# Patient Record
Sex: FEMALE | Race: BLACK OR AFRICAN AMERICAN | NOT HISPANIC OR LATINO | Employment: OTHER | URBAN - METROPOLITAN AREA
[De-identification: names, ages, dates, MRNs, and addresses within clinical notes are randomized per-mention and may not be internally consistent; named-entity substitution may affect disease eponyms.]

---

## 2017-11-19 ENCOUNTER — HOSPITAL ENCOUNTER (OUTPATIENT)
Facility: HOSPITAL | Age: 77
Discharge: HOME HEALTH | End: 2017-11-20
Attending: EMERGENCY MEDICINE | Admitting: INTERNAL MEDICINE
Payer: MEDICARE

## 2017-11-19 DIAGNOSIS — I16.0 HYPERTENSIVE URGENCY: ICD-10-CM

## 2017-11-19 DIAGNOSIS — R42 DIZZINESS: ICD-10-CM

## 2017-11-19 DIAGNOSIS — I20.89 ANGINAL EQUIVALENT: Primary | ICD-10-CM

## 2017-11-19 DIAGNOSIS — R06.02 SOB (SHORTNESS OF BREATH): ICD-10-CM

## 2017-11-19 DIAGNOSIS — R07.9 CHEST PAIN: ICD-10-CM

## 2017-11-19 PROBLEM — D64.9 NORMOCYTIC ANEMIA: Status: ACTIVE | Noted: 2017-11-19

## 2017-11-19 PROBLEM — N18.30 CKD (CHRONIC KIDNEY DISEASE) STAGE 3, GFR 30-59 ML/MIN: Status: ACTIVE | Noted: 2017-11-19

## 2017-11-19 LAB
ALBUMIN SERPL BCP-MCNC: 3.4 G/DL
ALP SERPL-CCNC: 71 U/L
ALT SERPL W/O P-5'-P-CCNC: 9 U/L
AMPHET+METHAMPHET UR QL: NEGATIVE
ANION GAP SERPL CALC-SCNC: 10 MMOL/L
APTT BLDCRRT: 24 SEC
AST SERPL-CCNC: 15 U/L
BARBITURATES UR QL SCN>200 NG/ML: NEGATIVE
BASOPHILS # BLD AUTO: 0.02 K/UL
BASOPHILS NFR BLD: 0.4 %
BENZODIAZ UR QL SCN>200 NG/ML: NEGATIVE
BILIRUB SERPL-MCNC: 0.4 MG/DL
BILIRUB UR QL STRIP: NEGATIVE
BNP SERPL-MCNC: 75 PG/ML
BUN SERPL-MCNC: 20 MG/DL
BZE UR QL SCN: NEGATIVE
CALCIUM SERPL-MCNC: 9.3 MG/DL
CANNABINOIDS UR QL SCN: NEGATIVE
CHLORIDE SERPL-SCNC: 107 MMOL/L
CLARITY UR: CLEAR
CO2 SERPL-SCNC: 21 MMOL/L
COLOR UR: YELLOW
CREAT SERPL-MCNC: 1.3 MG/DL
CREAT UR-MCNC: 268.1 MG/DL
DIFFERENTIAL METHOD: ABNORMAL
EOSINOPHIL # BLD AUTO: 0 K/UL
EOSINOPHIL NFR BLD: 0.4 %
ERYTHROCYTE [DISTWIDTH] IN BLOOD BY AUTOMATED COUNT: 14.5 %
EST. GFR  (AFRICAN AMERICAN): 45 ML/MIN/1.73 M^2
EST. GFR  (NON AFRICAN AMERICAN): 39 ML/MIN/1.73 M^2
FERRITIN SERPL-MCNC: 23 NG/ML
FOLATE SERPL-MCNC: 8.2 NG/ML
GLUCOSE SERPL-MCNC: 97 MG/DL
GLUCOSE UR QL STRIP: NEGATIVE
HCT VFR BLD AUTO: 34.9 %
HGB BLD-MCNC: 10.9 G/DL
HGB UR QL STRIP: NEGATIVE
INR PPP: 1
IRON SERPL-MCNC: 71 UG/DL
KETONES UR QL STRIP: ABNORMAL
LEUKOCYTE ESTERASE UR QL STRIP: NEGATIVE
LYMPHOCYTES # BLD AUTO: 1 K/UL
LYMPHOCYTES NFR BLD: 23 %
MCH RBC QN AUTO: 28.2 PG
MCHC RBC AUTO-ENTMCNC: 31.2 G/DL
MCV RBC AUTO: 90 FL
METHADONE UR QL SCN>300 NG/ML: NEGATIVE
MONOCYTES # BLD AUTO: 0.3 K/UL
MONOCYTES NFR BLD: 6.4 %
NEUTROPHILS # BLD AUTO: 3.1 K/UL
NEUTROPHILS NFR BLD: 69.6 %
NITRITE UR QL STRIP: NEGATIVE
OPIATES UR QL SCN: NEGATIVE
PCP UR QL SCN>25 NG/ML: NEGATIVE
PH UR STRIP: 6 [PH] (ref 5–8)
PLATELET # BLD AUTO: 217 K/UL
PMV BLD AUTO: 11.2 FL
POTASSIUM SERPL-SCNC: 4.2 MMOL/L
PROT SERPL-MCNC: 7.2 G/DL
PROT UR QL STRIP: NEGATIVE
PROTHROMBIN TIME: 10.6 SEC
RBC # BLD AUTO: 3.86 M/UL
SATURATED IRON: 23 %
SODIUM SERPL-SCNC: 138 MMOL/L
SP GR UR STRIP: 1.02 (ref 1–1.03)
TOTAL IRON BINDING CAPACITY: 306 UG/DL
TOXICOLOGY INFORMATION: NORMAL
TRANSFERRIN SERPL-MCNC: 207 MG/DL
TROPONIN I SERPL DL<=0.01 NG/ML-MCNC: 0.01 NG/ML
TROPONIN I SERPL DL<=0.01 NG/ML-MCNC: 0.01 NG/ML
TSH SERPL DL<=0.005 MIU/L-ACNC: 0.87 UIU/ML
URN SPEC COLLECT METH UR: ABNORMAL
UROBILINOGEN UR STRIP-ACNC: 1 EU/DL
VIT B12 SERPL-MCNC: 296 PG/ML
WBC # BLD AUTO: 4.52 K/UL

## 2017-11-19 PROCEDURE — 82746 ASSAY OF FOLIC ACID SERUM: CPT

## 2017-11-19 PROCEDURE — 36415 COLL VENOUS BLD VENIPUNCTURE: CPT

## 2017-11-19 PROCEDURE — 84484 ASSAY OF TROPONIN QUANT: CPT

## 2017-11-19 PROCEDURE — 83880 ASSAY OF NATRIURETIC PEPTIDE: CPT

## 2017-11-19 PROCEDURE — 84484 ASSAY OF TROPONIN QUANT: CPT | Mod: 91

## 2017-11-19 PROCEDURE — 81003 URINALYSIS AUTO W/O SCOPE: CPT

## 2017-11-19 PROCEDURE — G0378 HOSPITAL OBSERVATION PER HR: HCPCS

## 2017-11-19 PROCEDURE — 85610 PROTHROMBIN TIME: CPT

## 2017-11-19 PROCEDURE — 63600175 PHARM REV CODE 636 W HCPCS: Performed by: INTERNAL MEDICINE

## 2017-11-19 PROCEDURE — 93005 ELECTROCARDIOGRAM TRACING: CPT

## 2017-11-19 PROCEDURE — 93010 ELECTROCARDIOGRAM REPORT: CPT | Mod: ,,, | Performed by: INTERNAL MEDICINE

## 2017-11-19 PROCEDURE — 85730 THROMBOPLASTIN TIME PARTIAL: CPT

## 2017-11-19 PROCEDURE — 84443 ASSAY THYROID STIM HORMONE: CPT

## 2017-11-19 PROCEDURE — 85025 COMPLETE CBC W/AUTO DIFF WBC: CPT

## 2017-11-19 PROCEDURE — 25000003 PHARM REV CODE 250: Performed by: INTERNAL MEDICINE

## 2017-11-19 PROCEDURE — 82607 VITAMIN B-12: CPT

## 2017-11-19 PROCEDURE — 82728 ASSAY OF FERRITIN: CPT

## 2017-11-19 PROCEDURE — 83540 ASSAY OF IRON: CPT

## 2017-11-19 PROCEDURE — 80307 DRUG TEST PRSMV CHEM ANLYZR: CPT

## 2017-11-19 PROCEDURE — 80053 COMPREHEN METABOLIC PANEL: CPT

## 2017-11-19 PROCEDURE — 99285 EMERGENCY DEPT VISIT HI MDM: CPT | Mod: 25

## 2017-11-19 RX ORDER — DOCUSATE SODIUM 100 MG/1
100 CAPSULE, LIQUID FILLED ORAL 2 TIMES DAILY
Status: DISCONTINUED | OUTPATIENT
Start: 2017-11-19 | End: 2017-11-20 | Stop reason: HOSPADM

## 2017-11-19 RX ORDER — HEPARIN SODIUM 5000 [USP'U]/ML
5000 INJECTION, SOLUTION INTRAVENOUS; SUBCUTANEOUS EVERY 8 HOURS
Status: DISCONTINUED | OUTPATIENT
Start: 2017-11-19 | End: 2017-11-20 | Stop reason: HOSPADM

## 2017-11-19 RX ORDER — ONDANSETRON 2 MG/ML
4 INJECTION INTRAMUSCULAR; INTRAVENOUS EVERY 8 HOURS PRN
Status: DISCONTINUED | OUTPATIENT
Start: 2017-11-19 | End: 2017-11-20 | Stop reason: HOSPADM

## 2017-11-19 RX ORDER — NAPROXEN SODIUM 220 MG/1
81 TABLET, FILM COATED ORAL DAILY
Status: DISCONTINUED | OUTPATIENT
Start: 2017-11-19 | End: 2017-11-20 | Stop reason: HOSPADM

## 2017-11-19 RX ORDER — FERROUS SULFATE 325(65) MG
325 TABLET, DELAYED RELEASE (ENTERIC COATED) ORAL 2 TIMES DAILY
Status: DISCONTINUED | OUTPATIENT
Start: 2017-11-19 | End: 2017-11-20 | Stop reason: HOSPADM

## 2017-11-19 RX ORDER — ATORVASTATIN CALCIUM 20 MG/1
20 TABLET, FILM COATED ORAL DAILY
Status: DISCONTINUED | OUTPATIENT
Start: 2017-11-20 | End: 2017-11-20 | Stop reason: HOSPADM

## 2017-11-19 RX ORDER — LOSARTAN POTASSIUM 50 MG/1
100 TABLET ORAL DAILY
Status: DISCONTINUED | OUTPATIENT
Start: 2017-11-19 | End: 2017-11-20 | Stop reason: HOSPADM

## 2017-11-19 RX ORDER — AMLODIPINE BESYLATE 5 MG/1
10 TABLET ORAL DAILY
Status: DISCONTINUED | OUTPATIENT
Start: 2017-11-19 | End: 2017-11-20 | Stop reason: HOSPADM

## 2017-11-19 RX ORDER — SODIUM CHLORIDE 9 MG/ML
INJECTION, SOLUTION INTRAVENOUS CONTINUOUS
Status: DISCONTINUED | OUTPATIENT
Start: 2017-11-19 | End: 2017-11-20

## 2017-11-19 RX ADMIN — HEPARIN SODIUM 5000 UNITS: 5000 INJECTION, SOLUTION INTRAVENOUS; SUBCUTANEOUS at 10:11

## 2017-11-19 RX ADMIN — ASPIRIN 81 MG 81 MG: 81 TABLET ORAL at 04:11

## 2017-11-19 RX ADMIN — SODIUM CHLORIDE: 0.9 INJECTION, SOLUTION INTRAVENOUS at 06:11

## 2017-11-19 RX ADMIN — LOSARTAN POTASSIUM 100 MG: 50 TABLET, FILM COATED ORAL at 04:11

## 2017-11-19 RX ADMIN — AMLODIPINE BESYLATE 10 MG: 5 TABLET ORAL at 04:11

## 2017-11-19 RX ADMIN — DOCUSATE SODIUM 100 MG: 100 CAPSULE, LIQUID FILLED ORAL at 08:11

## 2017-11-19 RX ADMIN — FERROUS SULFATE TAB EC 325 MG (65 MG FE EQUIVALENT) 325 MG: 325 (65 FE) TABLET DELAYED RESPONSE at 08:11

## 2017-11-19 NOTE — ED TRIAGE NOTES
Pt reports chest discomfort, with dizziness x 4 days, states thought it would get better but did not. Pt denies dizziness at this time, rates chest discomfort 7/10. Pt denies shortness of breath, n/v/d. Pt states symptoms worsens with ambulation and exertion.

## 2017-11-19 NOTE — MEDICAL/APP STUDENT
Chief Complaint: Worsening dizziness x2 days    HPI: Ms. Crystal Alvarado is a 78 yo AAF with a history of HTN, SSS, HLD, CAD, CKDIII, bradycardia, Tobacco use, and arthritis, who presented to [facility] ED on 11/19 with worsening dizziness for the last 2 days. She states that she last felt like her normal self prior to the summer, but has had dizziness with standing and movement, typically 1-2 episodes per day. She states that for the last 2 days she has had more frequent episodes, with dizziness any time she is not sitting or laying down. She describes the dizziness as a feeling of about to pass out, but denies vertigo, and loss of consciousness. She states that during these periodic episodes she usually sits or lays down, which improves her symptoms. She also endorses some tunnel vision, palpitations, SOB, and blurred vision associated with these dizzy spells, especially in the last week.   She indicates that she has had a decreased appetite for the last 2 months, that she does not attribute to anything specific. She denies any nausea, vomiting, diarrhea, and abdominal pain. She reports some right arm pain, exacerbated with movement. She denies any recent illness, sick contacts, or recent travel. She does indorse some infrequent hot flashes, which she says she has experienced for years. She reports her only medical problem is arthritis, and when questioned further states that she does have a heart problem, but does not know what problem specifically.  She reports a history of smoking 4-5 cigarettes a day for 5 years, but states that she quit 2 years ago. She denies alcohol and illicit/recreational drug use. She reports that she lives with her daughter, but that she is able to accomplish all the activities of daily living with minimal to no assistance and does not use any durable medical equipment. She reports that she is compliant with her 4 daily medications, though admits that doesn't remember the name of the  medications that she takes. She states that she did not take them today due to feeling poorly.    Past Medical History:   HTN  Sick Sinus Syndrome  HLD  CAD  CKD Stage III  Bradycardia  Tobacco use  Arthritis    Past Surgical History:  Hysterectomy    Immunizations/Screening:  Mammogram, Colonoscopy, PAP: no longer indicated by age  DEXA: up to date  Tdap up to date, date unknown  Flu not up to date, had flu vaccine last year  Pneumovac up to date    Allergies:   NKDA    Home medications:  Amlodipine 10mg PO QD  Atorvastatin 20mg PO QD  Aspirin 81mg PO QD  Losartan 100mg PO QD    Family History:  No pertinent family history.    Social History:   Denies current tobacco use. 4-5 cigarettes/day q5qunks, quit 2 years ago  Denies alcohol use  Denies illicit/recreational drug use    Review of Systems:  General: denies fever, chills, recent illness, denies cold intolerance, endorses infrequent hot flashes  Neuro: denies numbness, tingling, decreased sensation, denies weakness and loss of consciousness  Head: denies recent trauma, denies headaches  Eyes: endorses some tunnel vision and blurred vision during dizzy spells  Ears: denies ear pain, denies changes in hearing  Nose: denies nasal drainage or congestion  Throat: denies throat pain, cough, and dysphagia  Cardio: endorses periodic palpitations associated with dizzy spells, denies chest pain  Respiratory: endorses SOB periodically, denies recent URI  Abd: denies N/V, denies diarrhea, denies abdominal pain or discomfort, denies bowel habit changes, denies blood in stool  Gu: denies recent bladder changes, denies hematuria, dysuria  Ext: R arm pain-increased with movement, denies numbness, tingling, denies changes in sensation    Objective:  Vitals:  Temp: 98.9  Pulse: 56-74  RR: 16-20  Bp: 170-200/66-82  SpO2: 100% on RA    Physical Exam:  General: alert and awake, NAD, sitting comfortably in bed  Neuro: A&O x3, no focal deficits, strength 5/5 in UE & LE  bilaterally  Head: normocephalic, atraumatic  Eyes: EOMI, pupils equal and reactive to light, anicteric sclera  Ears: no erythema, no drainage noted  Throat: no oropharyngeal erythema or exudates, oral mucosa moist and pink  Neck: supple, trachea midline, no LAD, JVP 5-6cm  Cardiac: RRR, Grade II systolic murmur at L upper sternal border  Lung: CTAB, no crackles, wheezes, or rhonchi, no accessory muscle use, unlabored breathing  Abd: soft, nondistended, nontender to palpation, normoactive bowel sounds in all 4 quadrants  Skin: warm, dry, intact, no lesions or rashes noted  Ext: warm, well-perfused, trace edema of lower extremities bilaterally, no cyanosis, no clubbing  Pulses: 2+ dorsalis pedis and radial pulses bilaterally  Psych: appropriate demeanor and affect on exam    Labs:  WBC: 4.52  RBC: 3.86  Hgb: 10.9  Hct: 34.9  MCV: 90  MCH: 28.2  MCHC: 31.2  RDW: 14.5  Plt: 217  MPV: 11.2  Gran%: 69.6  Gran#: 3.1  Lymph%: 23.0  Lymph#: 1.0  Mono%: 6.4  Mono#: 0.3  Eos%: 0.4  Eos#: 0.0  Baso%: 0.4  Baso#: 0.02    Protime: 10.6  INR: 1.0  aPTT: 24.0    Na: 138  K: 4.2  Cl: 107  Co2: 21  Anion Gap: 10  BUN: 20  Crt: 1.3  GFR: 45  Glucose: 97  Ca: 9.3  Alk Phos: 71  Total Protein: 7.2  Albumin: 3.4  Total Bili: 0.4  AST: 15  ALT: 9    Troponin I: 0.010  BNP: 75    Urine Crt: 268.1    Urine Tox: negative  U/A: urine, clean catch   Color: yellow   pH: 6.0   Specific gravity: 1.025   Appearance: clear   Protein: negative   Glucose: negative   Ketones: trace   Occult blood: negative   Nitrite: negative   Urobilinogen: 1.0   Bilirubin: negative   Leukocytes: negative      Imagin/19 CXR:   The cardiomediastinal silhouette is prominent, similar to the previous exam noting calcification of the aortic arch. There is no pleural effusion.  The trachea is midline. The lungs are symmetrically expanded bilaterally with mildly coarse interstitial attenuation and minimal basilar subsegmental atelectasis. No large focal consolidation  seen.  There is no pneumothorax.  The osseous structures are remarkable for degenerative changes of the spine and shoulders. Stable chronic findings, no acute cardiopulmonary process.    11/19 EKG:    Normal sinus rhythm    Assessment:  Ms. Crystal Alvarado is a 76yo AA female with a history of HTN, HLD, and CAD, who presented with worsening dizziness for the last 2 days.    Plan:  1. Dizziness   -2/2 cardiac vs venous insufficiency vs dehydration vs thyroid vs medication induced etiology   -Likely some combination of the above   -orthostatics negative on admit, will repeat in the AM   - CBC & CMP Qd, Hgb A1c, B12, Folate, iron studies ordered, will follow up   - PT/OT consulted   -Cardiac    -EKG shows normal sinus rhythm    -CXR shows chronic findings, no acute cardiopulmonary processes    - Troponin x1 negative, will repeat x2    -BNP 75    -Will follow up TTE tomorrow    -admittied with cardiac telemetry for monitoring    -cardiology consulted, will follow up recomendations   -Venous insufficiency    -recommended use of compression socks during admit to improve stasis    -consider discharge on fludrocortisone   -dehydration    -will start IVF for rehydration   -thyroid    -will follow up thyroid function tests   -medication induced    -home medications restarted as necessary for BP control, will discontinue any unnecessary medications to optimize regimen  2. Protein Gap   -protein gap 3.8 on admission   -will follow up Hep C antibody, HIV 1 & 2 antibodies   -will continue to monitor  3. CAD    -CAD reported in pt chart   -Cath in 2015 indicated no concerning disease   -Home dose Aspirin 81mg continued on admit  4. HTN   -Pt reports that she did not take her meds today   -Hypertensive in the ED with SBP up to 200   -Continued home dose of Amlodpine 10mg PO Qd and Losartan 100mg PO QD  5. HLD   -Continued home dose atorvastatin 20mg PO QD  6. Arthritis   -Reports right arm pain, increased with activity   -Will  continue to monitor

## 2017-11-19 NOTE — ED PROVIDER NOTES
"Encounter Date: 11/19/2017       History     Chief Complaint   Patient presents with    Dizziness     dizziness and a "funny feeling in my chest" x 3 days, denies n/v, intermittent     76yo female with pmhx of HTN and HLD is here for SOB and CP on exertion for the past three to four days.  Pt states that her anterior chest "feels funny" when she gets up and walks.  No fever/chills hemoptysis, abd pain, n/v/d, dysuria, or leg swelling.  She has an occasional nonproductive cough.  She reports occasional palpitations when she is walking.  Pt reports that she has also felt "like I'm going to pass out" when she is walking.  No falls or recent injury.  Pt denies headache, numbness/tingling.  She reports that she has been taking her prescription medications as prescribed.  She reports that for the past several weeks, she has had decreased appetite, but has been drinking fluids without difficulty.        The history is provided by the patient and medical records.   Chest Pain   The current episode started several days ago. Chest pain occurs intermittently. The chest pain is unchanged. The pain is associated with exertion. Quality: "feels funny" "tight" The pain does not radiate. Chest pain is worsened by exertion. Primary symptoms include fatigue, shortness of breath, cough and palpitations. Pertinent negatives for primary symptoms include no fever, no abdominal pain, no nausea, no vomiting, no dizziness and no altered mental status.   The palpitations also occurred with shortness of breath. The palpitations did not occur with dizziness.     Associated symptoms include near-syncope.   Pertinent negatives for associated symptoms include no diaphoresis, no lower extremity edema, no numbness, no orthopnea, no paroxysmal nocturnal dyspnea and no weakness. She tried nothing for the symptoms. Risk factors include being elderly and obesity.   Her past medical history is significant for hyperlipidemia and hypertension.   Pertinent " negatives for past medical history include no CAD.   Pertinent negatives for family medical history include: no CAD.     Review of patient's allergies indicates:  No Known Allergies  Past Medical History:   Diagnosis Date    Arthritis     Hyperlipidemia     Hypertension      Past Surgical History:   Procedure Laterality Date    HYSTERECTOMY N/A      History reviewed. No pertinent family history.  Social History   Substance Use Topics    Smoking status: Former Smoker    Smokeless tobacco: Never Used    Alcohol use Yes     Review of Systems   Constitutional: Positive for activity change, appetite change and fatigue. Negative for diaphoresis and fever.   HENT: Negative for congestion.    Eyes: Negative for visual disturbance.   Respiratory: Positive for cough and shortness of breath.    Cardiovascular: Positive for chest pain, palpitations and near-syncope. Negative for orthopnea.   Gastrointestinal: Negative for abdominal pain, nausea and vomiting.   Genitourinary: Negative for dysuria.   Musculoskeletal: Negative for back pain.   Skin: Negative for rash.   Allergic/Immunologic: Negative for immunocompromised state.   Neurological: Positive for light-headedness. Negative for dizziness, speech difficulty, weakness and numbness.   Psychiatric/Behavioral: Negative for confusion.       Physical Exam     Initial Vitals [11/19/17 1243]   BP Pulse Resp Temp SpO2   (!) 180/66 68 16 98.9 °F (37.2 °C) 100 %      MAP       104         Physical Exam    Nursing note and vitals reviewed.  Constitutional: She appears well-developed and well-nourished. She is active and cooperative. She is easily aroused.  Non-toxic appearance. She does not have a sickly appearance. She does not appear ill. No distress.   HENT:   Head: Normocephalic and atraumatic.   Eyes: Conjunctivae and EOM are normal.   Neck: Normal range of motion. Neck supple.   Cardiovascular: Normal rate and regular rhythm.   Murmur heard.   Systolic murmur is present    Pulses:       Radial pulses are 2+ on the right side, and 2+ on the left side.   Pulmonary/Chest: Effort normal. No accessory muscle usage. She has decreased breath sounds (diffuse bilaterally).   Abdominal: Soft. Normal appearance and bowel sounds are normal. There is no tenderness.   Neurological: She is alert, oriented to person, place, and time and easily aroused. She has normal strength. GCS eye subscore is 4. GCS verbal subscore is 5. GCS motor subscore is 6.   Skin: Skin is warm, dry and intact. No bruising and no rash noted.   Psychiatric: She has a normal mood and affect. Her speech is normal and behavior is normal. Judgment and thought content normal. Cognition and memory are normal.         ED Course   Procedures  Labs Reviewed   CBC W/ AUTO DIFFERENTIAL - Abnormal; Notable for the following:        Result Value    RBC 3.86 (*)     Hemoglobin 10.9 (*)     Hematocrit 34.9 (*)     MCHC 31.2 (*)     All other components within normal limits   COMPREHENSIVE METABOLIC PANEL - Abnormal; Notable for the following:     CO2 21 (*)     Albumin 3.4 (*)     ALT 9 (*)     eGFR if  45 (*)     eGFR if non  39 (*)     All other components within normal limits   URINALYSIS - Abnormal; Notable for the following:     Ketones, UA Trace (*)     All other components within normal limits   B-TYPE NATRIURETIC PEPTIDE   TROPONIN I   PROTIME-INR   APTT   TOXICOLOGY SCREEN, URINE, RANDOM (COMPLIANCE)   DRUG SCREEN PANEL, URINE EMERGENCY         Imaging Results          X-Ray Chest 1 View (Final result)  Result time 11/19/17 13:26:51    Final result by So Kendall MD (11/19/17 13:26:51)                 Impression:      Stable chronic findings, no acute cardiopulmonary process.      Electronically signed by: SO KENDALL MD  Date:     11/19/17  Time:    13:26              Narrative:    Chest one view    Indication:Shortness of breath    Comparison:10/30/2015    Findings:  The  cardiomediastinal silhouette is prominent, similar to the previous exam noting calcification of the aortic arch.  There is no pleural effusion.  The trachea is midline.  The lungs are symmetrically expanded bilaterally with mildly coarse interstitial attenuation and minimal basilar subsegmental atelectasis. No large focal consolidation seen.  There is no pneumothorax.  The osseous structures are remarkable for degenerative changes of the spine and shoulders.                                   Medical Decision Making:   History:   Old Medical Records: I decided to obtain old medical records.  Old Records Summarized: records from clinic visits.       <> Summary of Records: 9/29/17- Progress note:  Sick Sinus Syndrome  Initial Assessment:   78yo female here for exertional SOB and CP.    Differential Diagnosis:   STEMI, Non-stemi, dysrhythmia, CHF, electrolyte derangement, dehydration, infection  Clinical Tests:   Lab Tests: Ordered and Reviewed  Radiological Study: Ordered and Reviewed  Medical Tests: Ordered and Reviewed  ED Management:  Labs, EKG, CXR  Other:   I have discussed this case with another health care provider.       <> Summary of the Discussion: [facility]- will accept patient.   I feel the pt has anginal equivalent.  Troponin, EKG, and CXR negative for acute changes.  Pt will be placed in observation with  for cardiac rule out.                    ED Course      Clinical Impression:   The primary encounter diagnosis was Anginal equivalent. Diagnoses of SOB (shortness of breath), Dizziness, and Chest pain were also pertinent to this visit.                           Maddy Harrison, KIMBERLEE  11/19/17 [id number]

## 2017-11-19 NOTE — H&P
"[facility] Internal Medicine History and Physical - Resident Note    Admitting Team: [facility] Internal Medicine Team B  Attending Physician: Dr. Berto Arndt  Resident: Peg Lund  Interns: Silver and Asde    Date of Admit: 11/19/2017    Chief Complaint     Dizziness (dizziness and a "funny feeling in my chest" x 3 days, denies n/v, intermittent)   for two days.    Subjective:      History of Present Illness:  Crystal Alvarado is a 77 y.o. female who  has a history of Arthritis; Hyperlipidemia; and Hypertension.. The patient presented to [facility] on 11/19/2017 with a primary complaint of Dizziness (dizziness and a "funny feeling in my chest" x 3 days, denies n/v, intermittent)    "Dizziness" when up moving around is feeling like she is going to faint when she is up walking around. Has had episodes like this before but is feeling worse in the last day. Since the summer has had episodes of dizziness 1-2x/day. Yesterday morning she had multiple episodes of dizziness. Reports that she should've come to ED yesterday but had no one to bring her. Orthostatics negative in ED- had no symptoms. When she has this feeling she usually goes and lies down at home until it resolves. Shortness of breath, a "bump" feeling in her chest with dizziness. Has some blacking out of vision. She spends most of her time sitting, leaves the house only once a week.  Also endorses blurry vision x 1 week. Has also had decreased appetite for past 2 months. No inciting event that lead to decreased appetite. Has sharp pain in R arm/shoulder intermittently, worse when uses arm.   No abdominal pain, chest pain, no nausea/vomiting, no diarrhea/constipation. Reports decreased intake of water as well. Denies blood in stool, denies dysuria. No cold Sx.  Says her only medical history is arthritis. Was previously admitted for similar symptoms but forgets what they told her about her heart. Per chart checking, had chest pain and cardiac cath " that was clear in 2015. Endorses hot flashes occasionally. Has not had any recent medication changes.       Quit smoking 2 years ago, smoked 4-5 cigarettes a day for about 5 years or more when was a smoker. No alcohol, no other drugs. Lives at home with her daughter. Does not use any DME. Able to independently perform ADLs. She doesn't cook much anymore but still gets and plates her own food.     Takes 3 medicines a day plus a baby aspirin, and did not take any of them yet today. Nto aware of any allergies to meds.  [phone] daughter is Karsten. Code status discussed with patient and family on admission, code is DNR.       Past Medical History:  Past Medical History:   Diagnosis Date    Arthritis     Hyperlipidemia     Hypertension        Past Surgical History:  Past Surgical History:   Procedure Laterality Date    HYSTERECTOMY N/A        Allergies:  Review of patient's allergies indicates:  No Known Allergies    Home Medications:  Prior to Admission medications    Medication Sig Start Date End Date Taking? Authorizing Provider   amlodipine (NORVASC) 10 MG tablet Take 10 mg by mouth once daily.    Historical Provider, MD   aspirin 81 MG Chew Take 1 tablet (81 mg total) by mouth once daily. 8/12/15 9/29/17  Melina Overton MD   atorvastatin (LIPITOR) 20 MG tablet Take 1 tablet (20 mg total) by mouth once daily. 8/12/15 9/29/17  Melina Overton MD   azelastine (ASTELIN) 137 mcg (0.1 %) nasal spray INHALE 1 SPRAY IN EACH NOSTRIL TWICE A DAY 5/17/16   Historical Provider, MD   fluticasone (FLONASE) 50 mcg/actuation nasal spray 2 sprays by Each Nare route once daily. 8/16/16   Cecilio Kline MD   losartan (COZAAR) 100 MG tablet TAKE 1 TABLET (100 MG TOTAL) BY MOUTH ONCE DAILY. 2/23/17   Cecilio Kline MD   nitroGLYCERIN (NITROSTAT) 0.4 MG SL tablet Place 1 tablet (0.4 mg total) under the tongue every 5 (five) minutes as needed for Chest pain (and notify MD). 5/2/17 5/2/18  Cecilio MCDUFFIE  "MD Eliud     Family History:  History reviewed. No pertinent family history.    Social History:  Social History   Substance Use Topics    Smoking status: Former Smoker    Smokeless tobacco: Never Used    Alcohol use Yes       Review of Systems:  Pertinent positives and negatives listed in HPI. All other systems are reviewed and are negative.    Health Maintaince :   Primary Care Physician: Dr. Shantell Goff  Immunizations:   TDap unknown up to date.  Influenza is not up to date, had one last year.  Pneumovax is up to date, provided by PCP.  Cancer Screening:  PAP; mammogram; colonoscopy no longer indicated by age.       Objective:   Last 24 Hour Vital Signs:  BP  Min: 170/82  Max: 200/66  Temp  Av.9 °F (37.2 °C)  Min: 98.9 °F (37.2 °C)  Max: 98.9 °F (37.2 °C)  Pulse  Av  Min: 57  Max: 74  Resp  Av.8  Min: 16  Max: 20  SpO2  Av %  Min: 100 %  Max: 100 %  Height  Av' 5" (165.1 cm)  Min: 5' 5" (165.1 cm)  Max: 5' 5" (165.1 cm)  Weight  Av.5 kg (184 lb)  Min: 83.5 kg (184 lb)  Max: 83.5 kg (184 lb)  Body mass index is 30.62 kg/m².  No intake/output data recorded.    Physical Examination:  General: Alert and awake in no apparent distress  Head:  Normocephalic and atraumatic  Eyes:  PERRL; EOMi with anicteric sclera and clear conjunctivae  Mouth:  Oropharynx clear and without exudate; moist mucous membranes  Cardio:  Regular rate and rhythm with normal S1 and S2; 1/6 systolic murmur at upper L sternal border. JVP 5-6cm  Resp:  CTAB; respirations unlabored; no wheezes, crackles or rhonchi  Abdom: Soft, NTND with normoactive bowel sounds  Extrem: Warm and well-perfused with no clubbing, cyanosis. Trace edema of bilateral lower extremities  Skin:  No rashes, lesions, or color changes  Pulses: 2+ and symmetric distally  Neuro:  AAOx3; cooperative and pleasant with no focal deficits    Laboratory:  Most Recent Data:  CBC: Lab Results   Component Value Date    WBC 4.52 2017    HGB 10.9 " (L) 11/19/2017    HCT 34.9 (L) 11/19/2017     11/19/2017    MCV 90 11/19/2017    RDW 14.5 11/19/2017     BMP: Lab Results   Component Value Date     11/19/2017    K 4.2 11/19/2017     11/19/2017    CO2 21 (L) 11/19/2017    BUN 20 11/19/2017    CREATININE 1.3 11/19/2017    GLU 97 11/19/2017    CALCIUM 9.3 11/19/2017                 LFTs: Lab Results   Component Value Date    PROT 7.2 11/19/2017    ALBUMIN 3.4 (L) 11/19/2017    BILITOT 0.4 11/19/2017    AST 15 11/19/2017    ALKPHOS 71 11/19/2017    ALT 9 (L) 11/19/2017     Coags:   Lab Results   Component Value Date    INR 1.0 11/19/2017       DM: Lab Results   Component Value Date          CREATININE 1.3 11/19/2017       Anemia: No results found for: IRON, TIBC, FERRITIN, [id number], FOLATE  Cardiac: Lab Results   Component Value Date    TROPONINI 0.010 11/19/2017    BNP 75 11/19/2017       Trended Cardiac Data:    Recent Labs  Lab 11/19/17  1325   TROPONINI 0.010   BNP 75       Microbiology Data:  No data    Other Laboratory Data:  N/A    Other Results:  EKG (my interpretation): NSR    Radiology:  Imaging Results          X-Ray Chest 1 View (Final result)  Result time 11/19/17 13:26:51    Final result by So Kendall MD (11/19/17 13:26:51)                 Impression:      Stable chronic findings, no acute cardiopulmonary process.      Electronically signed by: SO KENDALL MD  Date:     11/19/17  Time:    13:26              Narrative:    Chest one view    Indication:Shortness of breath    Comparison:10/30/2015    Findings:  The cardiomediastinal silhouette is prominent, similar to the previous exam noting calcification of the aortic arch.  There is no pleural effusion.  The trachea is midline.  The lungs are symmetrically expanded bilaterally with mildly coarse interstitial attenuation and minimal basilar subsegmental atelectasis. No large focal consolidation seen.  There is no pneumothorax.  The osseous structures are remarkable for  degenerative changes of the spine and shoulders.                                 Assessment:     Crystal Alvarado is a 77 y.o. female with:  Patient Active Problem List    Diagnosis Date Noted    Anginal equivalent 11/19/2017    Coronary artery disease involving native coronary artery of native heart without angina pectoris 02/16/2016    Hyperlipidemia     Cardiac ischemia 08/12/2015    Essential hypertension 08/11/2015    SSS (sick sinus syndrome) 08/10/2015    Dizziness and giddiness 08/10/2015    Bradycardia 08/10/2015    Light headedness 08/10/2015    Sick sinus syndrome 08/10/2015    Chest pain 08/09/2015    HTN (hypertension) 08/09/2015    Tobacco abuse 08/09/2015    Pain in the chest 08/09/2015        Plan:     Dizziness  - consider cardiac, venous insufficiency, dehydration, thyroid as etiology  - cardiac workup as below:  - EKG with NSR  - troponin negative x1, will continue to trend  - BNP 75  - CXR with stable chronic findings, no acute cardiopulmonary process  - Echo for tomorrow  - Admit to tele  - Thyroid function tests  - Iron studies  - B 12, folate  - HgbA1c  - daily CBC, CMP  - Rehydration with IVF  - Orthostatics negative on admission; repeat orthostatics in AM  - If does not improve, consider discharging on fludrocortizone.   - Will consult cardiology, f/u recs  - PT/OT eval    History of CAD per chart  - clean cardiac cath in 2015  - continue home ASA 81mg    Protein Gap of 3.8 on Admission  - Hep C antibody  - HIV 1 and 2 antibodies    Hypertension  - Had not taken BP meds on day of admission  - Continue home amlodipine 10mg, losartan 100mg daily    Hyperlipidemia  - Continue home atorvastatin 20mg daily     Arthritis  - Some R arm pain with movement  - Continue to monitor    Diet: Adult regular cardiac  Ppx: subQ heparin  Dispo: pending dizziness workup and clinical improvement    Code Status:     DNR, discussed with patient and family on admission.    Abril Ray  [facility]  Internal Medicine HO-I  [facility] Internal Medicine Service    [facility] Medicine Hospitalist Pager numbers:   [facility] Hospitalist Medicine Team A (Tom/Boubacar): [phone]  [place] Hospitalist Medicine Team B (Yris/Kelvin):  [phone]

## 2017-11-20 VITALS
SYSTOLIC BLOOD PRESSURE: 176 MMHG | HEART RATE: 65 BPM | RESPIRATION RATE: 20 BRPM | HEIGHT: 65 IN | TEMPERATURE: 99 F | DIASTOLIC BLOOD PRESSURE: 74 MMHG | OXYGEN SATURATION: 99 % | BODY MASS INDEX: 30.66 KG/M2 | WEIGHT: 184 LBS

## 2017-11-20 PROBLEM — I16.1 HYPERTENSIVE EMERGENCY WITHOUT CONGESTIVE HEART FAILURE: Status: ACTIVE | Noted: 2017-11-20

## 2017-11-20 LAB
ALBUMIN SERPL BCP-MCNC: 3.3 G/DL
ALP SERPL-CCNC: 73 U/L
ALT SERPL W/O P-5'-P-CCNC: 7 U/L
ANION GAP SERPL CALC-SCNC: 6 MMOL/L
AST SERPL-CCNC: 10 U/L
BASOPHILS # BLD AUTO: 0.02 K/UL
BASOPHILS NFR BLD: 0.5 %
BILIRUB SERPL-MCNC: 0.6 MG/DL
BUN SERPL-MCNC: 16 MG/DL
CALCIUM SERPL-MCNC: 8.9 MG/DL
CHLORIDE SERPL-SCNC: 108 MMOL/L
CHOLEST SERPL-MCNC: 136 MG/DL
CHOLEST/HDLC SERPL: 3 {RATIO}
CO2 SERPL-SCNC: 26 MMOL/L
CREAT SERPL-MCNC: 1 MG/DL
DIFFERENTIAL METHOD: ABNORMAL
EOSINOPHIL # BLD AUTO: 0.1 K/UL
EOSINOPHIL NFR BLD: 2.2 %
ERYTHROCYTE [DISTWIDTH] IN BLOOD BY AUTOMATED COUNT: 14.5 %
EST. GFR  (AFRICAN AMERICAN): >60 ML/MIN/1.73 M^2
EST. GFR  (NON AFRICAN AMERICAN): 54 ML/MIN/1.73 M^2
ESTIMATED AVG GLUCOSE: 103 MG/DL
ESTIMATED PA SYSTOLIC PRESSURE: 20.98
GLOBAL PERICARDIAL EFFUSION: ABNORMAL
GLUCOSE SERPL-MCNC: 89 MG/DL
HBA1C MFR BLD HPLC: 5.2 %
HCT VFR BLD AUTO: 32.6 %
HCV AB SERPL QL IA: NEGATIVE
HDLC SERPL-MCNC: 45 MG/DL
HDLC SERPL: 33.1 %
HGB BLD-MCNC: 10.2 G/DL
HIV 1+2 AB+HIV1 P24 AG SERPL QL IA: NEGATIVE
LDLC SERPL CALC-MCNC: 76.4 MG/DL
LYMPHOCYTES # BLD AUTO: 1.3 K/UL
LYMPHOCYTES NFR BLD: 30.4 %
MCH RBC QN AUTO: 28.1 PG
MCHC RBC AUTO-ENTMCNC: 31.3 G/DL
MCV RBC AUTO: 90 FL
MONOCYTES # BLD AUTO: 0.3 K/UL
MONOCYTES NFR BLD: 8.2 %
NEUTROPHILS # BLD AUTO: 2.4 K/UL
NEUTROPHILS NFR BLD: 58.7 %
NONHDLC SERPL-MCNC: 91 MG/DL
PLATELET # BLD AUTO: 207 K/UL
PMV BLD AUTO: 11.1 FL
POTASSIUM SERPL-SCNC: 3.9 MMOL/L
PROT SERPL-MCNC: 6.3 G/DL
RBC # BLD AUTO: 3.63 M/UL
RETIRED EF AND QEF - SEE NOTES: 55 (ref 55–65)
SODIUM SERPL-SCNC: 140 MMOL/L
TRICUSPID VALVE REGURGITATION: ABNORMAL
TRIGL SERPL-MCNC: 73 MG/DL
TROPONIN I SERPL DL<=0.01 NG/ML-MCNC: 0.01 NG/ML
TROPONIN I SERPL DL<=0.01 NG/ML-MCNC: 0.01 NG/ML
TROPONIN I SERPL DL<=0.01 NG/ML-MCNC: <0.006 NG/ML
WBC # BLD AUTO: 4.15 K/UL

## 2017-11-20 PROCEDURE — 84484 ASSAY OF TROPONIN QUANT: CPT | Mod: 91

## 2017-11-20 PROCEDURE — G8989 SELF CARE D/C STATUS: HCPCS | Mod: CI

## 2017-11-20 PROCEDURE — 84484 ASSAY OF TROPONIN QUANT: CPT

## 2017-11-20 PROCEDURE — 25000003 PHARM REV CODE 250: Performed by: INTERNAL MEDICINE

## 2017-11-20 PROCEDURE — 80053 COMPREHEN METABOLIC PANEL: CPT

## 2017-11-20 PROCEDURE — 85025 COMPLETE CBC W/AUTO DIFF WBC: CPT

## 2017-11-20 PROCEDURE — 93010 ELECTROCARDIOGRAM REPORT: CPT | Mod: ,,, | Performed by: INTERNAL MEDICINE

## 2017-11-20 PROCEDURE — 93307 TTE W/O DOPPLER COMPLETE: CPT

## 2017-11-20 PROCEDURE — 97161 PT EVAL LOW COMPLEX 20 MIN: CPT

## 2017-11-20 PROCEDURE — G0378 HOSPITAL OBSERVATION PER HR: HCPCS

## 2017-11-20 PROCEDURE — 83036 HEMOGLOBIN GLYCOSYLATED A1C: CPT

## 2017-11-20 PROCEDURE — G8980 MOBILITY D/C STATUS: HCPCS | Mod: CK

## 2017-11-20 PROCEDURE — G8987 SELF CARE CURRENT STATUS: HCPCS | Mod: CI

## 2017-11-20 PROCEDURE — 93005 ELECTROCARDIOGRAM TRACING: CPT

## 2017-11-20 PROCEDURE — 86703 HIV-1/HIV-2 1 RESULT ANTBDY: CPT

## 2017-11-20 PROCEDURE — 36415 COLL VENOUS BLD VENIPUNCTURE: CPT

## 2017-11-20 PROCEDURE — G8978 MOBILITY CURRENT STATUS: HCPCS | Mod: CK

## 2017-11-20 PROCEDURE — 97116 GAIT TRAINING THERAPY: CPT

## 2017-11-20 PROCEDURE — 94761 N-INVAS EAR/PLS OXIMETRY MLT: CPT

## 2017-11-20 PROCEDURE — 97166 OT EVAL MOD COMPLEX 45 MIN: CPT

## 2017-11-20 PROCEDURE — 80061 LIPID PANEL: CPT

## 2017-11-20 PROCEDURE — 63600175 PHARM REV CODE 636 W HCPCS: Performed by: INTERNAL MEDICINE

## 2017-11-20 PROCEDURE — G8988 SELF CARE GOAL STATUS: HCPCS | Mod: CH

## 2017-11-20 PROCEDURE — 86803 HEPATITIS C AB TEST: CPT

## 2017-11-20 PROCEDURE — G8979 MOBILITY GOAL STATUS: HCPCS | Mod: CJ

## 2017-11-20 RX ORDER — FERROUS SULFATE 325(65) MG
325 TABLET, DELAYED RELEASE (ENTERIC COATED) ORAL 2 TIMES DAILY
Qty: 30 TABLET | Refills: 3 | Status: SHIPPED | OUTPATIENT
Start: 2017-11-20 | End: 2019-02-26

## 2017-11-20 RX ADMIN — LOSARTAN POTASSIUM 100 MG: 50 TABLET, FILM COATED ORAL at 09:11

## 2017-11-20 RX ADMIN — HEPARIN SODIUM 5000 UNITS: 5000 INJECTION, SOLUTION INTRAVENOUS; SUBCUTANEOUS at 05:11

## 2017-11-20 RX ADMIN — SODIUM CHLORIDE: 0.9 INJECTION, SOLUTION INTRAVENOUS at 05:11

## 2017-11-20 RX ADMIN — ASPIRIN 81 MG 81 MG: 81 TABLET ORAL at 09:11

## 2017-11-20 RX ADMIN — AMLODIPINE BESYLATE 10 MG: 5 TABLET ORAL at 09:11

## 2017-11-20 RX ADMIN — FERROUS SULFATE TAB EC 325 MG (65 MG FE EQUIVALENT) 325 MG: 325 (65 FE) TABLET DELAYED RESPONSE at 09:11

## 2017-11-20 RX ADMIN — HEPARIN SODIUM 5000 UNITS: 5000 INJECTION, SOLUTION INTRAVENOUS; SUBCUTANEOUS at 01:11

## 2017-11-20 RX ADMIN — ATORVASTATIN CALCIUM 20 MG: 20 TABLET, FILM COATED ORAL at 09:11

## 2017-11-20 RX ADMIN — DOCUSATE SODIUM 100 MG: 100 CAPSULE, LIQUID FILLED ORAL at 09:11

## 2017-11-20 NOTE — ASSESSMENT & PLAN NOTE
Heart rate stable here.   I would recommend an event monitor   Patient may need pacemaker  Obtain TSH.   Continue telemetry and obtain echocardiogram.

## 2017-11-20 NOTE — NURSING
Patient sitting up on side of bed. /84 hr 71. After standing bp 145/70. Patient assisted into chair. After up in chair for a few minutes patient states she feels dizziness,SOB, and cp. /86 hr 69. SPO2 100% on RA. Assisted back into bed. /89 after lying in bed. Patient states she still feels pain in middle of chest and still feel SOB but improving. Dr. Stuart notified. Will see patient.

## 2017-11-20 NOTE — PLAN OF CARE
Problem: Patient Care Overview  Goal: Plan of Care Review  Outcome: Ongoing (interventions implemented as appropriate)  Pt has slept well between care during the night.  She is on telemetry running sinus sue with occasional  PVCs.  Pt has had no complaints during the shift.

## 2017-11-20 NOTE — NURSING
Discharge instructions and education provided. Patient voices understanding. IV site and telemetry discontinued without adverse reaction. Patient waiting for wheelchair.

## 2017-11-20 NOTE — ASSESSMENT & PLAN NOTE
BP was 188/66 on presentation  Patient had associated Dizziness  CT head was not performed  We recommend to obtain CT head without contrast to r/o CVA  Adjust BP meds to keep BP<140/90  Allow permissive hypertensive for the first 24 hours  Place on low salt Diet

## 2017-11-20 NOTE — ASSESSMENT & PLAN NOTE
Most likely due to hypertensive heart disease in the setting of hypertensive emergency  Last coronary angiogram negative for CAD  Continue aspirin, statin.

## 2017-11-20 NOTE — PLAN OF CARE
Problem: Patient Care Overview  Goal: Plan of Care Review  Pt on room air with SpO2 98 %. No respiratory distress noted. Will continue to monitor.

## 2017-11-20 NOTE — PT/OT/SLP EVAL
Occupational Therapy  Evaluation    Crystal Alvarado   MRN: [id number]   Admitting Diagnosis: Dizziness    OT Date of Treatment: 11/20/17   OT Start Time: 1201  OT Stop Time: 1236  OT Total Time (min): 35 min    Billable Minutes:  Evaluation 35  Total Time 35    Diagnosis: Dizziness   The primary encounter diagnosis was Anginal equivalent. Diagnoses of SOB (shortness of breath), Dizziness, and Chest pain were also pertinent to this visit.      Past Medical History:   Diagnosis Date    Arthritis     Hyperlipidemia     Hypertension       Past Surgical History:   Procedure Laterality Date    HYSTERECTOMY N/A        Referring physician: Yris  Date referred to OT: 11/19/2017    General Precautions: Standard, fall (monitor BP fluctuates high to low)  Orthopedic Precautions: N/A  Braces: N/A    Do you have any cultural, spiritual, Hoahaoism conflicts, given your current situation?: NA     Patient History:  Living Environment  Lives With: child(brendon), adult  Living Arrangements: house  Home Accessibility: stairs to enter home  Number of Stairs to Enter Home: 3  Stair Railings at Home: none  Transportation Available: family or friend will provide  Living Environment Comment: pt. lives with daughter in [facility] with 3 steps to enter with no HR.  Has tub./shower combo with shower chair.  PLOF:  indep ADLS and ambulates without a device independently.  Assisted with steps via daughter.  Daughter provides transportation.  Equipment Currently Used at Home: shower chair    Prior level of function:   Bed Mobility/Transfers: independent  Grooming: independent  Bathing: independent  Upper Body Dressing: independent  Lower Body Dressing: independent  Toileting: independent  Home Management Skills: independent  Homemaking Responsibilities: Yes  Meal Prep Responsibility: Primary  Laundry Responsibility: Primary  Cleaning Responsibility: Primary  Bill Paying/Finance Responsibility: Primary  Shopping Responsibility: Primary  Driving License:  No  Mode of Transportation: Family     Dominant hand: right    Subjective:  Communicated with nurse prior to session.    Chief Complaint: a little dizziness  Patient/Family stated goals: none    Pain/Comfort  Pain Rating 1: 4/10  Location - Side 1: Bilateral  Location - Orientation 1: upper  Location 1: thigh  Pain Addressed 1: Reposition  Pain Rating Post-Intervention 1:  (reported jill apin after sat briefly up in BS chair and returned to bed; nurse in room at time.  pain not rated)    Objective:  Patient found with: telemetry    Cognitive Exam:  Oriented to: Person, Place, Time and Situation  Follows Commands/attention: Follows one-step commands  Communication: clear/fluent  Memory:  No Deficits noted  Safety awareness/insight to disability: impaired  Coping skills/emotional control: flat affect    Visual/perceptual:  Intact    Physical Exam:  Postural examination/scapula alignment: Rounded shoulder  Skin integrity: Visible skin intact  Edema: None noted BUE    Sensation:   Intact    Upper Extremity Range of Motion:  Right Upper Extremity: WFL  Left Upper Extremity: WFL    Upper Extremity Strength:  Right Upper Extremity: WFL except shld 4-/5  Left Upper Extremity: WFL except shld 3+/5   Strength: wfl    Fine motor coordination:   Intact    Gross motor coordination: WFL    Functional Mobility:  Bed Mobility:  Sit to Supine: Stand by Assistance    Transfers:  Sit <> Stand Assistance: Stand By Assistance  Sit <> Stand Assistive Device: No Assistive Device  Bed <> Chair Technique: Stand Pivot  Bed <> Chair Transfer Assistance: Contact Guard Assistance (initially SBA from EOB but after seated up in BS chair for short period of time to eat lunch, pt reported SOB, chest pain and assisted back to bed with CGA stand step t/f back to EOB )  Bed <> Chair Assistive Device: No Assistive Device  Toilet Transfer Technique: Stand Pivot (before SOB and chest pain episode while sitting upin BS chair for short  "period)  Toilet Transfer Assistance: Supervision  Toilet Transfer Assistive Device: No Assistive Device    Functional Ambulation: SBA /Supervison without device to bathroom, sink and BS chair; no LOB but slowed speed.    Activities of Daily Living:  Feeding Level of Assistance: Modified independent (poor appetite)  LE Dressing Level of Assistance: Modified independent (socks seated EOB)  Grooming Position: Standing at sink  Grooming Level of Assistance: Supervision  Toileting Where Assessed: Toilet (prior to chest pain and SOB episode while sitting up in BS chair eating lunch for short period of time)  Toileting Level of Assistance: Modified independent    Balance:   Static Sit: NORMAL: No deviations seen in posture held statically  Dynamic Sit: GOOD+: Maintains balance through MAXIMAL excursions of active trunk motion  Static Stand: GOOD-: Takes MODERATE challenges from all directions inconsistently  Dynamic stand: FAIR+: Needs CLOSE SUPERVISION during gait and is able to right self with minor LOB    Therapeutic Activities and Exercises:  Role of OT and Poc    AM-PAC 6 CLICK ADL  How much help from another person does this patient currently need?  1 = Unable, Total/Dependent Assistance  2 = A lot, Maximum/Moderate Assistance  3 = A little, Minimum/Contact Guard/Supervision  4 = None, Modified [place]/Independent    Putting on and taking off regular lower body clothing? : 4  Bathing (including washing, rinsing, drying)?: 3  Toileting, which includes using toilet, bedpan, or urinal? : 4  Putting on and taking off regular upper body clothing?: 4  Taking care of personal grooming such as brushing teeth?: 4  Eating meals?: 4  Total Score: 23    AM-PAC Raw Score CMS "G-Code Modifier Level of Impairment Assistance   6 % Total / Unable   7 - 9 CM 80 - 100% Maximal Assist   10-14 CL 60 - 80% Moderate Assist   15 - 19 CK 40 - 60% Moderate Assist   20 - 22 CJ 20 - 40% Minimal Assist   23 CI 1-20% SBA / CGA   24 CH " 0% Independent/ Mod I       Patient left HOB elevated with all lines intact, call button in reach, bed alarm on, nurse notified and nurse present    Assessment:  Crystal Alvarado is a 77 y.o. female with a medical diagnosis of Dizziness and The primary encounter diagnosis was Anginal equivalent. Diagnoses of SOB (shortness of breath), Dizziness, and Chest pain were also pertinent to this visit. Pt. presents with slight dizziness and elevated /84 hr 71, standing /70. Pt. Reported no real appetite. After up in chair for a few minutes patient stated she felt SOB and nurse notified and pt. assisted to EOB with nurse in room at time. /86 hr 69. SPO2 100% on RA. Pt. Assisted to supine with SBA;  /89 after lying in bed. Patient stated she still felt pain in middle of chest and still felt SOB. Nurse remained at bedside with pt.  Continue OT as appropriate . DME needs for home: To be determined. HH recommended at discharge.        Rehab identified problem list/impairments: Rehab identified problem list/impairments: weakness, impaired self care skills, impaired functional mobilty, impaired endurance, decreased lower extremity function, impaired cardiopulmonary response to activity    Rehab potential is good.    Activity tolerance: Fair    Discharge recommendations: Discharge Facility/Level Of Care Needs: home with home health, home health OT, home health PT     Barriers to discharge: Barriers to Discharge: None    Equipment recommendations:  (ongoing )     GOALS:    Occupational Therapy Goals        Problem: Occupational Therapy Goal    Goal Priority Disciplines Outcome Interventions   Occupational Therapy Goal     OT, PT/OT Ongoing (interventions implemented as appropriate)    Description:  Goals to be met by: 11/28/2017    Patient will increase functional independence with ADLs by performing:    Grooming while standing at sink with Modified [place].  Stand pivot transfers with Modified  [place].  Toilet transfer to toilet with [place].  Increased functional strength to 4/5 for BUE.  Upper extremity exercise program 10 reps per handout, with supervision.                      PLAN:  Patient to be seen 5 x/week to address the above listed problems via self-care/home management, therapeutic activities, therapeutic exercises  Plan of Care expires: 12/20/17  Plan of Care reviewed with: patient    OT G-codes  Functional Assessment Tool Used: AM-PAC  Score: 23  Functional Limitation: Self care  Self Care Current Status (): CI  Self Care Goal Status (): MAYELA Carranza OT  11/20/2017

## 2017-11-20 NOTE — PROGRESS NOTES
"U Internal Medicine Resident HO-1 Progress Note    Subjective:      Crystal Alvarado is a 77 y.o. female who is being followed by the [facility] service at [facility] for dizziness.     This AM patient had reported feeling improved. Early this afternoon when working with PT/OT, SBP was 190 when lying down, dropped to 140 when seated. Patient was helped to bring to chair, then started having shortness of breath and dizziness with complaints of some substernal chest pain. Pain did not radiate. Team went to see her, she had been helped back to bed and was lying down. BP was 202/86, HR 69. She reported feeling short of breath.   Breath sounds were bilateral and equal. Heart was regular rate and rhythm with no new murmurs. STAT EKG showed NSR. Troponin was drawn. Symptoms subsided as she was lying down. Will follow up again with patient this afternoon to ensure resolution of symptoms and to follow troponin.     Has not yet had echo today.       Objective:   Last 24 Hour Vital Signs:  BP  Min: 148/65  Max: 200/66  Temp  Av.7 °F (37.1 °C)  Min: 98.3 °F (36.8 °C)  Max: 99.2 °F (37.3 °C)  Pulse  Av.4  Min: 49  Max: 80  Resp  Av  Min: 11  Max: 22  SpO2  Av.6 %  Min: 94 %  Max: 100 %  Height  Av' 5" (165.1 cm)  Min: 5' 5" (165.1 cm)  Max: 5' 5" (165.1 cm)  Weight  Av.5 kg (184 lb)  Min: 83.5 kg (184 lb)  Max: 83.5 kg (184 lb)  I/O last 3 completed shifts:  In: 1253.3 [P.O.:60; I.V.:1193.3]  Out: 275 [Urine:275]    Physical Examination:  General:          Alert and awake in no apparent distress  Head:               Normocephalic and atraumatic  Eyes:               PERRL; EOMi with anicteric sclera and clear conjunctivae  Mouth:             Oropharynx clear and without exudate; moist mucous membranes  Cardio:             Regular rate and rhythm with normal S1 and S2; 1/6 systolic murmur at upper L sternal border.  Resp:               CTAB; respirations unlabored; no " wheezes, crackles or rhonchi  Abdom:            Soft, NTND with normoactive bowel sounds  Extrem:            Warm and well-perfused with no clubbing, cyanosis. Compression stockings on bilateral lower extremities  Skin:                No rashes, lesions, or color changes  Pulses:            2+ and symmetric distally  Neuro:             AAOx3; cooperative and pleasant with no focal deficits      Laboratory:  Laboratory Data Reviewed: yes  Pertinent Findings:  Trended Lab Data:    Recent Labs  Lab 11/19/17  1325 11/20/17  0325 11/20/17  0700   WBC 4.52 4.15  --    HGB 10.9* 10.2*  --    HCT 34.9* 32.6*  --     207  --    MCV 90 90  --    RDW 14.5 14.5  --      --  140   K 4.2  --  3.9     --  108   CO2 21*  --  26   BUN 20  --  16   GLU 97  --  89   CALCIUM 9.3  --  8.9   PROT 7.2  --  6.3   ALBUMIN 3.4*  --  3.3*   BILITOT 0.4  --  0.6   AST 15  --  10   ALKPHOS 71  --  73   ALT 9*  --  7*     Troponin negative x3  TSH 0.866  Cholesterol panel negative: 136, HDL 45, LDL 76.4, triglycerides 73    Microbiology Data Reviewed: yes  Pertinent Findings:  No data    Other Results:  EKG (my interpretation): no new EKG.    Radiology Data Reviewed: yes  Pertinent Findings:  No new studies    Current Medications:     Infusions:        Scheduled:   amLODIPine  10 mg Oral Daily    aspirin  81 mg Oral Daily    atorvastatin  20 mg Oral Daily    docusate sodium  100 mg Oral BID    ferrous sulfate  325 mg Oral BID    heparin (porcine)  5,000 Units Subcutaneous Q8H    losartan  100 mg Oral Daily        PRN:  [email or web address]    Antibiotics and Day Number of Therapy:  None    Lines and Day Number of Therapy:  PIV    Assessment:     Crystal Alvarado is a 77 y.o.female with  Patient Active Problem List    Diagnosis Date Noted    Hypertensive emergency without congestive heart failure 11/20/2017    Anginal equivalent 11/19/2017    CKD (chronic kidney disease) stage 3, GFR 30-59 ml/min 11/19/2017    Normocytic  anemia 11/19/2017    Coronary artery disease involving native coronary artery of native heart without angina pectoris 02/16/2016    Hyperlipidemia     Cardiac ischemia 08/12/2015    Essential hypertension 08/11/2015    SSS (sick sinus syndrome) 08/10/2015    Dizziness 08/10/2015    Bradycardia 08/10/2015    Light headedness 08/10/2015    Sick sinus syndrome 08/10/2015    Chest pain 08/09/2015    HTN (hypertension) 08/09/2015    Tobacco abuse 08/09/2015    Pain in the chest 08/09/2015        Plan:     Dizziness  - consider cardiac, venous insufficiency, dehydration, anxiety, stroke.   - cardiac workup as below:  - EKG with NSR  - troponin negative x3, repeat troponin with recent episode pendnig  - Monitored on tele  - BNP 75  - CXR with stable chronic findings, no acute cardiopulmonary process  - Echo ordered for today, will follow up  - TSH 0.866  - Iron studies: Iron 71, TIBC 306, 23% saturation, transferrin 207, ferritin 23  - B 12 296, folate 8.2  - HgbA1c pending, will follow up  - daily CBC, CMP  - Rehydration with IVF  - Orthostatics negative on admission  - Very labile blood pressure  - Cardiology consult, appreciate recs: recommend head Ct, outpatient event monitor and follow up with Dr. Zayas, BP med adjustment to keep BP < 140/90 after 24 hours of permissive HTN, low salt diet.   - Ordered head CT and will follow up  - PT/OT eval recommending home health, will discuss with SW      Hypertension  - Had not taken BP meds on day of admission  - Possibly due to patient non-compliance with anxiety  - Monitor and consider adding hydralazine to BP med regimen  - Continue home amlodipine 10mg, losartan 100mg daily  - Encourage close follow up with PCP to continue medicine adjustment    History of Sick Sinus Syndrome  - per chart checking past diagnosis  - patient unsure of any previous cardiac diagnosis    History of CAD per chart  - clean cardiac cath in 2015  - continue home ASA 81mg     Protein Gap of  3.8 on Admission  - Hep C antibody, HIV 1 and 2 antibodies negative     Hyperlipidemia  - Continue home atorvastatin 20mg daily      Arthritis  - Some R arm pain with movement  - Continue to monitor    Deconditioning  - Patient reports that she is not moving around as much as she used to, moves at home from bed to chair  - No longer cooks for herself  - Can be contributing to feelings of weakness/dizziness  - Home health PT/OT     Diet: Adult regular cardiac  Ppx: subQ heparin  Dispo: pending dizziness workup and clinical improvement, to home with home health     Abril Ray  [facility] Internal Medicine HO-1  [facility] Internal Medicine Service Team B    [facility] Medicine Hospitalist Pager numbers:   [facility] Hospitalist Medicine Team A (Tom/Boubacar): [phone]  [facility] Hospitalist Medicine Team B (Yris/Kelvin):  [phone]

## 2017-11-20 NOTE — PT/OT/SLP EVAL
Physical Therapy  Evaluation/Treatment    Crystal Alvarado   MRN: [id number]   Admitting Diagnosis: Dizziness    PT Received On: 11/20/17  PT Start Time: 0923     PT Stop Time: 0946    PT Total Time (min): 23 min       Billable Minutes:  Evaluation 15 and Gait Training 8    Diagnosis: Dizziness  The primary encounter diagnosis was Anginal equivalent. Diagnoses of SOB (shortness of breath), Dizziness, and Chest pain were also pertinent to this visit.    Past Medical History:   Diagnosis Date    Arthritis     Hyperlipidemia     Hypertension       Past Surgical History:   Procedure Laterality Date    HYSTERECTOMY N/A        Referring physician: Dr. Lund  Date referred to PT: 11/19/17    General Precautions: Standard, fall  Orthopedic Precautions: N/A   Braces: N/A       Do you have any cultural, spiritual, Jainism conflicts, given your current situation?: none reported to PT    Patient History:  Living Environment Comment: Pt reports living with her daughter in a [facility] with 3 VANDANA with no rails with tub/shower combo with shower chair. Pt reports independence with all functional mobility, gait and ADLs without AD, only requiring assist from her daughter to negotiate steps in/out of her house.  Equipment Currently Used at Home: shower chair  DME owned (not currently used): none    Previous Level of Function:  Ambulation Skills: independent  Transfer Skills: independent  ADL Skills: independent    Subjective:  Communicated with TRACY Burger prior to session.  Pt reports no dizziness today, denying dizziness or any other symptoms throughout session.  Chief Complaint: none reported at this time  Patient goals: to return home    Pain/Comfort  Pain Rating 1:  (pt reports no pain at rest and only pain in R knee during WB activities (6/10 during gait))  Pain Rating Post-Intervention 1: 0/10      Objective:   Patient found with: telemetry, bed alarm     Cognitive Exam:  Oriented to: Person, Place, Time and Situation    Follows  Commands/attention: Follows multistep  commands  Communication: clear/fluent  Safety awareness/insight to disability: intact    Physical Exam:  Postural examination/scapula alignment: Rounded shoulder and Head forward    Skin integrity: Visible skin intact  Edema: Mild BUEs    Sensation:   Intact    Lower Extremity Range of Motion:  Right Lower Extremity: WFL  Left Lower Extremity: WFL    Lower Extremity Strength:  Right Lower Extremity: WFL except hip flexion 3+/5, hip add 4-/5  Left Lower Extremity: WFL except hip flexion 3+/5, hip add 4-/5     Fine motor coordination:  Intact    Gross motor coordination: WFL    Functional Mobility:  Bed Mobility:  Rolling/Turning Right: Supervision  Scooting/Bridging: Supervision  Supine to Sit: Supervision, With side rail  Sit to Supine: Supervision    Transfers:  Sit <> Stand Assistance: Contact Guard Assistance  Sit <> Stand Assistive Device: No Assistive Device    Gait:   Gait Distance: 110 feet x 2 with no AD and CGA with antalgic gait pattern over RLE leading to instability and increased lateral trunk sway with no overt LOB.  Assistance 1: Contact Guard Assistance  Gait Assistive Device: No device  Gait Pattern: reciprocal  Gait Deviation(s): decreased bertha, decreased step length    Balance:   Static Sit: GOOD: Takes MODERATE challenges from all directions  Dynamic Sit: GOOD: Maintains balance through MODERATE excursions of active trunk movement  Static Stand: FAIR+: Takes MINIMAL challenges from all directions  Dynamic stand: FAIR: Needs CONTACT GUARD during gait    Therapeutic Activities and Exercises:  Pt completed 2 bouts of gait x 110 feet with no AD and CGA for safety. Pt with slight instaiblity noted during gait but no overt LOB.  Pt instructed in static standing balance activities x 30 sec in each condition: narrow FAROOQ eyes open/closed, modified tandem with RLE leading eyes open/closed, and modified tandem stance with LLE leading with eyes open/closed.  Pt stands  with wide FAROOQ during tandem standing bouts and required CGA throughout for safety.    AM-PAC 6 CLICK MOBILITY  How much help from another person does this patient currently need?   1 = Unable, Total/Dependent Assistance  2 = A lot, Maximum/Moderate Assistance  3 = A little, Minimum/Contact Guard/Supervision  4 = None, Modified [place]/Independent    Turning over in bed (including adjusting bedclothes, sheets and blankets)?: 3  Sitting down on and standing up from a chair with arms (e.g., wheelchair, bedside commode, etc.): 3  Moving from lying on back to sitting on the side of the bed?: 3  Moving to and from a bed to a chair (including a wheelchair)?: 3  Need to walk in hospital room?: 3  Climbing 3-5 steps with a railing?: 3  Total Score: 18     AM-PAC Raw Score CMS G-Code Modifier Level of Impairment Assistance   6 % Total / Unable   7 - 9 CM 80 - 100% Maximal Assist   10 - 14 CL 60 - 80% Moderate Assist   15 - 19 CK 40 - 60% Moderate Assist   20 - 22 CJ 20 - 40% Minimal Assist   23 CI 1-20% SBA / CGA   24 CH 0% Independent/ Mod I     Patient left HOB elevated with call button in reach, bed alarm on and RN notified.    Assessment:   Crystal Alvarado is a 77 y.o. female with a medical diagnosis of Dizziness and presents with LE weakness, impaired standing balance, impaired gait mechanics, and instability during gait. Pt lives with her daughter. DME needs are ongoing pending pt's stability and progress with gait. Recommending HH PT/OT.    Rehab identified problem list/impairments: Rehab identified problem list/impairments: weakness, impaired endurance, impaired self care skills, impaired functional mobilty, gait instability, impaired balance, decreased lower extremity function    Rehab potential is good.    Activity tolerance: Good    Discharge recommendations: Discharge Facility/Level Of Care Needs: home health PT, home health OT     Barriers to discharge: Barriers to Discharge: None    Equipment  recommendations: Equipment Needed After Discharge:  (ongoing assessment)     GOALS:    Physical Therapy Goals        Problem: Physical Therapy Goal    Goal Priority Disciplines Outcome Goal Variances Interventions   Physical Therapy Goal     PT/OT, PT Ongoing (interventions implemented as appropriate)     Description:  Goals to be met by: 17     Patient will increase functional independence with mobility by performin. Supine <> sit with Modified [place]  2. Sit to stand transfer with Supervision  3. Bed to chair transfer with Supervision   4. Gait  x 150 feet with Stand-by Assistance  5. Stand for 5 minutes with Supervision   6. Lower extremity exercise program x 10 reps per handout, with supervision                      PLAN:    Patient to be seen 6 x/week to address the above listed problems via gait training, therapeutic activities, therapeutic exercises  Plan of Care expires: 17  Plan of Care reviewed with: patient    Functional Assessment Tool Used: AMPAC  Score: 18  Functional Limitation: Mobility: Walking and moving around  Mobility: Walking and Moving Around Current Status (): CK  Mobility: Walking and Moving Around Goal Status (): ERIN Tony, PT  2017

## 2017-11-20 NOTE — PLAN OF CARE
Patient discharged to home. TN faxed via [facility] care home health orders to [facility]. No DME needs per notes. Patient's PCP office closed, will call tomorrow to ensure they schedule patient for follow-up.    --TN met with patient. She is aware TN will call her tomorrow with follow-up appointments. She is also aware [employer] [employer] will assign her a home health company. Patient stated her daughter is coming to pick her up. Nurse Jennyfer MOLINA notified.    --TN left message with Dr. Kline and Dr. Goff's office to schedule patient for a hospital follow-up. TN called and notified patient's daughter Shimon.    Future Appointments  Date Time Provider Department Center   4/10/2018 9:00 AM Cecilio Kline MD [facility] CARDIO [facility]     Follow-up With  Details  Why  Contact Info   Shantell Gfof MD  Schedule an appointment as soon as possible for a visit in 1 week   left message.   [address]  [address]  [place] [address]  [phone]   Cecilio Kline MD  Schedule an appointment as soon as possible for a visit    [address]  [address]  [place] [address]  [phone]   [facility]    Home Health Company-- send home health orders to them.  [address]  [address]  [place] [address]  [phone]           11/20/17 1637   Final Note   Assessment Type Final Discharge Note   Discharge Disposition Home-Health   What phone number can be called within the next 1-3 days to see how you are doing after discharge? [phone]   Hospital Follow Up  Appt(s) scheduled? Yes   Discharge plans and expectations educations in teach back method with documentation complete? Yes   Right Care Referral Info   Post Acute Recommendation Home-care   Referral Type Home Health   Facility Name Sent to [facility]     Natalia Brito RN  Transition Navigator  [phone]

## 2017-11-20 NOTE — MEDICAL/APP STUDENT
Subjective:  Ms. Alvarado reports improvement in her symptoms overnight, and states that she has not been dizzy or lightheaded this morning. She is able to tolerate a regular cardiac diet with no N/V. She denies any bladder or bowel habit changes, and denies diarrhea. She is able to ambulate about the room with no difficulty. She denies dizziness and lightheadedness with sitting and standing this morning. She denies chest pain, SOB, headaches, fever, chills, and palpitations this morning.    Objective:  Vitals:  Temp: 98.3 Max: 99  Pulse: 49-80  RR: 11-20  Bp: 148-200/63-82  SpO2: % on RA    Orthostatics:   Layin/84 Pulse: 61   Sittin/77 Pulse: 72   Standin/81 Pulse: 80   No symptoms on exam.      Physical Exam:  General: alert and awake, NAD, sitting comfortably in bed  Neuro: A&O x3, no focal deficits, strength 5/5 in UE & LE bilaterally  Head: normocephalic, atraumatic  Eyes: EOMI, pupils equal and reactive to light  Throat: no oropharyngeal erythema or exudates, oral mucosa moist and pink  Neck: supple, trachea midline, no LAD  Cardiac: RRR, Grade II systolic murmur at L upper sternal border  Lung: CTAB, no crackles, wheezes, or rhonchi, no accessory muscle use, unlabored breathing  Abd: soft, nondistended, nontender to palpation, normoactive bowel sounds in all 4 quadrants  Skin: warm, dry, intact, no lesions or rashes noted  Ext: warm, well-perfused, no cyanosis, no clubbing, trace edema in LE bilaterally  Pulses: 2+ dorsalis pedis and radial pulses bilaterally     Medications:  Amlodipine 10mg PO QD  Aspirin 81mg PO QD  Atorvastatin 20mg PO QD  Docusate 100mg PO BID  Ferrous sulfate 325mg PO BID  Heparin 5000 Units SubQ Q8H  Losartan 100mg PO QD  0.9% NaCl infusion IV continuous 100mL/hr    Labs:  WBC: 4.15  RBC: 3.63  Hgb: 10.2  Hct: 32.6  MCV: 90  MCH: 28.1  MCHC: 31.3  RDW: 14.5  Plt: 207  MPV: 11.1  Gran%: 58.7  Gran#: 2.4  Lymph%: 30.4  Lymph#: 1.3  Mono%: 8.2  Mono#: 0.3  Eos%:  2.2  Eos#: 0.1  Baso%: 0.5  Baso#: 0.02    Iron: 71  TIBC: 306  Saturated Iron: 23  Transferrin: 207  Ferritin: 23  Folate: 8.2  Vitamin B12: 296     Na: 140  K: 3.9  Cl: 108  Co2: 26  Anion Gap: 6  BUN: 16  Crt: 1.0  GFR: >60  Glucose: 89  Ca: 8.9  Alk Phos: 73  Total Protein: 6.3  Albumin: 3.3  Total Bili: 0.6  AST: 10  ALT: 7    Cholesterol: 136  HDL: 45  LDL: 76.4  Total Chol/HDL Ratio: 3.0  Triglycerides: 73    TSH: 0.866      Assessment:  Ms. Crystal Alvarado is a 78yo AA female with a history of arthritis, HTN, HLD, and CAD, who presented with worsening dizziness for 2 days.     Plan:  1. Dizziness              -likely 2/2 dehydration              -orthostatics negative on admit, negative when repeated this morning   -dehydration improved with IVF              -thyroid- TSH 0.866   -telemetry shows sinus bradycardia and occasional PVCs, will continue to monitor              -follow up Hgb A1C, Folate              -PT/OT consulted              -cardiology consulted, will follow up recomendations  2. Protein Gap              -protein gap 3.8 on admission, 3.0 this AM              -will follow up Hep C antibody, HIV 1 & 2 antibodies              -will continue to monitor  3. CAD    - no acute issues              -CAD reported in pt chart              -Cath in 2015 indicated no concerning disease              -Home dose Aspirin 81mg continued on admit  4. HTN               -Hypertensive this morning with SBP of 179, has not received morning dose of antihypertensive medications              -Continued home dose of Amlodpine 10mg PO QD and Losartan 100mg PO QD  5. HLD   -lipid panel indicates no hyperlipidemia              -Continued home dose atorvastatin 20mg PO QD  6. Arthritis              -Reports right arm pain, increased with activity              -Will continue to monitor

## 2017-11-20 NOTE — HPI
"The patient is a a 76 yo female with past medical history of hypertension, arthritis, hyperlipidemia, who came to the ED with complaint of dizziness and "funny feeling in her chest". The patient reported that she developed dizziness about 1 week ago. She described it like she sees black and about to passed out. She sits down and her symptoms resolved in less then 5 min. She gets dizzy when she moved around,  Yesterday, her symptoms got worse. She got multiple episodes throughout the day associated with blurry vision. She also has chest pain that is sharp in nature, retrosternal, nonradiating. She denies abdominal pain, N/V/D. She reported poor PO Intake for the past couple months.  Off  Note she had a coronary angiogram in 2015 that showed nonobstructive CAD. Echo in 2015 showed normal LVEF and diastolic dysfunction.   In the ED BP was 180/66, hr 68. She was orthostatic negative. CT head was not done.  As of this morning, dizziness has resolved. She stated that she was able to to the restroom without difficulty.  "

## 2017-11-20 NOTE — CONSULTS
"  [facility]  Cardiology  Consult Note    Patient Name: Crystal Alvarado  MRN: [id number]  Admission Date: 11/19/2017  Hospital Length of Stay: 0 days  Code Status: DNR   Attending Provider: Berto Arndt MD   Consulting Provider: Lilly Reed MD  Primary Care Physician: Shantell Goff MD  Principal Problem:Dizziness    Patient information was obtained from ER records.     Consults  Subjective:     Chief Complaint:  dizziness     HPI:   The patient is a a 76 yo female with past medical history of hypertension, arthritis, hyperlipidemia, who came to the ED with complaint of dizziness and "funny feeling in her chest". The patient reported that she developed dizziness about 1 week ago. She described it like she sees black and about to passed out. She sits down and her symptoms resolved in less then 5 min. She gets dizzy when she moved around,  Yesterday, her symptoms got worse. She got multiple episodes throughout the day associated with blurry vision. She also has chest pain that is sharp in nature, retrosternal, nonradiating. She denies abdominal pain, N/V/D. She reported poor PO Intake for the past couple months.  Off  Note she had a coronary angiogram in 2015 that showed nonobstructive CAD. Echo in 2015 showed normal LVEF and diastolic dysfunction.   In the ED BP was 180/66, hr 68. She was orthostatic negative. CT head was not done.  As of this morning, dizziness has resolved. She stated that she was able to to the restroom without difficulty.    No new subjective & objective note has been filed under this hospital service since the last note was generated.     Past Medical History:       Past Medical History:   Diagnosis Date    Arthritis      Hyperlipidemia      Hypertension           Past Surgical History:        Past Surgical History:   Procedure Laterality Date    HYSTERECTOMY N/A           Allergies:  Review of patient's allergies indicates:  No Known Allergies     Home " Medications:  Prior to Admission medications    Medication Sig Start Date End Date Taking? Authorizing Provider   amlodipine (NORVASC) 10 MG tablet Take 10 mg by mouth once daily.       Historical Provider, MD   aspirin 81 MG Chew Take 1 tablet (81 mg total) by mouth once daily. 8/12/15 9/29/17   Melina Overton MD   atorvastatin (LIPITOR) 20 MG tablet Take 1 tablet (20 mg total) by mouth once daily. 8/12/15 9/29/17   Melina Overton MD   azelastine (ASTELIN) 137 mcg (0.1 %) nasal spray INHALE 1 SPRAY IN EACH NOSTRIL TWICE A DAY 5/17/16     Historical Provider, MD   fluticasone (FLONASE) 50 mcg/actuation nasal spray 2 sprays by Each Nare route once daily. 8/16/16     Cecilio Kline MD   losartan (COZAAR) 100 MG tablet TAKE 1 TABLET (100 MG TOTAL) BY MOUTH ONCE DAILY. 2/23/17     Cecilio Kline MD   nitroGLYCERIN (NITROSTAT) 0.4 MG SL tablet Place 1 tablet (0.4 mg total) under the tongue every 5 (five) minutes as needed for Chest pain (and notify MD). 5/2/17 5/2/18   Cecilio Kline MD      Family History:  History reviewed. No pertinent family history.     Social History:       Social History   Substance Use Topics    Smoking status: Former Smoker    Smokeless tobacco: Never Used    Alcohol use Yes         Review of Systems:  Vitals:    11/20/17 0406 11/20/17 0503 11/20/17 0715 11/20/17 0802   BP: (!) 156/63   (!) 179/81   BP Location: Left arm   Right arm   Patient Position: Lying   Standing   Pulse: (!) 49   80   Resp: 12   20   Temp: 98.5 °F (36.9 °C)   98.5 °F (36.9 °C)   TempSrc: Oral   Oral   SpO2:  100% 98%    Weight:       Height:         Pertinent positives and negatives listed in HPI. All other systems are reviewed and are negative.   Physical Exam   Constitutional: She is oriented to person, place, and time. She appears well-developed and well-nourished.   Eyes: No scleral icterus.   Neck: No JVD present. Carotid bruit is not present.   Cardiovascular: Normal rate and  regular rhythm.   Occasional extrasystoles are present. Exam reveals no gallop.    Murmur (II/VI systolic) heard.  Pulmonary/Chest: Breath sounds normal.   Musculoskeletal: She exhibits no edema.   Neurological: She is alert and oriented to person, place, and time.   Skin: Skin is warm and dry.   Psychiatric: She has a normal mood and affect. Her behavior is normal. Judgment and thought content normal.       Recent Labs  Lab 11/20/17  0700      K 3.9      CO2 26   BUN 16   CREATININE 1.0       Recent Labs  Lab 11/20/17  0325   WBC 4.15   RBC 3.63*   HGB 10.2*   HCT 32.6*      MCV 90   MCH 28.1   MCHC 31.3*       Assessment and Plan:     * Dizziness    May be due to hypertensive encephalopathy in the setting of hypertensive emergency. Other etiology may be intermittent bradycardia.  Dizziness resolved once BP was well controlled  Continue fall precaution  Follow up echocardiography  Recommend head CT  Patient will need outpatient event monitor   She will follow up with Dr. Zayas at discharge.          Hypertensive emergency without congestive heart failure    BP was 188/66 on presentation  Patient had associated Dizziness  CT head was not performed  We recommend to obtain CT head without contrast to r/o CVA  Adjust BP meds to keep BP<140/90  Allow permissive hypertensive for the first 24 hours  Place on low salt Diet        CKD (chronic kidney disease) stage 3, GFR 30-59 ml/min    Creatinine 1 and clearance >60  Continue to monitor        Hyperlipidemia    Resume statin   Repeat lipid panel        SSS (sick sinus syndrome)    Heart rate stable here.   I would recommend an event monitor   Patient may need pacemaker  Obtain TSH.   Continue telemetry and obtain echocardiogram.        Chest pain    Most likely due to hypertensive heart disease in the setting of hypertensive emergency  Last coronary angiogram negative for CAD  Continue aspirin, statin.            VTE Risk Mitigation         Ordered      heparin (porcine) injection 5,000 Units  Every 8 hours     Route:  Subcutaneous        11/19/17 1636     Medium Risk of VTE  Once      11/19/17 1636     Place sequential compression device  Until discontinued      11/19/17 1636     Place MARIANN hose  Until discontinued      11/19/17 1636          Thank you for your consult. I will follow-up with patient. Please contact us if you have any additional questions.    Lilly Reed MD  Cardiology   [facility]

## 2017-11-20 NOTE — PLAN OF CARE
"Problem: Occupational Therapy Goal  Goal: Occupational Therapy Goal  Goals to be met by: 11/28/2017    Patient will increase functional independence with ADLs by performing:    Grooming while standing at sink with Modified [place].  Stand pivot transfers with Modified [place].  Toilet transfer to toilet with [place].  Increased functional strength to 4/5 for BUE.  Upper extremity exercise program 10 reps per handout, with supervision.    Outcome: Ongoing (interventions implemented as appropriate)   Patient found sitting up on side of bed upon entrance to room. Pt. Reported feeling "a little" dizziness. Donned socks Roxanne seated EOB. Checked seated /84 hr 71, standing /70. Pt. Ambulated to bathroom with SBA no device, toileted with Roxanne; ambulated to sink with supervision and washed hands with supervision. No complaints. Patient ambulated to BS chair with Supervision to sit up and eat lunch as lunch tray arrived. Pt. Reported no real appetite. After up in chair for a few minutes patient stated she felt SOB and nurse notified and pt. assisted to EOB with nurse in room at time. /86 hr 69. SPO2 100% on RA. Pt. Assisted to supine with SBA;  /89 after lying in bed. Patient stated she still felt pain in middle of chest and still felt SOB. Nurse remained at bedside with pt.  Continue OT as appropriate . DME needs for home: To be determined. HH recommended at discharge.        "

## 2017-11-20 NOTE — PLAN OF CARE
TN went to meet with patient, no family present at bedside.  Patient is independent, and her daughter lives with her.   She does not have home health or medical equipment at home.   Patient does not drive, but daughter will provide transport home.   TN will continue to follow and assess discharge needs.    --Update: Therapy recommending home health PT/OT on discharge.    Future Appointments  Date Time Provider Department Center   4/10/2018 9:00 AM Cecilio Kline MD [facility] CARDIO [facility]        11/20/17 1021   Discharge Assessment   Assessment Type Discharge Planning Assessment   Confirmed/corrected address and phone number on facesheet? Yes   Assessment information obtained from? Patient   Prior to hospitilization cognitive status: Alert/Oriented   Prior to hospitalization functional status: Independent   Current cognitive status: Alert/Oriented   Current Functional Status: Independent   Facility Arrived From: Home   Lives With child(brendon), adult   Able to Return to Prior Arrangements yes   Is patient able to care for self after discharge? Yes   Who are your caregiver(s) and their phone number(s)? Shimon HutchinsPiqiz-[phone]   Patient's perception of discharge disposition home or selfcare   Readmission Within The Last 30 Days no previous admission in last 30 days   Equipment Currently Used at Home none   Do you have any problems affording any of your prescribed medications? No   Is the patient taking medications as prescribed? yes   Does the patient have transportation home? Yes   Transportation Available family or friend will provide   Dialysis Name and Scheduled days N/A   Does the patient receive services at the [facility]? No   Discharge Plan A Home with family   Discharge Plan B Home with family;Home Health   Patient/Family In Agreement With Plan yes     Natalia Brito RN  Transition Navigator  [phone]

## 2017-11-20 NOTE — DISCHARGE INSTRUCTIONS
Dizziness, Uncertain Cause (English) View Edit Remove   Chest Pain, Noncardiac (English) View Edit Remove   Heart Failure, Discharge Instructions for (English) View Edit Remove   Heart Failure: Warning Signs of a Flare-Up (English) View Edit Remove   BLOOD PRESSURE, DISCHARGE INSTRUCTIONS: TAKING YOUR (ENGLISH) View Edit Remove

## 2017-11-20 NOTE — ASSESSMENT & PLAN NOTE
May be due to hypertensive encephalopathy in the setting of hypertensive emergency. Other etiology may be intermittent bradycardia.  Dizziness resolved once BP was well controlled  Continue fall precaution

## 2017-11-21 ENCOUNTER — TELEPHONE (OUTPATIENT)
Dept: CARDIOLOGY | Facility: CLINIC | Age: 77
End: 2017-11-21

## 2017-11-21 NOTE — TELEPHONE ENCOUNTER
----- Message from Jackson Jurado sent at 11/21/2017  8:45 AM CST -----  Contact: [phone]/SKGV  Pt need to be seen within 1-2 weeks for a hospital follow-up  Please call and advise

## 2017-11-21 NOTE — PT/OT/SLP DISCHARGE
Physical Therapy Discharge Summary    Crystal Alvarado  MRN: [id number]   Dizziness   Patient Discharged from acute Physical Therapy on 17.  Please refer to prior PT noted date on 17 for functional status.     Assessment:   Patient appropriate for care in another setting.  GOALS:    Physical Therapy Goals        Problem: Physical Therapy Goal    Goal Priority Disciplines Outcome Goal Variances Interventions   Physical Therapy Goal     PT/OT, PT Ongoing (interventions implemented as appropriate)     Description:  Goals to be met by: 17     Patient will increase functional independence with mobility by performin. Supine <> sit with Modified [place]  2. Sit to stand transfer with Supervision  3. Bed to chair transfer with Supervision   4. Gait  x 150 feet with Stand-by Assistance  5. Stand for 5 minutes with Supervision   6. Lower extremity exercise program x 10 reps per handout, with supervision                    Reasons for Discontinuation of Therapy Services  Transfer to alternate level of care.      Plan:  Patient Discharged to: Home with Home Health Service.    Alem Tony, PT  2017

## 2017-11-22 PROBLEM — I16.1 HYPERTENSIVE EMERGENCY WITHOUT CONGESTIVE HEART FAILURE: Status: ACTIVE | Noted: 2017-11-22

## 2017-11-23 NOTE — DISCHARGE SUMMARY
"[facility] Internal Medicine Discharge Summary    Primary Team: [facility] Internal Medicine  Attending Physician: Dr. Berto Arndt  Resident: Kevon  Intern: Cory    Date of Admit: 11/19/2017  Date of Discharge: 11/20/2017    Discharge to: Home with Home Health  Condition: Good    Discharge Diagnoses     Patient Active Problem List   Diagnosis    Chest pain    HTN (hypertension)    Tobacco abuse    Pain in the chest    SSS (sick sinus syndrome)    Dizziness    Bradycardia    Light headedness    Sick sinus syndrome    Essential hypertension    Cardiac ischemia    Hyperlipidemia    Coronary artery disease involving native coronary artery of native heart without angina pectoris    Anginal equivalent    CKD (chronic kidney disease) stage 3, GFR 30-59 ml/min    Normocytic anemia    Hypertensive emergency without congestive heart failure       Consultants and Procedures     Consultants:  Cardiology    Procedures:   None    Brief History of Present Illness      Crystal Alvarado is a 77 y.o. female who  has a past medical history of Arthritis; Hyperlipidemia; and Hypertension.  The patient presented to [facility] on 11/19/2017 with a primary complaint of Dizziness (dizziness and a "funny feeling in my chest" x 3 days, denies n/v, intermittent)    Patient with episodes of dizziness and shortness of breath when up and walking around for past several months. Day prior to admission she had multiple episodes, resolved with several minutes of lying down. She has also had decreased appetite and decreased ambulation over the past several months. Endorses some lightheadedness/ vision darkening on getting up as well. Per her chart, she had a chest pain workup in 2015 with a clean cardiac cath.     During her stay, etiology for dizziness was worked up including cardiac, stroke, thyroid, orthostatics. Cardiac workup was negative except for positive orthostatics. PT/OT recommended patient be discharged home with " home health therapy. Cardiology saw the patient and recommend outpatient event monitor and follow-up.     For the full HPI please refer to the History & Physical from this admission.    Hospital Course By Problem with Pertinent Findings     Dizziness  - considered cardiac, venous insufficiency, dehydration, anxiety, stroke.   - cardiac workup: EKG with NSR, troponin negative x4, BNP 75, echocardiogram normal   - CXR with stable chronic findings, no acute cardiopulmonary process  - TFTs wnl  - Iron studies, B12, folate all wnl  - HgbA1c 5.2  - caogulation studies wnl  - Orthostatics positive when working with PT on day of discharge  - Very labile blood pressure  - Cardiology consulted, appreciate recs: recommend head Ct, outpatient event monitor and follow up with cardiologist, BP med adjustment to keep BP < 140/90, low salt diet. At this time, continue on current BP regimen with emphasis on compliance, see below. W  - Head CT with no acute processes  - PT/OT rigobertoal recommended home health, set up by [employer] to have home health PT/OT 3x/week     Hypertension  - Had not taken BP meds on day of admission  - Possibly due to patient non-compliance, with anxiety  - Continued home amlodipine 10mg, losartan 100mg daily  - Considered adding hydralazine to home BP med but because of lability, did not add at this time   - Encourage close follow up with PCP to continue medicine adjustment     History of Sick Sinus Syndrome  - per chart checking past diagnosis  - patient unsure of any previous cardiac diagnosis     History of CAD per chart  - clean cardiac cath in 2015  - continued home ASA 81mg     Protein Gap of 3.8 on Admission  - Hep C antibody, HIV 1 and 2 antibodies negative     Hyperlipidemia  - Continued home atorvastatin 20mg daily   - FLP wnl     Arthritis  - Some R arm pain with movement  - Continue to monitor. Encourage follow-up with PCP as outpatient     Deconditioning  - Patient reports that she is not moving  around as much as she used to, moves at home from bed to chair  - No longer cooks for herself  - Can be contributing to feelings of weakness/dizziness  - Home health PT/OT       Discharge Medications        Medication List      START taking these medications    ferrous sulfate 325 (65 FE) MG EC tablet  Take 1 tablet (325 mg total) by mouth 2 (two) times daily.        CONTINUE taking these medications    amLODIPine 10 MG tablet  Commonly known as:  NORVASC     aspirin 81 MG Chew  Take 1 tablet (81 mg total) by mouth once daily.     atorvastatin 20 MG tablet  Commonly known as:  LIPITOR  Take 1 tablet (20 mg total) by mouth once daily.     azelastine 137 mcg (0.1 %) nasal spray  Commonly known as:  ASTELIN     fluticasone 50 mcg/actuation nasal spray  Commonly known as:  FLONASE  2 sprays by Each Nare route once daily.     losartan 100 MG tablet  Commonly known as:  COZAAR  TAKE 1 TABLET (100 MG TOTAL) BY MOUTH ONCE DAILY.     nitroGLYCERIN 0.4 MG SL tablet  Commonly known as:  NITROSTAT  Place 1 tablet (0.4 mg total) under the tongue every 5 (five) minutes as needed for Chest pain (and notify MD).           Where to Get Your Medications      You can get these medications from any pharmacy    Bring a paper prescription for each of these medications  · ferrous sulfate 325 (65 FE) MG EC tablet         Discharge Information:   Diet:  Cardiac    Physical Activity:  As tolerated    Instructions:  1. Take all medications as prescribed  2. Keep all follow-up appointments  3. Return to the hospital or call your primary care physicians if any worsening symptoms such as temperature > 100.4, chest pain, shortness of breath, increased confusion or weakness, or any other concerning symptoms occur.      Follow-Up Appointments:  Follow-up Information     Shantell Goff MD. Schedule an appointment as soon as possible for a visit in 1 week.    Specialty:  Family Medicine  Why:   left message.   Contact  information:  [address]  [address]  [place] [id number] [phone]             Schedule an appointment as soon as possible for a visit with Cecilio Kline MD.    Specialty:  Cardiology  Why:   left message, will call you to schedule.  Contact information:  [address]  [address]  [place] [address] [phone]             [facility].    Specialty:  DME Provider  Why:  [facility]-- send home health orders to them.  Contact information:  [address]  [address]  [facility] [address]  [phone]                     [facility] Internal Medicine, HO[facility]

## 2017-12-08 NOTE — PT/OT/SLP DISCHARGE
Occupational Therapy Discharge Summary    Crystal Alvarado  MRN: [id number]   Principal Problem: Dizziness      Patient Discharged from acute Occupational Therapy on 11/20.  Please refer to prior OT note dated 11/20 for functional status.    Assessment:      Patient appropriate for care in another setting.    Objective:     GOALS:    Occupational Therapy Goals        Problem: Occupational Therapy Goal    Goal Priority Disciplines Outcome Interventions   Occupational Therapy Goal     OT, PT/OT Ongoing (interventions implemented as appropriate)    Description:  Goals to be met by: 11/28/2017    Patient will increase functional independence with ADLs by performing:    Grooming while standing at sink with Modified [place].  Stand pivot transfers with Modified [place].  Toilet transfer to toilet with [place].  Increased functional strength to 4/5 for BUE.  Upper extremity exercise program 10 reps per handout, with supervision.                      Reasons for Discontinuation of Therapy Services  Transfer to alternate level of care.      Plan:     Patient Discharged to: Home with Home Health Service    Tanvir Farmer OT  12/8/2017

## 2017-12-26 ENCOUNTER — OFFICE VISIT (OUTPATIENT)
Dept: CARDIOLOGY | Facility: CLINIC | Age: 77
End: 2017-12-26
Payer: MEDICARE

## 2017-12-26 VITALS
BODY MASS INDEX: 30.49 KG/M2 | SYSTOLIC BLOOD PRESSURE: 136 MMHG | WEIGHT: 183 LBS | HEART RATE: 60 BPM | DIASTOLIC BLOOD PRESSURE: 70 MMHG | HEIGHT: 65 IN

## 2017-12-26 DIAGNOSIS — I25.10 CORONARY ARTERY DISEASE INVOLVING NATIVE CORONARY ARTERY OF NATIVE HEART WITHOUT ANGINA PECTORIS: ICD-10-CM

## 2017-12-26 DIAGNOSIS — I10 ESSENTIAL HYPERTENSION: Primary | Chronic | ICD-10-CM

## 2017-12-26 DIAGNOSIS — N18.30 CKD (CHRONIC KIDNEY DISEASE) STAGE 3, GFR 30-59 ML/MIN: ICD-10-CM

## 2017-12-26 DIAGNOSIS — R07.9 CHEST PAIN, UNSPECIFIED TYPE: ICD-10-CM

## 2017-12-26 DIAGNOSIS — R42 LIGHT HEADEDNESS: ICD-10-CM

## 2017-12-26 DIAGNOSIS — D64.9 NORMOCYTIC ANEMIA: ICD-10-CM

## 2017-12-26 DIAGNOSIS — I49.5 SICK SINUS SYNDROME: ICD-10-CM

## 2017-12-26 DIAGNOSIS — I49.5 SSS (SICK SINUS SYNDROME): ICD-10-CM

## 2017-12-26 PROCEDURE — 99999 PR PBB SHADOW E&M-EST. PATIENT-LVL III: CPT | Mod: PBBFAC,,, | Performed by: INTERNAL MEDICINE

## 2017-12-26 PROCEDURE — 93000 ELECTROCARDIOGRAM COMPLETE: CPT | Mod: S$GLB,,, | Performed by: INTERNAL MEDICINE

## 2017-12-26 PROCEDURE — 99214 OFFICE O/P EST MOD 30 MIN: CPT | Mod: S$GLB,,, | Performed by: INTERNAL MEDICINE

## 2017-12-26 RX ORDER — TRAMADOL HYDROCHLORIDE 50 MG/1
50 TABLET ORAL 4 TIMES DAILY PRN
Refills: 5 | COMMUNITY
Start: 2017-10-09 | End: 2018-02-22

## 2017-12-26 RX ORDER — MIRTAZAPINE 15 MG/1
15 TABLET, FILM COATED ORAL NIGHTLY
COMMUNITY
Start: 2017-12-23 | End: 2018-05-08

## 2017-12-26 NOTE — PROGRESS NOTES
Subjective:   Patient ID:  Crystal Alvarado is a 77 y.o. female     Chief Complaint   Patient presents with    Hospital Follow Up       HPI: Pt was recently hospitalized at [facility] for chst pain and feeling weak. While walking in the house in November pt felt faint and had to lie down in the bed. There is no hx of loss of consciousness. AM of admission pt states she wasn't feeling well at all.  Pt also had retrosternal pain.   Enzymes and echo looked fine during hospitalization.  Pt had a mild anemia.   Review of Systems   Cardiovascular: Positive for chest pain (No recurrent episodes of chest pain since discharge from hospital) and near-syncope. Negative for claudication, dyspnea on exertion, irregular heartbeat, leg swelling, orthopnea, palpitations and syncope.     Pt only felt faint once since hospital discharge in November.  Objective:   Physical Exam   Constitutional: She is oriented to person, place, and time. She appears well-developed and well-nourished. No distress.   HENT:   Head: Normocephalic.   Eyes: No scleral icterus.   Neck: No JVD present. Carotid bruit is not present.   Cardiovascular: Normal rate, regular rhythm and normal heart sounds.  Exam reveals no gallop and no friction rub.    No murmur heard.  Pulmonary/Chest: Effort normal and breath sounds normal. No stridor.   Musculoskeletal: She exhibits no edema.   Neurological: She is alert and oriented to person, place, and time.   Skin: Skin is warm and dry. She is not diaphoretic.   Psychiatric: She has a normal mood and affect. Her behavior is normal. Judgment and thought content normal.   Vitals reviewed.    ECG: NSR, WNL    Note cor angio in 2015 showed no obstructive CAD    November 2017 hospital records reviewed  Assessment:     1. Essential hypertension    2. SSS (sick sinus syndrome)    3. Sick sinus syndrome    4. Coronary artery disease involving native coronary artery of native heart without angina pectoris    5. CKD (chronic  kidney disease) stage 3, GFR 30-59 ml/min    6. Normocytic anemia    7. Chest pain, unspecified type    8. Light headedness        Plan:     Possible orthostatic hypotension.  If episodes  Of weakness recur may need to cut the amlodipine in half  Same meds for now  24 hr holter  RTC 3 months

## 2017-12-29 ENCOUNTER — TELEPHONE (OUTPATIENT)
Dept: CARDIOLOGY | Facility: CLINIC | Age: 77
End: 2017-12-29

## 2018-02-21 ENCOUNTER — HOSPITAL ENCOUNTER (EMERGENCY)
Facility: HOSPITAL | Age: 78
Discharge: HOME | End: 2018-02-21
Attending: EMERGENCY MEDICINE
Payer: MEDICARE

## 2018-02-21 VITALS
HEIGHT: 65 IN | SYSTOLIC BLOOD PRESSURE: 174 MMHG | RESPIRATION RATE: 16 BRPM | DIASTOLIC BLOOD PRESSURE: 79 MMHG | BODY MASS INDEX: 30.49 KG/M2 | WEIGHT: 183 LBS | TEMPERATURE: 98 F | OXYGEN SATURATION: 100 % | HEART RATE: 77 BPM

## 2018-02-21 DIAGNOSIS — M54.50 ACUTE RIGHT-SIDED LOW BACK PAIN WITHOUT SCIATICA: Primary | ICD-10-CM

## 2018-02-21 PROCEDURE — 99283 EMERGENCY DEPT VISIT LOW MDM: CPT | Mod: 25

## 2018-02-21 PROCEDURE — 63600175 PHARM REV CODE 636 W HCPCS: Performed by: EMERGENCY MEDICINE

## 2018-02-21 PROCEDURE — 96372 THER/PROPH/DIAG INJ SC/IM: CPT

## 2018-02-21 RX ORDER — KETOROLAC TROMETHAMINE 30 MG/ML
15 INJECTION, SOLUTION INTRAMUSCULAR; INTRAVENOUS
Status: COMPLETED | OUTPATIENT
Start: 2018-02-21 | End: 2018-02-21

## 2018-02-21 RX ORDER — IBUPROFEN 600 MG/1
600 TABLET ORAL EVERY 6 HOURS PRN
Qty: 30 TABLET | Refills: 0 | Status: SHIPPED | OUTPATIENT
Start: 2018-02-21 | End: 2018-04-06 | Stop reason: SINTOL

## 2018-02-21 RX ORDER — ORPHENADRINE CITRATE 30 MG/ML
60 INJECTION INTRAMUSCULAR; INTRAVENOUS
Status: COMPLETED | OUTPATIENT
Start: 2018-02-21 | End: 2018-02-21

## 2018-02-21 RX ORDER — METHOCARBAMOL 750 MG/1
1500 TABLET, FILM COATED ORAL 3 TIMES DAILY
Qty: 30 TABLET | Refills: 0 | Status: SHIPPED | OUTPATIENT
Start: 2018-02-21 | End: 2018-02-26

## 2018-02-21 RX ADMIN — ORPHENADRINE CITRATE 60 MG: 30 INJECTION INTRAMUSCULAR; INTRAVENOUS at 11:02

## 2018-02-21 RX ADMIN — KETOROLAC TROMETHAMINE 15 MG: 30 INJECTION, SOLUTION INTRAMUSCULAR at 11:02

## 2018-02-21 NOTE — ED NOTES
Pt states she had a trip and fall on steps yesterday.  Pt states she landed on R back and c/o pain to R lower back/R upper buttocks.  Pt denies hitting her head or LOC.

## 2018-02-21 NOTE — ED PROVIDER NOTES
Encounter Date: 2/21/2018    SCRIBE #1 NOTE: I, Marcos Ferrari, am scribing for, and in the presence of,  Dr. Brice. I have scribed the entire note.       History     Chief Complaint   Patient presents with    Fall     trip and fall 1 day ago resulting in pain to left lower back and left knee     Time seen by provider: 11:08 AM    This patent a 77 year old female presenting to ED with lower back pain after falling down two steps yesterday.  Patient reports that she fell on her behind.  The pain began gradually after the incident.  The patient reports that she was ambulatory after the fall, but required assistance to get up initially.  The patient took one ibuprofen with no alleviation of the pain.  Patient reports no associated numbness or weakness in the lower extremities.  Patient denies bowel/bladder incontinence. The patient has no allergies. Patient also has a history of arthritis in her knees.            The history is provided by the patient.     Review of patient's allergies indicates:  No Known Allergies  Past Medical History:   Diagnosis Date    Arthritis     Hyperlipidemia     Hypertension      Past Surgical History:   Procedure Laterality Date    HYSTERECTOMY N/A      History reviewed. No pertinent family history.  Social History   Substance Use Topics    Smoking status: Former Smoker    Smokeless tobacco: Never Used    Alcohol use No     Review of Systems   Genitourinary: Negative for difficulty urinating.   Musculoskeletal: Positive for back pain (lower right).   Neurological: Negative for weakness and numbness.   All other systems reviewed and are negative.      Physical Exam     Initial Vitals [02/21/18 1022]   BP Pulse Resp Temp SpO2   (!) 187/75 80 18 98.4 °F (36.9 °C) 97 %      MAP       112.33         Physical Exam    Nursing note and vitals reviewed.  Constitutional: She appears well-developed and well-nourished. She is not diaphoretic. No distress.   HENT:   Head: Normocephalic and  atraumatic.   Eyes: Conjunctivae are normal.   Neck: Normal range of motion. Neck supple.   Cardiovascular: Normal rate, regular rhythm and normal heart sounds. Exam reveals no gallop and no friction rub.    No murmur heard.  Pulmonary/Chest: Breath sounds normal. She has no wheezes. She has no rhonchi. She has no rales.   Musculoskeletal: Normal range of motion. She exhibits tenderness. She exhibits no edema.        Back:    Lower right back tenderness   Neurological: She is alert and oriented to person, place, and time. She has normal strength and normal reflexes.   Skin: Skin is warm and dry. No rash noted. No erythema.   Psychiatric: She has a normal mood and affect. Her behavior is normal.         ED Course   Procedures  Labs Reviewed - No data to display          Medical Decision Making:   ED Management:  Right sided lower back pain after fall 2 days ago.  No bony tenderness.  Feel this is strain.  Treat with pain meds.                      Clinical Impression:     1. Acute right-sided low back pain without sciatica        Disposition:   Disposition: Discharged  Condition: Stable    I, Dr. Sharon Brice, personally performed the services described in this documentation.   All medical record entries made by the scribe were at my direction and in my presence.   I have reviewed the chart and agree that the record is accurate and complete.   Sharon Brice MD.  6:04 PM 03/09/2018                  Sharon Brice MD  03/09/18 [id number]

## 2018-02-21 NOTE — DISCHARGE INSTRUCTIONS
Apply ice to area of pain for 20 minute intervals several times a day.  Take medications as directed.  See your doctor if you are note better in 4-5 days.

## 2018-02-22 ENCOUNTER — OFFICE VISIT (OUTPATIENT)
Dept: URGENT CARE | Facility: CLINIC | Age: 78
End: 2018-02-22
Payer: MEDICARE

## 2018-02-22 VITALS
TEMPERATURE: 98 F | WEIGHT: 183 LBS | HEIGHT: 65 IN | BODY MASS INDEX: 30.49 KG/M2 | OXYGEN SATURATION: 98 % | RESPIRATION RATE: 18 BRPM

## 2018-02-22 DIAGNOSIS — M54.50 ACUTE RIGHT-SIDED LOW BACK PAIN WITHOUT SCIATICA: Primary | ICD-10-CM

## 2018-02-22 PROCEDURE — 3008F BODY MASS INDEX DOCD: CPT | Mod: S$GLB,,, | Performed by: PHYSICIAN ASSISTANT

## 2018-02-22 PROCEDURE — 99203 OFFICE O/P NEW LOW 30 MIN: CPT | Mod: S$GLB,,, | Performed by: PHYSICIAN ASSISTANT

## 2018-02-22 PROCEDURE — 1159F MED LIST DOCD IN RCRD: CPT | Mod: S$GLB,,, | Performed by: PHYSICIAN ASSISTANT

## 2018-02-22 RX ORDER — HYDROCODONE BITARTRATE AND ACETAMINOPHEN 7.5; 325 MG/1; MG/1
1 TABLET ORAL EVERY 8 HOURS PRN
Qty: 9 TABLET | Refills: 0 | Status: SHIPPED | OUTPATIENT
Start: 2018-02-22 | End: 2018-02-24 | Stop reason: ALTCHOICE

## 2018-02-22 RX ORDER — NAPROXEN SODIUM 220 MG/1
81 TABLET, FILM COATED ORAL DAILY
Refills: 4 | COMMUNITY
Start: 2018-01-13 | End: 2020-08-10

## 2018-02-22 RX ORDER — ATORVASTATIN CALCIUM 20 MG/1
20 TABLET, FILM COATED ORAL NIGHTLY
Refills: 4 | COMMUNITY
Start: 2018-01-13

## 2018-02-22 RX ORDER — FERROUS SULFATE 325(65) MG
1 TABLET ORAL 2 TIMES DAILY
Refills: 3 | Status: ON HOLD | COMMUNITY
Start: 2018-01-16 | End: 2018-04-27 | Stop reason: HOSPADM

## 2018-02-23 NOTE — PROGRESS NOTES
"Subjective:       Patient ID: Crystal Alvarado is a 77 y.o. female.    Vitals:  height is 5' 5" (1.651 m) and weight is 83 kg (183 lb). Her temperature is 97.8 °F (36.6 °C). Her respiration is 18 and oxygen saturation is 98%.     Chief Complaint: Back Pain    Pt fell last Tuesday & hurt her R lower back. She went to the ER where they gave her a muscle relaxer. Pt states that the medication isn't working & her back is still in great pain. She denies fevers/chills, bowel or bladder incontinence, numbness, tingling, weakness, or radiculoapthy of B Le.       Back Pain   This is a new problem. The current episode started 1 to 4 weeks ago. The problem has been gradually worsening since onset. The quality of the pain is described as aching. The pain does not radiate. The pain is at a severity of 8/10. The pain is the same all the time. The symptoms are aggravated by standing and sitting. Pertinent negatives include no abdominal pain, bladder incontinence, bowel incontinence, chest pain, dysuria, fever, headaches, leg pain, numbness, paresis, paresthesias, pelvic pain, perianal numbness, tingling, weakness or weight loss. She has tried muscle relaxant for the symptoms. The treatment provided no relief.     Review of Systems   Constitution: Negative for fever, weakness, malaise/fatigue and weight loss.   Cardiovascular: Negative for chest pain.   Skin: Negative for color change and rash.   Musculoskeletal: Positive for back pain. Negative for muscle cramps, muscle weakness and stiffness.   Gastrointestinal: Negative for abdominal pain and bowel incontinence.   Genitourinary: Negative for bladder incontinence, dysuria, hematuria, pelvic pain and urgency.   Neurological: Negative for disturbances in coordination, headaches, numbness, paresthesias and tingling.       Objective:      Physical Exam   Constitutional: She is oriented to person, place, and time. Vital signs are normal. She appears well-developed and well-nourished. " She is active and cooperative. No distress.   HENT:   Head: Normocephalic and atraumatic.   Nose: Nose normal.   Mouth/Throat: Oropharynx is clear and moist and mucous membranes are normal.   Eyes: Conjunctivae and lids are normal.   Neck: Trachea normal, normal range of motion, full passive range of motion without pain and phonation normal. Neck supple.   Cardiovascular: Normal rate, regular rhythm, normal heart sounds, intact distal pulses and normal pulses.    Pulmonary/Chest: Effort normal and breath sounds normal.   Abdominal: Soft. Normal appearance and bowel sounds are normal. She exhibits no abdominal bruit, no pulsatile midline mass and no mass.   Musculoskeletal: She exhibits no edema or deformity.        Cervical back: She exhibits normal range of motion, no tenderness, no bony tenderness, no swelling, no edema, no deformity, no laceration, no pain, no spasm and normal pulse.        Thoracic back: She exhibits normal range of motion, no tenderness, no bony tenderness, no swelling, no edema, no deformity, no laceration, no pain, no spasm and normal pulse.        Lumbar back: She exhibits tenderness, pain and spasm. She exhibits normal range of motion, no bony tenderness, no swelling, no edema, no deformity, no laceration and normal pulse.        Back:    TTP RIGHT LUMBAR REGION WITH MILD M SPASM NOTED  NO E/E/E    FULL ROM B LE WITH 5/5 STRENGTH  ABLE TO AMBULATE WITH SMOOTH RHYTHMIC GAIT  NEG ST LEG RAISE B  2+DTR B PATELLA AND ACHILLES  NVIT DISTALLY WITH SLIT AND 2+BCR   Neurological: She is alert and oriented to person, place, and time. She has normal strength and normal reflexes. No sensory deficit.   Skin: Skin is warm, dry and intact. She is not diaphoretic.   Psychiatric: She has a normal mood and affect. Her speech is normal and behavior is normal. Judgment and thought content normal. Cognition and memory are normal.   Nursing note and vitals reviewed.      Assessment:       1. Acute right-sided  low back pain without sciatica        Plan:         Acute right-sided low back pain without sciatica    Other orders  -     hydrocodone-acetaminophen 7.5-325mg (NORCO) 7.5-325 mg per tablet; Take 1 tablet by mouth every 8 (eight) hours as needed for Pain.  Dispense: 9 tablet; Refill: 0        Back Contusion     You have a contusion to your back. A contusion is also called a bruise. There is swelling and some bleeding under the skin. The skin may be purplish. You may have muscle aching and stiffness in the area of the bruise. There are no broken bones.  Contusions heal on their own, without further treatment. However, pain and skin discoloration may take weeks to months to go away.   Home care  · Rest. Avoid heavy lifting, strenuous exertion, or any activity that causes pain.  · Ice the area to reduce pain and swelling. Put ice cubes in a plastic bag or use a cold pack. (Wrap the cold source in a thin towel. Do not place it directly on your skin.) Ice the injured area for 20 minutes every 1-2 hours the first day. Continue with ice packs 3-4 times a day for the next two days, then as needed for the relief of pain and swelling.  · Take any prescribed pain medication. If none was prescribed, take acetaminophen, ibuprofen, or naproxen to control pain.  Follow-up care  Follow up with your healthcare provider, or as directed. Call if you are not better in 1-2 weeks.  When to seek medical advice  Call your healthcare provider for any of the following:  · New or worsening pain  · Increased swelling around the bruise  · Pain spreads to one or both legs  · Weakness or numbness in one or both legs   · Loss of bowel or bladder control  · Numbness in the groin or genital area  · Fever of 100.4°F (38ºC) or higher, or as directed by your healthcare provider  Date Last Reviewed: 6/26/2015 © 2000-2017 [employer]. [address] [place] [address], [place] [address]. All rights reserved. This information is not intended as a  substitute for professional medical care. Always follow your healthcare professional's instructions.      Please follow up with your Primary care provider within 2-5 days if your signs and symptoms have not resolved or worsen.     If your condition worsens or fails to improve we recommend that you receive another evaluation at the emergency room immediately or contact your primary medical clinic to discuss your concerns.   You must understand that you have received an Urgent Care treatment only and that you may be released before all of your medical problems are known or treated. You, the patient, will arrange for follow up care as instructed.

## 2018-02-23 NOTE — PATIENT INSTRUCTIONS
Back Contusion     You have a contusion to your back. A contusion is also called a bruise. There is swelling and some bleeding under the skin. The skin may be purplish. You may have muscle aching and stiffness in the area of the bruise. There are no broken bones.  Contusions heal on their own, without further treatment. However, pain and skin discoloration may take weeks to months to go away.   Home care  · Rest. Avoid heavy lifting, strenuous exertion, or any activity that causes pain.  · Ice the area to reduce pain and swelling. Put ice cubes in a plastic bag or use a cold pack. (Wrap the cold source in a thin towel. Do not place it directly on your skin.) Ice the injured area for 20 minutes every 1-2 hours the first day. Continue with ice packs 3-4 times a day for the next two days, then as needed for the relief of pain and swelling.  · Take any prescribed pain medication. If none was prescribed, take acetaminophen, ibuprofen, or naproxen to control pain.  Follow-up care  Follow up with your healthcare provider, or as directed. Call if you are not better in 1-2 weeks.  When to seek medical advice  Call your healthcare provider for any of the following:  · New or worsening pain  · Increased swelling around the bruise  · Pain spreads to one or both legs  · Weakness or numbness in one or both legs   · Loss of bowel or bladder control  · Numbness in the groin or genital area  · Fever of 100.4°F (38ºC) or higher, or as directed by your healthcare provider  Date Last Reviewed: 6/26/2015 © 2000-2017 [employer]. [address] [place] [address], [place] [address]. All rights reserved. This information is not intended as a substitute for professional medical care. Always follow your healthcare professional's instructions.      Please follow up with your Primary care provider within 2-5 days if your signs and symptoms have not resolved or worsen.     If your condition worsens or fails to improve we recommend that  you receive another evaluation at the emergency room immediately or contact your primary medical clinic to discuss your concerns.   You must understand that you have received an Urgent Care treatment only and that you may be released before all of your medical problems are known or treated. You, the patient, will arrange for follow up care as instructed.

## 2018-02-24 ENCOUNTER — HOSPITAL ENCOUNTER (EMERGENCY)
Facility: HOSPITAL | Age: 78
Discharge: HOME | End: 2018-02-24
Payer: MEDICARE

## 2018-02-24 VITALS
TEMPERATURE: 99 F | WEIGHT: 183 LBS | BODY MASS INDEX: 31.24 KG/M2 | RESPIRATION RATE: 16 BRPM | DIASTOLIC BLOOD PRESSURE: 81 MMHG | SYSTOLIC BLOOD PRESSURE: 210 MMHG | OXYGEN SATURATION: 99 % | HEART RATE: 80 BPM | HEIGHT: 64 IN
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
## 2018-02-24 DIAGNOSIS — M54.50 ACUTE BILATERAL LOW BACK PAIN WITHOUT SCIATICA: ICD-10-CM

## 2018-02-24 DIAGNOSIS — S32.040A CLOSED COMPRESSION FRACTURE OF FOURTH LUMBAR VERTEBRA, INITIAL ENCOUNTER: Primary | ICD-10-CM

## 2018-02-24 PROCEDURE — 63600175 PHARM REV CODE 636 W HCPCS

## 2018-02-24 PROCEDURE — 25000003 PHARM REV CODE 250

## 2018-02-24 PROCEDURE — 96372 THER/PROPH/DIAG INJ SC/IM: CPT

## 2018-02-24 PROCEDURE — 99284 EMERGENCY DEPT VISIT MOD MDM: CPT | Mod: 25

## 2018-02-24 RX ORDER — ONDANSETRON 4 MG/1
4 TABLET, ORALLY DISINTEGRATING ORAL
Status: COMPLETED | OUTPATIENT
Start: 2018-02-24 | End: 2018-02-24

## 2018-02-24 RX ORDER — OXYCODONE AND ACETAMINOPHEN 5; 325 MG/1; MG/1
1 TABLET ORAL
Status: COMPLETED | OUTPATIENT
Start: 2018-02-24 | End: 2018-02-24

## 2018-02-24 RX ORDER — OXYCODONE AND ACETAMINOPHEN 5; 325 MG/1; MG/1
1-2 TABLET ORAL EVERY 4 HOURS PRN
Qty: 20 TABLET | Refills: 0 | Status: SHIPPED | OUTPATIENT
Start: 2018-02-24 | End: 2018-03-19 | Stop reason: SDUPTHER

## 2018-02-24 RX ORDER — DOCUSATE SODIUM 100 MG/1
100 CAPSULE, LIQUID FILLED ORAL 2 TIMES DAILY
Qty: 60 CAPSULE | Refills: 0 | Status: SHIPPED | OUTPATIENT
Start: 2018-02-24 | End: 2023-01-12

## 2018-02-24 RX ORDER — DOCUSATE SODIUM 100 MG/1
100 CAPSULE, LIQUID FILLED ORAL 2 TIMES DAILY
Qty: 60 CAPSULE | Refills: 0 | COMMUNITY
Start: 2018-02-24 | End: 2018-02-24 | Stop reason: SDUPTHER

## 2018-02-24 RX ORDER — KETOROLAC TROMETHAMINE 30 MG/ML
30 INJECTION, SOLUTION INTRAMUSCULAR; INTRAVENOUS
Status: COMPLETED | OUTPATIENT
Start: 2018-02-24 | End: 2018-02-24

## 2018-02-24 RX ADMIN — OXYCODONE HYDROCHLORIDE AND ACETAMINOPHEN 1 TABLET: 5; 325 TABLET ORAL at 06:02

## 2018-02-24 RX ADMIN — ONDANSETRON 4 MG: 4 TABLET, ORALLY DISINTEGRATING ORAL at 06:02

## 2018-02-24 RX ADMIN — KETOROLAC TROMETHAMINE 30 MG: 30 INJECTION, SOLUTION INTRAMUSCULAR at 06:02

## 2018-02-24 NOTE — ED TRIAGE NOTES
Pt fell 2 days ago and went to urgent care, prescribed percocet but no imaging was done. C/o lower back pain 8/10.      LOC: The patient is awake, alert, aware of environment with an appropriate affect. Oriented x4, speaking appropriately  APPEARANCE: Pt resting comfortably, in no acute distress, pt is clean and well groomed, clothing properly fastened  SKIN:The skin is warm and dry, color consistent with ethnicity, patient has normal skin turgor and moist mucus membranes  RESPIRATORY:Airway is open and patent, respirations are spontaneous, patient has a normal effort and rate, no accessory muscle use noted.  CARDIAC: Normal rate and rhythm, no peripheral edema noted, capillary refill < 3 seconds, bilateral radial pulses 2+.  NEUROLOGIC: PERRLA, facial expression is symmetrical, patient moving all extremities spontaneously, normal sensation in all extremities when touched with a finger.  Follows all commands appropriately  MUSCULOSKELETAL: Patient moving all extremities spontaneously, no obvious swelling or deformities noted.

## 2018-02-25 ENCOUNTER — TELEPHONE (OUTPATIENT)
Dept: URGENT CARE | Facility: CLINIC | Age: 78
End: 2018-02-25

## 2018-02-25 NOTE — TELEPHONE ENCOUNTER
I spoke with the patient's sitter and she said the she said that the patient went back to be seen somewhere else on yesterday and was told that she had a fracture.

## 2018-03-16 NOTE — PROGRESS NOTES
Subjective:      Patient ID: Crystal Alvarado is a 77 y.o. female.    Chief Complaint: Low-back Pain      HPI     History of CKD 3, CAD, HTN, hyperlipidemia, sick sinus syndrome.     Seen in ED on 2/24/18 after a fall (she missed a step and fell backwards) a few days prior. CT scan showed age indeterminate fracture of L4.     She complains of constant LBP with intermittent radiation to her groin. No other leg pain. Pain is worse with getting up from sitting or turning in bed. Pain is better with laying flat and sitting. Groin pain is only when she gets up from a seated position. Pain is sharp in nature. She rates her pain as a 7 on a scale of 1-10. No numbness, tingling,or weakness in her legs. She has overall weakness noted. No previous back issues.     Some improvement with percocet and robaxin. No PT, injections, or surgery on her back.     Patient denies fevers, chills, night sweats, nausea, vomiting, and weight loss. Patient also denies bowel/bladder dysfunction, sexual dysfunction, and any saddle anesthesia.       Review of Systems   Constitution: Positive for weakness and malaise/fatigue. Negative for fever, night sweats, weight gain and weight loss.   HENT: Negative for hearing loss, nosebleeds and odynophagia.    Eyes: Negative for blurred vision and double vision.   Cardiovascular: Negative for chest pain, irregular heartbeat and palpitations.   Respiratory: Negative for cough, hemoptysis, shortness of breath and wheezing.    Endocrine: Negative for cold intolerance and polydipsia.        Positive for poor appetite.    Hematologic/Lymphatic: Does not bruise/bleed easily.   Skin: Negative for dry skin, poor wound healing, rash and suspicious lesions.   Musculoskeletal: Positive for back pain.        See HPI for pertinent positives.   Gastrointestinal: Negative for bloating, abdominal pain, constipation, diarrhea, hematochezia, melena, nausea and vomiting.   Genitourinary: Negative for bladder incontinence,  dysuria, hematuria, hesitancy and incomplete emptying.   Neurological: Negative for disturbances in coordination, dizziness, focal weakness, headaches, loss of balance, numbness, paresthesias and seizures.   Psychiatric/Behavioral: Negative for depression and hallucinations. The patient is not nervous/anxious.            Objective:        General: Crystal is well-developed, well-nourished, appears stated age, in no acute distress, alert and oriented to time, place and person.     General    Vitals reviewed.  Constitutional: She is oriented to person, place, and time. She appears well-developed and well-nourished.   Pulmonary/Chest: Effort normal.   Abdominal: She exhibits no distension.   Neurological: She is alert and oriented to person, place, and time.   Psychiatric: She has a normal mood and affect. Her behavior is normal. Judgment and thought content normal.           Gait: she ambulates slowly in room. Came up in WC. Heel/toe/tandem walking not tested. She has pain with going from sitting to standing.     On exam of the lumbar spine, Inspection of back is normal, diffuse lower lumbar tenderness noted. No pain with fist percussion.     Skin in lumbar region is warm to the touch without visible rashes.     muscle tone normal without spasm, limited range of motion with pain  Pain in flexion. and Pain in extension.    Strength testing of the bilateral LEs shows  Right hip abduction:  +5/5  Left hip abduction:  +5/5  Right hip flexion:  +5/5  Left hip flexion:  +5/5  Right hip extensors:  +5/5  Left hip extensors:  +5/5  Right quadriceps:  +5/5  Left quadriceps:  +5/5  Right hamstring:  +5/5  Left hamstring:  +5/5  Right dorsiflexion:  +5/5  Left dorsiflexion:  +5/5  Right plantar flexion:  +5/5  Left plantar flexion:  +5/5   Right EHL:  +5/5   Left EHL:  +5/5    negative clonus of bilateral LEs.     negative straight leg raise on bilateral LEs.     Sensation is grossly intact in L2, L3, L4, L5, and S1  distribution.    Right hip has no pain with IR/ER.  Left hip has no pain with IR/ER.     On exam of bilateral UEs, patient has full painfree ROM with no signs of clubbing, laxity, cyanosis, edema, instability, weakness, or tenderness.       XRAY INTERPRETATION:  CT scan of lumbar spine dated 2/24/18 is personally reviewed and shows age indeterminate L4 compression fracture, slip L4-L5, multilevel lumbar spondylosis with foraminal and central stenosis.         Assessment:       1. Closed compression fracture of fourth lumbar vertebra, initial encounter    2. Spondylosis without myelopathy    3. Thoracic and lumbosacral neuritis    4. Acquired spondylolisthesis    5. Bilateral low back pain with bilateral sciatica, unspecified chronicity           Plan:       Orders Placed This Encounter    MRI LUMBAR SPINE WITHOUT CONTRAST    Ambulatory referral to Pain Clinic    calcitonin, salmon, (FORTICAL) 200 unit/actuation nasal spray    oxyCODONE-acetaminophen (PERCOCET) 5-325 mg per tablet       Seen in ED on 2/24/18 after a fall (she missed a step and fell backwards) with constant LBP with intermittent radiation to her groin. No other leg pain. Groin pain is when going from sitting to standing. Known age indeterminate L4 compression fracture, slip L4-L5, multilevel lumbar spondylosis with foraminal and central stenosis. Exam and symptoms likely due to acute compression fracture, but may also be due to underlying degeneration. Treatment options reviewed with patient and her daughter along with above lumbar CT scan. Following plan made:     - MRI of lumbar spine to evaluate acuity of L4 compression fracture. Will do in [place].   - Miacalcin nasal spray to do x 1 month only.   - [place] state  reviewed she was given percocet from ED on 2/24/18 and again by PCP on 2/27/18 (this was a 15 day supply). She still has some of this prescription left.   - Discussed risks of narcotics at length. Continue to wean from percocet as  needed. She was given one time refill of percocet. She will not fill if she does not require it.   - Will put MRI results and further plan on [facility]. If fracture is acute, may consider kyphoplasty. If not, may consider ESIs with pain management in [place].     ADDENDUM 3/25/18:  MRI of lumbar spine dated 3/23/18 is personally reviewed and shows acute to subacute L4 compression fracture with no retropulsion. Mild central stenosis L3-L4 and mild/moderate central stenosis L4-L5. Recommend patient see Dr. Rojo in [place] to discuss possible kyphoplasty. Will call patient (she is not active on [facility]).     Follow-up: Follow-up if symptoms worsen or fail to improve. If there are any questions prior to this, the patient was instructed to contact the office.

## 2018-03-19 ENCOUNTER — OFFICE VISIT (OUTPATIENT)
Dept: SPINE | Facility: CLINIC | Age: 78
End: 2018-03-19
Payer: MEDICARE

## 2018-03-19 VITALS
HEART RATE: 56 BPM | DIASTOLIC BLOOD PRESSURE: 84 MMHG | BODY MASS INDEX: 31.24 KG/M2 | WEIGHT: 183 LBS | HEIGHT: 64 IN | SYSTOLIC BLOOD PRESSURE: 198 MMHG

## 2018-03-19 DIAGNOSIS — M54.14 THORACIC AND LUMBOSACRAL NEURITIS: ICD-10-CM

## 2018-03-19 DIAGNOSIS — M54.42 BILATERAL LOW BACK PAIN WITH BILATERAL SCIATICA, UNSPECIFIED CHRONICITY: ICD-10-CM

## 2018-03-19 DIAGNOSIS — M47.819 SPONDYLOSIS WITHOUT MYELOPATHY: ICD-10-CM

## 2018-03-19 DIAGNOSIS — S32.040A CLOSED COMPRESSION FRACTURE OF FOURTH LUMBAR VERTEBRA, INITIAL ENCOUNTER: Primary | ICD-10-CM

## 2018-03-19 DIAGNOSIS — M54.41 BILATERAL LOW BACK PAIN WITH BILATERAL SCIATICA, UNSPECIFIED CHRONICITY: ICD-10-CM

## 2018-03-19 DIAGNOSIS — M54.17 THORACIC AND LUMBOSACRAL NEURITIS: ICD-10-CM

## 2018-03-19 DIAGNOSIS — M43.10 ACQUIRED SPONDYLOLISTHESIS: ICD-10-CM

## 2018-03-19 PROCEDURE — 3077F SYST BP >= 140 MM HG: CPT | Mod: CPTII,S$GLB,, | Performed by: PHYSICIAN ASSISTANT

## 2018-03-19 PROCEDURE — 3079F DIAST BP 80-89 MM HG: CPT | Mod: CPTII,S$GLB,, | Performed by: PHYSICIAN ASSISTANT

## 2018-03-19 PROCEDURE — 99999 PR PBB SHADOW E&M-EST. PATIENT-LVL IV: CPT | Mod: PBBFAC,,, | Performed by: PHYSICIAN ASSISTANT

## 2018-03-19 PROCEDURE — 99204 OFFICE O/P NEW MOD 45 MIN: CPT | Mod: S$GLB,,, | Performed by: PHYSICIAN ASSISTANT

## 2018-03-19 RX ORDER — OXYCODONE AND ACETAMINOPHEN 5; 325 MG/1; MG/1
1-2 TABLET ORAL EVERY 8 HOURS PRN
Qty: 20 TABLET | Refills: 0 | Status: SHIPPED | OUTPATIENT
Start: 2018-03-19 | End: 2018-04-26

## 2018-03-19 RX ORDER — CALCITONIN SALMON 200 [IU]/.09ML
1 SPRAY, METERED NASAL DAILY
Qty: 1 BOTTLE | Refills: 0 | Status: SHIPPED | OUTPATIENT
Start: 2018-03-19 | End: 2019-02-26

## 2018-03-23 ENCOUNTER — HOSPITAL ENCOUNTER (OUTPATIENT)
Dept: RADIOLOGY | Facility: HOSPITAL | Age: 78
Discharge: HOME | End: 2018-03-23
Attending: PHYSICIAN ASSISTANT
Payer: MEDICARE

## 2018-03-23 DIAGNOSIS — S32.040A CLOSED COMPRESSION FRACTURE OF FOURTH LUMBAR VERTEBRA, INITIAL ENCOUNTER: ICD-10-CM

## 2018-03-23 PROCEDURE — 72148 MRI LUMBAR SPINE W/O DYE: CPT | Mod: 26,,, | Performed by: RADIOLOGY

## 2018-03-23 PROCEDURE — 72148 MRI LUMBAR SPINE W/O DYE: CPT | Mod: TC

## 2018-03-26 ENCOUNTER — TELEPHONE (OUTPATIENT)
Dept: SPINE | Facility: CLINIC | Age: 78
End: 2018-03-26

## 2018-03-27 ENCOUNTER — TELEPHONE (OUTPATIENT)
Dept: SPINE | Facility: CLINIC | Age: 78
End: 2018-03-27

## 2018-04-06 ENCOUNTER — HOSPITAL ENCOUNTER (EMERGENCY)
Facility: HOSPITAL | Age: 78
Discharge: HOME | End: 2018-04-06
Attending: EMERGENCY MEDICINE
Payer: MEDICARE

## 2018-04-06 VITALS
BODY MASS INDEX: 30.22 KG/M2 | RESPIRATION RATE: 18 BRPM | OXYGEN SATURATION: 98 % | HEIGHT: 64 IN | TEMPERATURE: 99 F | DIASTOLIC BLOOD PRESSURE: 77 MMHG | HEART RATE: 67 BPM | SYSTOLIC BLOOD PRESSURE: 180 MMHG | WEIGHT: 177 LBS
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
## 2018-04-06 DIAGNOSIS — M71.22 BAKER'S CYST, LEFT: Primary | ICD-10-CM
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
## 2018-04-06 DIAGNOSIS — N28.9 ACUTE KIDNEY INSUFFICIENCY: ICD-10-CM

## 2018-04-06 DIAGNOSIS — M79.672 LEFT FOOT PAIN: ICD-10-CM

## 2018-04-06 DIAGNOSIS — I10 ESSENTIAL HYPERTENSION: ICD-10-CM
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
## 2018-04-06 LAB
ALBUMIN SERPL BCP-MCNC: 3.4 G/DL
ALP SERPL-CCNC: 96 U/L
ALT SERPL W/O P-5'-P-CCNC: 5 U/L
ANION GAP SERPL CALC-SCNC: 6 MMOL/L
AST SERPL-CCNC: 12 U/L
BASOPHILS # BLD AUTO: 0.03 K/UL
BASOPHILS NFR BLD: 0.6 %
BILIRUB SERPL-MCNC: 0.3 MG/DL
BILIRUB UR QL STRIP: NEGATIVE
BUN SERPL-MCNC: 31 MG/DL
CALCIUM SERPL-MCNC: 9 MG/DL
CHLORIDE SERPL-SCNC: 110 MMOL/L
CK SERPL-CCNC: 95 U/L
CLARITY UR REFRACT.AUTO: CLEAR
CO2 SERPL-SCNC: 23 MMOL/L
COLOR UR AUTO: NORMAL
CREAT SERPL-MCNC: 1.6 MG/DL
DIFFERENTIAL METHOD: ABNORMAL
EOSINOPHIL # BLD AUTO: 0.1 K/UL
EOSINOPHIL NFR BLD: 1.7 %
ERYTHROCYTE [DISTWIDTH] IN BLOOD BY AUTOMATED COUNT: 14.1 %
EST. GFR  (AFRICAN AMERICAN): 35.1 ML/MIN/1.73 M^2
EST. GFR  (NON AFRICAN AMERICAN): 30.4 ML/MIN/1.73 M^2
GLUCOSE SERPL-MCNC: 85 MG/DL
GLUCOSE UR QL STRIP: NEGATIVE
HCT VFR BLD AUTO: 34.3 %
HGB BLD-MCNC: 10.5 G/DL
HGB UR QL STRIP: NEGATIVE
IMM GRANULOCYTES # BLD AUTO: 0.01 K/UL
IMM GRANULOCYTES NFR BLD AUTO: 0.2 %
KETONES UR QL STRIP: NEGATIVE
LEUKOCYTE ESTERASE UR QL STRIP: NEGATIVE
LYMPHOCYTES # BLD AUTO: 1.2 K/UL
LYMPHOCYTES NFR BLD: 22.9 %
MAGNESIUM SERPL-MCNC: 2.3 MG/DL
MCH RBC QN AUTO: 29.3 PG
MCHC RBC AUTO-ENTMCNC: 30.6 G/DL
MCV RBC AUTO: 96 FL
MONOCYTES # BLD AUTO: 0.3 K/UL
MONOCYTES NFR BLD: 6.4 %
NEUTROPHILS # BLD AUTO: 3.6 K/UL
NEUTROPHILS NFR BLD: 68.2 %
NITRITE UR QL STRIP: NEGATIVE
NRBC BLD-RTO: 0 /100 WBC
PH UR STRIP: 5 [PH] (ref 5–8)
PLATELET # BLD AUTO: 200 K/UL
PMV BLD AUTO: 10.4 FL
POTASSIUM SERPL-SCNC: 4 MMOL/L
PROT SERPL-MCNC: 6.9 G/DL
PROT UR QL STRIP: NEGATIVE
RBC # BLD AUTO: 3.58 M/UL
SODIUM SERPL-SCNC: 139 MMOL/L
SP GR UR STRIP: 1 (ref 1–1.03)
TSH SERPL DL<=0.005 MIU/L-ACNC: 2.05 UIU/ML
URN SPEC COLLECT METH UR: NORMAL
UROBILINOGEN UR STRIP-ACNC: NEGATIVE EU/DL
WBC # BLD AUTO: 5.32 K/UL

## 2018-04-06 PROCEDURE — 96360 HYDRATION IV INFUSION INIT: CPT

## 2018-04-06 PROCEDURE — 83735 ASSAY OF MAGNESIUM: CPT

## 2018-04-06 PROCEDURE — 85025 COMPLETE CBC W/AUTO DIFF WBC: CPT

## 2018-04-06 PROCEDURE — 99284 EMERGENCY DEPT VISIT MOD MDM: CPT | Mod: ,,, | Performed by: EMERGENCY MEDICINE

## 2018-04-06 PROCEDURE — 99284 EMERGENCY DEPT VISIT MOD MDM: CPT | Mod: 25

## 2018-04-06 PROCEDURE — 84443 ASSAY THYROID STIM HORMONE: CPT

## 2018-04-06 PROCEDURE — 81003 URINALYSIS AUTO W/O SCOPE: CPT

## 2018-04-06 PROCEDURE — 80053 COMPREHEN METABOLIC PANEL: CPT

## 2018-04-06 PROCEDURE — 96361 HYDRATE IV INFUSION ADD-ON: CPT

## 2018-04-06 PROCEDURE — 82550 ASSAY OF CK (CPK): CPT

## 2018-04-06 PROCEDURE — 25000003 PHARM REV CODE 250: Performed by: EMERGENCY MEDICINE

## 2018-04-06 RX ORDER — ACETAMINOPHEN 325 MG/1
650 TABLET ORAL
Status: COMPLETED | OUTPATIENT
Start: 2018-04-06 | End: 2018-04-06

## 2018-04-06 RX ADMIN — SODIUM CHLORIDE, POTASSIUM CHLORIDE, SODIUM LACTATE AND CALCIUM CHLORIDE 1000 ML: 600; 310; 30; 20 INJECTION, SOLUTION INTRAVENOUS at 06:04

## 2018-04-06 RX ADMIN — ACETAMINOPHEN 650 MG: 325 TABLET ORAL at 06:04

## 2018-04-06 NOTE — ED TRIAGE NOTES
Pt arrives for evaluation of left foot and right head pain after she fell at home today - also has pain and small scratch to right lip from fall - denies dizziness

## 2018-04-06 NOTE — ED PROVIDER NOTES
Encounter Date: 4/6/2018    SCRIBE #1 NOTE: I, Odalys Carl, am scribing for, and in the presence of,  Dr. Hua. I have scribed the entire note.       History     Chief Complaint   Patient presents with    Foot Pain     Pt arrives for evaluation of left foot and right head pain after she fell at home today - also has pain and small scratch to right lip from fall - denies dizziness     Time patient was seen by the provider: 5:23 PM      The patient is a 77 y.o. female with co-morbidities including: HLD, HTN, arthritis, who presents to the ED with a complaint of left foot pain s/p mechanical fall from around 2:00 PM today. The patient states she tripped and fell also hitting her mouth, there is minimal bleeding present. Patient reports she was unable to get up and was lying on the floor for 2 hours until her daughter found her. She denies LOC. Patient also states her vision has been blurry for a week. She has not had an eye exam recently.          The history is provided by the patient, medical records and a relative.     Review of patient's allergies indicates:  No Known Allergies  Past Medical History:   Diagnosis Date    Arthritis     Hyperlipidemia     Hypertension      Past Surgical History:   Procedure Laterality Date    HYSTERECTOMY N/A      History reviewed. No pertinent family history.  Social History   Substance Use Topics    Smoking status: Former Smoker    Smokeless tobacco: Never Used    Alcohol use No     Review of Systems   Constitutional: Negative for fever.   HENT: Negative for sore throat.         (+) Upper lip bleeding   Eyes:        (+) Blurry vision   Respiratory: Negative for shortness of breath.    Cardiovascular: Negative for chest pain.   Gastrointestinal: Negative for nausea.   Genitourinary: Negative for dysuria.   Musculoskeletal: Negative for back pain.        (+) Left foot pain   Skin: Negative for rash.   Neurological: Negative for weakness.   Hematological: Does not  bruise/bleed easily.       Physical Exam     Initial Vitals [04/06/18 1638]   BP Pulse Resp Temp SpO2   (!) 163/68 60 20 99.2 °F (37.3 °C) 99 %      MAP       99.67         Physical Exam    Vitals reviewed.  Constitutional: No distress.   77 y.o. -American female   HENT:   Head: Normocephalic and atraumatic.   No intraoral injury or lesion is identified   Eyes: EOM are normal. Pupils are equal, round, and reactive to light.   Neck: No tracheal deviation present. No JVD present.   Cardiovascular: Normal rate, regular rhythm and normal heart sounds.   Diminished lower extremities bilaterally   Pulmonary/Chest: Breath sounds normal. No stridor. No respiratory distress.   Abdominal: Soft. She exhibits no distension. There is no tenderness.   Musculoskeletal: She exhibits no edema.   There is mild soft tissue swelling and tenderness of the dorsum of the left foot without bony deformity.  There is no malleolar tenderness to palpation.  Otherwise normal range of motion exhibited.  Patient endorses right sided low back pain with movement stemming from a fall greater than a month ago.   Neurological: She is alert and oriented to person, place, and time. No sensory deficit.   Skin:   A 1 to 2 mm superficial laceration is noted to the right upper lip with minimal bleeding   Psychiatric: Her behavior is normal. Thought content normal.         ED Course   Procedures  Labs Reviewed   CBC W/ AUTO DIFFERENTIAL - Abnormal; Notable for the following:        Result Value    RBC 3.58 (*)     Hemoglobin 10.5 (*)     Hematocrit 34.3 (*)     MCHC 30.6 (*)     All other components within normal limits   COMPREHENSIVE METABOLIC PANEL - Abnormal; Notable for the following:     BUN, Bld 31 (*)     Creatinine 1.6 (*)     Albumin 3.4 (*)     ALT 5 (*)     Anion Gap 6 (*)     eGFR if  35.1 (*)     eGFR if non  30.4 (*)     All other components within normal limits   MAGNESIUM   TSH   CK   URINALYSIS, REFLEX  TO URINE CULTURE          X-Rays:   Independently Interpreted Readings:   Other Readings:  X-ray foot: No evidence of fracture or dislocation noted   X-ray ankle: no fracture or dislocation noted.     Imaging Results          US Lower Extremity Veins Left (Final result)  Result time 04/06/18 20:24:18    Final result by Jamel Zambrano MD (04/06/18 20:24:18)                 Impression:      No evidence of deep venous thrombosis in either lower extremity.    Left Baker's cyst.    Electronically signed by resident: Bijal Fernández  Date:    04/06/2018  Time:    20:12    Electronically signed by: Jamel Zambrano MD  Date:    04/06/2018  Time:    20:24             Narrative:    EXAMINATION:  US LOWER EXTREMITY VEINS LEFT    CLINICAL HISTORY:  Pain in left foot    TECHNIQUE:  Duplex and color flow Doppler evaluation and graded compression of the left lower extremity veins was performed.    COMPARISON:  None    FINDINGS:  Left thigh veins: The common femoral, femoral, popliteal, upper greater saphenous, and deep femoral veins are patent and free of thrombus. The veins are normally compressible and have normal phasic flow and augmentation response.    Left calf veins: The visualized calf veins are patent.    Contralateral CFV: No evidence of deep venous thrombosis in either lower extremity.    Miscellaneous: There is a fluid collection in left popliteal fossa measuring 4.5 by 3.1 x 0.9 cm, nonspecific, likely a Baker's cyst.                               X-Ray Ankle Complete Left (Final result)  Result time 04/06/18 18:08:01   Procedure changed from X-Ray Ankle 2 View Left     Final result by Lul Kendall MD (04/06/18 18:08:01)                 Impression:      1. No acute displaced fracture or dislocation of the ankle.      Electronically signed by: Lul Kendall MD  Date:    04/06/2018  Time:    18:08             Narrative:    EXAMINATION:  XR ANKLE COMPLETE 3 VIEW LEFT    CLINICAL HISTORY:  fall at  home;    TECHNIQUE:  AP, lateral and oblique views of the left ankle were performed.    COMPARISON:  None    FINDINGS:  Three views.    There is osteopenia.  There is diffuse edema about the ankle.  The ankle mortise is intact.  No acute displaced fracture or dislocation of the ankle.  Degenerative changes are noted of the calcaneus.  There is vascular calcification.  There is a focus of subcutaneous calcification posterior to the distal tibia/fibula.                               X-Ray Foot Complete Left (Final result)  Result time 04/06/18 18:08:58   Procedure changed from X-Ray Foot 2 View Left     Final result by Lul Kendall MD (04/06/18 18:08:58)                 Impression:      1. Nonspecific edema about the foot, no acute displaced fracture or dislocation.      Electronically signed by: Lul Kendall MD  Date:    04/06/2018  Time:    18:08             Narrative:    EXAMINATION:  XR FOOT COMPLETE 3 VIEW LEFT    CLINICAL HISTORY:  fall at home;.    TECHNIQUE:  AP, lateral and oblique views of the left foot were performed.    COMPARISON:  07/03/2013    FINDINGS:  Three views.    There is osteopenia.  Degenerative changes are noted of the foot.  Allowing for positioning, no convincing acute displaced fracture or dislocation of the foot.  There is edema about the foot.                                Medical Decision Making:   History:   Old Medical Records: I decided to obtain old medical records.  Differential Diagnosis:   Foot fracture, foot dislocation, foot contusion, AK I, electrolyte derangement, rhabdomyolysis  Independently Interpreted Test(s):   I have ordered and independently interpreted X-rays - see prior notes.  Clinical Tests:   Lab Tests: Ordered and Reviewed  Radiological Study: Ordered and Reviewed            Scribe Attestation:   Scribe #1: I performed the above scribed service and the documentation accurately describes the services I performed. I attest to the accuracy of the  note.    Attending Attestation:             Attending ED Notes:   Emergency department findings today do not reveal evidence of significant hematologic disorder, however, metabolic profile does reveal an acute kidney injury with a creatinine up from 1.02 1.6 with associated azotemia in this patient presenting with left foot pain and a fall at home earlier today.  Plain films of the lower extremity as well as ultrasounds do not reveal evidence of acute bony injury or embolic disease.  A Baker's cyst of the left popliteal fossa has been identified.  Fluid resuscitation has been provided.  The patient describes improvement of her presenting symptoms with ED therapy.  She has been fitting for a walking boot and has been fitted and dispensed a walker for diminished weight-bearing in improved ambulation.  I have discussed ED findings and outpatient plan with the patient and accompanying daughter and have answered her questions the satisfaction.  I have recommended close primary care follow-up for further monitoring of her renal function and return to the ED as needed for urgent concerns.             Clinical Impression:   The primary encounter diagnosis was Baker's cyst, left. Diagnoses of Left foot pain, Fall at home, initial encounter, and Acute kidney insufficiency were also pertinent to this visit.    Disposition:   Disposition: Discharged  Condition: Stable                        Wilfredo Hua MD  04/06/18 [id number]

## 2018-04-06 NOTE — ED TRIAGE NOTES
Presents to ER after a slip and fall earlier today.  Complaining of pain to her left ankle.  Swelling noted to the medial and lateral aspect of her left ankle.  Pt identifiers checked and correct  LOC: The patient is awake, alert, aware of environment with an appropriate affect. Oriented x3, speaking appropriately  APPEARANCE: Pt resting comfortably, in no acute distress, pt is clean and well groomed, clothing properly fastened  SKIN: Skin warm, dry and intact, normal skin turgor, moist mucus membranes  RESPIRATORY: Airway is open and patent, respirations are spontaneous, even and unlabored, normal effort and rate  MUSCULOSKELETAL: No obvious deformities.  HISTORY:                             Chest pain     HTN (hypertension)    Tobacco abuse     Pain in the chest     SSS (sick sinus syndrome)     Dizziness     Bradycardia     Light headedness     Sick sinus syndrome     Essential hypertension     Cardiac ischemia     Hyperlipidemia     Coronary artery disease involving native coronary artery of native heart without angina pectoris     Anginal equivalent     CKD (chronic kidney disease) stage 3, GFR 30-59 ml/min     Normocytic anemia     Hypertensive emergency without congestive heart failure     SOB (shortness of breath)     Hypertensive urgency

## 2018-04-06 NOTE — ED NOTES
Patient to RWR, assist to BR in attempt to obtain urine, up with max assist, patient voided in toilet, not in hat. To x ray with tech.

## 2018-04-25 ENCOUNTER — HOSPITAL ENCOUNTER (OUTPATIENT)
Facility: HOSPITAL | Age: 78
Discharge: HOME | End: 2018-04-28
Attending: EMERGENCY MEDICINE | Admitting: INTERNAL MEDICINE
Payer: MEDICARE

## 2018-04-25 DIAGNOSIS — E87.6 HYPOKALEMIA: ICD-10-CM

## 2018-04-25 DIAGNOSIS — I47.10 SUPRAVENTRICULAR TACHYCARDIA: Primary | ICD-10-CM

## 2018-04-25 DIAGNOSIS — I50.30 HEART FAILURE WITH PRESERVED LEFT VENTRICULAR FUNCTION (HFPEF): ICD-10-CM

## 2018-04-25 DIAGNOSIS — R07.2 PRECORDIAL PAIN: ICD-10-CM

## 2018-04-25 DIAGNOSIS — R00.0 TACHYCARDIA: ICD-10-CM

## 2018-04-25 DIAGNOSIS — R07.9 CHEST PAIN: ICD-10-CM

## 2018-04-25 DIAGNOSIS — I95.1 ORTHOSTATIC HYPOTENSION: ICD-10-CM

## 2018-04-25 DIAGNOSIS — R53.81 PHYSICAL DECONDITIONING: ICD-10-CM

## 2018-04-25 DIAGNOSIS — R42 POSTURAL DIZZINESS WITH PRESYNCOPE: ICD-10-CM

## 2018-04-25 DIAGNOSIS — I10 ESSENTIAL HYPERTENSION: ICD-10-CM

## 2018-04-25 DIAGNOSIS — R55 POSTURAL DIZZINESS WITH PRESYNCOPE: ICD-10-CM

## 2018-04-25 DIAGNOSIS — R00.1 SINUS BRADYCARDIA: ICD-10-CM

## 2018-04-25 DIAGNOSIS — I49.5 SSS (SICK SINUS SYNDROME): ICD-10-CM

## 2018-04-25 LAB
ALBUMIN SERPL BCP-MCNC: 3.7 G/DL
ALP SERPL-CCNC: 111 U/L
ALT SERPL W/O P-5'-P-CCNC: <5 U/L
ANION GAP SERPL CALC-SCNC: 12 MMOL/L
ANISOCYTOSIS BLD QL SMEAR: SLIGHT
AST SERPL-CCNC: 11 U/L
BASOPHILS # BLD AUTO: 0.02 K/UL
BASOPHILS NFR BLD: 0.3 %
BILIRUB SERPL-MCNC: 0.6 MG/DL
BILIRUB UR QL STRIP: NEGATIVE
BNP SERPL-MCNC: 48 PG/ML
BUN SERPL-MCNC: 18 MG/DL
CALCIUM SERPL-MCNC: 9.8 MG/DL
CHLORIDE SERPL-SCNC: 103 MMOL/L
CLARITY UR: CLEAR
CO2 SERPL-SCNC: 26 MMOL/L
COLOR UR: YELLOW
CREAT SERPL-MCNC: 1.3 MG/DL
DIFFERENTIAL METHOD: NORMAL
EOSINOPHIL # BLD AUTO: 0.1 K/UL
EOSINOPHIL NFR BLD: 1 %
ERYTHROCYTE [DISTWIDTH] IN BLOOD BY AUTOMATED COUNT: 14 %
EST. GFR  (AFRICAN AMERICAN): 45 ML/MIN/1.73 M^2
EST. GFR  (NON AFRICAN AMERICAN): 39 ML/MIN/1.73 M^2
GLUCOSE SERPL-MCNC: 118 MG/DL
GLUCOSE UR QL STRIP: NEGATIVE
HCT VFR BLD AUTO: 39.1 %
HGB BLD-MCNC: 12.6 G/DL
HGB UR QL STRIP: NEGATIVE
KETONES UR QL STRIP: NEGATIVE
LEUKOCYTE ESTERASE UR QL STRIP: NEGATIVE
LYMPHOCYTES # BLD AUTO: 2.2 K/UL
LYMPHOCYTES NFR BLD: 32.6 %
MAGNESIUM SERPL-MCNC: 1.7 MG/DL
MCH RBC QN AUTO: 28.9 PG
MCHC RBC AUTO-ENTMCNC: 32.2 G/DL
MCV RBC AUTO: 90 FL
MONOCYTES # BLD AUTO: 0.4 K/UL
MONOCYTES NFR BLD: 5.8 %
NEUTROPHILS # BLD AUTO: 4 K/UL
NEUTROPHILS NFR BLD: 60.3 %
NITRITE UR QL STRIP: NEGATIVE
OVALOCYTES BLD QL SMEAR: NORMAL
PH UR STRIP: 6 [PH] (ref 5–8)
PLATELET # BLD AUTO: 275 K/UL
PLATELET BLD QL SMEAR: NORMAL
PMV BLD AUTO: 10.4 FL
POIKILOCYTOSIS BLD QL SMEAR: SLIGHT
POTASSIUM SERPL-SCNC: 3.3 MMOL/L
PROT SERPL-MCNC: 7.2 G/DL
PROT UR QL STRIP: NEGATIVE
RBC # BLD AUTO: 4.36 M/UL
SODIUM SERPL-SCNC: 141 MMOL/L
SP GR UR STRIP: <=1.005 (ref 1–1.03)
TROPONIN I SERPL DL<=0.01 NG/ML-MCNC: 0.01 NG/ML
TSH SERPL DL<=0.005 MIU/L-ACNC: 1.19 UIU/ML
URN SPEC COLLECT METH UR: ABNORMAL
UROBILINOGEN UR STRIP-ACNC: NEGATIVE EU/DL
WBC # BLD AUTO: 6.68 K/UL

## 2018-04-25 PROCEDURE — 83735 ASSAY OF MAGNESIUM: CPT

## 2018-04-25 PROCEDURE — 99285 EMERGENCY DEPT VISIT HI MDM: CPT | Mod: 25

## 2018-04-25 PROCEDURE — 25000003 PHARM REV CODE 250: Performed by: EMERGENCY MEDICINE

## 2018-04-25 PROCEDURE — 96374 THER/PROPH/DIAG INJ IV PUSH: CPT

## 2018-04-25 PROCEDURE — 85025 COMPLETE CBC W/AUTO DIFF WBC: CPT

## 2018-04-25 PROCEDURE — G0378 HOSPITAL OBSERVATION PER HR: HCPCS

## 2018-04-25 PROCEDURE — 63600175 PHARM REV CODE 636 W HCPCS: Performed by: EMERGENCY MEDICINE

## 2018-04-25 PROCEDURE — 81003 URINALYSIS AUTO W/O SCOPE: CPT

## 2018-04-25 PROCEDURE — 93005 ELECTROCARDIOGRAM TRACING: CPT

## 2018-04-25 PROCEDURE — 84484 ASSAY OF TROPONIN QUANT: CPT

## 2018-04-25 PROCEDURE — 84443 ASSAY THYROID STIM HORMONE: CPT

## 2018-04-25 PROCEDURE — 83880 ASSAY OF NATRIURETIC PEPTIDE: CPT

## 2018-04-25 PROCEDURE — 80053 COMPREHEN METABOLIC PANEL: CPT

## 2018-04-25 RX ORDER — ASPIRIN 325 MG
325 TABLET ORAL
Status: DISCONTINUED | OUTPATIENT
Start: 2018-04-25 | End: 2018-04-25

## 2018-04-25 RX ORDER — ATORVASTATIN CALCIUM 20 MG/1
20 TABLET, FILM COATED ORAL NIGHTLY
Status: DISCONTINUED | OUTPATIENT
Start: 2018-04-26 | End: 2018-04-28 | Stop reason: HOSPADM

## 2018-04-25 RX ORDER — ADENOSINE 3 MG/ML
INJECTION, SOLUTION INTRAVENOUS
Status: DISCONTINUED
Start: 2018-04-25 | End: 2018-04-25 | Stop reason: WASHOUT

## 2018-04-25 RX ORDER — IBUPROFEN 200 MG
24 TABLET ORAL
Status: DISCONTINUED | OUTPATIENT
Start: 2018-04-25 | End: 2018-04-28 | Stop reason: HOSPADM

## 2018-04-25 RX ORDER — IBUPROFEN 200 MG
16 TABLET ORAL
Status: DISCONTINUED | OUTPATIENT
Start: 2018-04-25 | End: 2018-04-28 | Stop reason: HOSPADM

## 2018-04-25 RX ORDER — NITROGLYCERIN 0.4 MG/1
0.4 TABLET SUBLINGUAL EVERY 5 MIN PRN
Status: DISCONTINUED | OUTPATIENT
Start: 2018-04-25 | End: 2018-04-28 | Stop reason: HOSPADM

## 2018-04-25 RX ORDER — NAPROXEN SODIUM 220 MG/1
81 TABLET, FILM COATED ORAL DAILY
Status: DISCONTINUED | OUTPATIENT
Start: 2018-04-26 | End: 2018-04-28 | Stop reason: HOSPADM

## 2018-04-25 RX ORDER — SODIUM CHLORIDE 0.9 % (FLUSH) 0.9 %
5 SYRINGE (ML) INJECTION
Status: DISCONTINUED | OUTPATIENT
Start: 2018-04-25 | End: 2018-04-28 | Stop reason: HOSPADM

## 2018-04-25 RX ORDER — POTASSIUM CHLORIDE 20 MEQ/1
20 TABLET, EXTENDED RELEASE ORAL
Status: COMPLETED | OUTPATIENT
Start: 2018-04-25 | End: 2018-04-25

## 2018-04-25 RX ORDER — GLUCAGON 1 MG
1 KIT INJECTION
Status: DISCONTINUED | OUTPATIENT
Start: 2018-04-25 | End: 2018-04-28 | Stop reason: HOSPADM

## 2018-04-25 RX ORDER — MORPHINE SULFATE 4 MG/ML
2 INJECTION, SOLUTION INTRAMUSCULAR; INTRAVENOUS
Status: COMPLETED | OUTPATIENT
Start: 2018-04-25 | End: 2018-04-25

## 2018-04-25 RX ADMIN — NITROGLYCERIN 0.4 MG: 0.4 TABLET SUBLINGUAL at 08:04

## 2018-04-25 RX ADMIN — MORPHINE SULFATE 2 MG: 4 INJECTION INTRAVENOUS at 08:04

## 2018-04-25 RX ADMIN — POTASSIUM CHLORIDE 20 MEQ: 20 TABLET, EXTENDED RELEASE ORAL at 09:04

## 2018-04-26 ENCOUNTER — TELEPHONE (OUTPATIENT)
Dept: CARDIOLOGY | Facility: CLINIC | Age: 78
End: 2018-04-26

## 2018-04-26 PROBLEM — R00.1 SINUS BRADYCARDIA: Status: ACTIVE | Noted: 2018-04-26

## 2018-04-26 PROBLEM — I16.1 HYPERTENSIVE EMERGENCY WITHOUT CONGESTIVE HEART FAILURE: Status: RESOLVED | Noted: 2017-11-22 | Resolved: 2018-04-26

## 2018-04-26 LAB
ALBUMIN SERPL BCP-MCNC: 3.2 G/DL
ALP SERPL-CCNC: 93 U/L
ALT SERPL W/O P-5'-P-CCNC: <5 U/L
ANION GAP SERPL CALC-SCNC: 7 MMOL/L
AST SERPL-CCNC: 10 U/L
BASOPHILS # BLD AUTO: 0.01 K/UL
BASOPHILS NFR BLD: 0.2 %
BILIRUB SERPL-MCNC: 0.5 MG/DL
BUN SERPL-MCNC: 17 MG/DL
CALCIUM SERPL-MCNC: 9.2 MG/DL
CHLORIDE SERPL-SCNC: 106 MMOL/L
CHOLEST SERPL-MCNC: 147 MG/DL
CHOLEST/HDLC SERPL: 3 {RATIO}
CO2 SERPL-SCNC: 30 MMOL/L
CREAT SERPL-MCNC: 1.2 MG/DL
DIASTOLIC DYSFUNCTION: YES
DIFFERENTIAL METHOD: ABNORMAL
EOSINOPHIL # BLD AUTO: 0.1 K/UL
EOSINOPHIL NFR BLD: 1 %
ERYTHROCYTE [DISTWIDTH] IN BLOOD BY AUTOMATED COUNT: 14.1 %
EST. GFR  (AFRICAN AMERICAN): 50 ML/MIN/1.73 M^2
EST. GFR  (NON AFRICAN AMERICAN): 43 ML/MIN/1.73 M^2
ESTIMATED PA SYSTOLIC PRESSURE: 31.3
GLUCOSE SERPL-MCNC: 100 MG/DL
HCT VFR BLD AUTO: 37.1 %
HDLC SERPL-MCNC: 49 MG/DL
HDLC SERPL: 33.3 %
HGB BLD-MCNC: 11.6 G/DL
LDLC SERPL CALC-MCNC: 85.2 MG/DL
LYMPHOCYTES # BLD AUTO: 1.3 K/UL
LYMPHOCYTES NFR BLD: 25.2 %
MAGNESIUM SERPL-MCNC: 1.9 MG/DL
MCH RBC QN AUTO: 28.5 PG
MCHC RBC AUTO-ENTMCNC: 31.3 G/DL
MCV RBC AUTO: 91 FL
MITRAL VALVE MOBILITY: NORMAL
MONOCYTES # BLD AUTO: 0.3 K/UL
MONOCYTES NFR BLD: 5.5 %
NEUTROPHILS # BLD AUTO: 3.4 K/UL
NEUTROPHILS NFR BLD: 67.9 %
NONHDLC SERPL-MCNC: 98 MG/DL
PLATELET # BLD AUTO: 226 K/UL
PMV BLD AUTO: 10.4 FL
POTASSIUM SERPL-SCNC: 3.7 MMOL/L
PROT SERPL-MCNC: 6.5 G/DL
RBC # BLD AUTO: 4.07 M/UL
RETIRED EF AND QEF - SEE NOTES: 60 (ref 55–65)
SODIUM SERPL-SCNC: 143 MMOL/L
TRICUSPID VALVE REGURGITATION: ABNORMAL
TRIGL SERPL-MCNC: 64 MG/DL
TROPONIN I SERPL DL<=0.01 NG/ML-MCNC: 0.01 NG/ML
TROPONIN I SERPL DL<=0.01 NG/ML-MCNC: 0.01 NG/ML
WBC # BLD AUTO: 5.07 K/UL

## 2018-04-26 PROCEDURE — G0378 HOSPITAL OBSERVATION PER HR: HCPCS

## 2018-04-26 PROCEDURE — 36415 COLL VENOUS BLD VENIPUNCTURE: CPT

## 2018-04-26 PROCEDURE — 25000003 PHARM REV CODE 250: Performed by: STUDENT IN AN ORGANIZED HEALTH CARE EDUCATION/TRAINING PROGRAM

## 2018-04-26 PROCEDURE — 84484 ASSAY OF TROPONIN QUANT: CPT

## 2018-04-26 PROCEDURE — 93306 TTE W/DOPPLER COMPLETE: CPT | Mod: 26,,, | Performed by: INTERNAL MEDICINE

## 2018-04-26 PROCEDURE — 97161 PT EVAL LOW COMPLEX 20 MIN: CPT

## 2018-04-26 PROCEDURE — 63600175 PHARM REV CODE 636 W HCPCS: Performed by: STUDENT IN AN ORGANIZED HEALTH CARE EDUCATION/TRAINING PROGRAM

## 2018-04-26 PROCEDURE — 93306 TTE W/DOPPLER COMPLETE: CPT

## 2018-04-26 PROCEDURE — 80061 LIPID PANEL: CPT

## 2018-04-26 PROCEDURE — 97165 OT EVAL LOW COMPLEX 30 MIN: CPT

## 2018-04-26 PROCEDURE — 85025 COMPLETE CBC W/AUTO DIFF WBC: CPT

## 2018-04-26 PROCEDURE — 93005 ELECTROCARDIOGRAM TRACING: CPT

## 2018-04-26 PROCEDURE — G8978 MOBILITY CURRENT STATUS: HCPCS | Mod: CL

## 2018-04-26 PROCEDURE — 80053 COMPREHEN METABOLIC PANEL: CPT

## 2018-04-26 PROCEDURE — G8979 MOBILITY GOAL STATUS: HCPCS | Mod: CI

## 2018-04-26 PROCEDURE — 99291 CRITICAL CARE FIRST HOUR: CPT | Mod: ,,, | Performed by: INTERNAL MEDICINE

## 2018-04-26 PROCEDURE — 83735 ASSAY OF MAGNESIUM: CPT

## 2018-04-26 RX ORDER — HYDRALAZINE HYDROCHLORIDE 20 MG/ML
10 INJECTION INTRAMUSCULAR; INTRAVENOUS ONCE AS NEEDED
Status: COMPLETED | OUTPATIENT
Start: 2018-04-26 | End: 2018-04-26

## 2018-04-26 RX ORDER — ENOXAPARIN SODIUM 100 MG/ML
40 INJECTION SUBCUTANEOUS DAILY
Status: DISCONTINUED | OUTPATIENT
Start: 2018-04-26 | End: 2018-04-28 | Stop reason: HOSPADM

## 2018-04-26 RX ORDER — AMLODIPINE BESYLATE 5 MG/1
10 TABLET ORAL DAILY
Status: DISCONTINUED | OUTPATIENT
Start: 2018-04-26 | End: 2018-04-28 | Stop reason: HOSPADM

## 2018-04-26 RX ORDER — LOSARTAN POTASSIUM 50 MG/1
100 TABLET ORAL DAILY
Status: DISCONTINUED | OUTPATIENT
Start: 2018-04-26 | End: 2018-04-28 | Stop reason: HOSPADM

## 2018-04-26 RX ADMIN — ASPIRIN 81 MG 81 MG: 81 TABLET ORAL at 09:04

## 2018-04-26 RX ADMIN — SODIUM CHLORIDE 500 ML: 9 INJECTION, SOLUTION INTRAVENOUS at 03:04

## 2018-04-26 RX ADMIN — LOSARTAN POTASSIUM 100 MG: 50 TABLET, FILM COATED ORAL at 09:04

## 2018-04-26 RX ADMIN — ATORVASTATIN CALCIUM 20 MG: 20 TABLET, FILM COATED ORAL at 09:04

## 2018-04-26 RX ADMIN — AMLODIPINE BESYLATE 10 MG: 5 TABLET ORAL at 09:04

## 2018-04-26 RX ADMIN — ENOXAPARIN SODIUM 40 MG: 100 INJECTION SUBCUTANEOUS at 09:04

## 2018-04-26 RX ADMIN — HYDRALAZINE HYDROCHLORIDE 10 MG: 20 INJECTION INTRAMUSCULAR; INTRAVENOUS at 10:04

## 2018-04-26 NOTE — PROGRESS NOTES
"[facility] Internal Medicine Resident HO-I Progress Note    Subjective:      Reports feeling better this AM. Denies episodes of dizziness, chest pain overnight. Further clarifies that dizziness usually occurs while standing     Objective:   Last 24 Hour Vital Signs:  BP  Min: 119/72  Max: 179/56  Temp  Av.3 °F (36.8 °C)  Min: 97.9 °F (36.6 °C)  Max: 98.5 °F (36.9 °C)  Pulse  Av.4  Min: 40  Max: 170  Resp  Av.1  Min: 15  Max: 43  SpO2  Av.9 %  Min: 99 %  Max: 100 %  Height  Av' 5" (165.1 cm)  Min: 5' 5" (165.1 cm)  Max: 5' 5" (165.1 cm)  Weight  Av.2 kg (165 lb 12.6 oz)  Min: 75.2 kg (165 lb 12.6 oz)  Max: 75.2 kg (165 lb 12.6 oz)  No intake/output data recorded.    Physical Examination:  General: sitting in bed, NAD, pacer pads in place, on 2L NC  HEENT: NCAT. PERRL, EOMI, anicteric sclera. OP clear with MMM.  CV: bradycardic, regular rhythm, no murmurs  Resp: on 2L NC, no accessory muscle use, CTAB, no crackles/wheezes  Abd: soft, NT, ND, normoactive BS  Skin: no rashes or bruising  Ext: no edema, cyanosis, or clubbing  Pulses: 2+ radial, PT  Neuro: AxO to person, place, year, situation, president. Moves extremities spontaneously.  Psych: cooperative, pleasant    Laboratory:  Laboratory Data Reviewed: yes  Pertinent Findings:  Lab Results   Component Value Date    WBC 5.07 2018    HGB 11.6 (L) 2018    HCT 37.1 2018    MCV 91 2018     2018     Lab Results   Component Value Date    CREATININE 1.2 2018    BUN 17 2018     2018    K 3.7 2018     2018    CO2 30 (H) 2018     Lab Results   Component Value Date    ALT <5 (L) 2018    AST 10 2018    ALKPHOS 93 2018    BILITOT 0.5 2018     Microbiology Data Reviewed: yes  Pertinent Findings:  None    Other Results:  EKG (my interpretation):  - sinus bradycardia, no ST segment changes, TWI in aVR/V1    Radiology Data Reviewed: yes  Pertinent " Findings:  None    Current Medications:     Infusions:       Scheduled:   amLODIPine  10 mg Oral Daily    aspirin  81 mg Oral Daily    atorvastatin  20 mg Oral QHS    enoxaparin  40 mg Subcutaneous Daily    losartan  100 mg Oral Daily        PRN:  dextrose 50%, dextrose 50%, glucagon (human recombinant), glucose, glucose, nitroGLYCERIN, sodium chloride 0.9%    Antibiotics and Day Number of Therapy:  None    Lines and Day Number of Therapy:  PIV    Assessment:     Crystal Alvarado is a 77 y.o.female with    Plan:     Chest pain with SVT s/p conversion with vagal maneuver   - P/w chest pain x 1 hour a/w SOB, diaphoresis.    - Initial trop 0.006, EKG with SVT-- converted to NSR s/p vagal maneuvers.   - 8/2015 nuclear stress test pos for inducible ischemia  subsequent cath with non-obstructive CAD.  12/2017 24hr Holter neg for arrhythmias.    - Trending trop/EKG (neg x2 so far).  Cardiology consulted (follows w/ Dr. Kline)-- f/u recs.  - Likely step down to floor today  continue monitoring on tele, pacer pads in place.     Presyncope with hx sick sinus syndrome  - Presented s/p near syncopal event.  Hx of similar episodes in the past.  - Follows with Dr. Kline (cards)  12/2017 24hr Holter neg for arrhythmias to explain episodes.  - Monitor on tele, pacer pads in place.  Cardiology consulted-- f/u recs.     Hypokalemia, resolved  - Admit K 3.3  replete PRN     Benign essential HTN  - BPs stable  continue home norvasc, losartan     Hyperlipidemia  - Fasting lipid panel at goal  continue home statin     Chronic diastolic heart failure  - Appears euvolemic.  On admit -- BNP 48, CXR without pulm vascular congestion.  - 11/2017 TTE with LVEF 55-60%, indeterminate diastolic fnx  f/u repeat TTE     CKD3  - Admit BUN/Cr 18/1.3, eGFR 45 (at baseline)  avoid nephrotoxins     Iron deficiency anemia  - Diagnosed during 11/2017 hospitalization.  H/H stable on admit.    - Continue home iron, colace on discharge     L4  compression fracture  - Seen on CT and MRI L-spine s/p mechanical fall  - PT/OT consulted  pain control PRN     Deconditioning  - PT/OT consulted at noted above     DVTPPx: lovenox  Diet: NPO  Code: FULL     Dispo: step down to floor today, pending cardiology recs, symptomatic improvement    Saskia George  [facility] Internal Medicine HO-I  [facility] IM Service Team A    [facility] Medicine Hospitalist Pager numbers:   [facility] Hospitalist Medicine Team A (Tom/Boubacar): [phone]  [facility] Hospitalist Medicine Team B (Yris/Kelvin):  [phone]

## 2018-04-26 NOTE — HOSPITAL COURSE
4/25/2018 Presented with complaints of dizziness but denies LOC. Denies any chest pain. EKG with no acute changes. Initial troponin .006. TSH 1.194 Reports of HR up to 170s in ER with HR down to 40s with vagal maneuvers-no strips available on chart. Admitted to [facility] for further evaluation   4/26/2018 HR SOB overnight with HR upper 40s to 50s true SB with no AVB or pauses. BP stable. Repeat troponin .013. Echocardiogram today with normal LVEF and diastolic dysfunction   4/27/2018 Transferred out of ICU yesterday. HR 57-63 on telemetry overnight with no AVB or pauses. Orthostatics positive yesterday with 500cc NS bolus. Denies any complaints this AM

## 2018-04-26 NOTE — ASSESSMENT & PLAN NOTE
-nonobstructive per [facility] 2015  -troponin negative; no concern for ACS  -continue ASA, statin and ARB; no BB due to bradycardia

## 2018-04-26 NOTE — PLAN OF CARE
bp lying   170/76 pulse 42-68 while she was still lying, having occ pvc, and pac's    bp sitting   166/74   Pulse 68 with slight inc to 74, still having occ pvc and pac's , no co of chest pain sob    bp standing,  133/60  Pulse 65, initially felt weak , but did not last long, once she started moving back to bed  Pulse up 72, but once back in bed, down 63, occ pvc and pac's, no chest pain or sob

## 2018-04-26 NOTE — TELEPHONE ENCOUNTER
----- Message from Rowan Huizar sent at 4/26/2018  8:51 AM CDT -----  Contact: Maty [phone]  CONSULTS  Patient: DIANA COOPER [[id number]]  YOB: 1940  Clinic Number: [id number]  Room Number: 258  Referring Physician: Dr. Lillie Hamlin  Reason: Chest pain   Person calling & phone number: Maty [phone]

## 2018-04-26 NOTE — ED PROVIDER NOTES
Encounter Date: 4/25/2018    SCRIBE #1 NOTE: IAleksandar, am scribing for, and in the presence of, Dr. Phillip.       History     Chief Complaint   Patient presents with    Chest Pain     arrives via [facility] ems with reported onset of chest pain approx. 45 minutes pta. pt. took ntg. sl x1 with decrease in pain to 7 and took 324mg asa p.o.. on arrival during triage pt. becomes anxious/restless/sob and reports pain increasing. initial ekg svt-170-180. md at bedside.      Crystal Alvarado is a 76 y/o female who  has a past medical history of Arthritis; Hyperlipidemia; and Hypertension.    The patient presents to the ED due to central chest pain and weakness starting 45 minutes ago. Patient had just finished bathing when her pain began and she became diaphoretic. She is unable to describe the pain. It does not radiate to any other locations. Patient notes associated shortness of breath. She took four 81mg aspirin and 1 sublingual nitroglycerin, which minimally improved her pain. Her pain is a 7 currently, but she states it is worsening. Patient's cardiologist is Dr. Kline.      The history is provided by the EMS personnel and a relative.     Review of patient's allergies indicates:  No Known Allergies  Past Medical History:   Diagnosis Date    Arthritis     Hyperlipidemia     Hypertension      Past Surgical History:   Procedure Laterality Date    HYSTERECTOMY N/A      No family history on file.  Social History   Substance Use Topics    Smoking status: Former Smoker    Smokeless tobacco: Never Used    Alcohol use No     Review of Systems   Constitutional: Positive for diaphoresis. Negative for fatigue.   HENT: Negative for congestion, rhinorrhea and sore throat.    Eyes: Negative for pain and visual disturbance.   Respiratory: Positive for shortness of breath. Negative for cough.    Cardiovascular: Positive for chest pain. Negative for leg swelling.   Gastrointestinal: Negative for abdominal pain,  diarrhea, nausea and vomiting.   Genitourinary: Negative for dysuria and hematuria.   Musculoskeletal: Negative for back pain and neck pain.   Skin: Negative for rash.   Neurological: Positive for weakness. Negative for headaches.   Hematological: Does not bruise/bleed easily.   Psychiatric/Behavioral: Negative for confusion.       Physical Exam     Initial Vitals   BP Pulse Resp Temp SpO2   -- -- -- -- --      MAP       --         Physical Exam    Nursing note and vitals reviewed.  Constitutional: She appears well-developed and well-nourished. She is not diaphoretic.   HENT:   Head: Normocephalic and atraumatic.   Right Ear: External ear normal.   Left Ear: External ear normal.   Nose: Nose normal.   Mouth/Throat: Oropharynx is clear and moist.   Eyes: Conjunctivae and EOM are normal. Right eye exhibits no discharge. Left eye exhibits no discharge. No scleral icterus.   Neck: Neck supple. No JVD present.   Cardiovascular: Regular rhythm, normal heart sounds and intact distal pulses. Exam reveals no gallop and no friction rub.    No murmur heard.  Symmetric 2+ radial pulses, tachycardic   Pulmonary/Chest: Breath sounds normal. No respiratory distress. She has no wheezes. She has no rhonchi. She has no rales. She exhibits no tenderness.   Abdominal: Soft. Bowel sounds are normal. She exhibits no distension and no mass. There is no tenderness. There is no rebound and no guarding.   Musculoskeletal: Normal range of motion. She exhibits no edema.   No peripheral edema   Neurological: She is alert and oriented to person, place, and time. No cranial nerve deficit.   Skin: Skin is warm. Capillary refill takes less than 2 seconds. No rash noted. No erythema.   Psychiatric: She has a normal mood and affect. Thought content normal.         ED Course   Critical Care  Date/Time: 4/25/2018 10:06 PM  Performed by: YOVANA UMANA  Authorized by: YOVANA UMANA   Total critical care time (exclusive of procedural time) : 37  minutes  Critical care was necessary to treat or prevent imminent or life-threatening deterioration of the following conditions: cardiac failure.  Critical care was time spent personally by me on the following activities: blood draw for specimens, discussions with consultants, evaluation of patient's response to treatment, obtaining history from patient or surrogate, pulse oximetry, ordering and review of laboratory studies, review of old charts, development of treatment plan with patient or surrogate, examination of patient, ordering and performing treatments and interventions, ordering and review of radiographic studies and re-evaluation of patient's condition.        Labs Reviewed   COMPREHENSIVE METABOLIC PANEL - Abnormal; Notable for the following:        Result Value    Potassium 3.3 (*)     Glucose 118 (*)     ALT <5 (*)     eGFR if  45 (*)     eGFR if non  39 (*)     All other components within normal limits   URINALYSIS - Abnormal; Notable for the following:     Specific Gravity, UA <=1.005 (*)     All other components within normal limits   CBC W/ AUTO DIFFERENTIAL   TROPONIN I   B-TYPE NATRIURETIC PEPTIDE   TSH   MAGNESIUM   TROPONIN I     EKG Readings: (Independently Interpreted)   1st EKG: SVT, rate of 170    2nd EKG: SVT, rate of 156    3rd EKG: Sinus rhythm with frequent PVCs, no stemi, normal T waves     Imaging Results          X-Ray Chest AP Portable (Final result)  Result time 04/25/18 21:49:31    Final result by Aleksandar Turpin MD (04/25/18 21:49:31)                 Impression:      No radiographic acute intrathoracic process seen on this single view.      Electronically signed by: Aleksandar Turpin MD  Date:    04/25/2018  Time:    21:49             Narrative:    EXAMINATION:  XR CHEST AP PORTABLE    CLINICAL HISTORY:  Chest Pain;    TECHNIQUE:  Single portable semi-upright view of the chest was performed.    COMPARISON:  Chest radiograph  11/19/2017.    FINDINGS:  Monitoring leads and pacer pads overlie the chest.  Patient is slightly rotated.  Cardiomediastinal silhouette is midline and grossly unchanged.  Pulmonary vasculature and hilar regions are within normal limits.  The lungs are symmetrically well expanded and grossly clear.  No pleural effusion or pneumothorax.  Calcific atherosclerosis of the thoracic aorta.  No acute osseous process seen.  PA and lateral views can be obtained.                                   Medical Decision Making:   History:   Old Medical Records: I decided to obtain old medical records.  Initial Assessment:   The patient presents to the ED due to chest pain and weakness starting 45 minutes ago.  Differential Diagnosis:   SVT, a fib with RVR, sinus tachycardia, sepsis, infection, dehydration, electrolyte abnormality  Independently Interpreted Test(s):   I have ordered and independently interpreted EKG Reading(s) - see prior notes  Clinical Tests:   Lab Tests: Ordered and Reviewed  Radiological Study: Ordered and Reviewed  Medical Tests: Ordered and Reviewed  ED Management:  Patient in SVT upon arrival but self converted with vagal maneuvers.  CP resolved.  Chart check reveals that patient has h/o SSS and was seen by Dr Kline and given holter monitor which didn't show any arrhythmias over 24 hours.  ED workup reveals mild hypokalemia which was replaced in ED.   Will admit to [facility] IM for further management                       Clinical Impression:   The primary encounter diagnosis was Supraventricular tachycardia. Diagnoses of Chest pain, Tachycardia, and Hypokalemia were also pertinent to this visit.    Disposition:   Disposition: Admitted  Condition: Stable       I, Lily Phillip,  personally performed the services described in this documentation. All medical record entries made by the scribe were at my direction and in my presence.  I have reviewed the chart and agree that the record reflects my personal  performance and is accurate and complete. Lily Phillip M.D. 10:06 PM04/25/2018                   Lily Phillip MD  04/25/18 2207       Lily Phillip MD  04/25/18 6372

## 2018-04-26 NOTE — PLAN OF CARE
Ap 50, sinus , no co of chest pain, or sob, sat's 98% 2lnc, lungs clear, remains npo until evaluated by cardiology , neuro status negative, except rt eye does not open as much as left, at times , still looks closed, lorie, noproblem with vision, slightly weaker rt hand grasp, no numbness/tingling, states no problems holding on to things

## 2018-04-26 NOTE — ED NOTES
Patient in to ED 11. Tachycardic. C/O of chest pain of 7 on 1-10 scale.  Nitro ordered and administered along with IV morphine. Patient awake and oriented to place and situation. Physician at bedside along with nursing staff. Patient able to verbalize needs.

## 2018-04-26 NOTE — PLAN OF CARE
Up to commode, no dizziness, is weak on her feet, unsteady, reinstructed never to get up by herself, bed alarm on, bp  At 1300 179/81  At 1400 102/65  Both lying in bed, 1500 bp 102/65 also  Lying  In bed,  Up to commode, bp up 169/72 pulse 56, weak but no dizziness        1530 started on normal saline 250cc hr for total of 500cc, did not see order , that was written earlier

## 2018-04-26 NOTE — SUBJECTIVE & OBJECTIVE
Past Medical History:   Diagnosis Date    Arthritis     CHF (congestive heart failure)     Hyperlipidemia     Hypertension        Past Surgical History:   Procedure Laterality Date    HYSTERECTOMY N/A        Review of patient's allergies indicates:  No Known Allergies    No current facility-administered medications on file prior to encounter.      Current Outpatient Prescriptions on File Prior to Encounter   Medication Sig    amlodipine (NORVASC) 10 MG tablet Take 10 mg by mouth once daily.    aspirin 81 MG Chew Take 81 mg by mouth once daily.    atorvastatin (LIPITOR) 20 MG tablet Take 20 mg by mouth every evening.    azelastine (ASTELIN) 137 mcg (0.1 %) nasal spray INHALE 1 SPRAY IN EACH NOSTRIL TWICE A DAY    calcitonin, salmon, (FORTICAL) 200 unit/actuation nasal spray 1 spray by Nasal route once daily.    docusate sodium (COLACE) 100 MG capsule Take 1 capsule (100 mg total) by mouth 2 (two) times daily.    ferrous sulfate 325 (65 FE) MG EC tablet Take 1 tablet (325 mg total) by mouth 2 (two) times daily.    ferrous sulfate 325 mg (65 mg iron) Tab tablet Take 1 tablet by mouth 2 (two) times daily.    losartan (COZAAR) 100 MG tablet TAKE 1 TABLET (100 MG TOTAL) BY MOUTH ONCE DAILY.    mirtazapine (REMERON) 15 MG tablet Take 15 mg by mouth every evening.     nitroGLYCERIN (NITROSTAT) 0.4 MG SL tablet Place 1 tablet (0.4 mg total) under the tongue every 5 (five) minutes as needed for Chest pain (and notify MD).    [DISCONTINUED] oxyCODONE-acetaminophen (PERCOCET) 5-325 mg per tablet Take 1-2 tablets by mouth every 8 (eight) hours as needed for Pain.     Family History     None        Social History Main Topics    Smoking status: Former Smoker     Packs/day: 0.50     Quit date: 4/26/2013    Smokeless tobacco: Never Used    Alcohol use No    Drug use: No    Sexual activity: No     Review of Systems   Constitution: Negative for chills, decreased appetite, diaphoresis, fever and weakness.    Cardiovascular: Positive for dyspnea on exertion. Negative for chest pain, claudication, cyanosis, irregular heartbeat, leg swelling, near-syncope, orthopnea, palpitations, paroxysmal nocturnal dyspnea and syncope.   Respiratory: Negative for cough, hemoptysis, shortness of breath and wheezing.    Gastrointestinal: Negative for bloating, abdominal pain, constipation, diarrhea, melena, nausea and vomiting.   Neurological: Positive for dizziness.     Objective:     Vital Signs (Most Recent):  Temp: 97.7 °F (36.5 °C) (04/26/18 1107)  Pulse: (!) 59 (04/26/18 1103)  Resp: 18 (04/26/18 1103)  BP: (!) 148/75 (04/26/18 1103)  SpO2: 100 % (04/26/18 1103) Vital Signs (24h Range):  Temp:  [97.7 °F (36.5 °C)-98.5 °F (36.9 °C)] 97.7 °F (36.5 °C)  Pulse:  [] 59  Resp:  [15-44] 18  SpO2:  [92 %-100 %] 100 %  BP: (119-179)/(54-76) 148/75     Weight: 75.2 kg (165 lb 12.6 oz)  Body mass index is 27.59 kg/m².    SpO2: 100 %  O2 Device (Oxygen Therapy): nasal cannula      Intake/Output Summary (Last 24 hours) at 04/26/18 1433  Last data filed at 04/26/18 0910   Gross per 24 hour   Intake              180 ml   Output              200 ml   Net              -20 ml       Lines/Drains/Airways     Peripheral Intravenous Line                 Peripheral IV - Single Lumen 04/25/18 2027 Right Antecubital less than 1 day         Peripheral IV - Single Lumen 04/25/18 2341 Right Forearm less than 1 day                Physical Exam   Constitutional: She is oriented to person, place, and time. She appears well-developed and well-nourished. No distress.   Cardiovascular: Regular rhythm.  Bradycardia present.  Exam reveals no gallop.    No murmur heard.  Pulmonary/Chest: Effort normal and breath sounds normal. No respiratory distress. She has no wheezes.   Abdominal: Soft. Bowel sounds are normal. She exhibits no distension. There is no tenderness.   Neurological: She is alert and oriented to person, place, and time.   Skin: Skin is warm and  dry.       Significant Labs:       Recent Labs  Lab 04/26/18  0231   CALCIUM 9.2   PROT 6.5      K 3.7   CO2 30*      BUN 17   CREATININE 1.2   ALKPHOS 93   ALT <5*   AST 10   BILITOT 0.5       Recent Labs  Lab 04/26/18  0231   WBC 5.07   RBC 4.07   HGB 11.6*   HCT 37.1      MCV 91   MCH 28.5   MCHC 31.3*       Recent Labs  Lab 04/26/18  0802   TROPONINI 0.013       Significant Imaging: Echocardiogram:   2D echo with color flow doppler:   Results for orders placed or performed during the hospital encounter of 04/25/18   2D echo with color flow doppler   Result Value Ref Range    EF 60 55 - 65    Diastolic Dysfunction Yes (A)     Est. PA Systolic Pressure 31.3     Mitral Valve Mobility NORMAL     Tricuspid Valve Regurgitation TRIVIAL

## 2018-04-26 NOTE — PT/OT/SLP EVAL
"Physical Therapy Evaluation    Patient Name:  Crystal Alvarado   MRN:  [id number]    Recommendations:     Discharge Recommendations:   (TBD)   Discharge Equipment Recommendations:  (TBD)   Barriers to discharge: None    Assessment:     Crystal Alvarado is a 77 y.o. female admitted with a medical diagnosis of Supraventricular tachycardia.  She presents with the following impairments/functional limitations:  weakness, impaired endurance, impaired self care skills, impaired functional mobilty, gait instability, impaired balance, impaired cardiopulmonary response to activity, decreased upper extremity function, decreased lower extremity function Patient with fatigue and c/o "legs shaky" with standing/stepping; pt Ying PTA; will cont with POC; ongoing assessment for d/c recs..    Rehab Prognosis:  good; patient would benefit from acute skilled PT services to address these deficits and reach maximum level of function.      Recent Surgery: * No surgery found *      Plan:     During this hospitalization, patient to be seen 6 x/week to address the above listed problems via gait training, therapeutic activities, therapeutic exercises  · Plan of Care Expires:  05/26/18   Plan of Care Reviewed with: patient    Subjective     Communicated with nurse prior to session.  Patient found supine in bed with HOB elevated upon PT entry to room, agreeable to evaluation.      Chief Complaint: my legs are so weak  Patient comments/goals: get stronger  Pain/Comfort:  · Pain Rating 1: 0/10  · Pain Rating Post-Intervention 1: 0/10    Patients cultural, spiritual, Holiness conflicts given the current situation: no    Living Environment:  Pt lives with dtr in [facility] with 3 VANDANA and no handrails; tub/shower combo with chair  Prior to admission, patients level of function was Ying with mobility-used RW until past Friday 2/2 sprained ankle-no pain now.  Patient has the following equipment: walker, rolling.  Upon discharge, patient will have assistance " from family.    Objective:     Patient found with: peripheral IV, oxygen, SCD, bed alarm (ICU monitoring)     General Precautions: Standard, fall, respiratory   Orthopedic Precautions:N/A   Braces: N/A     Exams:  · A&Ox4  · Follows all one/two step commands  · BLE AROM WFL  · BLE strength~4/5 grossly  · Intact tactile sensation    Functional Mobility:  · Bed Mobility:     · Rolling Left:  stand by assistance  · Scooting: stand by assistance  · Supine to Sit: stand by assistance  · Sit to Supine: stand by assistance  · Transfers:     · Sit to Stand:  stand by assistance and contact guard assistance with HHA  · Gait: CGA/HHA with 4 side steps toward HOB; slow bertha, short steps  · Balance: Sit~good; Stand~fair+; dynamic stand/stepping~fair    AM-PAC 6 CLICK MOBILITY  Total Score:12       Therapeutic Activities and Exercises:   pt educated on role of PT/POC    Patient left HOB elevated with all lines intact, call button in reach, bed alarm on and nurse notified.    GOALS:    Physical Therapy Goals        Problem: Physical Therapy Goal    Goal Priority Disciplines Outcome Goal Variances Interventions   Physical Therapy Goal     PT/OT, PT Ongoing (interventions implemented as appropriate)     Description:  Goals to be met by: 2018     Patient will increase functional independence with mobility by performin. Supine to sit with Modified [place]  2. Sit to supine with Modified [place]  3. Sit to stand transfer with Modified [place]  4. Gait  x 75 feet with Modified [place] with or without AD  5. Ascend/descend 4 stair with 0 Handrails Minimal Assistance   6. Lower extremity exercise program x10 reps with independence                      History:     Past Medical History:   Diagnosis Date    Arthritis     CHF (congestive heart failure)     Hyperlipidemia     Hypertension        Past Surgical History:   Procedure Laterality Date    HYSTERECTOMY N/A        Clinical Decision Making:      History  Co-morbidities and personal factors that may impact the plan of care Examination  Body Structures and Functions, activity limitations and participation restrictions that may impact the plan of care Clinical Presentation   Decision Making/ Complexity Score   Co-morbidities:   [] Time since onset of injury / illness / exacerbation  [x] Status of current condition  []Patient's cognitive status and safety concerns    [] Multiple Medical Problems (see med hx)  Personal Factors:   [x] Patient's age  [] Prior Level of function   [] Patient's home situation (environment and family support)  [] Patient's level of motivation  [] Expected progression of patient      HISTORY:(criteria)    [] 31879 - no personal factors/history    [x] 05257 - has 1-2 personal factor/comorbidity     [] 14451 - has >3 personal factor/comorbidity     Body Regions:  [] Objective examination findings  [] Head     []  Neck  [] Trunk   [] Upper Extremity  [] Lower Extremity    Body Systems:  [x] For communication ability, affect, cognition, language, and learning style: the assessment of the ability to make needs known, consciousness, orientation (person, place, and time), expected emotional /behavioral responses, and learning preferences (eg, learning barriers, education  needs)  [x] For the neuromuscular system: a general assessment of gross coordinated movement (eg, balance, gait, locomotion, transfers, and transitions) and motor function  (motor control and motor learning)  [x] For the musculoskeletal system: the assessment of gross symmetry, gross range of motion, gross strength, height, and weight  [] For the integumentary system: the assessment of pliability(texture), presence of scar formation, skin color, and skin integrity  [x] For cardiovascular/pulmonary system: the assessment of heart rate, respiratory rate, blood pressure, and edema     Activity limitations:    [] Patient's cognitive status and saf ety concerns          [x]  Status of current condition      [] Weight bearing restriction  [] Cardiopulmunary Restriction    Participation Restrictions:   [] Goals and goal agreement with the patient     [] Rehab potential (prognosis) and probable outcome      Examination of Body System: (criteria)    [] 78457 - addressing 1-2 elements    [] 79561 - addressing a total of 3 or more elements     [x] 33591 -  Addressing a total of 4 or more elements         Clinical Presentation: (criteria)  Evolving - 46070     On examination of body system using standardized tests and measures patient presents with 4 or more elements from any of the following: body structures and functions, activity limitations, and/or participation restrictions.  Leading to a clinical presentation that is considered stable and/or uncomplicated                              Clinical Decision Making  (Eval Complexity):  Low- 44996     Time Tracking:     PT Received On: 04/26/18  PT Start Time: 1520     PT Stop Time: 1540  PT Total Time (min): 20 min     Billable Minutes: Evaluation 20 minutes      Kelsy Alan, PT  04/26/2018

## 2018-04-26 NOTE — PLAN OF CARE
Problem: Occupational Therapy Goal  Goal: Occupational Therapy Goal  Goals to be met by: 5/26     Patient will increase functional independence with ADLs by performing:    UE Dressing with Modified [place].  LE Dressing with Set-up Assistance.  Grooming while standing with Modified [place].  Toileting from toilet with Modified [place] for hygiene and clothing management.   Toilet transfer to toilet with Modified [place].  Upper extremity exercise program x10 reps per handout, with independence.    Outcome: Ongoing (interventions implemented as appropriate)  OT eval performed, report to follow    Ongoing assessment of post acute therapy needs pending progress

## 2018-04-26 NOTE — ASSESSMENT & PLAN NOTE
-HR 47-53 overnight on telemetry  -reports of SVT with HR 170s with HR down to 40s with vagal maneuvers-no strips available  -SB noted on bedside telemetry with no AVB or pauses noted  -appears asymptomatic from SB; symptoms more consistent with orthostasis  -continue to monitor on telemetry; avoid CCB or BB  -24hr Holter monitor in 12/2017 with no AVB or pauses; rare PVCs, PACs; 2 runs of nonsustained EAT  -no indication for pacemaker placement as of now; will arrange for outpatient cardiac event monitor

## 2018-04-26 NOTE — HPI
76yo female with history of nonobstructive CAD, HTN, CKD stage III, HLP and normocytic anemia who presented to the ER with complaints of dizziness. She is a poor historian making her HPI difficult. She complains of dizziness yesterday while standing that was described as a drained feeling. She reports of sensation of feeling as if she would pass out. She rested with continued symptoms therefore she presented to the ER. She denies complete LOC. She complains of SOB while walking into the ER but cannot elaborate but denies chest pain or palpitations. She reports decreased appetite lately but has been drinking a fair amount of fluids

## 2018-04-26 NOTE — TELEPHONE ENCOUNTER
----- Message from Rowan Huizar sent at 4/26/2018  8:51 AM CDT -----  Contact: Maty [phone]  CONSULTS  Patient: DIANA COOPER [[id number]]  YOB: 1940  Clinic Number: [id number]  Room Number: 258  Referring Physician: Dr. Lillie Hamlin  Reason: Chest pain   Person calling & phone number: Maty [phone]

## 2018-04-26 NOTE — H&P
"\[facility]\"" Internal Medicine History and Physical - Resident Note    Admitting Team: \[facility]\"" IM Team A  Attending Physician: Isamar Hamlin MD  Resident: Rupa Fontenot  Intern: Talat George    Date of Admit: 4/25/2018    Chief Complaint     "Chest pain" x 1 hour, "Dizziness" x 1 episode    Subjective:      History of Present Illness:  Crystal Alvarado is a 76 yo female with a PMHx HTN, HLD, sick sinus syndrome, and chronic diastolic heart failure who presents to the ED with chest pain x 1 hour and s/p 1 episode of near syncope. The patient is a relatively poor historian in that she changes answers to questions depending on when asked. She is accompanied by son at bedside. The patient states she was in her USOH (lives at home with daughter, walks with walker, does not drive, independent with ADLs) until earlier in the evening when she started to feel dizzy. She reports standing up for unknown amount of time in the kitchen then started to feel like she was going to pass out; reports seeing spots; denied room spinning sensation. Denies chest pain, palpitations at that time. Has had "dizzy" episodes several times over past year and reports never losing consciousness. Has been hospitalized for this but is unsure of what has been found. Then, prior to getting to ED, started to develop chest pain. Chest pain was worst when she got to the ED; described as right sided, throbbing, a/w SOB, diaphoresis; at one point stated it lasted minutes but also said it lasted 1 hour; resolved with SL nitro given in ED. On ROS, reports increasing falls over the last few months 2/2 generalized weakness and decreased appetite; denies recent illness, SOB, F/C, N/V, abdominal pain, constipation, diarrhea, changes in urinary/bowel habits, fatigue, weight change.    Of note-- on arrival in ED, patient appeared anxious and SOB. Initial EKG revealed SVT with HR 170s-180s. Patient spontaneously converted to NSR with frequent PVCs.    Past Medical " History:  Past Medical History:   Diagnosis Date    Arthritis     Hyperlipidemia     Hypertension    Chronic diastolic heart failure  Sick sinus syndrome    Past Surgical History:  Past Surgical History:   Procedure Laterality Date    HYSTERECTOMY N/A        Allergies:  Review of patient's allergies indicates:  No Known Allergies    Home Medications:  Prior to Admission medications    Medication Sig Start Date End Date Taking? Authorizing Provider   amlodipine (NORVASC) 10 MG tablet Take 10 mg by mouth once daily.    Historical Provider, MD   aspirin 81 MG Chew Take 81 mg by mouth once daily. 1/13/18   Historical Provider, MD   atorvastatin (LIPITOR) 20 MG tablet Take 20 mg by mouth every evening. 1/13/18   Historical Provider, MD   azelastine (ASTELIN) 137 mcg (0.1 %) nasal spray INHALE 1 SPRAY IN EACH NOSTRIL TWICE A DAY 5/17/16   Historical Provider, MD   calcitonin, salmon, (FORTICAL) 200 unit/actuation nasal spray 1 spray by Nasal route once daily. 3/19/18 3/19/19  Vivi Rose PA-C   docusate sodium (COLACE) 100 MG capsule Take 1 capsule (100 mg total) by mouth 2 (two) times daily. 2/24/18   Kellie Gordon MD   ferrous sulfate 325 (65 FE) MG EC tablet Take 1 tablet (325 mg total) by mouth 2 (two) times daily. 11/20/17   Abril Ray DO   ferrous sulfate 325 mg (65 mg iron) Tab tablet Take 1 tablet by mouth 2 (two) times daily. 1/16/18   Historical Provider, MD   losartan (COZAAR) 100 MG tablet TAKE 1 TABLET (100 MG TOTAL) BY MOUTH ONCE DAILY. 12/18/17   Cecilio Kline MD   mirtazapine (REMERON) 15 MG tablet Take 15 mg by mouth every evening.  12/23/17   Historical Provider, MD   nitroGLYCERIN (NITROSTAT) 0.4 MG SL tablet Place 1 tablet (0.4 mg total) under the tongue every 5 (five) minutes as needed for Chest pain (and notify MD). 5/2/17 5/2/18  Cecilio Kline MD   oxyCODONE-acetaminophen (PERCOCET) 5-325 mg per tablet Take 1-2 tablets by mouth every 8 (eight) hours as needed for Pain.  3/19/18   Vivi Rose PA-C       Family History:  No family history on file.    Social History:  Social History   Substance Use Topics    Smoking status: Former Smoker    Smokeless tobacco: Never Used    Alcohol use No       Review of Systems:  Pertinent items are noted in HPI. All other systems are reviewed and are negative.    Health Maintaince :   Primary Care Physician: Shantell Goff  Cardiologist: Eliud  Immunizations:   TDap is up to date.  Influenza is not up to date.  Pneumovax is up to date.  Cancer Screening:  PAP: no longer indicated.   Mammogram: is not up to date.  Colonoscopy: is not up to date.      Objective:   Last 24 Hour Vital Signs:  BP  Min: 119/72  Max: 174/69  Temp  Av.5 °F (36.9 °C)  Min: 98.5 °F (36.9 °C)  Max: 98.5 °F (36.9 °C)  Pulse  Av.2  Min: 40  Max: 170  Resp  Av.2  Min: 17  Max: 25  SpO2  Av.8 %  Min: 99 %  Max: 100 %  There is no height or weight on file to calculate BMI.  No intake/output data recorded.    Physical Examination:  General: sitting in bed, NAD, pacer pads in place, on 2L NC  HEENT: NCAT. PERRL, EOMI, anicteric sclera. OP clear with MMM.  Neck: JVP 8cm  CV: bradycardic, regular rhythm, no murmurs  Resp: on 2L NC, no accessory muscle use, CTAB, no crackles/wheezes  Abd: soft, NT, ND, normoactive BS  Skin: no rashes or bruising  Ext: no edema, cyanosis, or clubbing  Pulses: 2+ radial, PT  Neuro: AxO to person, place, year, situation, president. PERRL, EOMI, symmetric smile, hearing intact to conversation. 5/5 RUE, RLE strength. 4/5 LUE, LLE strength. Sensation intact grossly. Did not assess gait.  Psych: cooperative, pleaseant    Laboratory:  Most Recent Data:  CBC: Lab Results   Component Value Date    WBC 6.68 2018    HGB 12.6 2018    HCT 39.1 2018     2018    MCV 90 2018    RDW 14.0 2018     WBC Differential: 60.3 % N, 0 % Bands, 32.6 % L, 5.8 % M, 1.0 % Eo, 0.3 % Baso, 0 additional cells  seen  BMP: Lab Results   Component Value Date     04/25/2018    K 3.3 (L) 04/25/2018     04/25/2018    CO2 26 04/25/2018    BUN 18 04/25/2018    CREATININE 1.3 04/25/2018     (H) 04/25/2018    CALCIUM 9.8 04/25/2018    MG 1.7 04/25/2018    PHOS 2.8 06/14/2010     LFTs: Lab Results   Component Value Date    PROT 7.2 04/25/2018    ALBUMIN 3.7 04/25/2018    BILITOT 0.6 04/25/2018    AST 11 04/25/2018    ALKPHOS 111 04/25/2018    ALT <5 (L) 04/25/2018     Coags:   Lab Results   Component Value Date    INR 1.0 11/19/2017     FLP: Lab Results   Component Value Date    CHOL 136 11/20/2017    HDL 45 11/20/2017    LDLCALC 76.4 11/20/2017    TRIG 73 11/20/2017    CHOLHDL 33.1 11/20/2017     DM: Lab Results   Component Value Date    HGBA1C 5.2 11/20/2017    LDLCALC 76.4 11/20/2017    CREATININE 1.3 04/25/2018     Thyroid: Lab Results   Component Value Date    TSH 1.194 04/25/2018     Anemia: Lab Results   Component Value Date    IRON 71 11/19/2017    TIBC 306 11/19/2017    FERRITIN 23 11/19/2017    JGNVGDPJ11 296 11/19/2017    FOLATE 8.2 11/19/2017     Cardiac: Lab Results   Component Value Date    TROPONINI 0.006 04/25/2018    BNP 48 04/25/2018     Urinalysis: Lab Results   Component Value Date    LABURIN NO GROWTH AFTER 48 HOURS. 06/11/2010    COLORU Yellow 04/25/2018    SPECGRAV <=1.005 (A) 04/25/2018    NITRITE Negative 04/25/2018    KETONESU Negative 04/25/2018    UROBILINOGEN Negative 04/25/2018    WBCUA 3-5 06/11/2010       Trended Lab Data:    Recent Labs  Lab 04/25/18 2032   WBC 6.68   HGB 12.6   HCT 39.1      MCV 90   RDW 14.0      K 3.3*      CO2 26   BUN 18   CREATININE 1.3   *   PROT 7.2   ALBUMIN 3.7   BILITOT 0.6   AST 11   ALKPHOS 111   ALT <5*       Trended Cardiac Data:    Recent Labs  Lab 04/25/18 2032   TROPONINI 0.006   BNP 48       Microbiology Data:  None    Other Results:  EKG (my interpretation):   1st: narrow complex tachycardia/SVT, diffuse ST  depression, TWI in aVR/V1  2nd: narrow complex tachycardia/SVT, ST depression II/III/aVF/V2-V6, TWI in aVR/V1  3rd: sinus rhythm, frequent PVCs, RSR pattern in V1 suggesting right bundle delay    Radiology:  Imaging Results          X-Ray Chest AP Portable (Final result)  Result time 04/25/18 21:49:31    Final result by Aleksandar Turpin MD (04/25/18 21:49:31)                 Impression:      No radiographic acute intrathoracic process seen on this single view.      Electronically signed by: Aleksandar Turpin MD  Date:    04/25/2018  Time:    21:49             Narrative:    EXAMINATION:  XR CHEST AP PORTABLE    CLINICAL HISTORY:  Chest Pain;    TECHNIQUE:  Single portable semi-upright view of the chest was performed.    COMPARISON:  Chest radiograph 11/19/2017.    FINDINGS:  Monitoring leads and pacer pads overlie the chest.  Patient is slightly rotated.  Cardiomediastinal silhouette is midline and grossly unchanged.  Pulmonary vasculature and hilar regions are within normal limits.  The lungs are symmetrically well expanded and grossly clear.  No pleural effusion or pneumothorax.  Calcific atherosclerosis of the thoracic aorta.  No acute osseous process seen.  PA and lateral views can be obtained.                                 Assessment:     Crystal Alvarado is a 77 y.o. female with:     Plan:     Chest pain with SVT s/p conversion with vagal maneuver   - P/w chest pain x 1 hour a/w SOB, diaphoresis.    - Initial trop 0.006, EKG with SVT-- converted to NSR s/p vagal maneuvers.   - 8/2015 nuclear stress test pos for inducible ischemia  subsequent cath with non-obstructive CAD.  12/2017 24hr Holter neg for arrhythmias.    - Trend trop/EKG, monitor on telemetry in ICU, keep pacer pads in place.  - Cardiology consulted (follows w/ Dr. Kline)-- f/u recs.    Presyncope with hx sick sinus syndrome  - Presented s/p near syncopal event.  Hx of similar episodes in the past.  - Follows with Dr. Kline (cards)  12/2017 24hr  Holter neg for arrhythmias to explain episodes.  - Monitor on tele, pacer pads in place.  Cardiology consulted-- f/u recs.    Hypokalemia  - Admit K 3.3  replete PRN    Benign essential HTN  - BPs stable  continue home norvasc, losartan    Hyperlipidemia  - F/u lipid panel  continue home statin    Chronic diastolic heart failure  - Appears euvolemic.  On admit -- BNP 48, CXR without pulm vascular congestion.  - 11/2017 TTE with LVEF 55-60%, indeterminate diastolic fnx  f/u repeat TTE    CKD3  - Admit BUN/Cr 18/1.3, eGFR 45 (at baseline)  avoid nephrotoxins    Iron deficiency anemia  - Diagnosed during 11/2017 hospitalization.  H/H stable on admit.    - Continue home iron, colace on discharge    L4 compression fracture  - Seen on CT and MRI L-spine s/p mechanical fall  - PT/OT consulted  pain control PRN    Deconditioning  - PT/OT consulted at noted above    DVTPPx: lovenox  Diet: NPO  Code: FULL    Dispo: pending chest pain work-up, cardiology recs, symptomatic improvement    Saskia George  [facility] Internal Medicine HO-I  U IM Service    [facility] Medicine Hospitalist Pager numbers:   [facility] Hospitalist Medicine Team A (Tom/Boubacar): [phone]  [facility] Hospitalist Medicine Team B (Yris/Kelvin):  [phone]

## 2018-04-26 NOTE — ASSESSMENT & PLAN NOTE
-per reports in ER  -no strips available  -no SVT noted on monitor overnight  -continue to monitor on telemetry; arrange for 30 day event monitor as an outpatient

## 2018-04-26 NOTE — PT/OT/SLP EVAL
Occupational Therapy   Evaluation    Name: Crystal Alvarado  MRN: [id number]  Admitting Diagnosis:  Supraventricular tachycardia      Recommendations:     Discharge Recommendations: other (see comments) (TBD)  Discharge Equipment Recommendations:  other (see comments) (TBD)  Barriers to discharge:  None    History:     Occupational Profile:  Living Environment: Pt lives w/dtr in SSH, 3 VANDANA no HR; tub/shower combo w/chair  Previous level of function: mod I ADLs and mobility except occasional assist for tub tf and steps  Roles and Routines:   Equipment Owned:  walker, rolling  Assistance upon Discharge: family    Past Medical History:   Diagnosis Date    Arthritis     CHF (congestive heart failure)     Hyperlipidemia     Hypertension        Past Surgical History:   Procedure Laterality Date    HYSTERECTOMY N/A        Subjective     Chief Complaint: my legs are so weak  Patient/Family stated goals: get stronger  Communicated with: nurse prior to session.  Pain/Comfort:  · Pain Rating 1: 0/10  · Pain Rating Post-Intervention 1: 0/10    Patients cultural, spiritual, Nondenominational conflicts given the current situation:      Objective:     Patient found with: peripheral IV, oxygen, SCD, bed alarm (icu monitoring)    General Precautions: Standard, fall, respiratory   Orthopedic Precautions:    Braces:       Occupational Performance:    Bed Mobility:    · Patient completed Rolling/Turning to Left with  stand by assistance  · Patient completed Scooting/Bridging with stand by assistance  · Patient completed Supine to Sit with stand by assistance  · Patient completed Sit to Supine with stand by assistance    Functional Mobility/Transfers:  · Patient completed Sit <> Stand Transfer with stand by assistance and contact guard assistance  with  HHA   · Functional Mobility: CGA HHA 4 lateral steps to HOB    Activities of Daily Living:  · LB Dressing: contact guard assistance donned socks seated EOB    Cognitive/Visual  "Perceptual:  AO4    Physical Exam:  BUE AROM WFL except limited adalberto shlds ~90  BUE strength grossly 2+/5 shlds, 3+/5 elbow distally  Good sit balance, fair+ stand balance    Patient left HOB elevated with all lines intact, call button in reach, bed alarm on and nurse notified    [facility] 6 Click:  [facility] Total Score: 15    Treatment & Education:  Pt educated on role of OT/POC  Education:    Assessment:     Crystal Alvarado is a 77 y.o. female with a medical diagnosis of Supraventricular tachycardia.  She presents with performance deficits affecting function are weakness, impaired functional mobilty, impaired endurance, gait instability, decreased upper extremity function, impaired self care skills, impaired balance, decreased lower extremity function, impaired cardiopulmonary response to activity.      Rehab Prognosis:  good; patient would benefit from acute skilled OT services to address these deficits and reach maximum level of function.         Clinical Decision Makin.  OT Low:  "Pt evaluation falls under low complexity for evaluation coding due to performance deficits noted in 1-3 areas as stated above and 0 co-morbities affecting current functional status. Data obtained from problem focused assessments. No modifications or assistance was required for completion of evaluation. Only brief occupational profile and history review completed."     Plan:     Patient to be seen 5 x/week to address the above listed problems via self-care/home management, therapeutic activities, therapeutic exercises  · Plan of Care Expires: 18  · Plan of Care Reviewed with: patient    This Plan of care has been discussed with the patient who was involved in its development and understands and is in agreement with the identified goals and treatment plan    GOALS:    Occupational Therapy Goals        Problem: Occupational Therapy Goal    Goal Priority Disciplines Outcome Interventions   Occupational Therapy Goal     OT, PT/OT " Ongoing (interventions implemented as appropriate)    Description:  Goals to be met by: 5/26     Patient will increase functional independence with ADLs by performing:    UE Dressing with Modified [place].  LE Dressing with Set-up Assistance.  Grooming while standing with Modified [place].  Toileting from toilet with Modified [place] for hygiene and clothing management.   Toilet transfer to toilet with Modified [place].  Upper extremity exercise program x10 reps per handout, with independence.                      Time Tracking:     OT Date of Treatment: 04/26/18  OT Start Time: 1523  OT Stop Time: 1540  OT Total Time (min): 17 min w/PT    Billable Minutes:Evaluation 17    Tanvir Farmer OT  4/26/2018

## 2018-04-26 NOTE — PLAN OF CARE
Problem: Patient Care Overview  Goal: Plan of Care Review  Outcome: Ongoing (interventions implemented as appropriate)  Pt admitted to ICU for chest pain. Upon arrival pt denies SOB, ches pain or any discomfort; SB HR 40s on monitor, blood pressure stable, afebrile. Pt free of falls or injury. Serial troponin in progress

## 2018-04-26 NOTE — PLAN OF CARE
Problem: Patient Care Overview  Goal: Plan of Care Review  Outcome: Ongoing (interventions implemented as appropriate)  No co of chest pain or sob today, heart rate ranges from 44-72, bp also fluctuates from 120's to 170's, pt has positive orthostatic bp, and rec 500cc normal saline over 2 hrs, no sob past infusion, bp did go up 178/79

## 2018-04-26 NOTE — ASSESSMENT & PLAN NOTE
-presented with dizziness more consistent with orthostasis rather than symptomatic bradycardia  -orthostatics positive with lying HR 61 /76 sitting HR 71 /74 standing HR 74 /60  -recommend IVF for orthostasis

## 2018-04-26 NOTE — PLAN OF CARE
reviewed medical record. Patient being fed by bedside nurse, PHN nurse Manzanares at bedside.  placed call to patient's daughter Shimon Hutchins. Unable to reach. Left voicemail.     04/26/18 1227   Discharge Assessment   Assessment Type Discharge Planning Assessment   Confirmed/corrected address and phone number on facesheet? Yes   Assessment information obtained from? Medical Record   Prior to hospitilization cognitive status: Alert/Oriented   Prior to hospitalization functional status: Needs Assistance   Current cognitive status: Alert/Oriented   Current Functional Status: Needs Assistance   Lives With child(brendon), adult   Able to Return to Prior Arrangements unable to determine at this time (comments)   Is patient able to care for self after discharge? Yes   Who are your caregiver(s) and their phone number(s)? Karsten Hutchins [phone]   Patient's perception of discharge disposition home or selfcare   Readmission Within The Last 30 Days no previous admission in last 30 days   Patient currently being followed by outpatient case management? No   Patient currently receives any other outside agency services? No   Equipment Currently Used at Home walker, rolling   Do you have any problems affording any of your prescribed medications? No   Is the patient taking medications as prescribed? yes   Does the patient have transportation home? Yes   Does the patient receive services at [facility]? No   Discharge Plan A Home with family   Discharge Plan B Other   Patient/Family In Agreement With Plan unable to assess

## 2018-04-26 NOTE — CONSULTS
[facility]  Cardiology  Consult Note    Patient Name: Crystal Alvarado  MRN: [id number]  Admission Date: 4/25/2018  Hospital Length of Stay: 0 days  Code Status: Full Code   Attending Provider: Isamar Hamlin MD   Consulting Provider: SERGIO Abdi, ANP  Primary Care Physician: Shantell Goff MD  Principal Problem:Supraventricular tachycardia    Patient information was obtained from patient and past medical records.     Inpatient consult to [facility]  Consult performed by: SULEMAN CONLEY  Consult ordered by: HONEY YUAN        Subjective:     Chief Complaint:  Dizziness     HPI:   78yo female with history of nonobstructive CAD, HTN, CKD stage III, HLP and normocytic anemia who presented to the ER with complaints of dizziness. She is a poor historian making her HPI difficult. She complains of dizziness yesterday while standing that was described as a drained feeling. She reports of sensation of feeling as if she would pass out. She rested with continued symptoms therefore she presented to the ER. She denies complete LOC. She complains of SOB while walking into the ER but cannot elaborate but denies chest pain or palpitations. She reports decreased appetite lately but has been drinking a fair amount of fluids     Hospital Course:  4/25/2018 Presented with complaints of dizziness but denies LOC. Denies any chest pain. EKG with no acute changes. Initial troponin .006. TSH 1.194 Reports of HR up to 170s in ER with HR down to 40s with vagal maneuvers-no strips available on chart. Admitted to [facility] Medicine for further evaluation   4/26/2018 HR SOB overnight with HR upper 40s to 50s true SB with no AVB or pauses. BP stable. Repeat troponin .013. Echocardiogram today with normal LVEF and diastolic dysfunction     Past Medical History:   Diagnosis Date    Arthritis     CHF (congestive heart failure)     Hyperlipidemia     Hypertension        Past Surgical  History:   Procedure Laterality Date    HYSTERECTOMY N/A        Review of patient's allergies indicates:  No Known Allergies    No current facility-administered medications on file prior to encounter.      Current Outpatient Prescriptions on File Prior to Encounter   Medication Sig    amlodipine (NORVASC) 10 MG tablet Take 10 mg by mouth once daily.    aspirin 81 MG Chew Take 81 mg by mouth once daily.    atorvastatin (LIPITOR) 20 MG tablet Take 20 mg by mouth every evening.    azelastine (ASTELIN) 137 mcg (0.1 %) nasal spray INHALE 1 SPRAY IN EACH NOSTRIL TWICE A DAY    calcitonin, salmon, (FORTICAL) 200 unit/actuation nasal spray 1 spray by Nasal route once daily.    docusate sodium (COLACE) 100 MG capsule Take 1 capsule (100 mg total) by mouth 2 (two) times daily.    ferrous sulfate 325 (65 FE) MG EC tablet Take 1 tablet (325 mg total) by mouth 2 (two) times daily.    ferrous sulfate 325 mg (65 mg iron) Tab tablet Take 1 tablet by mouth 2 (two) times daily.    losartan (COZAAR) 100 MG tablet TAKE 1 TABLET (100 MG TOTAL) BY MOUTH ONCE DAILY.    mirtazapine (REMERON) 15 MG tablet Take 15 mg by mouth every evening.     nitroGLYCERIN (NITROSTAT) 0.4 MG SL tablet Place 1 tablet (0.4 mg total) under the tongue every 5 (five) minutes as needed for Chest pain (and notify MD).    [DISCONTINUED] oxyCODONE-acetaminophen (PERCOCET) 5-325 mg per tablet Take 1-2 tablets by mouth every 8 (eight) hours as needed for Pain.     Family History     None        Social History Main Topics    Smoking status: Former Smoker     Packs/day: 0.50     Quit date: 4/26/2013    Smokeless tobacco: Never Used    Alcohol use No    Drug use: No    Sexual activity: No     Review of Systems   Constitution: Negative for chills, decreased appetite, diaphoresis, fever and weakness.   Cardiovascular: Positive for dyspnea on exertion. Negative for chest pain, claudication, cyanosis, irregular heartbeat, leg swelling, near-syncope,  orthopnea, palpitations, paroxysmal nocturnal dyspnea and syncope.   Respiratory: Negative for cough, hemoptysis, shortness of breath and wheezing.    Gastrointestinal: Negative for bloating, abdominal pain, constipation, diarrhea, melena, nausea and vomiting.   Neurological: Positive for dizziness.     Objective:     Vital Signs (Most Recent):  Temp: 97.7 °F (36.5 °C) (04/26/18 1107)  Pulse: (!) 59 (04/26/18 1103)  Resp: 18 (04/26/18 1103)  BP: (!) 148/75 (04/26/18 1103)  SpO2: 100 % (04/26/18 1103) Vital Signs (24h Range):  Temp:  [97.7 °F (36.5 °C)-98.5 °F (36.9 °C)] 97.7 °F (36.5 °C)  Pulse:  [] 59  Resp:  [15-44] 18  SpO2:  [92 %-100 %] 100 %  BP: (119-179)/(54-76) 148/75     Weight: 75.2 kg (165 lb 12.6 oz)  Body mass index is 27.59 kg/m².    SpO2: 100 %  O2 Device (Oxygen Therapy): nasal cannula      Intake/Output Summary (Last 24 hours) at 04/26/18 1433  Last data filed at 04/26/18 0910   Gross per 24 hour   Intake              180 ml   Output              200 ml   Net              -20 ml       Lines/Drains/Airways     Peripheral Intravenous Line                 Peripheral IV - Single Lumen 04/25/18 2027 Right Antecubital less than 1 day         Peripheral IV - Single Lumen 04/25/18 2341 Right Forearm less than 1 day                Physical Exam   Constitutional: She is oriented to person, place, and time. She appears well-developed and well-nourished. No distress.   Cardiovascular: Regular rhythm.  Bradycardia present.  Exam reveals no gallop.    No murmur heard.  Pulmonary/Chest: Effort normal and breath sounds normal. No respiratory distress. She has no wheezes.   Abdominal: Soft. Bowel sounds are normal. She exhibits no distension. There is no tenderness.   Neurological: She is alert and oriented to person, place, and time.   Skin: Skin is warm and dry.       Significant Labs:       Recent Labs  Lab 04/26/18  0231   CALCIUM 9.2   PROT 6.5      K 3.7   CO2 30*      BUN 17   CREATININE  1.2   ALKPHOS 93   ALT <5*   AST 10   BILITOT 0.5       Recent Labs  Lab 04/26/18  0231   WBC 5.07   RBC 4.07   HGB 11.6*   HCT 37.1      MCV 91   MCH 28.5   MCHC 31.3*       Recent Labs  Lab 04/26/18  0802   TROPONINI 0.013       Significant Imaging: Echocardiogram:   2D echo with color flow doppler:   Results for orders placed or performed during the hospital encounter of 04/25/18   2D echo with color flow doppler   Result Value Ref Range    EF 60 55 - 65    Diastolic Dysfunction Yes (A)     Est. PA Systolic Pressure 31.3     Mitral Valve Mobility NORMAL     Tricuspid Valve Regurgitation TRIVIAL      Assessment and Plan:     * Supraventricular tachycardia    -per reports in ER  -no strips available  -no SVT noted on monitor overnight  -continue to monitor on telemetry; arrange for 30 day event monitor as an outpatient         Sinus bradycardia    -HR 47-53 overnight on telemetry  -reports of SVT with HR 170s with HR down to 40s with vagal maneuvers-no strips available  -SB noted on bedside telemetry with no AVB or pauses noted  -appears asymptomatic from SB; symptoms more consistent with orthostasis  -continue to monitor on telemetry; avoid CCB or BB  -24hr Holter monitor in 12/2017 with no AVB or pauses; rare PVCs, PACs; 2 runs of nonsustained EAT  -no indication for pacemaker placement as of now; will arrange for outpatient cardiac event monitor         Heart failure with preserved left ventricular function (HFpEF)    -echo with normal LVEF and diastolic dysfunction  -no ADHF present  -continue CCB and ARB         Postural dizziness with presyncope    -presented with dizziness more consistent with orthostasis rather than symptomatic bradycardia  -orthostatics positive with lying HR 61 /76 sitting HR 71 /74 standing HR 74 /60  -recommend IVF for orthostasis            Coronary artery disease involving native coronary artery of native heart without angina pectoris    -nonobstructive per  [facility] 2015  -troponin negative; no concern for ACS  -continue ASA, statin and ARB; no BB due to bradycardia             VTE Risk Mitigation         Ordered     enoxaparin injection 40 mg  Daily      04/26/18 0054     IP VTE HIGH RISK PATIENT  Once      04/25/18 2248     Place sequential compression device  Until discontinued      04/25/18 2248          Thank you for your consult. I will follow-up with patient. Please contact us if you have any additional questions.    SERGIO Abdi, ANP  Cardiology   [facility]

## 2018-04-27 PROBLEM — I95.1 ORTHOSTATIC HYPOTENSION: Status: ACTIVE | Noted: 2018-04-27

## 2018-04-27 LAB
ALBUMIN SERPL BCP-MCNC: 3.1 G/DL
ALP SERPL-CCNC: 84 U/L
ALT SERPL W/O P-5'-P-CCNC: <5 U/L
ANION GAP SERPL CALC-SCNC: 8 MMOL/L
AST SERPL-CCNC: 8 U/L
BASOPHILS # BLD AUTO: 0.02 K/UL
BASOPHILS NFR BLD: 0.5 %
BILIRUB SERPL-MCNC: 0.6 MG/DL
BUN SERPL-MCNC: 15 MG/DL
CALCIUM SERPL-MCNC: 9.3 MG/DL
CHLORIDE SERPL-SCNC: 105 MMOL/L
CO2 SERPL-SCNC: 26 MMOL/L
CREAT SERPL-MCNC: 1 MG/DL
DIFFERENTIAL METHOD: ABNORMAL
EOSINOPHIL # BLD AUTO: 0.1 K/UL
EOSINOPHIL NFR BLD: 2.1 %
ERYTHROCYTE [DISTWIDTH] IN BLOOD BY AUTOMATED COUNT: 14.2 %
EST. GFR  (AFRICAN AMERICAN): >60 ML/MIN/1.73 M^2
EST. GFR  (NON AFRICAN AMERICAN): 54 ML/MIN/1.73 M^2
GLUCOSE SERPL-MCNC: 92 MG/DL
HCT VFR BLD AUTO: 36.3 %
HGB BLD-MCNC: 11.5 G/DL
LYMPHOCYTES # BLD AUTO: 1.1 K/UL
LYMPHOCYTES NFR BLD: 25.8 %
MCH RBC QN AUTO: 28.8 PG
MCHC RBC AUTO-ENTMCNC: 31.7 G/DL
MCV RBC AUTO: 91 FL
MONOCYTES # BLD AUTO: 0.2 K/UL
MONOCYTES NFR BLD: 5.3 %
NEUTROPHILS # BLD AUTO: 2.9 K/UL
NEUTROPHILS NFR BLD: 66.3 %
PLATELET # BLD AUTO: 228 K/UL
PMV BLD AUTO: 10.5 FL
POCT GLUCOSE: 126 MG/DL (ref 70–110)
POTASSIUM SERPL-SCNC: 3.2 MMOL/L
PROT SERPL-MCNC: 6.2 G/DL
RBC # BLD AUTO: 3.99 M/UL
SODIUM SERPL-SCNC: 139 MMOL/L
TROPONIN I SERPL DL<=0.01 NG/ML-MCNC: 0.01 NG/ML
TROPONIN I SERPL DL<=0.01 NG/ML-MCNC: 0.01 NG/ML
WBC # BLD AUTO: 4.38 K/UL

## 2018-04-27 PROCEDURE — 80053 COMPREHEN METABOLIC PANEL: CPT

## 2018-04-27 PROCEDURE — 25000003 PHARM REV CODE 250: Performed by: STUDENT IN AN ORGANIZED HEALTH CARE EDUCATION/TRAINING PROGRAM

## 2018-04-27 PROCEDURE — G0378 HOSPITAL OBSERVATION PER HR: HCPCS

## 2018-04-27 PROCEDURE — 25000003 PHARM REV CODE 250: Performed by: RADIOLOGY

## 2018-04-27 PROCEDURE — 63600175 PHARM REV CODE 636 W HCPCS: Performed by: EMERGENCY MEDICINE

## 2018-04-27 PROCEDURE — 84484 ASSAY OF TROPONIN QUANT: CPT | Mod: 91

## 2018-04-27 PROCEDURE — 94761 N-INVAS EAR/PLS OXIMETRY MLT: CPT

## 2018-04-27 PROCEDURE — 85025 COMPLETE CBC W/AUTO DIFF WBC: CPT

## 2018-04-27 PROCEDURE — 93005 ELECTROCARDIOGRAM TRACING: CPT

## 2018-04-27 PROCEDURE — 99225 PR SUBSEQUENT OBSERVATION CARE,LEVEL II: CPT | Mod: ,,, | Performed by: INTERNAL MEDICINE

## 2018-04-27 PROCEDURE — 36415 COLL VENOUS BLD VENIPUNCTURE: CPT

## 2018-04-27 PROCEDURE — 63600175 PHARM REV CODE 636 W HCPCS: Performed by: STUDENT IN AN ORGANIZED HEALTH CARE EDUCATION/TRAINING PROGRAM

## 2018-04-27 PROCEDURE — 63600175 PHARM REV CODE 636 W HCPCS: Performed by: RADIOLOGY

## 2018-04-27 RX ORDER — HYDRALAZINE HYDROCHLORIDE 20 MG/ML
10 INJECTION INTRAMUSCULAR; INTRAVENOUS ONCE AS NEEDED
Status: COMPLETED | OUTPATIENT
Start: 2018-04-27 | End: 2018-04-27

## 2018-04-27 RX ORDER — TRAMADOL HYDROCHLORIDE 50 MG/1
50 TABLET ORAL
COMMUNITY
End: 2019-02-26

## 2018-04-27 RX ORDER — POTASSIUM CHLORIDE 20 MEQ/1
20 TABLET, EXTENDED RELEASE ORAL
Status: DISPENSED | OUTPATIENT
Start: 2018-04-27 | End: 2018-04-27

## 2018-04-27 RX ORDER — POTASSIUM CHLORIDE 20 MEQ/1
20 TABLET, EXTENDED RELEASE ORAL
Status: COMPLETED | OUTPATIENT
Start: 2018-04-27 | End: 2018-04-27

## 2018-04-27 RX ORDER — ERGOCALCIFEROL 1.25 MG/1
50000 CAPSULE ORAL
Status: ON HOLD | COMMUNITY
End: 2022-12-27 | Stop reason: HOSPADM

## 2018-04-27 RX ORDER — NITROGLYCERIN 0.4 MG/1
0.4 TABLET SUBLINGUAL EVERY 5 MIN PRN
Status: DISCONTINUED | OUTPATIENT
Start: 2018-04-27 | End: 2018-04-27 | Stop reason: SDUPTHER

## 2018-04-27 RX ADMIN — NITROGLYCERIN 0.4 MG: 0.4 TABLET SUBLINGUAL at 12:04

## 2018-04-27 RX ADMIN — ASPIRIN 81 MG 81 MG: 81 TABLET ORAL at 09:04

## 2018-04-27 RX ADMIN — POTASSIUM CHLORIDE 20 MEQ: 20 TABLET, EXTENDED RELEASE ORAL at 08:04

## 2018-04-27 RX ADMIN — HYDRALAZINE HYDROCHLORIDE 10 MG: 20 INJECTION INTRAMUSCULAR; INTRAVENOUS at 08:04

## 2018-04-27 RX ADMIN — ENOXAPARIN SODIUM 40 MG: 100 INJECTION SUBCUTANEOUS at 09:04

## 2018-04-27 RX ADMIN — AMLODIPINE BESYLATE 10 MG: 5 TABLET ORAL at 09:04

## 2018-04-27 RX ADMIN — POTASSIUM CHLORIDE 20 MEQ: 20 TABLET, EXTENDED RELEASE ORAL at 10:04

## 2018-04-27 RX ADMIN — SODIUM CHLORIDE 1000 ML: 0.9 INJECTION, SOLUTION INTRAVENOUS at 10:04

## 2018-04-27 RX ADMIN — LOSARTAN POTASSIUM 100 MG: 50 TABLET, FILM COATED ORAL at 09:04

## 2018-04-27 RX ADMIN — ATORVASTATIN CALCIUM 20 MG: 20 TABLET, FILM COATED ORAL at 09:04

## 2018-04-27 RX ADMIN — POTASSIUM CHLORIDE 20 MEQ: 20 TABLET, EXTENDED RELEASE ORAL at 09:04

## 2018-04-27 NOTE — DISCHARGE SUMMARY
"[facility] IM Discharge Summary    Primary Team: [facility] IM Team A  Attending Physician: Isamar Hamlin MD  Resident: Rupa Fontenot  Intern: Talat George    Date of Admit: 4/25/2018  Date of Discharge: 4/28/18    Discharge to: home [facility]  Condition: stable    Discharge Diagnoses     Patient Active Problem List   Diagnosis    Chest pain    HTN (hypertension)    Tobacco abuse    Pain in the chest    SSS (sick sinus syndrome)    Dizziness    Bradycardia    Light headedness    Sick sinus syndrome    Essential hypertension    Cardiac ischemia    Hyperlipidemia    Coronary artery disease involving native coronary artery of native heart without angina pectoris    Anginal equivalent    CKD (chronic kidney disease) stage 3, GFR 30-59 ml/min    Normocytic anemia    SOB (shortness of breath)    Supraventricular tachycardia    Postural dizziness with presyncope    Hypokalemia    Physical deconditioning    Heart failure with preserved left ventricular function (HFpEF)    Sinus bradycardia       Consultants and Procedures     Consultants:  none    Procedures:   none    Brief History of Present Illness      Crystal Alvarado is a 76 yo female with a PMHx HTN, HLD, sick sinus syndrome, and chronic diastolic heart failure who presents to the ED with chest pain x 1 hour and s/p 1 episode of near syncope. The patient is a relatively poor historian in that she changes answers to questions depending on when asked. She is accompanied by son at bedside. The patient states she was in her USOH (lives at home with daughter, walks with walker, does not drive, independent with ADLs) until earlier in the evening when she started to feel dizzy. She reports standing up for unknown amount of time in the kitchen then started to feel like she was going to pass out; reports seeing spots; denied room spinning sensation. Denies chest pain, palpitations at that time. Has had "dizzy" episodes several times over past year and reports never " losing consciousness. Has been hospitalized for this but is unsure of what has been found. Then, prior to getting to ED, started to develop chest pain. Chest pain was worst when she got to the ED; described as right sided, throbbing, a/w SOB, diaphoresis; at one point stated it lasted minutes but also said it lasted 1 hour; resolved with SL nitro given in ED. On ROS, reports increasing falls over the last few months 2/2 generalized weakness and decreased appetite; denies recent illness, SOB, F/C, N/V, abdominal pain, constipation, diarrhea, changes in urinary/bowel habits, fatigue, weight change.     Of note-- on arrival in ED, patient appeared anxious and SOB. Initial EKG revealed SVT with HR 170s-180s. Patient spontaneously converted to NSR with frequent PVCs.    For the full HPI please refer to the History & Physical from this admission.    Hospital Course By Problem with Pertinent Findings     Chest pain with SVT s/p conversion with vagal maneuver   - P/w chest pain x 1 hour a/w SOB, diaphoresis.    - Initial trop 0.006, EKG with SVT-- converted to NSR s/p vagal maneuvers.   - 8/2015 nuclear stress test pos for inducible ischemia  subsequent cath with non-obstructive CAD.  12/2017 24hr Holter neg for arrhythmias.    - Trending trop/EKG (neg x2 so far).  Cardiology consulted (follows w/ Dr. Kline)--rec TTE, hold kamar blocking agents, will set up event monitor and EP f/u as outpatient. No indication for pacer  - TTE with normal LV EF, + diastolic dysfunction  - Stepped down 4/26  no events on tele  Discharged with cards, EP, and event monitor f/u     Presyncope with hx sick sinus syndrome  - Presented s/p near syncopal event.  Hx of similar episodes in the past.  - Follows with Dr. Kline (cards)  12/2017 24hr Holter neg for arrhythmias to explain episodes.  - Monitor on tele, pacer pads in place while inpatient.  Cardiology consulted-- See above for recs  -Given fluids for orthostasis     Hypokalemia,  resolved  - Admit K 3.3  replete PRN     Benign essential HTN  - BPs stable  continue home norvasc, losartan     Hyperlipidemia  - Fasting lipid panel at goal  continue home statin     Chronic diastolic heart failure  - Appears euvolemic.  On admit -- BNP 48, CXR without pulm vascular congestion.  - 11/2017 TTE with LVEF 55-60%, indeterminate diastolic fnx  repeat TTE similar resultes     CKD3  - Admit BUN/Cr 18/1.3, eGFR 45 (at baseline)  avoid nephrotoxins     Iron deficiency anemia  - Diagnosed during 11/2017 hospitalization.  H/H stable on admit.    - Continue home iron, colace on discharge     L4 compression fracture  - Seen on CT and MRI L-spine s/p mechanical fall  - PT/OT consulted  pain control PRN     Deconditioning  - PT/OT consulted- home health    Discharge Medications        Medication List      CHANGE how you take these medications    ferrous sulfate 325 (65 FE) MG EC tablet  Take 1 tablet (325 mg total) by mouth 2 (two) times daily.  What changed:  Another medication with the same name was removed. Continue taking this medication, and follow the directions you see here.        CONTINUE taking these medications    amLODIPine 10 MG tablet  Commonly known as:  NORVASC     aspirin 81 MG Chew     atorvastatin 20 MG tablet  Commonly known as:  LIPITOR     azelastine 137 mcg (0.1 %) nasal spray  Commonly known as:  ASTELIN     calcitonin (salmon) 200 unit/actuation nasal spray  Commonly known as:  FORTICAL  1 spray by Nasal route once daily.     docusate sodium 100 MG capsule  Commonly known as:  COLACE  Take 1 capsule (100 mg total) by mouth 2 (two) times daily.     losartan 100 MG tablet  Commonly known as:  COZAAR  TAKE 1 TABLET (100 MG TOTAL) BY MOUTH ONCE DAILY.     mirtazapine 15 MG tablet  Commonly known as:  REMERON     nitroGLYCERIN 0.4 MG SL tablet  Commonly known as:  NITROSTAT  Place 1 tablet (0.4 mg total) under the tongue every 5 (five) minutes as needed for Chest pain (and notify MD).         STOP taking these medications    oxyCODONE-acetaminophen 5-325 mg per tablet  Commonly known as:  PERCOCET            Discharge Information:   Diet:  cardiac    Physical Activity:  As tolerated    Instructions:  1. Take all medications as prescribed  2. Keep all follow-up appointments    Follow-Up Appointments:  Belkis-Eliud DURHAM  EP--referral per belkis George  [facility] Medicine, -[place]

## 2018-04-27 NOTE — PLAN OF CARE
Patient's family reports that patient is presenting the same way she did at home.  Upon assessment, patient presents with shortness of breath, Complains of chest pains.  Vitals taken 150/92 HR 83 oxygen sat  on room air.  MD notified.  Dr. James immediately responds and orders Nitroglycerin and EKG.

## 2018-04-27 NOTE — NURSING TRANSFER
Nursing Transfer Note      4/27/2018     Transfer To: [id number]    Transfer via wheelchair    Transfer with cardiac monitoring    Transported by RN     Medicines sent: none     Chart send with patient: Yes    Notified: daughter Shimon    Patient reassessed at: 4/27 0000     Upon arrival to floor: call bell in reach and bed in lowest position

## 2018-04-27 NOTE — ASSESSMENT & PLAN NOTE
-nonobstructive per [facility] 2015  -troponin negative; no concern for ACS  -continue ASA, statin and ARB; no BB due to bradycardia

## 2018-04-27 NOTE — PLAN OF CARE
"Problem: Patient Care Overview  Goal: Plan of Care Review  Outcome: Outcome(s) achieved Date Met: 04/27/18  Patient complained of SOB and chest pains about 1250hrs. See note.      After interventions, discharge was cancelled and patient is remaining in hospital at this time.  Family was at bedside and agrees with plan.  Reviewed plan of care with patient and family who verbalizes complete understanding. Patient denies pain and describes feeling, "fine."    Fall precautions observed.  Call light within reach.  No further distress noted at this time.        "

## 2018-04-27 NOTE — ASSESSMENT & PLAN NOTE
-presented with dizziness more consistent with orthostasis rather than symptomatic bradycardia  -orthostatics positive yesterday with lying HR 61 /76 sitting HR 71 /74 standing HR 74 /60  -given NS bolus of 500cc overnight  -denies any complaints currently; plan to repeat orthostatics today  -if remain positive recommend either NS until negative or NS followed by continuous infusion

## 2018-04-27 NOTE — PROGRESS NOTES
[facility]  Cardiology  Progress Note    Patient Name: Crystal Alvarado  MRN: [id number]  Admission Date: 4/25/2018  Hospital Length of Stay: 0 days  Code Status: Full Code   Attending Physician: Isamar Hamlin MD   Primary Care Physician: Shantell Goff MD  Expected Discharge Date:   Principal Problem:Supraventricular tachycardia    Subjective:     Hospital Course:   4/25/2018 Presented with complaints of dizziness but denies LOC. Denies any chest pain. EKG with no acute changes. Initial troponin .006. TSH 1.194 Reports of HR up to 170s in ER with HR down to 40s with vagal maneuvers-no strips available on chart. Admitted to [facility] for further evaluation   4/26/2018 HR SOB overnight with HR upper 40s to 50s true SB with no AVB or pauses. BP stable. Repeat troponin .013. Echocardiogram today with normal LVEF and diastolic dysfunction   4/27/2018 Transferred out of ICU yesterday. HR 57-63 on telemetry overnight with no AVB or pauses. Orthostatics positive yesterday with 500cc NS bolus. Denies any complaints this AM        Review of Systems   Constitution: Negative for chills, decreased appetite, diaphoresis, fever and weakness.   Cardiovascular: Negative for chest pain, claudication, cyanosis, dyspnea on exertion, irregular heartbeat, leg swelling, near-syncope, orthopnea, palpitations, paroxysmal nocturnal dyspnea and syncope.   Respiratory: Negative for cough, hemoptysis, shortness of breath and wheezing.    Gastrointestinal: Negative for bloating, abdominal pain, constipation, diarrhea, melena, nausea and vomiting.   Neurological: Negative for dizziness.     Objective:     Vital Signs (Most Recent):  Temp: 96.1 °F (35.6 °C) (04/27/18 0732)  Pulse: 87 (04/27/18 1000)  Resp: 20 (04/27/18 0732)  BP: (!) 145/74 (04/27/18 1000)  SpO2: 98 % (04/27/18 0730) Vital Signs (24h Range):  Temp:  [96.1 °F (35.6 °C)-99 °F (37.2 °C)] 96.1 °F (35.6 °C)  Pulse:  [48-87] 87  Resp:  [7-34]  20  SpO2:  [92 %-100 %] 98 %  BP: (102-187)/(60-92) 145/74     Weight: 77.4 kg (170 lb 10.2 oz)  Body mass index is 28.4 kg/m².     SpO2: 98 %  O2 Device (Oxygen Therapy): room air      Intake/Output Summary (Last 24 hours) at 04/27/18 1002  Last data filed at 04/27/18 0929   Gross per 24 hour   Intake              720 ml   Output              850 ml   Net             -130 ml       Lines/Drains/Airways     Peripheral Intravenous Line                 Peripheral IV - Single Lumen 04/25/18 2027 Right Antecubital 1 day         Peripheral IV - Single Lumen 04/25/18 2341 Right Forearm 1 day                Physical Exam   Constitutional: She is oriented to person, place, and time. She appears well-developed and well-nourished. No distress.   Cardiovascular: Normal rate and regular rhythm.  Exam reveals no gallop.    No murmur heard.  Pulmonary/Chest: Effort normal and breath sounds normal. No respiratory distress. She has no wheezes.   Abdominal: Soft. Bowel sounds are normal. She exhibits no distension. There is no tenderness.   Neurological: She is alert and oriented to person, place, and time.   Skin: Skin is warm and dry.       Significant Labs:       Recent Labs  Lab 04/27/18  0459      K 3.2*      CO2 26   BUN 15   CREATININE 1.0       Recent Labs  Lab 04/27/18  0459   WBC 4.38   RBC 3.99*   HGB 11.5*   HCT 36.3*      MCV 91   MCH 28.8   MCHC 31.7*         Significant Imaging: Echocardiogram:   2D echo with color flow doppler:   Results for orders placed or performed during the hospital encounter of 04/25/18   2D echo with color flow doppler   Result Value Ref Range    EF 60 55 - 65    Diastolic Dysfunction Yes (A)     Est. PA Systolic Pressure 31.3     Mitral Valve Mobility NORMAL     Tricuspid Valve Regurgitation TRIVIAL      Assessment and Plan:     Brief HPI: Seen this morning on AM NP rounds while resting in bed. Denies any complaints currently. Reports walking to bathroom yesterday with no  complaints of dizziness. Discussed cardiac POC as detailed below-appeared to understand and agree with POC     * Supraventricular tachycardia    -per reports in ER  -no strips available  -no SVT noted on monitor overnight  -will arrange for 30 day event monitor as an outpatient         Sinus bradycardia    -HR up to 57-63 overnight on telemetry  -reports of SVT with HR 170s with HR down to 40s with vagal maneuvers-no strips available  -asymptomatic from SB; symptoms more consistent with orthostasis  -24hr Holter monitor in 12/2017 with no AVB or pauses; rare PVCs, PACs; 2 runs of nonsustained EAT  -no indication for pacemaker placement as of now; will arrange for outpatient cardiac event monitor         Postural dizziness with presyncope    -presented with dizziness more consistent with orthostasis rather than symptomatic bradycardia  -orthostatics positive yesterday with lying HR 61 /76 sitting HR 71 /74 standing HR 74 /60  -given NS bolus of 500cc overnight  -denies any complaints currently; plan to repeat orthostatics today  -if remain positive recommend either NS until negative or NS followed by continuous infusion         Coronary artery disease involving native coronary artery of native heart without angina pectoris    -nonobstructive per [facility] 2015  -troponin negative; no concern for ACS  -continue ASA, statin and ARB; no BB due to bradycardia             VTE Risk Mitigation         Ordered     enoxaparin injection 40 mg  Daily      04/26/18 0054     IP VTE HIGH RISK PATIENT  Once      04/25/18 2248     Place sequential compression device  Until discontinued      04/25/18 2248          SERGIO Abdi, ANP  Cardiology  [facility]

## 2018-04-27 NOTE — PLAN OF CARE
Problem: Cardiac: ACS (Acute Coronary Syndrome) (Adult)  Goal: Signs and Symptoms of Listed Potential Problems Will be Absent, Minimized or Managed (Cardiac: ACS)  Signs and symptoms of listed potential problems will be absent, minimized or managed by discharge/transition of care (reference Cardiac: ACS (Acute Coronary Syndrome) (Adult) CPG).    Outcome: Ongoing (interventions implemented as appropriate)  Pt c/o chest pains and shortness of breath.  See note.

## 2018-04-27 NOTE — PT/OT/SLP PROGRESS
"Physical Therapy      Patient Name:  Crystal Alvarado   MRN:  [id number]    Patient refused PT treatment, "I just don't feel like getting up". Patient reports she expects to return home Will follow up as able    Jurgen Lozada, PT    "

## 2018-04-27 NOTE — SUBJECTIVE & OBJECTIVE
Review of Systems   Constitution: Negative for chills, decreased appetite, diaphoresis, fever and weakness.   Cardiovascular: Negative for chest pain, claudication, cyanosis, dyspnea on exertion, irregular heartbeat, leg swelling, near-syncope, orthopnea, palpitations, paroxysmal nocturnal dyspnea and syncope.   Respiratory: Negative for cough, hemoptysis, shortness of breath and wheezing.    Gastrointestinal: Negative for bloating, abdominal pain, constipation, diarrhea, melena, nausea and vomiting.   Neurological: Negative for dizziness.     Objective:     Vital Signs (Most Recent):  Temp: 96.1 °F (35.6 °C) (04/27/18 0732)  Pulse: 87 (04/27/18 1000)  Resp: 20 (04/27/18 0732)  BP: (!) 145/74 (04/27/18 1000)  SpO2: 98 % (04/27/18 0730) Vital Signs (24h Range):  Temp:  [96.1 °F (35.6 °C)-99 °F (37.2 °C)] 96.1 °F (35.6 °C)  Pulse:  [48-87] 87  Resp:  [7-34] 20  SpO2:  [92 %-100 %] 98 %  BP: (102-187)/(60-92) 145/74     Weight: 77.4 kg (170 lb 10.2 oz)  Body mass index is 28.4 kg/m².     SpO2: 98 %  O2 Device (Oxygen Therapy): room air      Intake/Output Summary (Last 24 hours) at 04/27/18 1002  Last data filed at 04/27/18 0929   Gross per 24 hour   Intake              720 ml   Output              850 ml   Net             -130 ml       Lines/Drains/Airways     Peripheral Intravenous Line                 Peripheral IV - Single Lumen 04/25/18 2027 Right Antecubital 1 day         Peripheral IV - Single Lumen 04/25/18 2341 Right Forearm 1 day                Physical Exam   Constitutional: She is oriented to person, place, and time. She appears well-developed and well-nourished. No distress.   Cardiovascular: Normal rate and regular rhythm.  Exam reveals no gallop.    No murmur heard.  Pulmonary/Chest: Effort normal and breath sounds normal. No respiratory distress. She has no wheezes.   Abdominal: Soft. Bowel sounds are normal. She exhibits no distension. There is no tenderness.   Neurological: She is alert and  oriented to person, place, and time.   Skin: Skin is warm and dry.       Significant Labs:       Recent Labs  Lab 04/27/18  0459      K 3.2*      CO2 26   BUN 15   CREATININE 1.0       Recent Labs  Lab 04/27/18  0459   WBC 4.38   RBC 3.99*   HGB 11.5*   HCT 36.3*      MCV 91   MCH 28.8   MCHC 31.7*         Significant Imaging: Echocardiogram:   2D echo with color flow doppler:   Results for orders placed or performed during the hospital encounter of 04/25/18   2D echo with color flow doppler   Result Value Ref Range    EF 60 55 - 65    Diastolic Dysfunction Yes (A)     Est. PA Systolic Pressure 31.3     Mitral Valve Mobility NORMAL     Tricuspid Valve Regurgitation TRIVIAL

## 2018-04-27 NOTE — DISCHARGE INSTRUCTIONS
BLOOD PRESSURE, DISCHARGE INSTRUCTIONS: TAKING YOUR (ENGLISH) View Edit Remove  Low-Salt Choices (English) View Edit Remove  Supraventricular Tachycardia (SVT), Understanding (English) View Edit Remove  Diet, Discharge Instructions for Eating a Low-Salt (English) View Edit Remove  Blood Pressure, Taking Your (English) View Edit Remove  High Blood Pressure (Hypertension), Discharge Instructions (English) View Edit Remove

## 2018-04-27 NOTE — PROGRESS NOTES
U Internal Medicine Resident YULIANA Progress Note    Subjective:      Reports feeling better this AM. Denies episodes of dizziness, chest pain overnight.     Objective:   Last 24 Hour Vital Signs:  BP  Min: 102/65  Max: 187/80  Temp  Av.9 °F (36.6 °C)  Min: 96.1 °F (35.6 °C)  Max: 99 °F (37.2 °C)  Pulse  Av.2  Min: 48  Max: 74  Resp  Av.8  Min: 7  Max: 34  SpO2  Av.6 %  Min: 92 %  Max: 100 %  Weight  Av.4 kg (170 lb 10.2 oz)  Min: 77.4 kg (170 lb 10.2 oz)  Max: 77.4 kg (170 lb 10.2 oz)  I/O last 3 completed shifts:  In: 750 [P.O.:750]  Out: 700 [Urine:700]    Physical Examination:  General: sitting in bed, NAD, pacer pads in place, on 2L NC  HEENT: NCAT. PERRL, EOMI, anicteric sclera. OP clear with MMM.  CV: bradycardic, regular rhythm, no murmurs  Resp: on RA, no accessory muscle use, CTAB, no crackles/wheezes  Abd: soft, NT, ND, normoactive BS  Skin: no rashes or bruising  Ext: no edema, cyanosis, or clubbing  Pulses: 2+ radial, PT  Neuro: AxO to person, place, year, situation, president. Moves extremities spontaneously.  Psych: cooperative, pleasant    Laboratory:  Laboratory Data Reviewed: yes  Pertinent Findings:  Lab Results   Component Value Date    WBC 4.38 2018    HGB 11.5 (L) 2018    HCT 36.3 (L) 2018    MCV 91 2018     2018     Lab Results   Component Value Date    CREATININE 1.0 2018    BUN 15 2018     2018    K 3.2 (L) 2018     2018    CO2 26 2018     Lab Results   Component Value Date    ALT <5 (L) 2018    AST 8 (L) 2018    ALKPHOS 84 2018    BILITOT 0.6 2018     Microbiology Data Reviewed: yes  Pertinent Findings:  None    Other Results:  EKG (my interpretation):  - sinus bradycardia, no ST segment changes, TWI in aVR/V1    Radiology Data Reviewed: yes  Pertinent Findings:  None    Current Medications:     Infusions:       Scheduled:   amLODIPine  10 mg Oral Daily     aspirin  81 mg Oral Daily    atorvastatin  20 mg Oral QHS    enoxaparin  40 mg Subcutaneous Daily    losartan  100 mg Oral Daily    potassium chloride  20 mEq Oral Q2H        PRN:  dextrose 50%, dextrose 50%, glucagon (human recombinant), glucose, glucose, nitroGLYCERIN, sodium chloride 0.9%    Antibiotics and Day Number of Therapy:  None    Lines and Day Number of Therapy:  PIV    Assessment:     Crystal Alvarado is a 77 y.o.female with    Plan:     Chest pain with SVT s/p conversion with vagal maneuver   - P/w chest pain x 1 hour a/w SOB, diaphoresis.    - Initial trop 0.006, EKG with SVT-- converted to NSR s/p vagal maneuvers.   - 8/2015 nuclear stress test pos for inducible ischemia  subsequent cath with non-obstructive CAD.  12/2017 24hr Holter neg for arrhythmias.    - Trending trop/EKG (neg x2 so far).  Cardiology consulted (follows w/ Dr. Kline)--rec TTE, hold kamar blocking agents, will set up event monitor and EP f/u as outpatient. No indication for pacer  - TTE with normal LV EF, + diastolic dysfunction  - Stepped down 4/26  continue monitoring on tele     Presyncope with hx sick sinus syndrome  - Presented s/p near syncopal event.  Hx of similar episodes in the past.  - Follows with Dr. Kline (cards)  12/2017 24hr Holter neg for arrhythmias to explain episodes.  - Monitor on tele, pacer pads in place.  Cardiology consulted-- See above for recs     Hypokalemia, resolved  - Admit K 3.3  replete PRN     Benign essential HTN  - BPs stable  continue home norvasc, losartan     Hyperlipidemia  - Fasting lipid panel at goal  continue home statin     Chronic diastolic heart failure  - Appears euvolemic.  On admit -- BNP 48, CXR without pulm vascular congestion.  - 11/2017 TTE with LVEF 55-60%, indeterminate diastolic fnx  repeat TTE similar resultes     CKD3  - Admit BUN/Cr 18/1.3, eGFR 45 (at baseline)  avoid nephrotoxins     Iron deficiency anemia  - Diagnosed during 11/2017 hospitalization.   H/H stable on admit.    - Continue home iron, colace on discharge     L4 compression fracture  - Seen on CT and MRI L-spine s/p mechanical fall  - PT/OT consulted  pain control PRN     Deconditioning  - PT/OT consulted- dispo TBD    DVTPPx: lovenox  Diet: cardiac  Code: FULL     Dispo: likely d/c today    Talat George  [facility] Internal Medicine HO-I  [facility] IM Service Team A    [facility] Medicine Hospitalist Pager numbers:   [facility] Hospitalist Medicine Team A (Tom/Boubacar): [phone]  [facility] Hospitalist Medicine Team B (Yris/Kelvin):  [phone]

## 2018-04-27 NOTE — ASSESSMENT & PLAN NOTE
-per reports in ER  -no strips available  -no SVT noted on monitor overnight  -will arrange for 30 day event monitor as an outpatient

## 2018-04-27 NOTE — ASSESSMENT & PLAN NOTE
-HR up to 57-63 overnight on telemetry  -reports of SVT with HR 170s with HR down to 40s with vagal maneuvers-no strips available  -asymptomatic from SB; symptoms more consistent with orthostasis  -24hr Holter monitor in 12/2017 with no AVB or pauses; rare PVCs, PACs; 2 runs of nonsustained EAT  -no indication for pacemaker placement as of now; will arrange for outpatient cardiac event monitor

## 2018-04-27 NOTE — PLAN OF CARE
"TN faxed Home Health orders to [facility] via [employer] [facility].    TN spoke with Faith with PHN. Patient is set up with [facility].    --Update: Patient's discharge is cancelled. She will not be discharging today.    1349: TN went to meet with patient, family at bedside. They were informed [facility]. Son was inquiring if patient would qualify for oxygen with having a "panic attack." TN informed son that patient would need to qualify for oxygen and had a qualifying diagnosis. Insurance will not pay, if she doesn't have this. TN informed son they can pay out of pocket if they want oxygen, but son stated "If it does not benefit patient then I guess she doesn't need it." TN provided them with business card for any further questions.     Future Appointments  Date Time Provider Department Center   5/8/2018 11:20 AM Cecilio Kline MD [facility] CARDIO [facility] [facility]   5/8/2018 3:00 PM Darius Rojo MD [facility] PAINMGT [facility] [facility]     Follow-up With  Details  Why  Contact Info   Shantell Goff MD  In 1 week  Primary Care Physician-- left message to schedule follow-up.  [address]  [address]  [place] [address]  [phone]   Cecilio Kline MD  On 5/8/2018  at 11:20 am--Cardiology Follow-Up  [address]  [address]  [place] [address]  [phone]   [facility]    [facility]--To be set up.  [address]  [address]  [place] [address]  [phone]        04/27/18 1035   Final Note   Assessment Type Final Discharge Note   Discharge Disposition Home-Health   What phone number can be called within the next 1-3 days to see how you are doing after discharge? [phone]   Hospital Follow Up  Appt(s) scheduled? Yes   Discharge plans and expectations educations in teach back method with documentation complete? Yes   Right Care Referral Info   Post Acute Recommendation Home-care   Referral Type Home Health   Facility Name Set up by [facility] "     Natalia Brito RN  Transition Navigator  [phone]

## 2018-04-28 VITALS
BODY MASS INDEX: 28.43 KG/M2 | TEMPERATURE: 97 F | OXYGEN SATURATION: 99 % | RESPIRATION RATE: 16 BRPM | WEIGHT: 170.63 LBS | HEART RATE: 54 BPM | SYSTOLIC BLOOD PRESSURE: 184 MMHG | DIASTOLIC BLOOD PRESSURE: 77 MMHG | HEIGHT: 65 IN

## 2018-04-28 LAB
POCT GLUCOSE: 89 MG/DL (ref 70–110)
POCT GLUCOSE: 94 MG/DL (ref 70–110)
TROPONIN I SERPL DL<=0.01 NG/ML-MCNC: 0.01 NG/ML

## 2018-04-28 PROCEDURE — G0378 HOSPITAL OBSERVATION PER HR: HCPCS

## 2018-04-28 PROCEDURE — 93005 ELECTROCARDIOGRAM TRACING: CPT

## 2018-04-28 PROCEDURE — 36415 COLL VENOUS BLD VENIPUNCTURE: CPT

## 2018-04-28 PROCEDURE — 97110 THERAPEUTIC EXERCISES: CPT

## 2018-04-28 PROCEDURE — 25000003 PHARM REV CODE 250: Performed by: STUDENT IN AN ORGANIZED HEALTH CARE EDUCATION/TRAINING PROGRAM

## 2018-04-28 PROCEDURE — 63600175 PHARM REV CODE 636 W HCPCS: Performed by: STUDENT IN AN ORGANIZED HEALTH CARE EDUCATION/TRAINING PROGRAM

## 2018-04-28 PROCEDURE — 84484 ASSAY OF TROPONIN QUANT: CPT

## 2018-04-28 RX ADMIN — ENOXAPARIN SODIUM 40 MG: 100 INJECTION SUBCUTANEOUS at 08:04

## 2018-04-28 RX ADMIN — LOSARTAN POTASSIUM 100 MG: 50 TABLET, FILM COATED ORAL at 08:04

## 2018-04-28 RX ADMIN — ASPIRIN 81 MG 81 MG: 81 TABLET ORAL at 08:04

## 2018-04-28 RX ADMIN — AMLODIPINE BESYLATE 10 MG: 5 TABLET ORAL at 08:04

## 2018-04-28 NOTE — PROGRESS NOTES
U Internal Medicine Resident HO-III Progress Note    Subjective:      Discharge held yesterday due to patient complaining of acute chest pain. Work up was negative for acute ischemia. Vitals remained stable and unchanged during episode. Episode more consistent with anxiety than with ischemia.      Objective:   Last 24 Hour Vital Signs:  BP  Min: 131/76  Max: 184/77  Temp  Av.6 °F (36.4 °C)  Min: 96.5 °F (35.8 °C)  Max: 98.8 °F (37.1 °C)  Pulse  Av  Min: 54  Max: 88  Resp  Av.7  Min: 16  Max: 18  I/O last 3 completed shifts:  In: 480 [P.O.:480]  Out: 900 [Urine:900]    Physical Examination:  General: sitting in bed, NAD, pacer pads in place, on 2L NC  HEENT: NCAT. PERRL, EOMI, anicteric sclera. OP clear with MMM.  CV: bradycardic, regular rhythm, no murmurs  Resp: on RA, no accessory muscle use, CTAB, no crackles/wheezes  Abd: soft, NT, ND, normoactive BS  Skin: no rashes or bruising  Ext: no edema, cyanosis, or clubbing  Pulses: 2+ radial, PT  Neuro: AxO to person, place, year, situation, president. Moves extremities spontaneously.  Psych: cooperative, pleasant    Laboratory:  Laboratory Data Reviewed: yes  Pertinent Findings:  Lab Results   Component Value Date    WBC 4.38 2018    HGB 11.5 (L) 2018    HCT 36.3 (L) 2018    MCV 91 2018     2018     Lab Results   Component Value Date    CREATININE 1.0 2018    BUN 15 2018     2018    K 3.2 (L) 2018     2018    CO2 26 2018     Lab Results   Component Value Date    ALT <5 (L) 2018    AST 8 (L) 2018    ALKPHOS 84 2018    BILITOT 0.6 2018     Microbiology Data Reviewed: yes  Pertinent Findings:  None    Other Results:  EKG (my interpretation):  - sinus bradycardia, no ST segment changes, TWI in aVR/V1    Radiology Data Reviewed: yes  Pertinent Findings:  None    Current Medications:     Infusions:       Scheduled:   amLODIPine  10 mg Oral Daily     aspirin  81 mg Oral Daily    atorvastatin  20 mg Oral QHS    enoxaparin  40 mg Subcutaneous Daily    losartan  100 mg Oral Daily        PRN:  dextrose 50%, dextrose 50%, glucagon (human recombinant), glucose, glucose, nitroGLYCERIN, sodium chloride 0.9%    Antibiotics and Day Number of Therapy:  None    Lines and Day Number of Therapy:  PIV    Assessment:     Crystal Alvarado is a 77 y.o.female with    Plan:     Chest pain with SVT s/p conversion with vagal maneuver   - P/w chest pain x 1 hour a/w SOB, diaphoresis.    - Initial trop 0.006, EKG with SVT-- converted to NSR s/p vagal maneuvers.   - 8/2015 nuclear stress test pos for inducible ischemia  subsequent cath with non-obstructive CAD.  12/2017 24hr Holter neg for arrhythmias.    - Trending trop/EKG (neg x2 so far).  Cardiology consulted (follows w/ Dr. Kline)--rec TTE, hold kamar blocking agents, will set up event monitor and EP f/u as outpatient. No indication for pacer  - TTE with normal LV EF, + diastolic dysfunction  - Stepped down 4/26  continue monitoring on tele     Presyncope with hx sick sinus syndrome  - Presented s/p near syncopal event.  Hx of similar episodes in the past.  - Follows with Dr. Kline (cards)  12/2017 24hr Holter neg for arrhythmias to explain episodes.  - Monitor on tele, pacer pads in place.  Cardiology consulted-- See above for recs     Hypokalemia, resolved  - Admit K 3.3  replete PRN     Benign essential HTN  - BPs stable  continue home norvasc, losartan     Hyperlipidemia  - Fasting lipid panel at goal  continue home statin     Chronic diastolic heart failure  - Appears euvolemic.  On admit -- BNP 48, CXR without pulm vascular congestion.  - 11/2017 TTE with LVEF 55-60%, indeterminate diastolic fnx  repeat TTE similar resultes     CKD3  - Admit BUN/Cr 18/1.3, eGFR 45 (at baseline)  avoid nephrotoxins     Iron deficiency anemia  - Diagnosed during 11/2017 hospitalization.  H/H stable on admit.    - Continue  home iron, colace on discharge     L4 compression fracture  - Seen on CT and MRI L-spine s/p mechanical fall  - PT/OT consulted  pain control PRN     Deconditioning  - PT/OT consulted- dispo TBD    DVTPPx: lovenox  Diet: cardiac  Code: FULL     Dispo: d/c today    Landon Cho MD  [facility] Internal Medicine -III  [facility] IM Service Team A    [facility] Medicine Hospitalist Pager numbers:   [facility] Hospitalist Medicine Team A (Tom/Boubacar): [phone]  [facility] Hospitalist Medicine Team B (Yris/Kelvin):  [phone]

## 2018-04-28 NOTE — PLAN OF CARE
Problem: Patient Care Overview  Goal: Plan of Care Review  Outcome: Ongoing (interventions implemented as appropriate)  Plan of care reviewed with patient. Patient verbalized complete understanding. Fall precautions maintained. Bed in lowest position, locked, call light within reach, and bed alarm on. Side rails up x's 2 with slip resistant socks on. Nurse instructed patient to notify staff for any assistance and pt verbalized complete understanding. Patient on telemetry throughout shift with no ectopy noted. Will continue to monitor

## 2018-04-28 NOTE — PT/OT/SLP PROGRESS
"Physical Therapy Treatment    Patient Name:  Crystal Alvarado   MRN:  [id number]    Recommendations:     Discharge Recommendations:      Discharge Equipment Recommendations:     Barriers to discharge: Discharge w/ HHPT (discussed w/ [facility] PT)    Assessment:     Crystal Alvarado is a 77 y.o. female admitted with a medical diagnosis of Supraventricular tachycardia.  She presents with the following impairments/functional limitations:  weakness, impaired endurance, impaired functional mobilty, impaired balance, gait instability, decreased ROM. Pt tolerated standing / seated therex with no adverse reactions.    Rehab Prognosis:  GOOD; patient would benefit from acute skilled PT services to address these deficits and reach maximum level of function.      Recent Surgery: * No surgery found *      Plan:     During this hospitalization, patient to be seen 6 x/week to address the above listed problems via gait training, therapeutic activities, therapeutic exercises  · Plan of Care Expires:  05/26/18   Plan of Care Reviewed with: patient    Subjective     Communicated with NSG prior to session.  Patient found supine in bed upon PT entry to room, agreeable to treatment.      Chief Complaint: "uncomfortable night". Pt agreeable to PT session.   Patient comments/goals: to go home  Pain/Comfort:  · Pain Rating 1: 0/10    Patients cultural, spiritual, Presybeterian conflicts given the current situation: no    Objective:     Patient found with: telemetry, SCD     General Precautions: Standard, respiratory, fall   Orthopedic Precautions:N/A   Braces:       Functional Mobility:  · Bed Mobility:     · Rolling Left:  stand by assistance  · Transfers:     · Sit to Stand:  contact guard assistance with rolling walker  · Gait: 3 ft       AM-PAC 6 CLICK MOBILITY          Therapeutic Activities and Exercises:   Pt able to complete seated/ standing therex for B LE's AROM. Performed standing marching x 1 min in RW with SBA. Transferred w/ SBA " SUP<>Sit<>stannd.    Patient left supine with call button in reach and bed alarm on..    GOALS:    Physical Therapy Goals     Not on file          Multidisciplinary Problems (Resolved)        Problem: Physical Therapy Goal    Goal Priority Disciplines Outcome Goal Variances Interventions   Physical Therapy Goal   (Resolved)     PT/OT, PT Outcome(s) achieved     Description:  Goals to be met by: 2018     Patient will increase functional independence with mobility by performin. Supine to sit with Modified [place]  2. Sit to supine with Modified [place]  3. Sit to stand transfer with Modified [place]  4. Gait  x 75 feet with Modified [place] with or without AD  5. Ascend/descend 4 stair with 0 Handrails Minimal Assistance   6. Lower extremity exercise program x10 reps with independence                      Time Tracking:     PT Received On: 18  PT Start Time: 920     PT Stop Time: 940  PT Total Time (min): 20 min     Billable Minutes: Therapeutic Exercise 20    Treatment Type: Treatment  PT/PTA: PTA     PTA Visit Number: 1     Doron Reich, IVANA  2018

## 2018-04-28 NOTE — PROGRESS NOTES
TN contacted [phone]) weekend on call to verify that HH is arranged with [facility] and that they will be out to eval patient tomorrow, Sunday 4/28/18.     TN awaiting return calll

## 2018-04-28 NOTE — PLAN OF CARE
Pt sitting up in chair; verbalized understanding to call before getting up. Multiple attempts to call daughter as pt is being discharged. Will continue to attempt.

## 2018-04-28 NOTE — PLAN OF CARE
TN contacted PHN, [facility], [phone] ([id number]) and confirmed that [facility]  will be out tomorrow, Sunday, 4/29/18, to perform their initial evaluation.    No DME ordered       04/28/18 0819   Final Note   Assessment Type Final Discharge Note   Discharge Disposition Home-Health  ([facility]  arranged through [facility])   What phone number can be called within the next 1-3 days to see how you are doing after discharge? [phone]   Hospital Follow Up  Appt(s) scheduled? Yes   Right Care Referral Info   Post Acute Recommendation Home-care  ([facility]anged through [facility])   Referral Type [facility]   Facility Name [facility]     Glenna Figueroa, RN Transitional Navigator  [phone]

## 2018-04-30 NOTE — PROGRESS NOTES
"Admission Medication History - Pharmacy Consult Note    The home medication history was taken by Shon Mistry [facility].      You may go to "Admission" then "Reconcile Home Medications" tabs to review and/or act upon these items.    Please address this information as you see fit.  Feel free to contact us if you have any questions or require assistance.    Glenn Razo, PharmD    [phone]    "

## 2018-05-08 ENCOUNTER — OFFICE VISIT (OUTPATIENT)
Dept: CARDIOLOGY | Facility: CLINIC | Age: 78
End: 2018-05-08
Payer: MEDICARE

## 2018-05-08 VITALS
SYSTOLIC BLOOD PRESSURE: 133 MMHG | HEIGHT: 65 IN | DIASTOLIC BLOOD PRESSURE: 68 MMHG | WEIGHT: 172.31 LBS | BODY MASS INDEX: 28.71 KG/M2 | HEART RATE: 56 BPM

## 2018-05-08 DIAGNOSIS — I50.30 HEART FAILURE WITH PRESERVED LEFT VENTRICULAR FUNCTION (HFPEF): ICD-10-CM

## 2018-05-08 DIAGNOSIS — N18.30 CKD (CHRONIC KIDNEY DISEASE) STAGE 3, GFR 30-59 ML/MIN: ICD-10-CM

## 2018-05-08 DIAGNOSIS — E78.5 HYPERLIPIDEMIA, UNSPECIFIED HYPERLIPIDEMIA TYPE: ICD-10-CM

## 2018-05-08 DIAGNOSIS — I47.10 SUPRAVENTRICULAR TACHYCARDIA: ICD-10-CM

## 2018-05-08 DIAGNOSIS — I95.1 ORTHOSTATIC HYPOTENSION: ICD-10-CM

## 2018-05-08 DIAGNOSIS — E87.6 HYPOKALEMIA: ICD-10-CM

## 2018-05-08 DIAGNOSIS — I10 ESSENTIAL HYPERTENSION: Primary | Chronic | ICD-10-CM

## 2018-05-08 DIAGNOSIS — I49.5 SSS (SICK SINUS SYNDROME): ICD-10-CM

## 2018-05-08 DIAGNOSIS — I25.10 CORONARY ARTERY DISEASE INVOLVING NATIVE CORONARY ARTERY OF NATIVE HEART WITHOUT ANGINA PECTORIS: ICD-10-CM

## 2018-05-08 DIAGNOSIS — I10 HYPERTENSION: ICD-10-CM

## 2018-05-08 PROCEDURE — 99213 OFFICE O/P EST LOW 20 MIN: CPT | Mod: S$GLB,,, | Performed by: INTERNAL MEDICINE

## 2018-05-08 PROCEDURE — 93000 ELECTROCARDIOGRAM COMPLETE: CPT | Mod: S$GLB,,, | Performed by: INTERNAL MEDICINE

## 2018-05-08 PROCEDURE — 3075F SYST BP GE 130 - 139MM HG: CPT | Mod: CPTII,S$GLB,, | Performed by: INTERNAL MEDICINE

## 2018-05-08 PROCEDURE — 99999 PR PBB SHADOW E&M-EST. PATIENT-LVL III: CPT | Mod: PBBFAC,,, | Performed by: INTERNAL MEDICINE

## 2018-05-08 PROCEDURE — 3078F DIAST BP <80 MM HG: CPT | Mod: CPTII,S$GLB,, | Performed by: INTERNAL MEDICINE

## 2018-05-08 NOTE — PROGRESS NOTES
Subjective:      Patient ID: Crystal Alvarado is a 77 y.o. female.    Chief Complaint: Hospital Follow Up (When to ER with chest pain)    HPI:  Hospitalized in April with SVT and hypokalemia and shortness of breath  Pt fell twice after losing balance.  Last fall 2 weeks ago after losing balance.  Pt was not using her walker at the time of the last fall.      Review of Systems   Cardiovascular: Negative for chest pain, claudication, dyspnea on exertion, irregular heartbeat, leg swelling, near-syncope, orthopnea, palpitations and syncope.        Past Medical History:   Diagnosis Date    Arthritis     CHF (congestive heart failure)     Hyperlipidemia     Hypertension         Past Surgical History:   Procedure Laterality Date    HYSTERECTOMY N/A        No family history on file.    Social History     Social History    Marital status: Single     Spouse name: N/A    Number of children: N/A    Years of education: N/A     Social History Main Topics    Smoking status: Former Smoker     Packs/day: 0.50     Quit date: 4/26/2013    Smokeless tobacco: Never Used    Alcohol use No    Drug use: No    Sexual activity: No     Other Topics Concern    None     Social History Narrative    None       Current Outpatient Prescriptions on File Prior to Visit   Medication Sig Dispense Refill    amlodipine (NORVASC) 10 MG tablet Take 10 mg by mouth once daily.      aspirin 81 MG Chew Take 81 mg by mouth once daily.  4    atorvastatin (LIPITOR) 20 MG tablet Take 20 mg by mouth every evening.  4    azelastine (ASTELIN) 137 mcg (0.1 %) nasal spray INHALE 1 SPRAY IN EACH NOSTRIL TWICE A DAY  3    calcitonin, salmon, (FORTICAL) 200 unit/actuation nasal spray 1 spray by Nasal route once daily. 1 Bottle 0    docusate sodium (COLACE) 100 MG capsule Take 1 capsule (100 mg total) by mouth 2 (two) times daily. 60 capsule 0    ergocalciferol (ERGOCALCIFEROL) 50,000 unit Cap Take 50,000 Units by mouth every 7 days.      ferrous  "sulfate 325 (65 FE) MG EC tablet Take 1 tablet (325 mg total) by mouth 2 (two) times daily. 30 tablet 3    losartan (COZAAR) 100 MG tablet TAKE 1 TABLET (100 MG TOTAL) BY MOUTH ONCE DAILY. 30 tablet 9    nitroGLYCERIN (NITROSTAT) 0.4 MG SL tablet Place 1 tablet (0.4 mg total) under the tongue every 5 (five) minutes as needed for Chest pain (and notify MD). 25 tablet 5    traMADol (ULTRAM) 50 mg tablet Take 50 mg by mouth as needed for Pain.      [DISCONTINUED] mirtazapine (REMERON) 15 MG tablet Take 15 mg by mouth every evening.        No current facility-administered medications on file prior to visit.        Review of patient's allergies indicates:  No Known Allergies  Objective:     Vitals:    05/08/18 1123   BP: 133/68   BP Location: Left arm   Patient Position: Sitting   BP Method: Large (Automatic)   Pulse: (!) 56   Weight: 78.2 kg (172 lb 4.8 oz)   Height: 5' 5" (1.651 m)        Physical Exam   Constitutional: She is oriented to person, place, and time. She appears well-developed and well-nourished.   Eyes: No scleral icterus.   Neck: No JVD present. Carotid bruit is not present.   Cardiovascular: Normal rate and regular rhythm.  Exam reveals no gallop.    Murmur (II/VI systolic) heard.  Pulmonary/Chest: Breath sounds normal.   Musculoskeletal: She exhibits no edema.   Neurological: She is alert and oriented to person, place, and time.   Skin: Skin is warm and dry.   Psychiatric: She has a normal mood and affect. Her behavior is normal. Judgment and thought content normal.   Vitals reviewed.     ECG: sinus bradycardia at 56 bpm    Hospital records reviewed--initial ECG showed PSVT at 154 bpm (pt converted to NSR after vagal maneuvers)  Echo showed normal LVEF and diastolic dysfunction.  Pt was not discharged on any antiaarhythmic for fear of exacerbating her bradycardia.   Note pt had sinus bradycardia at 43 bpm on holter in December.  Assessment:     1. Essential hypertension    2. SSS (sick sinus " syndrome)    3. Hyperlipidemia, unspecified hyperlipidemia type    4. Coronary artery disease involving native coronary artery of native heart without angina pectoris    5. Supraventricular tachycardia    6. Heart failure with preserved left ventricular function (HFpEF)    7. Orthostatic hypotension    8. CKD (chronic kidney disease) stage 3, GFR 30-59 ml/min    9. Hypokalemia    10. Hypertension        Etiology of falls is uncertain.  Possible unsteady gait.  Possible orthostatic hypotension.  Possible symptomatic bradycardia.   Plan:   Crystal was seen today for hospital follow up.    Diagnoses and all orders for this visit:    Essential hypertension    SSS (sick sinus syndrome)  -     IN OFFICE EKG 12-LEAD (to Muse)    Hyperlipidemia, unspecified hyperlipidemia type    Coronary artery disease involving native coronary artery of native heart without angina pectoris    Supraventricular tachycardia    Heart failure with preserved left ventricular function (HFpEF)    Orthostatic hypotension    CKD (chronic kidney disease) stage 3, GFR 30-59 ml/min    Hypokalemia    Hypertension  -     IN OFFICE EKG 12-LEAD (to Muse)     Will consult Dr Archie Montez for evaluation of tachycardia - bradycardia syndrome.  Pt may benefit from PPM and metoprolol.    RTC 3 months    Same meds for now.    No Follow-up on file.

## 2018-05-30 ENCOUNTER — TELEPHONE (OUTPATIENT)
Dept: CARDIOLOGY | Facility: CLINIC | Age: 78
End: 2018-05-30

## 2018-05-30 NOTE — TELEPHONE ENCOUNTER
Discussed potential need for medications to control tachycardia and possible need for pacemaker.  I suggested to patient that she delay cataract surgery until after evaluation by Dr Montez, electrophysiologist.

## 2018-06-07 DIAGNOSIS — I49.5 SSS (SICK SINUS SYNDROME): Primary | ICD-10-CM

## 2018-06-14 ENCOUNTER — TELEPHONE (OUTPATIENT)
Dept: ELECTROPHYSIOLOGY | Facility: CLINIC | Age: 78
End: 2018-06-14

## 2018-06-14 ENCOUNTER — INITIAL CONSULT (OUTPATIENT)
Dept: CARDIOLOGY | Facility: CLINIC | Age: 78
End: 2018-06-14
Payer: MEDICARE

## 2018-06-14 VITALS
HEART RATE: 57 BPM | BODY MASS INDEX: 30.82 KG/M2 | WEIGHT: 185 LBS | HEIGHT: 65 IN | SYSTOLIC BLOOD PRESSURE: 128 MMHG | DIASTOLIC BLOOD PRESSURE: 76 MMHG

## 2018-06-14 DIAGNOSIS — I95.1 ORTHOSTATIC HYPOTENSION: ICD-10-CM

## 2018-06-14 DIAGNOSIS — I50.30 HEART FAILURE WITH PRESERVED LEFT VENTRICULAR FUNCTION (HFPEF): ICD-10-CM

## 2018-06-14 DIAGNOSIS — I10 ESSENTIAL HYPERTENSION: Chronic | ICD-10-CM

## 2018-06-14 DIAGNOSIS — I49.5 SSS (SICK SINUS SYNDROME): ICD-10-CM

## 2018-06-14 DIAGNOSIS — I47.10 SUPRAVENTRICULAR TACHYCARDIA: Primary | ICD-10-CM

## 2018-06-14 DIAGNOSIS — N18.30 CKD (CHRONIC KIDNEY DISEASE) STAGE 3, GFR 30-59 ML/MIN: ICD-10-CM

## 2018-06-14 PROCEDURE — 99205 OFFICE O/P NEW HI 60 MIN: CPT | Mod: S$GLB,,, | Performed by: INTERNAL MEDICINE

## 2018-06-14 PROCEDURE — 3078F DIAST BP <80 MM HG: CPT | Mod: CPTII,S$GLB,, | Performed by: INTERNAL MEDICINE

## 2018-06-14 PROCEDURE — 99999 PR PBB SHADOW E&M-EST. PATIENT-LVL IV: CPT | Mod: PBBFAC,,, | Performed by: INTERNAL MEDICINE

## 2018-06-14 PROCEDURE — 3074F SYST BP LT 130 MM HG: CPT | Mod: CPTII,S$GLB,, | Performed by: INTERNAL MEDICINE

## 2018-06-14 PROCEDURE — 93000 ELECTROCARDIOGRAM COMPLETE: CPT | Mod: S$GLB,,, | Performed by: INTERNAL MEDICINE

## 2018-06-14 NOTE — PROGRESS NOTES
Subjective:    Patient ID:  Crystal Alvarado is a 77 y.o. female who presents for evaluation of Tachy-sue    Referring Cardiologist: Chris Kline MD    HPI   I had the pleasure of seeing Ms. Alvarado today in our electrophysiology clinic for her SVT and bradycardia. As you are aware she is a pleasant 77 year-old woman with hypertension, non-obstructive CAD and CKD stage III and chronic bradycardia. She presented to the ER 4/25/2018 with complaint of dizziness. She was in a narrow complex short RP tachycardia at a rate of 156 bpm that terminated with a vagal maneuver. During her stay she had sinus rhythm with rates of 40s-60s off AV kamar agents. She felt better. She had a holter monitor 12/2017 that noted sinus rhythm with rate range of 43-97 with average of 66. There were also rare PVCs and PACs with 2 episodes of NSAT.     Since her hospitalization she has had 2 falls at home. She believes the falls were from losing her balance and not from light-headedness. She notes intermittent similar symptoms that she presented to the ER with. Episodes may last several minutes.    CONCLUSIONS     1 - Normal left ventricular systolic function (EF 60-65%).     2 - Impaired LV relaxation, increased LVEDP.     3 - Normal right ventricular systolic function .     4 - The estimated PA systolic pressure is 31 mmHg.    I reviewed all available electrocardiograms in EPIC which show sinus rhythm and intermittent sinus bradycardia with occasional PVCs. ECG dated 4/25/2018 showed narrow complex short RP tachycardia.    My interpretation of today's in clinic ECG is sinus rhythm at a rate of 57 bpm.    Review of Systems   Constitution: Negative for weakness and malaise/fatigue.   HENT: Negative for congestion and sore throat.    Eyes: Negative for blurred vision and visual disturbance.   Cardiovascular: Positive for palpitations. Negative for chest pain, leg swelling, near-syncope, paroxysmal nocturnal dyspnea and syncope.   Respiratory:  Negative for cough and shortness of breath.    Hematologic/Lymphatic: Negative for bleeding problem. Does not bruise/bleed easily.   Musculoskeletal: Positive for arthritis.   Gastrointestinal: Negative for bloating and abdominal pain.   Neurological: Positive for light-headedness (as per HPI) and loss of balance.        Objective:    Physical Exam   Constitutional: She is oriented to person, place, and time. She appears well-developed and well-nourished. No distress.   HENT:   Head: Normocephalic and atraumatic.   Eyes: Conjunctivae are normal. Right eye exhibits no discharge. Left eye exhibits no discharge.   Neck: Neck supple. No JVD present.   Cardiovascular: Normal rate and regular rhythm.  Exam reveals no gallop and no friction rub.    No murmur heard.  Pulmonary/Chest: Effort normal and breath sounds normal. No respiratory distress. She has no wheezes. She has no rales.   Abdominal: Soft. Bowel sounds are normal. She exhibits no distension. There is no tenderness. There is no rebound.   Musculoskeletal: She exhibits no edema.   Neurological: She is alert and oriented to person, place, and time.   Skin: Skin is warm and dry. She is not diaphoretic.   Psychiatric: She has a normal mood and affect. Her behavior is normal. Judgment and thought content normal.   Vitals reviewed.        Assessment:       1. Supraventricular tachycardia    2. SSS (sick sinus syndrome)    3. Orthostatic hypotension    4. Essential hypertension    5. Heart failure with preserved left ventricular function (HFpEF)    6. CKD (chronic kidney disease) stage 3, GFR 30-59 ml/min         Plan:       In summary, Ms. Alvarado is a pleasant 77 year-old woman with hypertension, non-obstructive CAD and CKD stage III and chronic bradycardia presenting for evaluation of SVT and bradycardia. It is unclear if her chronic bradycardia is symptomatic. I get the sense currently it is not. However she is having symptoms of likely recurrent SVT. We discussed at  length the etiology and pathophysiology of SVT including AVNRT, AVRT or AT. 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 I agree she is unlikely to tolerate AV kamar blocking agents. I discussed treatment options and their implications including no therapy with self vagal maneuvers, medical therapy with possible pacemaker implantation, or EPS guided ablation of the SVT mechanism. I was most in favor of EPS and ablation. I spent about a half hour discussing the nature of EP study and ablation, including possible transseptal puncture, with the patient and her daughter. We discussed risks and benefits at length. Our discussion included, but was not limited to the risk of death, infection, bleeding, stroke, MI, cardiac perforation, vascular injury, embolism, cardiac tamponade, skin burns, and other organic injury including the possibility for need for surgery or pacemaker implantation. I discussed that at some point her chronic bradycardia may truly become symptomatic at which point she would benefit from a pacemaker. After discussing the options she opted for definitive therapy with EPS and ablation. I see no contraindication to proceeding with cataract surgery from an arrhythmia point of view. I instructed her on how to perform vagal maneuvers at home for when she feels she is in SVT.     Plan  EPS/SVT ablation  IGLESIA for mapping  Anesthesia for sedation (MAC)    Thank you for allowing me to participate in the care of this patient. Please do not hesitate to call me with any questions or concerns.    Archie Montez MD, PhD  Cardiac Electrophysiology

## 2018-06-14 NOTE — LETTER
June 14, 2018      Cecilio Kline MD  [address]  [address]  [place] [address]           [facility]  [address], [address]  [place] [address]  Phone: [phone]          Patient: Crystal Alvarado   MR Number: [id number]   YOB: 1940   Date of Visit: 6/14/2018       Dear Dr. eCcilio Kline:    Thank you for referring Crystal Alvarado to me for evaluation. Attached you will find relevant portions of my assessment and plan of care.    If you have questions, please do not hesitate to call me. I look forward to following Crystal Alvarado along with you.    Sincerely,    Archie Montez MD    Enclosure  CC:  No Recipients    If you would like to receive this communication electronically, please contact [email or web address] or [phone] to request more information on [employer] access.    For providers and/or their staff who would like to refer a patient to [facility], please contact us through our one-stop-shop provider referral line, [facility], at [phone].    If you feel you have received this communication in error or would no longer like to receive these types of communications, please e-mail [email or web address]

## 2018-06-14 NOTE — PATIENT INSTRUCTIONS
Understanding Supraventricular Tachycardia  Supraventricular tachycardia (SVT) is a type of abnormal heart rhythm that results in fast heartbeats. The heart normally beats 60 to 100 beats per minute while you are at rest and awake. With SVT, the heart beats more than 100 times a minute. It may even beat over 200 times a minute. This is caused by a problem in the electrical system of the heart. It can lessen the amount of blood pumped through the heart.  How the heart beats  A heartbeat is the rhythm of the heart as it contracts to squeeze blood through the body. Its caused by electrical signals in the heart. A beat starts when a special group of cells give off an electrical signal. These cells are in the sinoatrial (SA) node. The SA node is in the upper right chamber of your heart (atrium). The signal from the SA node travels down to the 2 lower chambers of your heart (ventricles). On the way, the signal goes through the atrioventricular (AV) node. This is a special group of cells between the atria and ventricles. From there, the signal travels to your left and right ventricles. As it travels, the signal tells nearby parts of your heart to contract. This causes your heart to pump in a coordinated way.  What is SVT?  When you have SVT, the signal to start your heartbeat doesnt come from the SA node. Instead it comes from another part of the left or right atrium. Or it comes from the AV node. Some area outside the SA node begins to fire quickly, causing a rapid heartbeat of over 100 beats per minute or the electrical signals are caught up in an abnormal looping circuit. This shortens the time your ventricles have to fill. If your heartbeat is fast enough, your heart may be unable to pump enough blood forward to the rest of your body. The abnormal heartbeat may last for a few seconds to a few hours before your heart returns to its normal rhythm. Some SVT rhythms can last for days or weeks, or even become  permanent.  Types of SVT  There are several types of SVT. They include:  · Atrial fibrillation. This is most common type of SVT. The upper chambers of the heart quiver very fast instead of pumping due to disorganized electrical activity in the atria.  · Atrial flutter. This type of SVT is a milder form of fibrillation. The upper chambers of the heart flutter instead of pumping normally.  · Atrioventricular kamar reentrant tachycardia. It occurs when you have two channels through the AV node, instead of just one. The signal goes down one channel and up the other.  · Atrioventricular reciprocating tachycardia. With this condition, there is an extra connection of muscle between the atrium and the ventricle. This is known as an accessory pathway and can conduct electricity upwards and downwards. The signal goes down the AV node and back to the atrium through the accessory pathway. It then goes down the AV node again. In rare cases, this condition leads to an abnormal heart rhythm that causes sudden death. This is a congenital defect, which means you were born with it.  · Atrial tachycardia. This is another common type of SVT. A small group of cells in the atria begin to fire abnormally and trigger the fast heartbeat.  · Multifocal atrial tachycardia. Multiple groups of cells in your atria fire abnormally and trigger a fast heartbeat.  What causes SVT?  Some types of SVT run in families. Some people have heart problems from birth that cause SVT. High blood pressure, heart failure, mitral valve disease, sleep apnea, thyroid problems, and heart attacks can cause SVT. Smoking, excess caffeine or alcohol, and some medicines can increase your risk of having SVT.  Symptoms of SVT  When SVT happens, you may feel no symptoms. Or you may have:  · Fluttering feelings in your chest (palpitations)  · A tight feeling or pain in your chest  · A pulsing feeling in your neck  · Dizziness  · Shortness of  breath  · Tiredness  · Fainting  · Nausea  In very rare cases, SVT can cause sudden death.  Diagnosing SVT  Your primary healthcare provider may diagnose you. Or you may see a heart doctor (cardiologist). The doctor will ask about your health history. He or she will also give you a physical exam. You may also have tests. These help show what kind of SVT you have, and what may cause it. They also help check for other problems. The tests may include:  · Electrocardiogram (ECG), to analyze the abnormal rhythm  · Continuous heart monitors such as a holter monitor or an event recorder to watch your heart rhythm over a longer period in an attempt to catch the SVT rhythm  · Blood tests, to look for various causes such as thyroid problems or electrolyte abnormalities  · Chest X-ray, to check for lung problems and look at the size of your heart  · Exercise stress test, to see how well your heart works under stress  · Echocardiography, to check your heart structure and function  · Electrophysiologic study (EPS), an invasive procedure using wires in the heart to check the heart's electrical signals and diagnose the SVT  Date Last Reviewed: 5/1/2016  © 2002-2481 [employer]. [address] [place] [address]. All rights reserved. This information is not intended as a substitute for professional medical care. Always follow your healthcare professional's instructions.        Treatment for Supraventricular Tachycardia  Supraventricular tachycardia (SVT) is a type of abnormally fast heartbeat. The heart normally beats 60 to 100 beats per minute. With SVT, the heart beats more than 100 times a minute. It may beat as fast as 250 times a minute. This is caused by a problem in the electrical system of the heart. It can lessen the amount of blood pumped through the heart.  Types of treatment   You may not need treatment for SVT if you have only had one episode. If you do need treatment, there are several kinds.  They include:  · Valsalva maneuver. This is a way to increase pressure in the abdomen and chest. It can correct your heart rhythm right away. To do it, you bear down with your stomach muscles, as though you are trying to have a bowel movement.  · Carotid massage. Your healthcare provider may gently rub the carotid artery in your neck. This can also help correct the heart rhythm right away.  · Medicine. There are various kinds you can take. Calcium channel blockers or adenosine can help correct heart rhythm right away. If you have SVT only 1 or 2 times a year, you may take beta-blockers or calcium channel medicine as needed. If your SVT is more frequent, you may need to take medicine every day. Some people may need to take several medicines to prevent episodes of SVT.  · Electrocardioversion. This is a shock to the heart to restart a normal rhythm right away. This may be done if you have a severe episode of SVT.  · Catheter ablation. This can help permanently stop SVT. Your healthcare provider puts a thin, flexible tube (catheter) into a blood vessel in the groin. He or she then gently pushes it up into your heart. The area of your heart that causes your SVT is then burned. This prevents that area from starting a signal that causes SVT.   Lifestyle changes to help prevent SVT episodes  Your healthcare provider might suggest other ways to help prevent SVT, such as:  · Having less alcohol and caffeine  · Not smoking  · Lowering your stress  · Eating foods that are healthy for your heart  When to call your healthcare provider  Call your healthcare provider if you have any of the following:  · Sudden shortness of breath (call 911)  · Severe palpitations  · Severe dizziness  · Severe chest pain  · Symptoms that are happening more often   Date Last Reviewed: 8/10/2015  © 7085-5325 [employer]. [address] [place] [address], [place] [address]. All rights reserved. This information is not intended as a substitute for  professional medical care. Always follow your healthcare professional's instructions.        Having Catheter Ablation  A heart rhythm problem (arrhythmia) can make your heart beat too fast or in an irregular pattern. The problem is often caused by cells in your heart that arent working as they should. Or, it may be the result of an abnormal electric circuit. It may cause bothersome symptoms, such as an irregular heartbeat, dizziness, shortness of breath, chest pain, or fainting. Your doctor has recommended catheter ablation to treat your arrhythmia. This non-surgical procedure destroys the cells that are causing the problem.  Before the procedure    Before your catheter ablation, you will meet with a specially trained heart doctor (cardiac electrophysiologist) who will do the procedure. He or she will tell you how to get ready. You will likely be told to stop or change your heart rhythm medicines for a period of time before the procedure. Follow your doctors instructions. Also:  · Tell the doctor about all prescription and over-the-counter medicines you take. This includes herbs, supplements, and vitamins. It also includes daily medicines, such as insulin or blood thinners. If you are allergic to any medicines, tell the doctor.  · Have any routine tests, such as blood tests, as recommended.  · Dont eat or drink anything 12 hours before the procedure.  How catheter ablation is done  Catheter ablation uses thin, flexible wires (electrode catheters) to find and destroy (ablate) problem cells. Heres how the procedure is done:  · The hearts signals are mapped. To find the problem, an electrophysiology study (EPS) is done. During this study, the doctor tries to start (induce) your arrhythmia. An electrical map of the heart is then created. This shows the type of arrhythmia you have and where the problem is. Using the map as a guide, the doctor knows where to ablate.  · Problem areas are destroyed using heat or cold  therapy. Once the EPS shows where the problem is, the doctor threads an electrode catheter through a blood vessel to that area in the heart. Energy is sent through the catheter to destroy the problem cells.  · The hearts rhythm is tested again. After ablating the problem cells, the doctor tries to restart (reinduce) your arrhythmia. If a fast rhythm cant be induced, the ablation is a success. But if a fast rhythm does start again, you may need more ablation.  Your experience during catheter ablation  In most cases, catheter ablation is done in an electrophysiology (EP) lab. It often takes 2 to 4 hours, and sometimes longer. Youll receive medicine to prevent pain. Medicine will also help you relax or sleep during the procedure. If you feel uncomfortable during the procedure, tell the doctor or nurse:  · Getting started. The healthcare team washes the skin on your groin (or rarely, the neck). Any hair in that area may be removed. This is where the catheters will be inserted. An IV (intravenous) line is started in your arm. Medicines and fluids are given through this IV. To help keep the insertion site germ-free (sterile), your body is draped with sheets. Only the area where the catheters will be inserted is exposed.  · Inserting the catheters. The healthcare provider numbs the skin where the catheters will be inserted with pain medicine (local anesthetic). Then the provider uses a small needle to make punctures in your vein or artery. He or she puts catheters through these punctures and guides them to your heart. The provider uses X-ray monitors to help guide the catheters.  · The provider then puts wires in several places in the heart to map the electrical signals. The wires also stimulate the heart.  · Finishing up. When the procedure is finished, the provider takes the catheters out of your body. He or she puts pressure on the puncture sites to stop any bleeding. No stitches are needed. Youre then taken to a  recovery room to rest. You'll need to remain lying down for 2 to 6 hours. You'll also be asked not to move the leg where the catheters were inserted for a few hours. This is to make sure the insertion sites don't bleed.  Risks and complications  The risks of catheter ablation are fairly low compared to the benefits you receive. Discuss these risks with your doctor before the procedure. Possible risks and complications include:  · Bleeding or bruising at the catheter insertion site  · Blood clots  · A slow heart rhythm (requiring a permanent pacemaker)  · Perforation of the heart muscle, blood vessel, or lung (may require an emergency procedure)  · Damage to a heart valve (rare)  · Stroke or heart attack, also known as acute myocardial infarction, or AMI (rare)  · Infection, which is a risk after any invasive procedure. This may be indicated by a fever of 100.4°F (38.0°C) or higher, drainage, or redness and pain at the catheter insertion site.  · Death (extremely rare)   Date Last Reviewed: 5/1/2016 © 2000-2017 [employer]. [address] [place] [address], [place] [address]. All rights reserved. This information is not intended as a substitute for professional medical care. Always follow your healthcare professional's instructions.

## 2018-06-14 NOTE — TELEPHONE ENCOUNTER
----- Message from Clarence Meraz MA sent at 6/14/2018 10:44 AM CDT -----  When you call to schedule pt's RFA , Pt's daughter is requesting you call her to schedule. Her number is the mobile number in the chart.

## 2018-06-19 ENCOUNTER — TELEPHONE (OUTPATIENT)
Dept: ELECTROPHYSIOLOGY | Facility: CLINIC | Age: 78
End: 2018-06-19

## 2018-06-19 DIAGNOSIS — I49.9 CARDIAC ARRHYTHMIA, UNSPECIFIED CARDIAC ARRHYTHMIA TYPE: ICD-10-CM

## 2018-06-19 DIAGNOSIS — I47.10 SUPRAVENTRICULAR TACHYCARDIA: Primary | ICD-10-CM

## 2018-06-22 NOTE — PROGRESS NOTES
ABLATION EDUCATION CHECKLIST    PRE-PROCEDURE TESTIN2018 @ 9 AM  Pre-Procedure labs have been ordered for you at:  [facility]  · Be sure to arrive at your scheduled time. YOU DO NOT HAVE TO FAST FOR THIS LAB WORK!    DAY OF PROCEDURE:    2018 @ 9 AM  Report to Cardiology Waiting Room on 3rd floor of the Hospital    · Do not eat or drink anything after: 12 mn on the night before your procedure  · Please do not wear makeup (especially mascara) when arriving for your procedure    Medications:  · You may take your  usual morning medications with a sip of water      Directions to the Cardiology Waiting Room  If you park in the Parking Garage:  Take elevators to the 2nd floor  Walk up ramp and turn right by Gold Elevators  Take elevator to the 3rd floor  Upon exiting the elevator, turn away from the clinic areas  Walk long chinchilla around to front of hospital to area with windows overlooking [facility]  Check in at Reception Desk  OR  If family is dropping you off:  Have them drop you off at the front of the Hospital  (Near the ER, where all the flags are hung).  Take the E elevators to the 3rd floor.  Check in at the Reception Desk in the waiting room.        · You MAY be spending the night after your procedure.  · You will need someone to drive you home the day after your procedure.  · Your pain during your procedure will be managed by the anesthesia team.     Any need to reschedule or cancel procedures, or any questions regarding your procedures should be addressed directly with the Arrhythmia Department Nurses at the following phone number: [phone]

## 2018-07-04 ENCOUNTER — HOSPITAL ENCOUNTER (OUTPATIENT)
Facility: HOSPITAL | Age: 78
Discharge: HOME | End: 2018-07-06
Attending: EMERGENCY MEDICINE | Admitting: HOSPITALIST
Payer: MEDICARE

## 2018-07-04 DIAGNOSIS — I49.9 CARDIAC ARRHYTHMIA, UNSPECIFIED CARDIAC ARRHYTHMIA TYPE: ICD-10-CM

## 2018-07-04 DIAGNOSIS — S42.032A CLOSED DISPLACED FRACTURE OF ACROMIAL END OF LEFT CLAVICLE, INITIAL ENCOUNTER: ICD-10-CM

## 2018-07-04 DIAGNOSIS — R40.20 LOSS OF CONSCIOUSNESS: ICD-10-CM

## 2018-07-04 DIAGNOSIS — R55 NEAR SYNCOPE: ICD-10-CM

## 2018-07-04 DIAGNOSIS — R55 PRE-SYNCOPE: ICD-10-CM

## 2018-07-04 DIAGNOSIS — S01.81XA FACIAL LACERATION, INITIAL ENCOUNTER: ICD-10-CM

## 2018-07-04 DIAGNOSIS — T14.90XA TRAUMA: ICD-10-CM

## 2018-07-04 DIAGNOSIS — S06.0X0A CONCUSSION WITHOUT LOSS OF CONSCIOUSNESS, INITIAL ENCOUNTER: Primary | ICD-10-CM

## 2018-07-04 LAB
ALBUMIN SERPL BCP-MCNC: 3.6 G/DL
ALP SERPL-CCNC: 93 U/L
ALT SERPL W/O P-5'-P-CCNC: 8 U/L
ANION GAP SERPL CALC-SCNC: 10 MMOL/L
APTT BLDCRRT: <21 SEC
AST SERPL-CCNC: 15 U/L
BASOPHILS # BLD AUTO: 0.03 K/UL
BASOPHILS NFR BLD: 0.4 %
BILIRUB SERPL-MCNC: 0.3 MG/DL
BNP SERPL-MCNC: 46 PG/ML
BUN SERPL-MCNC: 17 MG/DL
CALCIUM SERPL-MCNC: 9.6 MG/DL
CHLORIDE SERPL-SCNC: 108 MMOL/L
CO2 SERPL-SCNC: 21 MMOL/L
CREAT SERPL-MCNC: 1.2 MG/DL
DIFFERENTIAL METHOD: ABNORMAL
EOSINOPHIL # BLD AUTO: 0.1 K/UL
EOSINOPHIL NFR BLD: 0.7 %
ERYTHROCYTE [DISTWIDTH] IN BLOOD BY AUTOMATED COUNT: 14.8 %
EST. GFR  (AFRICAN AMERICAN): 49.7 ML/MIN/1.73 M^2
EST. GFR  (NON AFRICAN AMERICAN): 43.1 ML/MIN/1.73 M^2
GLUCOSE SERPL-MCNC: 101 MG/DL
HCT VFR BLD AUTO: 39.2 %
HGB BLD-MCNC: 11.9 G/DL
IMM GRANULOCYTES # BLD AUTO: 0.03 K/UL
IMM GRANULOCYTES NFR BLD AUTO: 0.4 %
INR PPP: 0.9
LIPASE SERPL-CCNC: 55 U/L
LYMPHOCYTES # BLD AUTO: 1.1 K/UL
LYMPHOCYTES NFR BLD: 16.4 %
MAGNESIUM SERPL-MCNC: 2.5 MG/DL
MCH RBC QN AUTO: 29.5 PG
MCHC RBC AUTO-ENTMCNC: 30.4 G/DL
MCV RBC AUTO: 97 FL
MONOCYTES # BLD AUTO: 0.5 K/UL
MONOCYTES NFR BLD: 6.7 %
NEUTROPHILS # BLD AUTO: 5.1 K/UL
NEUTROPHILS NFR BLD: 75.4 %
NRBC BLD-RTO: 0 /100 WBC
PLATELET # BLD AUTO: 254 K/UL
PMV BLD AUTO: 10.6 FL
POTASSIUM SERPL-SCNC: 4.1 MMOL/L
PROT SERPL-MCNC: 7.6 G/DL
PROTHROMBIN TIME: 9.7 SEC
RBC # BLD AUTO: 4.03 M/UL
SODIUM SERPL-SCNC: 139 MMOL/L
TROPONIN I SERPL DL<=0.01 NG/ML-MCNC: 0.01 NG/ML
TROPONIN I SERPL DL<=0.01 NG/ML-MCNC: <0.006 NG/ML
WBC # BLD AUTO: 6.72 K/UL

## 2018-07-04 PROCEDURE — 90471 IMMUNIZATION ADMIN: CPT | Performed by: EMERGENCY MEDICINE

## 2018-07-04 PROCEDURE — 84484 ASSAY OF TROPONIN QUANT: CPT

## 2018-07-04 PROCEDURE — 12013 RPR F/E/E/N/L/M 2.6-5.0 CM: CPT | Mod: ,,, | Performed by: EMERGENCY MEDICINE

## 2018-07-04 PROCEDURE — 12013 RPR F/E/E/N/L/M 2.6-5.0 CM: CPT

## 2018-07-04 PROCEDURE — 63600175 PHARM REV CODE 636 W HCPCS: Performed by: EMERGENCY MEDICINE

## 2018-07-04 PROCEDURE — 93005 ELECTROCARDIOGRAM TRACING: CPT | Mod: 59

## 2018-07-04 PROCEDURE — 25000003 PHARM REV CODE 250: Performed by: EMERGENCY MEDICINE

## 2018-07-04 PROCEDURE — 93010 ELECTROCARDIOGRAM REPORT: CPT | Mod: 76,,, | Performed by: INTERNAL MEDICINE

## 2018-07-04 PROCEDURE — 83880 ASSAY OF NATRIURETIC PEPTIDE: CPT

## 2018-07-04 PROCEDURE — 96374 THER/PROPH/DIAG INJ IV PUSH: CPT | Mod: 59

## 2018-07-04 PROCEDURE — 96375 TX/PRO/DX INJ NEW DRUG ADDON: CPT | Mod: 59

## 2018-07-04 PROCEDURE — 85610 PROTHROMBIN TIME: CPT

## 2018-07-04 PROCEDURE — 80053 COMPREHEN METABOLIC PANEL: CPT

## 2018-07-04 PROCEDURE — 83690 ASSAY OF LIPASE: CPT

## 2018-07-04 PROCEDURE — 93010 ELECTROCARDIOGRAM REPORT: CPT | Mod: ,,, | Performed by: INTERNAL MEDICINE

## 2018-07-04 PROCEDURE — 99285 EMERGENCY DEPT VISIT HI MDM: CPT | Mod: 25,,, | Performed by: EMERGENCY MEDICINE

## 2018-07-04 PROCEDURE — 90714 TD VACC NO PRESV 7 YRS+ IM: CPT | Performed by: EMERGENCY MEDICINE

## 2018-07-04 PROCEDURE — 85025 COMPLETE CBC W/AUTO DIFF WBC: CPT

## 2018-07-04 PROCEDURE — 85730 THROMBOPLASTIN TIME PARTIAL: CPT

## 2018-07-04 PROCEDURE — 99285 EMERGENCY DEPT VISIT HI MDM: CPT | Mod: 25

## 2018-07-04 PROCEDURE — 83735 ASSAY OF MAGNESIUM: CPT

## 2018-07-04 RX ORDER — AMLODIPINE BESYLATE 5 MG/1
5 TABLET ORAL
Status: COMPLETED | OUTPATIENT
Start: 2018-07-04 | End: 2018-07-04

## 2018-07-04 RX ORDER — LIDOCAINE HYDROCHLORIDE 10 MG/ML
1 INJECTION, SOLUTION EPIDURAL; INFILTRATION; INTRACAUDAL; PERINEURAL
Status: COMPLETED | OUTPATIENT
Start: 2018-07-04 | End: 2018-07-04

## 2018-07-04 RX ORDER — HYDROCODONE BITARTRATE AND ACETAMINOPHEN 10; 325 MG/1; MG/1
1 TABLET ORAL
Status: COMPLETED | OUTPATIENT
Start: 2018-07-04 | End: 2018-07-04

## 2018-07-04 RX ORDER — HYDRALAZINE HYDROCHLORIDE 20 MG/ML
10 INJECTION INTRAMUSCULAR; INTRAVENOUS
Status: COMPLETED | OUTPATIENT
Start: 2018-07-04 | End: 2018-07-04

## 2018-07-04 RX ORDER — MORPHINE SULFATE 4 MG/ML
4 INJECTION, SOLUTION INTRAMUSCULAR; INTRAVENOUS
Status: COMPLETED | OUTPATIENT
Start: 2018-07-04 | End: 2018-07-04

## 2018-07-04 RX ADMIN — MORPHINE SULFATE 4 MG: 4 INJECTION INTRAVENOUS at 08:07

## 2018-07-04 RX ADMIN — HYDROCODONE BITARTRATE AND ACETAMINOPHEN 1 TABLET: 10; 325 TABLET ORAL at 11:07

## 2018-07-04 RX ADMIN — CLOSTRIDIUM TETANI TOXOID ANTIGEN (FORMALDEHYDE INACTIVATED) AND CORYNEBACTERIUM DIPHTHERIAE TOXOID ANTIGEN (FORMALDEHYDE INACTIVATED) 0.5 ML: 5; 2 INJECTION, SUSPENSION INTRAMUSCULAR at 10:07

## 2018-07-04 RX ADMIN — HYDRALAZINE HYDROCHLORIDE 10 MG: 20 INJECTION INTRAMUSCULAR; INTRAVENOUS at 11:07

## 2018-07-04 RX ADMIN — AMLODIPINE BESYLATE 5 MG: 5 TABLET ORAL at 11:07

## 2018-07-04 RX ADMIN — LIDOCAINE HYDROCHLORIDE 10 MG: 10 INJECTION, SOLUTION EPIDURAL; INFILTRATION; INTRACAUDAL; PERINEURAL at 08:07

## 2018-07-05 ENCOUNTER — CLINICAL SUPPORT (OUTPATIENT)
Dept: ELECTROPHYSIOLOGY | Facility: CLINIC | Age: 78
End: 2018-07-05
Attending: EMERGENCY MEDICINE
Payer: MEDICARE

## 2018-07-05 DIAGNOSIS — R55 PRE-SYNCOPE: ICD-10-CM

## 2018-07-05 PROBLEM — S06.0X0A CONCUSSION WITH NO LOSS OF CONSCIOUSNESS: Status: ACTIVE | Noted: 2018-07-05

## 2018-07-05 PROBLEM — I20.89 ANGINAL EQUIVALENT: Status: RESOLVED | Noted: 2017-11-19 | Resolved: 2018-07-05

## 2018-07-05 LAB — TROPONIN I SERPL DL<=0.01 NG/ML-MCNC: <0.006 NG/ML

## 2018-07-05 PROCEDURE — 25000003 PHARM REV CODE 250: Performed by: HOSPITALIST

## 2018-07-05 PROCEDURE — 93270 REMOTE 30 DAY ECG REV/REPORT: CPT | Mod: ,,, | Performed by: INTERNAL MEDICINE

## 2018-07-05 PROCEDURE — 84484 ASSAY OF TROPONIN QUANT: CPT

## 2018-07-05 PROCEDURE — 25000003 PHARM REV CODE 250: Performed by: EMERGENCY MEDICINE

## 2018-07-05 PROCEDURE — G0378 HOSPITAL OBSERVATION PER HR: HCPCS

## 2018-07-05 PROCEDURE — 99219 PR INITIAL OBSERVATION CARE,LEVL II: CPT | Mod: ,,, | Performed by: HOSPITALIST

## 2018-07-05 RX ORDER — LOSARTAN POTASSIUM 50 MG/1
100 TABLET ORAL DAILY
Status: DISCONTINUED | OUTPATIENT
Start: 2018-07-05 | End: 2018-07-06 | Stop reason: HOSPADM

## 2018-07-05 RX ORDER — SODIUM CHLORIDE 0.9 % (FLUSH) 0.9 %
5 SYRINGE (ML) INJECTION
Status: DISCONTINUED | OUTPATIENT
Start: 2018-07-05 | End: 2018-07-06 | Stop reason: HOSPADM

## 2018-07-05 RX ORDER — ACETAMINOPHEN 325 MG/1
650 TABLET ORAL EVERY 6 HOURS PRN
Status: DISCONTINUED | OUTPATIENT
Start: 2018-07-05 | End: 2018-07-06 | Stop reason: HOSPADM

## 2018-07-05 RX ORDER — SENNOSIDES 8.6 MG/1
2 TABLET ORAL EVERY 6 HOURS PRN
Status: DISCONTINUED | OUTPATIENT
Start: 2018-07-05 | End: 2018-07-06 | Stop reason: HOSPADM

## 2018-07-05 RX ORDER — FERROUS SULFATE 325(65) MG
325 TABLET, DELAYED RELEASE (ENTERIC COATED) ORAL 2 TIMES DAILY
Status: DISCONTINUED | OUTPATIENT
Start: 2018-07-05 | End: 2018-07-06 | Stop reason: HOSPADM

## 2018-07-05 RX ORDER — HYDROCODONE BITARTRATE AND ACETAMINOPHEN 10; 325 MG/1; MG/1
1 TABLET ORAL
Status: COMPLETED | OUTPATIENT
Start: 2018-07-05 | End: 2018-07-05

## 2018-07-05 RX ORDER — ATORVASTATIN CALCIUM 20 MG/1
20 TABLET, FILM COATED ORAL NIGHTLY
Status: DISCONTINUED | OUTPATIENT
Start: 2018-07-05 | End: 2018-07-06 | Stop reason: HOSPADM

## 2018-07-05 RX ORDER — DOCUSATE SODIUM 100 MG/1
100 CAPSULE, LIQUID FILLED ORAL 2 TIMES DAILY
Status: DISCONTINUED | OUTPATIENT
Start: 2018-07-05 | End: 2018-07-06 | Stop reason: HOSPADM

## 2018-07-05 RX ORDER — NAPROXEN SODIUM 220 MG/1
81 TABLET, FILM COATED ORAL DAILY
Status: DISCONTINUED | OUTPATIENT
Start: 2018-07-05 | End: 2018-07-06 | Stop reason: HOSPADM

## 2018-07-05 RX ORDER — AMLODIPINE BESYLATE 10 MG/1
10 TABLET ORAL DAILY
Status: DISCONTINUED | OUTPATIENT
Start: 2018-07-05 | End: 2018-07-06 | Stop reason: HOSPADM

## 2018-07-05 RX ORDER — HYDROCODONE BITARTRATE AND ACETAMINOPHEN 5; 325 MG/1; MG/1
2 TABLET ORAL EVERY 6 HOURS PRN
Status: DISCONTINUED | OUTPATIENT
Start: 2018-07-05 | End: 2018-07-05

## 2018-07-05 RX ORDER — OXYCODONE HYDROCHLORIDE 5 MG/1
10 TABLET ORAL EVERY 6 HOURS PRN
Status: DISCONTINUED | OUTPATIENT
Start: 2018-07-05 | End: 2018-07-06 | Stop reason: HOSPADM

## 2018-07-05 RX ORDER — ONDANSETRON 8 MG/1
8 TABLET, ORALLY DISINTEGRATING ORAL EVERY 8 HOURS PRN
Status: DISCONTINUED | OUTPATIENT
Start: 2018-07-05 | End: 2018-07-06 | Stop reason: HOSPADM

## 2018-07-05 RX ADMIN — FERROUS SULFATE TAB EC 325 MG (65 MG FE EQUIVALENT) 325 MG: 325 (65 FE) TABLET DELAYED RESPONSE at 09:07

## 2018-07-05 RX ADMIN — FERROUS SULFATE TAB EC 325 MG (65 MG FE EQUIVALENT) 325 MG: 325 (65 FE) TABLET DELAYED RESPONSE at 07:07

## 2018-07-05 RX ADMIN — ATORVASTATIN CALCIUM 20 MG: 20 TABLET, FILM COATED ORAL at 07:07

## 2018-07-05 RX ADMIN — LOSARTAN POTASSIUM 100 MG: 50 TABLET, FILM COATED ORAL at 09:07

## 2018-07-05 RX ADMIN — ASPIRIN 81 MG CHEWABLE TABLET 81 MG: 81 TABLET CHEWABLE at 09:07

## 2018-07-05 RX ADMIN — HYDROCODONE BITARTRATE AND ACETAMINOPHEN 2 TABLET: 5; 325 TABLET ORAL at 05:07

## 2018-07-05 RX ADMIN — HYDROCODONE BITARTRATE AND ACETAMINOPHEN 1 TABLET: 10; 325 TABLET ORAL at 03:07

## 2018-07-05 RX ADMIN — OXYCODONE HYDROCHLORIDE 10 MG: 5 TABLET ORAL at 07:07

## 2018-07-05 RX ADMIN — AMLODIPINE BESYLATE 10 MG: 10 TABLET ORAL at 09:07

## 2018-07-05 RX ADMIN — OXYCODONE HYDROCHLORIDE 10 MG: 5 TABLET ORAL at 10:07

## 2018-07-05 NOTE — HPI
77F presents after passing out in her kitchen.  She was standing at the stove stirring a pot when she started to feel like she was about to black out.  She tried to get to her bed to avoid falling but didn't make it and hit the floor resulting in injury.    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

## 2018-07-05 NOTE — SUBJECTIVE & OBJECTIVE
Past Medical History:   Diagnosis Date    Arthritis     CHF (congestive heart failure)     Hyperlipidemia     Hypertension        Past Surgical History:   Procedure Laterality Date    HYSTERECTOMY N/A        Review of patient's allergies indicates:  No Known Allergies    Current Facility-Administered Medications   Medication    HYDROcodone-acetaminophen  mg per tablet 1 tablet     Current Outpatient Prescriptions   Medication Sig    amlodipine (NORVASC) 10 MG tablet Take 10 mg by mouth once daily.    aspirin 81 MG Chew Take 81 mg by mouth once daily.    atorvastatin (LIPITOR) 20 MG tablet Take 20 mg by mouth every evening.    azelastine (ASTELIN) 137 mcg (0.1 %) nasal spray INHALE 1 SPRAY IN EACH NOSTRIL TWICE A DAY    calcitonin, salmon, (FORTICAL) 200 unit/actuation nasal spray 1 spray by Nasal route once daily.    docusate sodium (COLACE) 100 MG capsule Take 1 capsule (100 mg total) by mouth 2 (two) times daily.    ergocalciferol (ERGOCALCIFEROL) 50,000 unit Cap Take 50,000 Units by mouth every 7 days.    ferrous sulfate 325 (65 FE) MG EC tablet Take 1 tablet (325 mg total) by mouth 2 (two) times daily.    losartan (COZAAR) 100 MG tablet TAKE 1 TABLET (100 MG TOTAL) BY MOUTH ONCE DAILY.    traMADol (ULTRAM) 50 mg tablet Take 50 mg by mouth as needed for Pain.    nitroGLYCERIN (NITROSTAT) 0.4 MG SL tablet Place 1 tablet (0.4 mg total) under the tongue every 5 (five) minutes as needed for Chest pain (and notify MD).     Family History     None        Social History Main Topics    Smoking status: Former Smoker     Packs/day: 0.50     Quit date: 4/26/2013    Smokeless tobacco: Never Used    Alcohol use No    Drug use: No    Sexual activity: No     Review of Systems   Constitutional: Negative.    HENT: Negative.    Eyes: Negative.    Respiratory: Positive for shortness of breath.    Cardiovascular: Negative.    Gastrointestinal: Negative.    Endocrine: Negative.    Genitourinary: Negative.     Musculoskeletal: Negative.    Skin: Negative.    Allergic/Immunologic: Negative.    Neurological:        Feeling like she is going to black out   Hematological: Negative.    Psychiatric/Behavioral: Negative.      Objective:     Vital Signs (Most Recent):  Temp: 99.7 °F (37.6 °C) (07/04/18 1943)  Pulse: (!) 59 (07/05/18 0003)  Resp: 16 (07/05/18 0003)  BP: 134/63 (07/05/18 0134)  SpO2: 100 % (07/05/18 0003) Vital Signs (24h Range):  Temp:  [99.7 °F (37.6 °C)] 99.7 °F (37.6 °C)  Pulse:  [54-69] 59  Resp:  [10-19] 16  SpO2:  [99 %-100 %] 100 %  BP: (134-233)/(63-93) 134/63     Patient Vitals for the past 72 hrs (Last 3 readings):   Weight   07/04/18 1943 83.9 kg (184 lb 15.5 oz)     Body mass index is 30.78 kg/m².    No intake or output data in the 24 hours ending 07/05/18 0308    Physical Exam   Constitutional: She is oriented to person, place, and time. She appears well-developed and well-nourished.   HENT:   Head: Normocephalic.   Trauma over left eyebrow with dried blood   Eyes: EOM are normal. Pupils are equal, round, and reactive to light.   Neck: Normal range of motion. No JVD present.   Cardiovascular: Normal rate, regular rhythm, normal heart sounds and intact distal pulses.  Exam reveals no gallop and no friction rub.    No murmur heard.  Pulmonary/Chest: Effort normal and breath sounds normal. No respiratory distress. She has no wheezes. She has no rales. She exhibits no tenderness.   Abdominal: Soft. Bowel sounds are normal. She exhibits no distension. There is no tenderness.   Musculoskeletal: She exhibits no edema.   L arm in sling   Neurological: She is alert and oriented to person, place, and time.   Skin: Skin is warm and dry. Capillary refill takes less than 2 seconds.       Significant Labs:  CBC:    Recent Labs  Lab 07/04/18 2031   WBC 6.72   RBC 4.03   HGB 11.9*   HCT 39.2      MCV 97   MCH 29.5   MCHC 30.4*     BNP:    Recent Labs  Lab 07/04/18 2031   BNP 46     CMP:    Recent Labs  Lab  07/04/18 2031      CALCIUM 9.6   ALBUMIN 3.6   PROT 7.6      K 4.1   CO2 21*      BUN 17   CREATININE 1.2   ALKPHOS 93   ALT 8*   AST 15   BILITOT 0.3      Coagulation:     Recent Labs  Lab 07/04/18 2031   INR 0.9   APTT <21.0     LDH:  No results for input(s): LDH in the last 72 hours.  Microbiology:  Microbiology Results (last 7 days)     ** No results found for the last 168 hours. **          I have reviewed all pertinent labs within the past 24 hours.    Diagnostic Results:  Echo: I have reviewed all pertinent results/findings within the past 24 hours and my personal findings are:  as per hpi  EKG: I have reviewed all pertinent results/findings within the past 24 hours and my personal findings are: as per hpi

## 2018-07-05 NOTE — SUBJECTIVE & OBJECTIVE
Past Medical History:   Diagnosis Date    Arthritis     CHF (congestive heart failure)     Hyperlipidemia     Hypertension        Past Surgical History:   Procedure Laterality Date    HYSTERECTOMY N/A        Review of patient's allergies indicates:  No Known Allergies    No current facility-administered medications on file prior to encounter.      Current Outpatient Prescriptions on File Prior to Encounter   Medication Sig    amlodipine (NORVASC) 10 MG tablet Take 10 mg by mouth once daily.    aspirin 81 MG Chew Take 81 mg by mouth once daily.    atorvastatin (LIPITOR) 20 MG tablet Take 20 mg by mouth every evening.    azelastine (ASTELIN) 137 mcg (0.1 %) nasal spray INHALE 1 SPRAY IN EACH NOSTRIL TWICE A DAY    calcitonin, salmon, (FORTICAL) 200 unit/actuation nasal spray 1 spray by Nasal route once daily.    docusate sodium (COLACE) 100 MG capsule Take 1 capsule (100 mg total) by mouth 2 (two) times daily.    ergocalciferol (ERGOCALCIFEROL) 50,000 unit Cap Take 50,000 Units by mouth every 7 days.    ferrous sulfate 325 (65 FE) MG EC tablet Take 1 tablet (325 mg total) by mouth 2 (two) times daily.    losartan (COZAAR) 100 MG tablet TAKE 1 TABLET (100 MG TOTAL) BY MOUTH ONCE DAILY.    traMADol (ULTRAM) 50 mg tablet Take 50 mg by mouth as needed for Pain.    nitroGLYCERIN (NITROSTAT) 0.4 MG SL tablet Place 1 tablet (0.4 mg total) under the tongue every 5 (five) minutes as needed for Chest pain (and notify MD).     Family History     None        Social History Main Topics    Smoking status: Former Smoker     Packs/day: 0.50     Quit date: 4/26/2013    Smokeless tobacco: Never Used    Alcohol use No    Drug use: No    Sexual activity: No     Review of Systems   Constitutional: Negative for activity change, appetite change and diaphoresis.   HENT: Negative for congestion and facial swelling.    Eyes: Negative for photophobia and visual disturbance.   Respiratory: Negative for cough and shortness of  breath.    Cardiovascular: Positive for palpitations. Negative for chest pain and leg swelling.   Gastrointestinal: Negative for abdominal pain, nausea and vomiting.   Endocrine: Negative for polydipsia and polyuria.   Genitourinary: Negative for dysuria and hematuria.   Musculoskeletal: Negative for joint swelling and myalgias.   Skin: Negative for color change and rash.   Neurological: Positive for syncope and light-headedness.     Objective:     Vital Signs (Most Recent):  Temp: 98.4 °F (36.9 °C) (07/05/18 0514)  Pulse: (!) 56 (07/05/18 0535)  Resp: 18 (07/05/18 0514)  BP: (!) 149/69 (07/05/18 0514)  SpO2: 98 % (07/05/18 0514) Vital Signs (24h Range):  Temp:  [98.4 °F (36.9 °C)-99.7 °F (37.6 °C)] 98.4 °F (36.9 °C)  Pulse:  [54-69] 56  Resp:  [10-19] 18  SpO2:  [98 %-100 %] 98 %  BP: (134-233)/(63-93) 149/69     Weight: 75.9 kg (167 lb 5.3 oz)  Body mass index is 27.85 kg/m².    Physical Exam   Constitutional: She is oriented to person, place, and time. She appears well-developed and well-nourished. No distress.   HENT:   Head: Normocephalic.   Mouth/Throat: Oropharynx is clear and moist.   Eyes: Conjunctivae and EOM are normal. Pupils are equal, round, and reactive to light. No scleral icterus.   Neck: Normal range of motion. Neck supple. No JVD present.   Cardiovascular: Regular rhythm, S1 normal, S2 normal and intact distal pulses.  Bradycardia present.    Pulmonary/Chest: Effort normal and breath sounds normal.   Abdominal: Soft. Bowel sounds are normal. She exhibits no distension. There is no tenderness. There is no guarding.   Musculoskeletal: She exhibits no edema.   LUE in sling   Lymphadenopathy:     She has no cervical adenopathy.   Neurological: She is alert and oriented to person, place, and time. No cranial nerve deficit.   Skin: Skin is warm and dry. No erythema.   Nursing note and vitals reviewed.        CRANIAL NERVES     CN III, IV, VI   Pupils are equal, round, and reactive to light.  Extraocular  motions are normal.        Significant Labs:   CBC:   Recent Labs  Lab 07/04/18 2031   WBC 6.72   HGB 11.9*   HCT 39.2        CMP:   Recent Labs  Lab 07/04/18 2031      K 4.1      CO2 21*      BUN 17   CREATININE 1.2   CALCIUM 9.6   PROT 7.6   ALBUMIN 3.6   BILITOT 0.3   ALKPHOS 93   AST 15   ALT 8*   ANIONGAP 10   EGFRNONAA 43.1*       Significant Imaging: I have reviewed all pertinent imaging results/findings within the past 24 hours.

## 2018-07-05 NOTE — CONSULTS
[facility]  Cardiology  Consult Note    Patient Name: Crystal Alvarado  MRN: [id number]  Admission Date: 7/4/2018  Hospital Length of Stay: 0 days  Attending Physician: Dedrick Archuleta Jr., *  Primary Care Provider: Shantell Goff MD   Principal Problem:Pre-syncope    Inpatient consult to Cardiology  Consult performed by: ISRAEL SALVADOR  Consult ordered by: MONROE DICKSON        Subjective:     History of Present Illness:  77 year-old woman with hypertension, non-obstructive CAD and CKD stage III and chronic bradycardia. She presents to the ED s/p pre-syncope with fall, head trauma, and left clavicular fracture.  She was in her kitchen stirring a pot when she felt short of breath and as though she was going to black out.  She then tried to make it back to her bed before she fell but could not make it, fell, and hit the ground.  At that time she sustained the aforementioned trauma.       She recently presented to the ER 4/25/2018 with complaint of dizziness. She was in a narrow complex short RP tachycardia at a rate of 156 bpm that terminated with a vagal maneuver. During her stay she had sinus rhythm with rates of 40s-60s off AV kamar agents. She felt better. She had a holter monitor 12/2017 that noted sinus rhythm with rate range of 43-97 with average of 66. There were also rare PVCs and PACs with 2 episodes of NSAT.      Since her April hospitalization she has had 2 falls at home in addition to this most recent one. She believes the falls were from losing her balance and not from light-headedness. She notes intermittent similar symptoms that she presented to the ER with. Episodes may last several minutes.  She was seen in Dr Archie Montez's EP clinic and was scheduled for EPS/SVT ablation on 7/9/2018.      She has 3 ekgs in ER today all showing NSR with normal to mildly bradycardic rates.  Tele check in ED reveals no events.     RECENT ECHO CONCLUSIONS     1 - Normal left ventricular systolic  function (EF 60-65%).     2 - Impaired LV relaxation, increased LVEDP.     3 - Normal right ventricular systolic function .     4 - The estimated PA systolic pressure is 31 mmHg.    Past Medical History:   Diagnosis Date    Arthritis     CHF (congestive heart failure)     Hyperlipidemia     Hypertension        Past Surgical History:   Procedure Laterality Date    HYSTERECTOMY N/A        Review of patient's allergies indicates:  No Known Allergies    Current Facility-Administered Medications   Medication    HYDROcodone-acetaminophen  mg per tablet 1 tablet     Current Outpatient Prescriptions   Medication Sig    amlodipine (NORVASC) 10 MG tablet Take 10 mg by mouth once daily.    aspirin 81 MG Chew Take 81 mg by mouth once daily.    atorvastatin (LIPITOR) 20 MG tablet Take 20 mg by mouth every evening.    azelastine (ASTELIN) 137 mcg (0.1 %) nasal spray INHALE 1 SPRAY IN EACH NOSTRIL TWICE A DAY    calcitonin, salmon, (FORTICAL) 200 unit/actuation nasal spray 1 spray by Nasal route once daily.    docusate sodium (COLACE) 100 MG capsule Take 1 capsule (100 mg total) by mouth 2 (two) times daily.    ergocalciferol (ERGOCALCIFEROL) 50,000 unit Cap Take 50,000 Units by mouth every 7 days.    ferrous sulfate 325 (65 FE) MG EC tablet Take 1 tablet (325 mg total) by mouth 2 (two) times daily.    losartan (COZAAR) 100 MG tablet TAKE 1 TABLET (100 MG TOTAL) BY MOUTH ONCE DAILY.    traMADol (ULTRAM) 50 mg tablet Take 50 mg by mouth as needed for Pain.    nitroGLYCERIN (NITROSTAT) 0.4 MG SL tablet Place 1 tablet (0.4 mg total) under the tongue every 5 (five) minutes as needed for Chest pain (and notify MD).     Family History     None        Social History Main Topics    Smoking status: Former Smoker     Packs/day: 0.50     Quit date: 4/26/2013    Smokeless tobacco: Never Used    Alcohol use No    Drug use: No    Sexual activity: No     Review of Systems   Constitutional: Negative.    HENT: Negative.     Eyes: Negative.    Respiratory: Positive for shortness of breath.    Cardiovascular: Negative.    Gastrointestinal: Negative.    Endocrine: Negative.    Genitourinary: Negative.    Musculoskeletal: Negative.    Skin: Negative.    Allergic/Immunologic: Negative.    Neurological:        Feeling like she is going to black out   Hematological: Negative.    Psychiatric/Behavioral: Negative.      Objective:     Vital Signs (Most Recent):  Temp: 99.7 °F (37.6 °C) (07/04/18 1943)  Pulse: (!) 59 (07/05/18 0003)  Resp: 16 (07/05/18 0003)  BP: 134/63 (07/05/18 0134)  SpO2: 100 % (07/05/18 0003) Vital Signs (24h Range):  Temp:  [99.7 °F (37.6 °C)] 99.7 °F (37.6 °C)  Pulse:  [54-69] 59  Resp:  [10-19] 16  SpO2:  [99 %-100 %] 100 %  BP: (134-233)/(63-93) 134/63     Patient Vitals for the past 72 hrs (Last 3 readings):   Weight   07/04/18 1943 83.9 kg (184 lb 15.5 oz)     Body mass index is 30.78 kg/m².    No intake or output data in the 24 hours ending 07/05/18 0308    Physical Exam   Constitutional: She is oriented to person, place, and time. She appears well-developed and well-nourished.   HENT:   Head: Normocephalic.   Trauma over left eyebrow with dried blood   Eyes: EOM are normal. Pupils are equal, round, and reactive to light.   Neck: Normal range of motion. No JVD present.   Cardiovascular: Normal rate, regular rhythm, normal heart sounds and intact distal pulses.  Exam reveals no gallop and no friction rub.    No murmur heard.  Pulmonary/Chest: Effort normal and breath sounds normal. No respiratory distress. She has no wheezes. She has no rales. She exhibits no tenderness.   Abdominal: Soft. Bowel sounds are normal. She exhibits no distension. There is no tenderness.   Musculoskeletal: She exhibits no edema.   L arm in sling   Neurological: She is alert and oriented to person, place, and time.   Skin: Skin is warm and dry. Capillary refill takes less than 2 seconds.       Significant Labs:  CBC:    Recent Labs  Lab  07/04/18 2031   WBC 6.72   RBC 4.03   HGB 11.9*   HCT 39.2      MCV 97   MCH 29.5   MCHC 30.4*     BNP:    Recent Labs  Lab 07/04/18 2031   BNP 46     CMP:    Recent Labs  Lab 07/04/18 2031      CALCIUM 9.6   ALBUMIN 3.6   PROT 7.6      K 4.1   CO2 21*      BUN 17   CREATININE 1.2   ALKPHOS 93   ALT 8*   AST 15   BILITOT 0.3      Coagulation:     Recent Labs  Lab 07/04/18 2031   INR 0.9   APTT <21.0     LDH:  No results for input(s): LDH in the last 72 hours.  Microbiology:  Microbiology Results (last 7 days)     ** No results found for the last 168 hours. **          I have reviewed all pertinent labs within the past 24 hours.    Diagnostic Results:  Echo: I have reviewed all pertinent results/findings within the past 24 hours and my personal findings are:  as per hpi  EKG: I have reviewed all pertinent results/findings within the past 24 hours and my personal findings are: as per hpi    Assessment/Plan:     * Pre-syncope    Observation on medicine on tele  Check for any further bouts of NSAT or SVT as noted prior  If this does happen while inpatient it would be important to ask her what her symptoms are at the time of tachyarrhythmia, get strips, and possibly an ekg at the time of the event  Use adenosine if needed  No other AVN blockers as she is sinus sue at her baseline  Have EP evaluate her prior to her discharge as Dr Montez was planning an EPS/SVT ablation on Monday         HTN (hypertension)    She is currently only on losartan 100mg po daily  Would add norvasc 5mg po daily with a goal bp of 140-150/90 for this elderly female              Thank you for your consult. I will follow-up with patient. Please contact us if you have any additional questions.    Danis Bang MD  Heart Transplant  [facility]

## 2018-07-05 NOTE — PROGRESS NOTES
Patient admitted to C[facility] from ED.  Patient arrives to unit via stretcher. Ambulated to bed with assistance.  AAOX4, VSS , oriented to surroundings. Bed in lowest position. Call system within reach.  Side rails up x 2.  Telemetry notified patient is in room.

## 2018-07-05 NOTE — PLAN OF CARE
07/05/18 1049   Discharge Assessment   Assessment Type Discharge Planning Assessment   Confirmed/corrected address and phone number on facesheet? Yes   Assessment information obtained from? Patient;Medical Record   Expected Length of Stay (days) 3   Communicated expected length of stay with patient/caregiver yes   Prior to hospitilization cognitive status: Alert/Oriented   Prior to hospitalization functional status: Independent   Current cognitive status: Alert/Oriented   Current Functional Status: Independent   Lives With child(brendon), adult   Able to Return to Prior Arrangements yes   Is patient able to care for self after discharge? Yes   Patient's perception of discharge disposition home or selfcare   Readmission Within The Last 30 Days no previous admission in last 30 days   Patient currently being followed by outpatient case management? No   Patient currently receives any other outside agency services? No   Equipment Currently Used at Home walker, rolling;rollator   Do you have any problems affording any of your prescribed medications? TBD   Is the patient taking medications as prescribed? yes   Does the patient have transportation home? Yes   Transportation Available family or friend will provide   Does the patient receive services at the [facility]madin Clinic? No   Discharge Plan A Home with family   Patient/Family In Agreement With Plan yes   Placed in Obs for Pre Syncope. Lives with Daughter and is independent in her ADLs. Plan is to DC home. No DC needs identified.  Home Address:  [address]

## 2018-07-05 NOTE — ASSESSMENT & PLAN NOTE
On amlodipine 10 and losartan 100.  Acutely elevated BP likely stress response to traumatic injury.

## 2018-07-05 NOTE — ED PROVIDER NOTES
Encounter Date: 7/4/2018    SCRIBE #1 NOTE: I, Darren Sanabria, am scribing for, and in the presence of,  Dr. Archuleta. I have scribed the entire note.       History     Chief Complaint   Patient presents with    Fall     Patient was in kitchen, states that she blacked out and hit head on floor. Patient is alert and oriented at this time.      Time patient was seen by the provider: 8:17 PM      The patient is a 77 y.o. female with co-morbidities including: Arthritis, HTN, HLD, CHF, and CAD who presents to the ED s/p fall while in the kitchen earlier today. Pt admits to feeling lightheaded and near-syncopal prior to her fall. Pt also admits to head trauma and has facial lacerations from fall. Pt reports that she remembers falling. Associated sx include: back pain, left shoulder pain, and headache. Pt denies any CP, SOB, nausea, diaphoresis, diarrhea, abd pain, neck pain, LOC, smoking, any recent drug or EtOH use, and any hx of DM or CA. She does not recall her last tetanus. Per relative, she believes that the pt overheated in the kitchen and fainted. Pt adds that she takes aspirin. She has NKDA. Pt has had similar episodes in the past.         The history is provided by the patient, medical records and a relative.     Review of patient's allergies indicates:  No Known Allergies  Past Medical History:   Diagnosis Date    Arthritis     CHF (congestive heart failure)     Hyperlipidemia     Hypertension      Past Surgical History:   Procedure Laterality Date    HYSTERECTOMY N/A      No family history on file.  Social History   Substance Use Topics    Smoking status: Former Smoker     Packs/day: 0.50     Quit date: 4/26/2013    Smokeless tobacco: Never Used    Alcohol use No     Review of Systems   Constitutional: Negative for diaphoresis and fever.        + fall   HENT: Negative for sore throat.    Respiratory: Negative for shortness of breath.    Cardiovascular: Negative for chest pain.   Gastrointestinal:  Negative for abdominal pain, diarrhea and nausea.   Genitourinary: Negative for dysuria.   Musculoskeletal: Positive for back pain. Negative for neck pain.        + left shoulder pain   Skin: Negative for rash.   Neurological: Positive for light-headedness and headaches. Negative for weakness.        + near-syncopal episode  - LOC   Hematological: Does not bruise/bleed easily.       Physical Exam     Initial Vitals [07/04/18 1943]   BP Pulse Resp Temp SpO2   (!) 156/72 69 18 99.7 °F (37.6 °C) 99 %      MAP       --         Physical Exam    Nursing note and vitals reviewed.  Constitutional: She appears well-developed and well-nourished.   HENT:   Head: Normocephalic and atraumatic.   Mouth/Throat: Oropharynx is clear and moist.   Eyes: Pupils are equal, round, and reactive to light.   Neck: Normal range of motion. Neck supple. Spinous process tenderness present.   Cardiovascular: Normal rate, regular rhythm and normal heart sounds.   Pulmonary/Chest: Breath sounds normal. No respiratory distress. She has no wheezes. She has no rhonchi. She has no rales. She exhibits no tenderness.   Abdominal: Soft. She exhibits no distension and no mass. There is no tenderness. There is no rebound and no guarding.   Musculoskeletal: She exhibits tenderness. She exhibits no edema.   Midline tenderness T3/T4.  No upper T-spine or C-spine tenderness.   Left shoulder tenderness with reduced ROM and reduced distal pulse.   Neurological: She is alert and oriented to person, place, and time.   Skin: Skin is warm and dry. Capillary refill takes less than 2 seconds. Laceration noted.   Midline forehead with 2 cm laceration.   1 cm laceration on left eyebrow.    Psychiatric: She has a normal mood and affect. Her behavior is normal.         ED Course   Lac Repair  Date/Time: 7/10/2018 8:42 AM  Performed by: REDD VU JR  Authorized by: CLIFFORD WHITING   Body area: head/neck  Location details: left eyebrow  Laceration length: 1  cm  Foreign bodies: no foreign bodies  Tendon involvement: superficial  Nerve involvement: none  Vascular damage: no  Anesthesia: local infiltration    Anesthesia:  Local Anesthetic: lidocaine 1% without epinephrine  Anesthetic total: 3 mL  Patient sedated: no  Preparation: Patient was prepped and draped in the usual sterile fashion.  Irrigation solution: saline  Irrigation method: tap  Amount of cleaning: standard  Debridement: none  Degree of undermining: none  Skin closure: 6-0 Prolene  Technique: simple  Approximation: close  Approximation difficulty: simple  Dressing: open (no dressing)  Patient tolerance: Patient tolerated the procedure well with no immediate complications  Comments: I preformed anesthesia and wound prep suture per medical student under my supervision.  I was present for all critical components of repair    Lac Repair  Date/Time: 7/10/2018 8:45 AM  Performed by: REDD VU JR  Authorized by: CLIFFORD WHITING   Consent Done: Yes  Consent: Verbal consent obtained.  Consent given by: patient  Foreign bodies: no foreign bodies  Nerve involvement: none  Vascular damage: no  Anesthesia: local infiltration    Anesthesia:  Local Anesthetic: lidocaine 1% without epinephrine  Anesthetic total: 5 mL  Patient sedated: no  Preparation: Patient was prepped and draped in the usual sterile fashion.  Irrigation solution: saline  Amount of cleaning: standard  Debridement: none  Degree of undermining: none  Skin closure: 6-0 Prolene  Number of sutures: 5  Technique: simple  Approximation: close  Approximation difficulty: simple  Patient tolerance: Patient tolerated the procedure well with no immediate complications  Comments: I preformed anesthesia and wound prep, suture was by medical student under my direct supervision        Labs Reviewed   CBC W/ AUTO DIFFERENTIAL - Abnormal; Notable for the following:        Result Value    Hemoglobin 11.9 (*)     MCHC 30.4 (*)     RDW 14.8 (*)     Gran% 75.4 (*)      Lymph% 16.4 (*)     All other components within normal limits   COMPREHENSIVE METABOLIC PANEL - Abnormal; Notable for the following:     CO2 21 (*)     ALT 8 (*)     eGFR if  49.7 (*)     eGFR if non  43.1 (*)     All other components within normal limits   APTT   B-TYPE NATRIURETIC PEPTIDE   LIPASE   MAGNESIUM   PROTIME-INR   TROPONIN I   TROPONIN I   URINALYSIS, REFLEX TO URINE CULTURE     EKG Readings: (Independently Interpreted)   Sinus rhythm at rate of 65 bpm. No acute ST elevation or depression. Normal EKG. No change from previous.        Imaging Results          CT Thoracic Spine Without Contrast (Final result)  Result time 07/04/18 22:25:19    Final result by Jamel Zambrano MD (07/04/18 22:25:19)                 Impression:      No acute fractures or traumatic subluxations.    Additional findings as described above.    Electronically signed by resident: Rosalie Slaughter  Date:    07/04/2018  Time:    22:03    Electronically signed by: Jamel Zambrano MD  Date:    07/04/2018  Time:    22:25             Narrative:    EXAMINATION:  CT THORACIC SPINE WITHOUT CONTRAST    CLINICAL HISTORY:  T/L-spine trauma, significant injury suspected, +/- localizing signs;    TECHNIQUE:  Axial CT images of the thoracic spine obtained without the administration of intravenous contrast.  Coronal and sagittal reformats are provided.    COMPARISON:  MRI thoracic spine June 2010.    FINDINGS:  The thoracic vertebral body heights and disc spaces are maintained.  There is anterior disc osteophyte complex formation throughout the thoracic spine.  No acute fractures or traumatic subluxations.  Visualized portions of the ribs demonstrate no evidence of fractures or osseous destructive process.  No significant spinal canal stenosis or neural foraminal narrowing noted.  Multiple small intraosseous hemangiomas demonstrated in the thoracic spine, similar to prior MRI examination.  Clavicle fracture  described on recent shoulder radiograph is not included in the field of view.    Visualized soft tissue structures at the base of the neck show no significant abnormalities.  Evaluation of the mediastinum is somewhat limited due to motion artifact.  There is coronary artery atherosclerosis.  Small dependent pericardial effusion is noted.  Pulmonary artery caliber is mildly enlarged.  Mild atelectatic changes noted at the lung bases.  Visualized portions of the liver, spleen, and adrenal glands are within normal limits.  Gallbladder show no significant abnormalities.  The left kidney shows parenchymal loss and cortical thinning in comparison to the contralateral side suggestive of chronic medical renal disease.  Colonic diverticula noted within the visualized colon.  Pancreas is unremarkable.                               CT Head Without Contrast (Final result)  Result time 07/04/18 22:02:10    Final result by Jamel Zambrano MD (07/04/18 22:02:10)                 Impression:      No evidence of acute intracranial pathology.    Senescent changes.    Electronically signed by resident: Rosalie Slaughter  Date:    07/04/2018  Time:    21:51    Electronically signed by: Jamel Zambrano MD  Date:    07/04/2018  Time:    22:02             Narrative:    EXAMINATION:  CT HEAD WITHOUT CONTRAST    CLINICAL HISTORY:  Headache, post trauma;    TECHNIQUE:  Low dose axial images were obtained through the head.  Coronal and sagittal reformations were also performed. Contrast was not administered.    COMPARISON:  CT head 11/20/2017.    FINDINGS:  The ventricles are stable in size without evidence for hydrocephalus.  There is mild cerebral volume loss which is appropriate for age.    The brain otherwise appears within normal limits.  No evidence of major vascular distribution infarct, hemorrhage, edema, or parenchymal mass.  There are mild chronic microvascular ischemic changes.  No extra-axial blood or fluid collections.    The  cranium is intact.  The visualized paranasal sinuses and mastoid air cells are clear.                               CT Cervical Spine Without Contrast (Final result)  Result time 07/04/18 22:05:54    Final result by Jamel Zambrano MD (07/04/18 22:05:54)                 Impression:      No evidence of acute fractures or traumatic subluxations.    Multilevel cervical spondylosis noting moderate to severe left neural foraminal narrowing at C2-C5.    Electronically signed by resident: Rosalie Slaughter  Date:    07/04/2018  Time:    21:53    Electronically signed by: Jamel Zambrano MD  Date:    07/04/2018  Time:    22:05             Narrative:    EXAMINATION:  CT CERVICAL SPINE WITHOUT CONTRAST    CLINICAL HISTORY:  C-spine trauma, NEXUS/CCR negative, low risk;    TECHNIQUE:  Low dose axial images, sagittal and coronal reformations were performed though the cervical spine.  Contrast was not administered.    COMPARISON:  None.    FINDINGS:  There is reversal of the normal cervical lordosis.  The cervical vertebral body heights and disc spaces appear maintained.  There is anterior disc osteophyte complex formation at multiple levels.  The dense is intact.  There is no evidence of fracture or dislocation.  Multilevel moderate to severe neural foraminal narrowing is noted on the left at C2-C3, C3-C4, and C4-C5 related to facet and uncovertebral hypertrophy.  Small posterior disc osteophyte complex at C4-C5 may result in mild central canal narrowing.  There are mild atherosclerotic calcifications at the carotid bifurcations.  The soft tissue structures visualized in the neck are normal.  The airway is patent and the lung apices are normal.  The visualized portions of the brain demonstrate no significant abnormality.                               X-Ray Shoulder Trauma Left (Final result)  Result time 07/04/18 21:47:38    Final result by Jamel Zambrano MD (07/04/18 21:47:38)                 Impression:      Mildly  displaced fracture of the distal left clavicle.      Electronically signed by: Jamel Zambrano MD  Date:    07/04/2018  Time:    21:47             Narrative:    EXAMINATION:  XR SHOULDER TRAUMA 3 VIEW LEFT    CLINICAL HISTORY:  Injury, unspecified, initial encounter    TECHNIQUE:  Three views of the left shoulder.    COMPARISON:  Recent prior chest radiographs including 04/27/2018.    FINDINGS:  There is a mildly displaced fracture involving the distal left clavicle, not definitively seen on prior chest radiographs.  Acromioclavicular and glenohumeral alignment are maintained.  Left proximal humerus appears intact.  No additional fractures are identified.  No radiopaque foreign body.                               X-Ray Chest 1 View (Final result)  Result time 07/04/18 21:50:41    Final result by Jamel Zambrano MD (07/04/18 21:50:41)                 Impression:      Mildly displaced fracture of the distal left clavicle.    Otherwise no evidence of acute intrathoracic process.    Electronically signed by resident: Rosalie Slaughter  Date:    07/04/2018  Time:    21:41    Electronically signed by: Jamel Zambrano MD  Date:    07/04/2018  Time:    21:50             Narrative:    EXAMINATION:  XR CHEST 1 VIEW    CLINICAL HISTORY:  Syncope and collapse.    TECHNIQUE:  Single frontal view of the chest was performed.    COMPARISON:  04/27/2018.    FINDINGS:  The lungs are symmetrically expanded.  Mediastinal structures are midline.  There is aortic atherosclerosis.  Cardiac silhouette and pulmonary vascular distribution are within normal limits.  Mildly displaced fracture of the distal left clavicle noted.  Osseous structures otherwise show degenerative changes of the shoulders and AC joints.  No pleural effusion or pneumothorax.                                 Medical Decision Making:   History:   Old Medical Records: I decided to obtain old medical records.  Initial Assessment:   Syncope vs near syncope.  Pt did not  clearly recall why she fell.  She stated no LOC, but there was some question as to cause of fall  Independently Interpreted Test(s):   I have ordered and independently interpreted EKG Reading(s) - see prior notes  Clinical Tests:   Lab Tests: Ordered and Reviewed  Radiological Study: Ordered and Reviewed  Medical Tests: Ordered and Reviewed  ED Management:  Wound repaired and pt trauma work up negative of major injury.  Pt with hx of abnormal rhythm and scheduled for ablation.  Pt with hx of similar falls concerning for possible rhythm as cause.  Pt repeat trop increased but normal.  PT seen by Cards and rec to obs.  Pt discussed with cards and placed in obs            Scribe Attestation:   Scribe #1: I performed the above scribed service and the documentation accurately describes the services I performed. I attest to the accuracy of the note.            ED Course as of Jul 05 0122   Thu Jul 05, 2018   0046 Troponin I: 0.013 [SL]      ED Course User Index  [SL] Dedrick Archuleta Jr., MD     Clinical Impression:   The primary encounter diagnosis was Concussion without loss of consciousness, initial encounter. Diagnoses of Loss of consciousness, Near syncope, Trauma, Facial laceration, initial encounter, Cardiac arrhythmia, unspecified cardiac arrhythmia type, Pre-syncope, and Closed displaced fracture of acromial end of left clavicle, initial encounter were also pertinent to this visit.                             Dedrick Archuleta Jr., MD  07/10/18 0857       Dedrick Archuleta Jr., MD  07/10/18 5646       Dedrick Archuleta Jr., MD  07/10/18 9437

## 2018-07-05 NOTE — ASSESSMENT & PLAN NOTE
Observation on medicine on tele  Check for any further bouts of NSAT or SVT as noted prior  If this does happen while inpatient it would be important to ask her what her symptoms are at the time of tachyarrhythmia, get strips, and possibly an ekg at the time of the event  Use adenosine if needed  No other AVN blockers as she is sinus sue at her baseline  Have EP evaluate her prior to her discharge as Dr Montez was planning an EPS/SVT ablation on Monday

## 2018-07-05 NOTE — ED NOTES
Patient Identifiers for Crystal Alvarado checked and correct  LOC: The patient is awake, alert and aware of environment with an appropriate affect, the patient is oriented x 3 and speaking appropriate.  APPEARANCE: Patient resting comfortably and in no acute distress, patient is clean and well groomed, patient's clothing is properly fastened.  SKIN: The skin is warm and dry, patient has normal skin turgor and moist mucus membranes,no rashes or lesions. Abrasion noted to L eyebrow and center of forehead.  Musculoskeletal :  Normal range of motion noted. Moves all extremeties well, No swelling or tenderness noted  RESPIRATORY: Airway is open and patent, respirations are spontaneous, patient has a normal effort and rate.  CARDIAC: Patient has a normal rate and rhythm, no periphreal edema noted, capillary refill < 3 seconds.   ABDOMEN: Soft and non tender to palpation, no distention noted.   PULSES: 2+  And symmetrical in all extremeties  NEUROLOGIC: PERRL, facial expression is symmetrical, patient moving all extremities, normal sensation in all extremities when touched with a finger.The patient is awake, alert and cooperative with a calm affect, patient is aware of environment.    Will continue to monitor

## 2018-07-05 NOTE — ED TRIAGE NOTES
Pt presents to ED post fall, daughter reports that she thinks she got overheated. Pt fell face first. Abrasion noted to L eyebrow and center of forehead. Pt denies taking blood thinners.

## 2018-07-05 NOTE — ASSESSMENT & PLAN NOTE
She is currently only on losartan 100mg po daily  Would add norvasc 5mg po daily with a goal bp of 140-150/90 for this elderly female

## 2018-07-05 NOTE — ASSESSMENT & PLAN NOTE
Patient endorses prodrome where it felt like her heart was racing, suggesting a run of SVT as the possible culprit.  Scheduled for EP study 7/9; staying for eval by EP to reassess timing of procedure per Cardiology fellow's plan.  Monitor on telemetry.

## 2018-07-05 NOTE — H&P
[facility] Hospital Medicine  History & Physical    Patient Name: Crystal Alvarado  MRN: [id number]  Admission Date: 7/4/2018  Attending Physician: Ramona Villanueva MD   Primary Care Provider: Shantell Goff MD    [facility] Medicine Team: [facility] MED C Amrik Barroso MD     Patient information was obtained from patient, past medical records and ER records.     Subjective:     Principal Problem:Pre-syncope    Chief Complaint:   Chief Complaint   Patient presents with    Fall     Patient was in kitchen, states that she blacked out and hit head on floor. Patient is alert and oriented at this time.         HPI: 77F presents after passing out in her kitchen.  She was standing at the stove stirring a pot when she started to feel like she was about to black out.  She tried to get to her bed to avoid falling but didn't make it and hit the floor resulting in injury.    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      Past Medical History:   Diagnosis Date    Arthritis     CHF (congestive heart failure)     Hyperlipidemia     Hypertension        Past Surgical History:   Procedure Laterality Date    HYSTERECTOMY N/A        Review of patient's allergies indicates:  No Known Allergies    No current facility-administered medications on file prior to encounter.      Current Outpatient Prescriptions on File Prior to Encounter   Medication Sig    amlodipine (NORVASC) 10 MG tablet Take 10 mg by mouth once daily.    aspirin 81 MG Chew Take 81 mg by mouth once daily.    atorvastatin (LIPITOR) 20 MG tablet Take 20 mg by mouth every evening.    azelastine (ASTELIN) 137 mcg (0.1 %) nasal spray INHALE 1 SPRAY IN EACH NOSTRIL TWICE A DAY    calcitonin, salmon, (FORTICAL) 200 unit/actuation nasal spray 1 spray by Nasal route once daily.    docusate sodium (COLACE) 100 MG capsule Take 1 capsule (100 mg total) by mouth 2 (two) times daily.    ergocalciferol (ERGOCALCIFEROL)  50,000 unit Cap Take 50,000 Units by mouth every 7 days.    ferrous sulfate 325 (65 FE) MG EC tablet Take 1 tablet (325 mg total) by mouth 2 (two) times daily.    losartan (COZAAR) 100 MG tablet TAKE 1 TABLET (100 MG TOTAL) BY MOUTH ONCE DAILY.    traMADol (ULTRAM) 50 mg tablet Take 50 mg by mouth as needed for Pain.    nitroGLYCERIN (NITROSTAT) 0.4 MG SL tablet Place 1 tablet (0.4 mg total) under the tongue every 5 (five) minutes as needed for Chest pain (and notify MD).     Family History     None        Social History Main Topics    Smoking status: Former Smoker     Packs/day: 0.50     Quit date: 4/26/2013    Smokeless tobacco: Never Used    Alcohol use No    Drug use: No    Sexual activity: No     Review of Systems   Constitutional: Negative for activity change, appetite change and diaphoresis.   HENT: Negative for congestion and facial swelling.    Eyes: Negative for photophobia and visual disturbance.   Respiratory: Negative for cough and shortness of breath.    Cardiovascular: Positive for palpitations. Negative for chest pain and leg swelling.   Gastrointestinal: Negative for abdominal pain, nausea and vomiting.   Endocrine: Negative for polydipsia and polyuria.   Genitourinary: Negative for dysuria and hematuria.   Musculoskeletal: Negative for joint swelling and myalgias.   Skin: Negative for color change and rash.   Neurological: Positive for syncope and light-headedness.     Objective:     Vital Signs (Most Recent):  Temp: 98.4 °F (36.9 °C) (07/05/18 0514)  Pulse: (!) 56 (07/05/18 0535)  Resp: 18 (07/05/18 0514)  BP: (!) 149/69 (07/05/18 0514)  SpO2: 98 % (07/05/18 0514) Vital Signs (24h Range):  Temp:  [98.4 °F (36.9 °C)-99.7 °F (37.6 °C)] 98.4 °F (36.9 °C)  Pulse:  [54-69] 56  Resp:  [10-19] 18  SpO2:  [98 %-100 %] 98 %  BP: (134-233)/(63-93) 149/69     Weight: 75.9 kg (167 lb 5.3 oz)  Body mass index is 27.85 kg/m².    Physical Exam   Constitutional: She is oriented to person, place, and  time. She appears well-developed and well-nourished. No distress.   HENT:   Head: Normocephalic.   Mouth/Throat: Oropharynx is clear and moist.   Eyes: Conjunctivae and EOM are normal. Pupils are equal, round, and reactive to light. No scleral icterus.   Neck: Normal range of motion. Neck supple. No JVD present.   Cardiovascular: Regular rhythm, S1 normal, S2 normal and intact distal pulses.  Bradycardia present.    Pulmonary/Chest: Effort normal and breath sounds normal.   Abdominal: Soft. Bowel sounds are normal. She exhibits no distension. There is no tenderness. There is no guarding.   Musculoskeletal: She exhibits no edema.   LUE in sling   Lymphadenopathy:     She has no cervical adenopathy.   Neurological: She is alert and oriented to person, place, and time. No cranial nerve deficit.   Skin: Skin is warm and dry. No erythema.   Nursing note and vitals reviewed.        CRANIAL NERVES     CN III, IV, VI   Pupils are equal, round, and reactive to light.  Extraocular motions are normal.        Significant Labs:   CBC:   Recent Labs  Lab 07/04/18 2031   WBC 6.72   HGB 11.9*   HCT 39.2        CMP:   Recent Labs  Lab 07/04/18 2031      K 4.1      CO2 21*      BUN 17   CREATININE 1.2   CALCIUM 9.6   PROT 7.6   ALBUMIN 3.6   BILITOT 0.3   ALKPHOS 93   AST 15   ALT 8*   ANIONGAP 10   EGFRNONAA 43.1*       Significant Imaging: I have reviewed all pertinent imaging results/findings within the past 24 hours.    Assessment/Plan:     * Pre-syncope    Patient endorses prodrome where it felt like her heart was racing, suggesting a run of SVT as the possible culprit.  Scheduled for EP study 7/9; staying for eval by EP to reassess timing of procedure per Cardiology fellow's plan.  Monitor on telemetry.          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  Due to SSS will likely not tolerate rate control drugs.          Essential hypertension    On amlodipine 10 and losartan  100.  Acutely elevated BP likely stress response to traumatic injury.            VTE Risk Mitigation         Ordered     IP VTE LOW RISK PATIENT  Once      07/05/18 0414             Amrik Barroso MD  Department of Hospital Medicine   [facility]

## 2018-07-05 NOTE — HPI
77 year-old woman with hypertension, non-obstructive CAD and CKD stage III and chronic bradycardia. She presents to the ED s/p pre-syncope with fall, head trauma, and left clavicular fracture.  She was in her kitchen stirring a pot when she felt short of breath and as though she was going to black out.  She then tried to make it back to her bed before she fell but could not make it, fell, and hit the ground.  At that time she sustained the aforementioned trauma.       She recently presented to the ER 4/25/2018 with complaint of dizziness. She was in a narrow complex short RP tachycardia at a rate of 156 bpm that terminated with a vagal maneuver. During her stay she had sinus rhythm with rates of 40s-60s off AV kamar agents. She felt better. She had a holter monitor 12/2017 that noted sinus rhythm with rate range of 43-97 with average of 66. There were also rare PVCs and PACs with 2 episodes of NSAT.      Since her April hospitalization she has had 2 falls at home in addition to this most recent one. She believes the falls were from losing her balance and not from light-headedness. She notes intermittent similar symptoms that she presented to the ER with. Episodes may last several minutes.  She was seen in Dr Archie Montez's EP clinic and was scheduled for EPS/SVT ablation on 7/9/2018.      She has 3 ekgs in ER today all showing NSR with normal to mildly bradycardic rates.  Tele check in ED reveals no events.     RECENT ECHO CONCLUSIONS     1 - Normal left ventricular systolic function (EF 60-65%).     2 - Impaired LV relaxation, increased LVEDP.     3 - Normal right ventricular systolic function .     4 - The estimated PA systolic pressure is 31 mmHg.

## 2018-07-06 ENCOUNTER — ANESTHESIA EVENT (OUTPATIENT)
Dept: MEDSURG UNIT | Facility: HOSPITAL | Age: 78
End: 2018-07-06
Payer: MEDICARE

## 2018-07-06 VITALS
HEART RATE: 59 BPM | HEIGHT: 65 IN | RESPIRATION RATE: 18 BRPM | DIASTOLIC BLOOD PRESSURE: 71 MMHG | OXYGEN SATURATION: 96 % | TEMPERATURE: 99 F | BODY MASS INDEX: 27.88 KG/M2 | WEIGHT: 167.31 LBS | SYSTOLIC BLOOD PRESSURE: 170 MMHG

## 2018-07-06 PROBLEM — S42.033A CLOSED FRACTURE OF ACROMIAL END OF CLAVICLE: Status: ACTIVE | Noted: 2018-07-06

## 2018-07-06 LAB
ANION GAP SERPL CALC-SCNC: 8 MMOL/L
APTT BLDCRRT: 23.5 SEC
BASOPHILS # BLD AUTO: 0.03 K/UL
BASOPHILS NFR BLD: 0.5 %
BUN SERPL-MCNC: 26 MG/DL
CALCIUM SERPL-MCNC: 9.4 MG/DL
CHLORIDE SERPL-SCNC: 108 MMOL/L
CO2 SERPL-SCNC: 24 MMOL/L
CREAT SERPL-MCNC: 1.5 MG/DL
DIFFERENTIAL METHOD: ABNORMAL
EOSINOPHIL # BLD AUTO: 0.1 K/UL
EOSINOPHIL NFR BLD: 1.6 %
ERYTHROCYTE [DISTWIDTH] IN BLOOD BY AUTOMATED COUNT: 15 %
EST. GFR  (AFRICAN AMERICAN): 37.9 ML/MIN/1.73 M^2
EST. GFR  (NON AFRICAN AMERICAN): 32.9 ML/MIN/1.73 M^2
GLUCOSE SERPL-MCNC: 100 MG/DL
HCT VFR BLD AUTO: 34.9 %
HGB BLD-MCNC: 10.8 G/DL
IMM GRANULOCYTES # BLD AUTO: 0.02 K/UL
IMM GRANULOCYTES NFR BLD AUTO: 0.3 %
INR PPP: 0.9
LYMPHOCYTES # BLD AUTO: 1.1 K/UL
LYMPHOCYTES NFR BLD: 17 %
MCH RBC QN AUTO: 29.5 PG
MCHC RBC AUTO-ENTMCNC: 30.9 G/DL
MCV RBC AUTO: 95 FL
MONOCYTES # BLD AUTO: 0.4 K/UL
MONOCYTES NFR BLD: 6 %
NEUTROPHILS # BLD AUTO: 4.6 K/UL
NEUTROPHILS NFR BLD: 74.6 %
NRBC BLD-RTO: 0 /100 WBC
PLATELET # BLD AUTO: 246 K/UL
PMV BLD AUTO: 10.4 FL
POCT GLUCOSE: 115 MG/DL (ref 70–110)
POTASSIUM SERPL-SCNC: 3.7 MMOL/L
PROTHROMBIN TIME: 10.1 SEC
RBC # BLD AUTO: 3.66 M/UL
SODIUM SERPL-SCNC: 140 MMOL/L
WBC # BLD AUTO: 6.19 K/UL

## 2018-07-06 PROCEDURE — 99217 PR OBSERVATION CARE DISCHARGE: CPT | Mod: ,,, | Performed by: HOSPITALIST

## 2018-07-06 PROCEDURE — 85610 PROTHROMBIN TIME: CPT

## 2018-07-06 PROCEDURE — 25000003 PHARM REV CODE 250: Performed by: HOSPITALIST

## 2018-07-06 PROCEDURE — 85025 COMPLETE CBC W/AUTO DIFF WBC: CPT

## 2018-07-06 PROCEDURE — G0378 HOSPITAL OBSERVATION PER HR: HCPCS

## 2018-07-06 PROCEDURE — 36415 COLL VENOUS BLD VENIPUNCTURE: CPT

## 2018-07-06 PROCEDURE — 99213 OFFICE O/P EST LOW 20 MIN: CPT | Mod: GC,,, | Performed by: INTERNAL MEDICINE

## 2018-07-06 PROCEDURE — 85730 THROMBOPLASTIN TIME PARTIAL: CPT

## 2018-07-06 PROCEDURE — 80048 BASIC METABOLIC PNL TOTAL CA: CPT

## 2018-07-06 RX ORDER — OXYCODONE HYDROCHLORIDE 10 MG/1
10 TABLET ORAL EVERY 6 HOURS PRN
Qty: 20 TABLET | Refills: 0 | Status: SHIPPED | OUTPATIENT
Start: 2018-07-06 | End: 2018-07-06

## 2018-07-06 RX ORDER — OXYCODONE HYDROCHLORIDE 10 MG/1
10 TABLET ORAL EVERY 6 HOURS PRN
Qty: 20 TABLET | Refills: 0 | Status: SHIPPED | OUTPATIENT
Start: 2018-07-06 | End: 2018-10-31

## 2018-07-06 RX ADMIN — FERROUS SULFATE TAB EC 325 MG (65 MG FE EQUIVALENT) 325 MG: 325 (65 FE) TABLET DELAYED RESPONSE at 08:07

## 2018-07-06 RX ADMIN — OXYCODONE HYDROCHLORIDE 10 MG: 5 TABLET ORAL at 08:07

## 2018-07-06 RX ADMIN — ASPIRIN 81 MG CHEWABLE TABLET 81 MG: 81 TABLET CHEWABLE at 08:07

## 2018-07-06 RX ADMIN — AMLODIPINE BESYLATE 10 MG: 10 TABLET ORAL at 08:07

## 2018-07-06 RX ADMIN — LOSARTAN POTASSIUM 100 MG: 50 TABLET, FILM COATED ORAL at 08:07

## 2018-07-06 NOTE — DISCHARGE SUMMARY
DISCHARGE SUMMARY  Hospital Medicine    Team: [facility]    Patient Name: Crystal Alvarado  YOB: 1940    Admit Date: 7/4/2018    Discharge Date: 07/06/2018    Discharge Attending Physician: Ramona Villanueva MD     Principal Diagnoses:  Active Hospital Problems    Diagnosis  POA    *Pre-syncope [R55]  Yes    Closed fracture of acromial end of clavicle [S42.033A]  Unknown    Concussion with no loss of consciousness [S06.0X0A]  Yes    Supraventricular tachycardia [I47.1]  Yes    Essential hypertension [I10]  Yes      Resolved Hospital Problems    Diagnosis Date Resolved POA   No resolved problems to display.       Discharged Condition: stable    HOSPITAL COURSE:      Initial Presentation:    77F presents after passing out in her kitchen.  She was standing at the stove stirring a pot when she started to feel like she was about to black out.  She tried to get to her bed to avoid falling but didn't make it and hit the floor resulting in injury.  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     Course of Principle Problem for Admission:    Pt was admitted to [facility] for observation of symptomatic bradycardia or SVT as the cause of episode.  She was placed on telemetry and no events were recorded in 24h.  EP was contacted and recommend f/u on 7/9 as planned for EPS.  She was discharged home in stable condition.    Other Medical Problems Addressed in the Hospital:    Mildly Displaced Fracture of Distal Left Clavicle  -Immobilized in sling  -Pain control  -refer to Orthopedics upon discharge    Essential Hypertension  -on Amlodipine 10mg and Losartan 100mg  -likely elevated bp from pain/stress from traumatic injury    Consults: Cardiology    Last CBC/BMP:    CBC/Anemia Labs: Coags:      Recent Labs  Lab 07/04/18 2031 07/06/18  0857   WBC 6.72 6.19   HGB 11.9* 10.8*   HCT 39.2 34.9*    246   MCV 97 95   RDW 14.8* 15.0*      Recent Labs  Lab 07/04/18 2031 07/06/18  0857    INR 0.9 0.9   APTT <21.0 23.5        Chemistries:     Recent Labs  Lab 07/04/18  2031 07/06/18  0857    140   K 4.1 3.7    108   CO2 21* 24   BUN 17 26*   CREATININE 1.2 1.5*   CALCIUM 9.6 9.4   PROT 7.6  --    BILITOT 0.3  --    ALKPHOS 93  --    ALT 8*  --    AST 15  --    MG 2.5  --         Significant Diagnostic Studies: as above    Special Treatments/Procedures:   * No surgery found *     Disposition: Home or Self Care      Future Scheduled Appointments:  Future Appointments  Date Time Provider Department Center   7/9/2018 9:00 AM [facility]   8/14/2018 11:00 AM Cecilio Kline MD [facility]       Discharge Medication List:       Crystal Alvarado   Home Medication Instructions BEBE:[id number]    Printed on:07/06/18 1025   Medication Information                      amlodipine (NORVASC) 10 MG tablet  Take 10 mg by mouth once daily.             aspirin 81 MG Chew  Take 81 mg by mouth once daily.             atorvastatin (LIPITOR) 20 MG tablet  Take 20 mg by mouth every evening.             azelastine (ASTELIN) 137 mcg (0.1 %) nasal spray  INHALE 1 SPRAY IN EACH NOSTRIL TWICE A DAY             calcitonin, salmon, (FORTICAL) 200 unit/actuation nasal spray  1 spray by Nasal route once daily.             docusate sodium (COLACE) 100 MG capsule  Take 1 capsule (100 mg total) by mouth 2 (two) times daily.             ergocalciferol (ERGOCALCIFEROL) 50,000 unit Cap  Take 50,000 Units by mouth every 7 days.             ferrous sulfate 325 (65 FE) MG EC tablet  Take 1 tablet (325 mg total) by mouth 2 (two) times daily.             losartan (COZAAR) 100 MG tablet  TAKE 1 TABLET (100 MG TOTAL) BY MOUTH ONCE DAILY.             nitroGLYCERIN (NITROSTAT) 0.4 MG SL tablet  Place 1 tablet (0.4 mg total) under the tongue every 5 (five) minutes as needed for Chest pain (and notify MD).             oxyCODONE (ROXICODONE) 10 mg Tab immediate release tablet  Take 1 tablet  (10 mg total) by mouth every 6 (six) hours as needed.             traMADol (ULTRAM) 50 mg tablet  Take 50 mg by mouth as needed for Pain.                 Patient Instructions:    Discharge Procedure Orders  Ambulatory Referral to Orthopedics   Referral Priority: Routine Referral Type: Consultation   Requested Specialty: Orthopedic Surgery    Number of Visits Requested: 1      Notify your health care provider if you experience any of the following:  severe uncontrolled pain     Notify your health care provider if you experience any of the following:  persistent dizziness, light-headedness, or visual disturbances     Notify your health care provider if you experience any of the following:  increased confusion or weakness     Cardiac event monitor   Standing Status: Future Number of Occurrences: 1 Standing Exp. Date: 07/05/19   Order Specific Question Answer Comments   Cardiac Event Monitor Auto Trigger      Activity as tolerated         At the time of discharge patient was told to take all medications as prescribed, to keep all followup appointments, and to call their primary care physician or return to the emergency room if they have any worsening or concerning symptoms.    Signing Physician:  Ramona Villanueva MD

## 2018-07-06 NOTE — PROGRESS NOTES
[facility]  Cardiac Electrophysiology  Progress Note    Admission Date: 7/4/2018  Code Status: Full Code   Attending Physician: No att. providers found   Expected Discharge Date: 7/6/2018  Principal Problem:Pre-syncope    Subjective:     Interval History: Ms. Alvarado reports feeling well today. Denies chest pain/pressure/tightness, dyspnea, palpitations or light headedness. She reports no further concerns or symptoms at this time.    Review of Systems   Constitution: Negative for chills, decreased appetite, diaphoresis, fever, night sweats, weight gain and weight loss.   HENT: Negative for congestion, nosebleeds and tinnitus.    Eyes: Negative for blurred vision and photophobia.   Cardiovascular: Negative for chest pain, claudication, cyanosis, dyspnea on exertion, irregular heartbeat, leg swelling, near-syncope, orthopnea, palpitations, paroxysmal nocturnal dyspnea and syncope.   Respiratory: Negative for cough, hemoptysis, shortness of breath and wheezing.    Skin: Negative for color change and rash.   Musculoskeletal: Negative for back pain and falls.   Gastrointestinal: Negative for abdominal pain, constipation, diarrhea, nausea and vomiting.   Genitourinary: Negative for dysuria and hematuria.   Neurological: Negative for focal weakness, numbness and seizures.   Psychiatric/Behavioral: Negative for altered mental status. The patient is not nervous/anxious.      Objective:     Vital Signs (Most Recent):  Temp: 98.5 °F (36.9 °C) (07/06/18 0724)  Pulse: (!) 59 (07/06/18 0747)  Resp: 18 (07/06/18 0724)  BP: (!) 170/71 (07/06/18 0724)  SpO2: 96 % (07/06/18 0724) Vital Signs (24h Range):  Temp:  [98.1 °F (36.7 °C)-98.9 °F (37.2 °C)] 98.5 °F (36.9 °C)  Pulse:  [53-75] 59  Resp:  [16-18] 18  SpO2:  [92 %-96 %] 96 %  BP: (125-170)/(63-71) 170/71     Weight: 75.9 kg (167 lb 5.3 oz)  Body mass index is 27.85 kg/m².     SpO2: 96 %  O2 Device (Oxygen Therapy): room air    Physical Exam   Constitutional: She  is oriented to person, place, and time. She appears well-developed and well-nourished. No distress.   HENT:   Head: Normocephalic and atraumatic.   Eyes: EOM are normal. Pupils are equal, round, and reactive to light.   Neck: Normal range of motion. No JVD present.   Cardiovascular: Normal rate, regular rhythm, normal heart sounds and intact distal pulses.  Exam reveals no gallop and no friction rub.    No murmur heard.  Pulmonary/Chest: Effort normal and breath sounds normal. No respiratory distress. She has no wheezes. She has no rales.   Abdominal: Soft. Bowel sounds are normal. She exhibits no distension. There is no tenderness.   Musculoskeletal: Normal range of motion. She exhibits no edema.   Left arm sling   Neurological: She is alert and oriented to person, place, and time.   Skin: Skin is warm. No rash noted. She is not diaphoretic.   Psychiatric: She has a normal mood and affect. Her behavior is normal.       Significant Labs:     Recent Results (from the past 24 hour(s))   POCT glucose    Collection Time: 07/06/18  7:44 AM   Result Value Ref Range    POCT Glucose 115 (H) 70 - 110 mg/dL   Basic metabolic panel    Collection Time: 07/06/18  8:57 AM   Result Value Ref Range    Sodium 140 136 - 145 mmol/L    Potassium 3.7 3.5 - 5.1 mmol/L    Chloride 108 95 - 110 mmol/L    CO2 24 23 - 29 mmol/L    Glucose 100 70 - 110 mg/dL    BUN, Bld 26 (H) 8 - 23 mg/dL    Creatinine 1.5 (H) 0.5 - 1.4 mg/dL    Calcium 9.4 8.7 - 10.5 mg/dL    Anion Gap 8 8 - 16 mmol/L    eGFR if African American 37.9 (A) >60 mL/min/1.73 m^2    eGFR if non  32.9 (A) >60 mL/min/1.73 m^2   CBC auto differential    Collection Time: 07/06/18  8:57 AM   Result Value Ref Range    WBC 6.19 3.90 - 12.70 K/uL    RBC 3.66 (L) 4.00 - 5.40 M/uL    Hemoglobin 10.8 (L) 12.0 - 16.0 g/dL    Hematocrit 34.9 (L) 37.0 - 48.5 %    MCV 95 82 - 98 fL    MCH 29.5 27.0 - 31.0 pg    MCHC 30.9 (L) 32.0 - 36.0 g/dL    RDW 15.0 (H) 11.5 - 14.5 %     Platelets 246 150 - 350 K/uL    MPV 10.4 9.2 - 12.9 fL    Immature Granulocytes 0.3 0.0 - 0.5 %    Gran # (ANC) 4.6 1.8 - 7.7 K/uL    Immature Grans (Abs) 0.02 0.00 - 0.04 K/uL    Lymph # 1.1 1.0 - 4.8 K/uL    Mono # 0.4 0.3 - 1.0 K/uL    Eos # 0.1 0.0 - 0.5 K/uL    Baso # 0.03 0.00 - 0.20 K/uL    nRBC 0 0 /100 WBC    Gran% 74.6 (H) 38.0 - 73.0 %    Lymph% 17.0 (L) 18.0 - 48.0 %    Mono% 6.0 4.0 - 15.0 %    Eosinophil% 1.6 0.0 - 8.0 %    Basophil% 0.5 0.0 - 1.9 %    Differential Method Automated    APTT    Collection Time: 07/06/18  8:57 AM   Result Value Ref Range    aPTT 23.5 21.0 - 32.0 sec   Protime-INR    Collection Time: 07/06/18  8:57 AM   Result Value Ref Range    Prothrombin Time 10.1 9.0 - 12.5 sec    INR 0.9 0.8 - 1.2           Assessment and Plan:     * Pre-syncope    Occasional sinus bradycardia without ectopy or other sustained arrhythmia on telemetry  Scheduled for EPS and possible PPM implantation next week with Dr. Montez. The risks, benefits and alternatives to these procedures were discussed extensively and all questions were addressed  Avoid AV kamar blocking agents as she is sinus sue at her baseline  30 day event monitor with auto trigger on discharge            Rashawn Figueroa MD  Cardiac Electrophysiology  [facility]

## 2018-07-06 NOTE — NURSING
Patient given discharge instructions. Reviewed with patient. Instructions given to son. Verbalized understanding. Iv and tele  removed from patient. Prescription for Oxycodone given. Transport notified.

## 2018-07-06 NOTE — PLAN OF CARE
Problem: Patient Care Overview  Goal: Plan of Care Review  Outcome: Ongoing (interventions implemented as appropriate)  PRN medication given for pain.Plan of care discussed with patient, no questions at this time. Fall pre-cautions in place. Will continue to monitor.

## 2018-07-06 NOTE — SUBJECTIVE & OBJECTIVE
Interval History: Ms. Alvarado reports feeling well today. Denies chest pain/pressure/tightness, dyspnea, palpitations or light headedness. She reports no further concerns or symptoms at this time.    Review of Systems   Constitution: Negative for chills, decreased appetite, diaphoresis, fever, night sweats, weight gain and weight loss.   HENT: Negative for congestion, nosebleeds and tinnitus.    Eyes: Negative for blurred vision and photophobia.   Cardiovascular: Negative for chest pain, claudication, cyanosis, dyspnea on exertion, irregular heartbeat, leg swelling, near-syncope, orthopnea, palpitations, paroxysmal nocturnal dyspnea and syncope.   Respiratory: Negative for cough, hemoptysis, shortness of breath and wheezing.    Skin: Negative for color change and rash.   Musculoskeletal: Negative for back pain and falls.   Gastrointestinal: Negative for abdominal pain, constipation, diarrhea, nausea and vomiting.   Genitourinary: Negative for dysuria and hematuria.   Neurological: Negative for focal weakness, numbness and seizures.   Psychiatric/Behavioral: Negative for altered mental status. The patient is not nervous/anxious.      Objective:     Vital Signs (Most Recent):  Temp: 98.5 °F (36.9 °C) (07/06/18 0724)  Pulse: (!) 59 (07/06/18 0747)  Resp: 18 (07/06/18 0724)  BP: (!) 170/71 (07/06/18 0724)  SpO2: 96 % (07/06/18 0724) Vital Signs (24h Range):  Temp:  [98.1 °F (36.7 °C)-98.9 °F (37.2 °C)] 98.5 °F (36.9 °C)  Pulse:  [53-75] 59  Resp:  [16-18] 18  SpO2:  [92 %-96 %] 96 %  BP: (125-170)/(63-71) 170/71     Weight: 75.9 kg (167 lb 5.3 oz)  Body mass index is 27.85 kg/m².     SpO2: 96 %  O2 Device (Oxygen Therapy): room air    Physical Exam   Constitutional: She is oriented to person, place, and time. She appears well-developed and well-nourished. No distress.   HENT:   Head: Normocephalic and atraumatic.   Eyes: EOM are normal. Pupils are equal, round, and reactive to light.   Neck: Normal range of motion. No JVD  present.   Cardiovascular: Normal rate, regular rhythm, normal heart sounds and intact distal pulses.  Exam reveals no gallop and no friction rub.    No murmur heard.  Pulmonary/Chest: Effort normal and breath sounds normal. No respiratory distress. She has no wheezes. She has no rales.   Abdominal: Soft. Bowel sounds are normal. She exhibits no distension. There is no tenderness.   Musculoskeletal: Normal range of motion. She exhibits no edema.   Left arm sling   Neurological: She is alert and oriented to person, place, and time.   Skin: Skin is warm. No rash noted. She is not diaphoretic.   Psychiatric: She has a normal mood and affect. Her behavior is normal.       Significant Labs:     Recent Results (from the past 24 hour(s))   POCT glucose    Collection Time: 07/06/18  7:44 AM   Result Value Ref Range    POCT Glucose 115 (H) 70 - 110 mg/dL   Basic metabolic panel    Collection Time: 07/06/18  8:57 AM   Result Value Ref Range    Sodium 140 136 - 145 mmol/L    Potassium 3.7 3.5 - 5.1 mmol/L    Chloride 108 95 - 110 mmol/L    CO2 24 23 - 29 mmol/L    Glucose 100 70 - 110 mg/dL    BUN, Bld 26 (H) 8 - 23 mg/dL    Creatinine 1.5 (H) 0.5 - 1.4 mg/dL    Calcium 9.4 8.7 - 10.5 mg/dL    Anion Gap 8 8 - 16 mmol/L    eGFR if African American 37.9 (A) >60 mL/min/1.73 m^2    eGFR if non  32.9 (A) >60 mL/min/1.73 m^2   CBC auto differential    Collection Time: 07/06/18  8:57 AM   Result Value Ref Range    WBC 6.19 3.90 - 12.70 K/uL    RBC 3.66 (L) 4.00 - 5.40 M/uL    Hemoglobin 10.8 (L) 12.0 - 16.0 g/dL    Hematocrit 34.9 (L) 37.0 - 48.5 %    MCV 95 82 - 98 fL    MCH 29.5 27.0 - 31.0 pg    MCHC 30.9 (L) 32.0 - 36.0 g/dL    RDW 15.0 (H) 11.5 - 14.5 %    Platelets 246 150 - 350 K/uL    MPV 10.4 9.2 - 12.9 fL    Immature Granulocytes 0.3 0.0 - 0.5 %    Gran # (ANC) 4.6 1.8 - 7.7 K/uL    Immature Grans (Abs) 0.02 0.00 - 0.04 K/uL    Lymph # 1.1 1.0 - 4.8 K/uL    Mono # 0.4 0.3 - 1.0 K/uL    Eos # 0.1 0.0 - 0.5  K/uL    Baso # 0.03 0.00 - 0.20 K/uL    nRBC 0 0 /100 WBC    Gran% 74.6 (H) 38.0 - 73.0 %    Lymph% 17.0 (L) 18.0 - 48.0 %    Mono% 6.0 4.0 - 15.0 %    Eosinophil% 1.6 0.0 - 8.0 %    Basophil% 0.5 0.0 - 1.9 %    Differential Method Automated    APTT    Collection Time: 07/06/18  8:57 AM   Result Value Ref Range    aPTT 23.5 21.0 - 32.0 sec   Protime-INR    Collection Time: 07/06/18  8:57 AM   Result Value Ref Range    Prothrombin Time 10.1 9.0 - 12.5 sec    INR 0.9 0.8 - 1.2

## 2018-07-06 NOTE — PLAN OF CARE
Problem: Patient Care Overview  Goal: Plan of Care Review  Plan of care discussed with patient. Patient is free of fall/trauma/injury. Denies CP and SOB. Patient c/o pain in left fractured shoulder, PRN pain meds given with relief. Educated patient to call before she gets out of bed. She said she understood. All questions addressed. Will continue to monitor.

## 2018-07-06 NOTE — ASSESSMENT & PLAN NOTE
Occasional sinus bradycardia without ectopy or other sustained arrhythmia on telemetry  Scheduled for EPS and possible PPM implantation next week with Dr. Montez. The risks, benefits and alternatives to these procedures were discussed extensively and all questions were addressed  Avoid AV kamar blocking agents as she is sinus sue at her baseline  30 day event monitor with auto trigger on discharge

## 2018-07-06 NOTE — ANESTHESIA PREPROCEDURE EVALUATION
[facility]  Anesthesia Pre-Operative Evaluation         Patient Name: Crystal Alvarado  YOB: 1940  MRN: [id number]    SUBJECTIVE:     07/06/2018    Crystal Alvarado is a 77 y.o. female with  hypertension, non-obstructive CAD and CKD stage III and chronic bradycardia who presents for:    Pre-operative evaluation for Procedure(s) (LRB):  ABLATION (N/A )  INSERTION, CARDIAC PACEMAKER, DUAL CHAMBER (N/A )  PLACEMENT-LOOP RECORDER (N/A )    She is admitted to the hospital on 7/5/2018 s/p pre-syncope with fall, head trauma, and left clavicular fracture. She recently presented to the ER 4/25/2018 with complaint of dizziness. She was in a narrow complex short RP tachycardia at a rate of 156 bpm that terminated with a vagal maneuver. During her stay she had sinus rhythm with rates of 40s-60s off AV kamar agents. She felt better. She had a holter monitor 12/2017 that noted sinus rhythm with rate range of 43-97 with average of 66. There were also rare PVCs and PACs with 2 episodes of NSAT. Since her April hospitalization she has had 2 falls at home in addition to this most recent one. She believes the falls were from losing her balance and not from light-headedness. She notes intermittent similar symptoms that she presented to the ER with. Episodes may last several minutes.  She was seen in Dr Acrhie Montez's EP clinic and was scheduled for EPS/SVT ablation on 7/9/2018.       She has 3 EKGs in ER all showing NSR with normal to mildly bradycardic rates. Tele check in ED reveals no events.      LDA:       Peripheral IV - Single Lumen 07/04/18 2004 Left Antecubital (Active)   Number of days: 1       Prev airway:   None documented.    Drips:   None documented.      Patient Active Problem List   Diagnosis    Tobacco abuse    Pain in the chest    SSS (sick sinus syndrome)    Dizziness    Light headedness    Sick sinus syndrome    Essential hypertension    Hyperlipidemia    Coronary artery  disease involving native coronary artery of native heart without angina pectoris    CKD (chronic kidney disease) stage 3, GFR 30-59 ml/min    Normocytic anemia    Supraventricular tachycardia    Postural dizziness with presyncope    Physical deconditioning    Heart failure with preserved left ventricular function (HFpEF)    Sinus bradycardia    Orthostatic hypotension    Pre-syncope    Concussion with no loss of consciousness       Review of patient's allergies indicates:  No Known Allergies    Current Inpatient Medications:      No current facility-administered medications on file prior to encounter.      Current Outpatient Prescriptions on File Prior to Encounter   Medication Sig Dispense Refill    amlodipine (NORVASC) 10 MG tablet Take 10 mg by mouth once daily.      aspirin 81 MG Chew Take 81 mg by mouth once daily.  4    atorvastatin (LIPITOR) 20 MG tablet Take 20 mg by mouth every evening.  4    azelastine (ASTELIN) 137 mcg (0.1 %) nasal spray INHALE 1 SPRAY IN EACH NOSTRIL TWICE A DAY  3    calcitonin, salmon, (FORTICAL) 200 unit/actuation nasal spray 1 spray by Nasal route once daily. 1 Bottle 0    docusate sodium (COLACE) 100 MG capsule Take 1 capsule (100 mg total) by mouth 2 (two) times daily. 60 capsule 0    ergocalciferol (ERGOCALCIFEROL) 50,000 unit Cap Take 50,000 Units by mouth every 7 days.      ferrous sulfate 325 (65 FE) MG EC tablet Take 1 tablet (325 mg total) by mouth 2 (two) times daily. 30 tablet 3    losartan (COZAAR) 100 MG tablet TAKE 1 TABLET (100 MG TOTAL) BY MOUTH ONCE DAILY. 30 tablet 9    nitroGLYCERIN (NITROSTAT) 0.4 MG SL tablet Place 1 tablet (0.4 mg total) under the tongue every 5 (five) minutes as needed for Chest pain (and notify MD). 25 tablet 5    traMADol (ULTRAM) 50 mg tablet Take 50 mg by mouth as needed for Pain.         Past Surgical History:   Procedure Laterality Date    HYSTERECTOMY N/A        Social History     Social History    Marital status:  Single     Spouse name: N/A    Number of children: N/A    Years of education: N/A     Occupational History    Not on file.     Social History Main Topics    Smoking status: Former Smoker     Packs/day: 0.50     Quit date: 4/26/2013    Smokeless tobacco: Never Used    Alcohol use No    Drug use: No    Sexual activity: No     Other Topics Concern    Not on file     Social History Narrative    No narrative on file       OBJECTIVE:     Vital Signs Range (Last 24H):  BP: ()/()   Arterial Line BP: ()/()       CBC:   Recent Labs      07/04/18 2031   WBC  6.72   RBC  4.03   HGB  11.9*   HCT  39.2   PLT  254   MCV  97   MCH  29.5   MCHC  30.4*       CMP:   Recent Labs      07/04/18 2031   NA  139   K  4.1   CL  108   CO2  21*   BUN  17   CREATININE  1.2   GLU  101   MG  2.5   CALCIUM  9.6   ALBUMIN  3.6   PROT  7.6   ALKPHOS  93   ALT  8*   AST  15   BILITOT  0.3       INR:  Recent Labs      07/04/18 2031   INR  0.9   APTT  <21.0       Diagnostic Studies:   X-Ray Shoulder Trauma Left (7/4/2018):  Mildly displaced fracture of the distal left clavicle.    CT Head Without Contrast (7/4/2018):  No evidence of acute intracranial pathology.    CT Cervical Spine Without Contrast (74/2018):  No evidence of acute fractures or traumatic subluxations.  Multilevel cervical spondylosis noting moderate to severe left neural foraminal narrowing at C2-C5.    CT Thoracic Spine Without Contrast (7/4/2018):  No acute fractures or traumatic subluxations.      EKG (7/4/2018):  Vent. Rate : 056 BPM     Atrial Rate : 056 BPM     P-R Int : 148 ms          QRS Dur : 072 ms      QT Int : 432 ms       P-R-T Axes : 035 003 036 degrees     QTc Int : 416 ms  Sinus bradycardia  Otherwise normal ECG  When compared with ECG of 04-JUL-2018 20:40,  No significant change was found  Confirmed by Fabienne Pacheco MD ([id number]) on 7/5/2018 11:11:48 AM    2D ECHO:  Results for orders placed or performed during the hospital encounter of 04/25/18   2D  echo with color flow doppler   Result Value Ref Range    EF 60 55 - 65    Diastolic Dysfunction Yes (A)     Est. PA Systolic Pressure 31.3     Mitral Valve Mobility NORMAL     Tricuspid Valve Regurgitation TRIVIAL          ASSESSMENT/PLAN:         Anesthesia Evaluation    I have reviewed the Patient Summary Reports.    I have reviewed the Nursing Notes.   I have reviewed the Medications.     Review of Systems  Anesthesia Hx:  History of prior surgery of interest to airway management or planning:   Social:  Former Smoker, No Alcohol Use    Cardiovascular:   Hypertension CAD  Dysrhythmias  CHF hyperlipidemia ECG has been reviewed.    Renal/:   Chronic Renal Disease    Endocrine:   Denies Diabetes.        Physical Exam  General:  Well nourished    Airway/Jaw/Neck:  Airway Findings: Mouth Opening: Normal Tongue: Normal  General Airway Assessment: Adult  Mallampati: II  TM Distance: Normal, at least 6 cm  Jaw/Neck Findings:  Neck ROM: Normal ROM     Eyes/Ears/Nose:  Eyes/Ears/Nose Findings:    Dental:  Dental Findings: Periodontal disease, Mild   Chest/Lungs:  Chest/Lungs Findings: Clear to auscultation, Normal Respiratory Rate     Heart/Vascular:  Heart Findings: Rate: Normal  Rhythm: Regular Rhythm  Sounds: Normal  Heart Murmur  Vascular Findings:        Mental Status:  Mental Status Findings:  Cooperative, Alert and Oriented         Anesthesia Plan  Type of Anesthesia, risks & benefits discussed:  Anesthesia Type:  MAC, general  Patient's Preference: General/MAC  Intra-op Monitoring Plan: standard ASA monitors  Intra-op Monitoring Plan Comments:   Post Op Pain Control Plan: multimodal analgesia and per primary service following discharge from PACU  Post Op Pain Control Plan Comments: IV meds as needed  Induction:   IV  Beta Blocker:  Patient is not currently on a Beta-Blocker (No further documentation required).       Informed Consent: Patient understands risks and agrees with Anesthesia plan.  Questions answered.  Anesthesia consent signed with patient.  ASA Score: 3     Day of Surgery Review of History & Physical:    H&P update referred to the provider.     Anesthesia Plan Notes: Discussed anesthetic options, pt understands and agrees with plan        Ready For Surgery From Anesthesia Perspective.

## 2018-07-09 ENCOUNTER — ANESTHESIA (OUTPATIENT)
Dept: MEDSURG UNIT | Facility: HOSPITAL | Age: 78
End: 2018-07-09
Payer: MEDICARE

## 2018-07-09 ENCOUNTER — HOSPITAL ENCOUNTER (OUTPATIENT)
Facility: HOSPITAL | Age: 78
Discharge: HOME | End: 2018-07-10
Attending: INTERNAL MEDICINE | Admitting: INTERNAL MEDICINE
Payer: MEDICARE

## 2018-07-09 DIAGNOSIS — I47.10 SVT (SUPRAVENTRICULAR TACHYCARDIA): ICD-10-CM

## 2018-07-09 DIAGNOSIS — Z72.0 TOBACCO USE: ICD-10-CM

## 2018-07-09 DIAGNOSIS — R55 SYNCOPE AND COLLAPSE: ICD-10-CM

## 2018-07-09 DIAGNOSIS — I47.10 SUPRAVENTRICULAR TACHYCARDIA: Primary | ICD-10-CM

## 2018-07-09 DIAGNOSIS — I49.5 SICK SINUS SYNDROME: ICD-10-CM

## 2018-07-09 PROCEDURE — 37000009 HC ANESTHESIA EA ADD 15 MINS: Performed by: INTERNAL MEDICINE

## 2018-07-09 PROCEDURE — D9220A PRA ANESTHESIA: Mod: ANES,,, | Performed by: ANESTHESIOLOGY

## 2018-07-09 PROCEDURE — 93005 ELECTROCARDIOGRAM TRACING: CPT

## 2018-07-09 PROCEDURE — 93653 COMPRE EP EVAL TX SVT: CPT

## 2018-07-09 PROCEDURE — 93010 ELECTROCARDIOGRAM REPORT: CPT | Mod: 76,,, | Performed by: INTERNAL MEDICINE

## 2018-07-09 PROCEDURE — 93613 INTRACARDIAC EPHYS 3D MAPG: CPT | Mod: ,,, | Performed by: INTERNAL MEDICINE

## 2018-07-09 PROCEDURE — 37000008 HC ANESTHESIA 1ST 15 MINUTES: Performed by: INTERNAL MEDICINE

## 2018-07-09 PROCEDURE — 25000003 PHARM REV CODE 250

## 2018-07-09 PROCEDURE — 93653 COMPRE EP EVAL TX SVT: CPT | Mod: ,,, | Performed by: INTERNAL MEDICINE

## 2018-07-09 PROCEDURE — 25000003 PHARM REV CODE 250: Performed by: NURSE ANESTHETIST, CERTIFIED REGISTERED

## 2018-07-09 PROCEDURE — 63600175 PHARM REV CODE 636 W HCPCS: Performed by: INTERNAL MEDICINE

## 2018-07-09 PROCEDURE — 63600175 PHARM REV CODE 636 W HCPCS: Performed by: NURSE ANESTHETIST, CERTIFIED REGISTERED

## 2018-07-09 PROCEDURE — 25000003 PHARM REV CODE 250: Performed by: NURSE PRACTITIONER

## 2018-07-09 PROCEDURE — 25000003 PHARM REV CODE 250: Performed by: INTERNAL MEDICINE

## 2018-07-09 PROCEDURE — 93010 ELECTROCARDIOGRAM REPORT: CPT | Mod: ,,, | Performed by: INTERNAL MEDICINE

## 2018-07-09 PROCEDURE — 93621 COMP EP EVL L PAC&REC C SINS: CPT | Mod: 26,,, | Performed by: INTERNAL MEDICINE

## 2018-07-09 PROCEDURE — C1764 EVENT RECORDER, CARDIAC: HCPCS

## 2018-07-09 PROCEDURE — 63600175 PHARM REV CODE 636 W HCPCS

## 2018-07-09 PROCEDURE — 33282 EP LAB PROCEDURE: CPT | Mod: ,,, | Performed by: INTERNAL MEDICINE

## 2018-07-09 PROCEDURE — D9220A PRA ANESTHESIA: Mod: CRNA,,, | Performed by: NURSE ANESTHETIST, CERTIFIED REGISTERED

## 2018-07-09 PROCEDURE — 94761 N-INVAS EAR/PLS OXIMETRY MLT: CPT

## 2018-07-09 RX ORDER — ATORVASTATIN CALCIUM 20 MG/1
20 TABLET, FILM COATED ORAL NIGHTLY
Status: DISCONTINUED | OUTPATIENT
Start: 2018-07-09 | End: 2018-07-10 | Stop reason: HOSPADM

## 2018-07-09 RX ORDER — NAPROXEN SODIUM 220 MG/1
81 TABLET, FILM COATED ORAL DAILY
Status: DISCONTINUED | OUTPATIENT
Start: 2018-07-10 | End: 2018-07-10 | Stop reason: HOSPADM

## 2018-07-09 RX ORDER — SODIUM CHLORIDE 9 MG/ML
INJECTION, SOLUTION INTRAVENOUS CONTINUOUS
Status: DISCONTINUED | OUTPATIENT
Start: 2018-07-09 | End: 2018-07-10 | Stop reason: HOSPADM

## 2018-07-09 RX ORDER — SODIUM CHLORIDE 9 MG/ML
INJECTION, SOLUTION INTRAVENOUS CONTINUOUS
Status: DISCONTINUED | OUTPATIENT
Start: 2018-07-09 | End: 2018-07-09

## 2018-07-09 RX ORDER — FERROUS SULFATE 325(65) MG
325 TABLET, DELAYED RELEASE (ENTERIC COATED) ORAL 2 TIMES DAILY
Status: DISCONTINUED | OUTPATIENT
Start: 2018-07-09 | End: 2018-07-10 | Stop reason: HOSPADM

## 2018-07-09 RX ORDER — FENTANYL CITRATE 50 UG/ML
INJECTION, SOLUTION INTRAMUSCULAR; INTRAVENOUS
Status: DISCONTINUED | OUTPATIENT
Start: 2018-07-09 | End: 2018-07-09

## 2018-07-09 RX ORDER — EPHEDRINE SULFATE 50 MG/ML
INJECTION, SOLUTION INTRAVENOUS
Status: DISCONTINUED | OUTPATIENT
Start: 2018-07-09 | End: 2018-07-09

## 2018-07-09 RX ORDER — OXYCODONE HYDROCHLORIDE 5 MG/1
10 TABLET ORAL EVERY 8 HOURS PRN
Status: DISCONTINUED | OUTPATIENT
Start: 2018-07-09 | End: 2018-07-10 | Stop reason: HOSPADM

## 2018-07-09 RX ORDER — FENTANYL CITRATE 50 UG/ML
25 INJECTION, SOLUTION INTRAMUSCULAR; INTRAVENOUS EVERY 5 MIN PRN
Status: DISCONTINUED | OUTPATIENT
Start: 2018-07-09 | End: 2018-07-10 | Stop reason: HOSPADM

## 2018-07-09 RX ORDER — AMLODIPINE BESYLATE 10 MG/1
10 TABLET ORAL DAILY
Status: DISCONTINUED | OUTPATIENT
Start: 2018-07-10 | End: 2018-07-10 | Stop reason: HOSPADM

## 2018-07-09 RX ORDER — DOCUSATE SODIUM 100 MG/1
100 CAPSULE, LIQUID FILLED ORAL 2 TIMES DAILY
Status: DISCONTINUED | OUTPATIENT
Start: 2018-07-09 | End: 2018-07-10 | Stop reason: HOSPADM

## 2018-07-09 RX ORDER — CEFAZOLIN SODIUM 1 G/3ML
2 INJECTION, POWDER, FOR SOLUTION INTRAMUSCULAR; INTRAVENOUS
Status: COMPLETED | OUTPATIENT
Start: 2018-07-09 | End: 2018-07-09

## 2018-07-09 RX ORDER — PROPOFOL 10 MG/ML
VIAL (ML) INTRAVENOUS CONTINUOUS PRN
Status: DISCONTINUED | OUTPATIENT
Start: 2018-07-09 | End: 2018-07-09

## 2018-07-09 RX ORDER — PROPOFOL 10 MG/ML
VIAL (ML) INTRAVENOUS
Status: DISCONTINUED | OUTPATIENT
Start: 2018-07-09 | End: 2018-07-09

## 2018-07-09 RX ORDER — SODIUM CHLORIDE 0.9 % (FLUSH) 0.9 %
3 SYRINGE (ML) INJECTION
Status: DISCONTINUED | OUTPATIENT
Start: 2018-07-09 | End: 2018-07-09

## 2018-07-09 RX ORDER — LOSARTAN POTASSIUM 50 MG/1
100 TABLET ORAL DAILY
Status: DISCONTINUED | OUTPATIENT
Start: 2018-07-10 | End: 2018-07-10 | Stop reason: HOSPADM

## 2018-07-09 RX ADMIN — SODIUM CHLORIDE: 0.9 INJECTION, SOLUTION INTRAVENOUS at 02:07

## 2018-07-09 RX ADMIN — EPHEDRINE SULFATE 10 MG: 50 INJECTION, SOLUTION INTRAMUSCULAR; INTRAVENOUS; SUBCUTANEOUS at 03:07

## 2018-07-09 RX ADMIN — PROPOFOL 10 MG: 10 INJECTION, EMULSION INTRAVENOUS at 05:07

## 2018-07-09 RX ADMIN — FENTANYL CITRATE 25 MCG: 50 INJECTION, SOLUTION INTRAMUSCULAR; INTRAVENOUS at 04:07

## 2018-07-09 RX ADMIN — PROPOFOL 75 MCG/KG/MIN: 10 INJECTION, EMULSION INTRAVENOUS at 03:07

## 2018-07-09 RX ADMIN — ATORVASTATIN CALCIUM 20 MG: 20 TABLET, FILM COATED ORAL at 08:07

## 2018-07-09 RX ADMIN — FENTANYL CITRATE 25 MCG: 50 INJECTION, SOLUTION INTRAMUSCULAR; INTRAVENOUS at 05:07

## 2018-07-09 RX ADMIN — DOCUSATE SODIUM 100 MG: 100 CAPSULE, LIQUID FILLED ORAL at 08:07

## 2018-07-09 RX ADMIN — OXYCODONE HYDROCHLORIDE 10 MG: 5 TABLET ORAL at 06:07

## 2018-07-09 RX ADMIN — PROPOFOL 20 MG: 10 INJECTION, EMULSION INTRAVENOUS at 03:07

## 2018-07-09 RX ADMIN — FERROUS SULFATE TAB EC 325 MG (65 MG FE EQUIVALENT) 325 MG: 325 (65 FE) TABLET DELAYED RESPONSE at 08:07

## 2018-07-09 RX ADMIN — CEFAZOLIN 2 G: 330 INJECTION, POWDER, FOR SOLUTION INTRAMUSCULAR; INTRAVENOUS at 04:07

## 2018-07-09 NOTE — NURSING TRANSFER
Nursing Transfer Note      7/9/2018     Transfer To: 314    Transfer via stretcher    Transfer with cardiac monitoring, medtronic machine    Transported by RN    Medicines sent: N/A    Chart send with patient: Yes    Notified: daughter    Patient reassessed at: 7/9/2018

## 2018-07-09 NOTE — BRIEF OP NOTE
S/p AVNRT Slow pathway ablation.   Tolerated procedure well. No complications noted.   Monitor in recovery unit overnight .

## 2018-07-09 NOTE — PLAN OF CARE
Problem: Patient Care Overview  Goal: Plan of Care Review  Outcome: Ongoing (interventions implemented as appropriate)  Patient arrived to floor from home accompanied by her daughter. Pt oriented to room. Iv inserted. Admit assessment completed. Nurse call bell within reach. Will continue to monitor

## 2018-07-09 NOTE — PLAN OF CARE
Problem: Patient Care Overview  Goal: Plan of Care Review  Outcome: Ongoing (interventions implemented as appropriate)  Pt arrived to floor ambulatory from home accompanied by her daughter. Pt oriented to room. Iv inserted. Admit assessment completed. Nurse call bell within reach. Pt denies any complaints at this time. Will continue to monitor

## 2018-07-09 NOTE — PLAN OF CARE
Patient is warm and comfortable. Denies Pain/PONV. Vital signs are stable and within limit. Wound sites are clean and dry-no hematoma, no swelling noted; dressings in place.

## 2018-07-09 NOTE — TRANSFER OF CARE
"Anesthesia Transfer of Care Note    Patient: Crystal Alvarado    Procedure(s) Performed: Procedure(s) (LRB):  INSERTION, CARDIAC PACEMAKER, DUAL CHAMBER (N/A)    Patient location: PACU    Anesthesia Type: general    Transport from OR: Transported from OR on room air with adequate spontaneous ventilation    Post pain: adequate analgesia    Post assessment: no apparent anesthetic complications and tolerated procedure well    Post vital signs: stable    Level of consciousness: alert and awake    Nausea/Vomiting: no nausea/vomiting    Complications: none    Transfer of care protocol was followed      Last vitals:   Visit Vitals  BP (!) 153/67 (BP Location: Left arm, Patient Position: Lying)   Pulse (!) 58   Temp 36.2 °C (97.2 °F) (Temporal)   Resp 13   Ht 5' 5" (1.651 m)   Wt 76.2 kg (168 lb)   SpO2 100%   Breastfeeding? No   BMI 27.96 kg/m²     "

## 2018-07-10 VITALS
HEIGHT: 65 IN | SYSTOLIC BLOOD PRESSURE: 174 MMHG | DIASTOLIC BLOOD PRESSURE: 75 MMHG | HEART RATE: 68 BPM | TEMPERATURE: 99 F | OXYGEN SATURATION: 97 % | RESPIRATION RATE: 18 BRPM | BODY MASS INDEX: 27.99 KG/M2 | WEIGHT: 168 LBS

## 2018-07-10 NOTE — PLAN OF CARE
Problem: Patient Care Overview  Goal: Plan of Care Review  Outcome: Ongoing (interventions implemented as appropriate)  Patient progressing toward all goals. Plan of care discussed with patient, no questions at this time. Patient ambulating with assist, fall precautions in place. No c/o of pain or discomfort. Pulses 2+, dressing CDI.  Patient to be discharged home today.Will monitor.

## 2018-07-10 NOTE — ANESTHESIA POSTPROCEDURE EVALUATION
"Anesthesia Post Evaluation    Patient: Crystal Alvarado    Procedure(s) Performed: Procedure(s) (LRB):  INSERTION, CARDIAC PACEMAKER, DUAL CHAMBER (N/A)    Final Anesthesia Type: general  Patient location during evaluation: floor  Patient participation: Yes- Able to Participate  Level of consciousness: awake and alert  Post-procedure vital signs: reviewed and stable  Pain management: adequate  Airway patency: patent  PONV status at discharge: No PONV  Anesthetic complications: no      Cardiovascular status: blood pressure returned to baseline  Respiratory status: unassisted  Hydration status: euvolemic  Follow-up not needed.        Visit Vitals  BP (!) 174/75   Pulse (!) 57   Temp 36.9 °C (98.5 °F)   Resp 18   Ht 5' 5" (1.651 m)   Wt 76.2 kg (168 lb)   SpO2 97%   Breastfeeding? No   BMI 27.96 kg/m²       Pain/Arturo Score: Pain Assessment Performed: Yes (7/10/2018  7:02 AM)  Presence of Pain: denies (7/10/2018  7:02 AM)  Pain Rating Prior to Med Admin: 0 (7/9/2018  6:09 PM)  Arturo Score: 10 (7/9/2018  6:35 PM)      "

## 2018-07-10 NOTE — DISCHARGE SUMMARY
[facility]  Discharge Summary      Admit Date: 7/9/2018    Discharge Date and Time:  07/10/2018 7:46 AM    Attending Physician: Dr Montez.     Reason for Admission: SVT -slow pathway ablation     Procedures Performed: Procedure(s) (LRB):  PLACEMENT-LOOP RECORDER (N/A)    Hospital Course   S/p AVNRT Slow pathway ablation.   Tolerated procedure well. No complications noted.   Access site was soft with out tenderness or hematoma.       Significant Diagnostic Studies: Labs:     Lab Results   Component Value Date    INR 0.9 07/06/2018    INR 0.9 07/04/2018    INR 1.0 11/19/2017       Final Diagnoses:    Principal Problem: SVT (supraventricular tachycardia)   Secondary Diagnoses:   Active Hospital Problems    Diagnosis  POA    *SVT (supraventricular tachycardia) [I47.1]  Yes      Resolved Hospital Problems    Diagnosis Date Resolved POA   No resolved problems to display.       Discharged Condition: stable    Disposition: Home or Self Care    Follow Up/Patient Instructions:     Medications:  Reconciled Home Medications:      Medication List      CONTINUE taking these medications    amLODIPine 10 MG tablet  Commonly known as:  NORVASC  Take 10 mg by mouth once daily.     aspirin 81 MG Chew  Take 81 mg by mouth once daily.     atorvastatin 20 MG tablet  Commonly known as:  LIPITOR  Take 20 mg by mouth every evening.     azelastine 137 mcg (0.1 %) nasal spray  Commonly known as:  ASTELIN  INHALE 1 SPRAY IN EACH NOSTRIL TWICE A DAY     calcitonin (salmon) 200 unit/actuation nasal spray  Commonly known as:  FORTICAL  1 spray by Nasal route once daily.     docusate sodium 100 MG capsule  Commonly known as:  COLACE  Take 1 capsule (100 mg total) by mouth 2 (two) times daily.     ergocalciferol 50,000 unit Cap  Commonly known as:  ERGOCALCIFEROL  Take 50,000 Units by mouth every 7 days.     ferrous sulfate 325 (65 FE) MG EC tablet  Take 1 tablet (325 mg total) by mouth 2 (two) times daily.     losartan 100  MG tablet  Commonly known as:  COZAAR  TAKE 1 TABLET (100 MG TOTAL) BY MOUTH ONCE DAILY.     nitroGLYCERIN 0.4 MG SL tablet  Commonly known as:  NITROSTAT  Place 1 tablet (0.4 mg total) under the tongue every 5 (five) minutes as needed for Chest pain (and notify MD).     oxyCODONE 10 mg Tab immediate release tablet  Commonly known as:  ROXICODONE  Take 1 tablet (10 mg total) by mouth every 6 (six) hours as needed.     traMADol 50 mg tablet  Commonly known as:  ULTRAM  Take 50 mg by mouth as needed for Pain.            Discharge Procedure Orders  Diet Cardiac     Lifting restrictions   Scheduling Instructions: No lifting for 7 days     Notify your health care provider if you experience any of the following:  temperature >100.4     Notify your health care provider if you experience any of the following:  redness, tenderness, or signs of infection (pain, swelling, redness, odor or green/yellow discharge around incision site)     No dressing needed       Follow-up Information     Archie Montez MD In 4 weeks.    Specialties:  Cardiology, Electrophysiology  Why:  [facility] for Dr Montez   Contact information:  [address] [facility] [id number] [phone]

## 2018-07-10 NOTE — NURSING TRANSFER
Nursing Transfer Note      7/9/2018     Transfer To: 314    Transfer via stretcher    Transfer with cardiac monitoring    Transported by nurse    Medicines sent: none    Chart send with patient: Yes    Notified: daughter    Patient reassessed at: 1900     Upon arrival to floor: cardiac monitor applied, patient oriented to room, call bell in reach and bed in lowest position.  R groin CDI, no bleeding or hematoma noted.  L groin had moderate amount of blood drainage, pressure held and dressing changed.  Site remains soft and CDI.  Will cont to monitor.

## 2018-07-10 NOTE — PLAN OF CARE
Problem: Patient Care Overview  Goal: Plan of Care Review  Outcome: Ongoing (interventions implemented as appropriate)  Bilateral groin sites remain CDI after ambulation, no bleeding or hematoma noted.  Will cont monitor.

## 2018-07-10 NOTE — BRIEF OP NOTE
[email or web address]  [facility]  Discharge Summary      Admit Date: 7/9/2018    Discharge Date and Time:  07/10/2018 7:46 AM    Attending Physician: Archie Montez MD     Reason for Admission: SVT -slow pathway ablation     Procedures Performed: Procedure(s) (LRB):  ABLATION (N/A)    Hospital Course   S/p AVNRT Slow pathway ablation.   Tolerated procedure well. No complications noted.   Access site was soft with out tenderness or hematoma.       Significant Diagnostic Studies: Labs:     Lab Results   Component Value Date    INR 0.9 07/06/2018    INR 0.9 07/04/2018    INR 1.0 11/19/2017       Final Diagnoses:    Principal Problem: SVT (supraventricular tachycardia)   Secondary Diagnoses:   Active Hospital Problems    Diagnosis  POA    *SVT (supraventricular tachycardia) [I47.1]  Yes      Resolved Hospital Problems    Diagnosis Date Resolved POA   No resolved problems to display.       Discharged Condition: stable    Disposition: Home or Self Care    Follow Up/Patient Instructions:     Medications:  Reconciled Home Medications:      Medication List      CONTINUE taking these medications    amLODIPine 10 MG tablet  Commonly known as:  NORVASC  Take 10 mg by mouth once daily.     aspirin 81 MG Chew  Take 81 mg by mouth once daily.     atorvastatin 20 MG tablet  Commonly known as:  LIPITOR  Take 20 mg by mouth every evening.     azelastine 137 mcg (0.1 %) nasal spray  Commonly known as:  ASTELIN  INHALE 1 SPRAY IN EACH NOSTRIL TWICE A DAY     calcitonin (salmon) 200 unit/actuation nasal spray  Commonly known as:  FORTICAL  1 spray by Nasal route once daily.     docusate sodium 100 MG capsule  Commonly known as:  COLACE  Take 1 capsule (100 mg total) by mouth 2 (two) times daily.     ergocalciferol 50,000 unit Cap  Commonly known as:  ERGOCALCIFEROL  Take 50,000 Units by mouth every 7 days.     ferrous sulfate 325 (65 FE) MG EC tablet  Take 1 tablet (325 mg total) by mouth 2 (two) times daily.     losartan 100 MG  tablet  Commonly known as:  COZAAR  TAKE 1 TABLET (100 MG TOTAL) BY MOUTH ONCE DAILY.     nitroGLYCERIN 0.4 MG SL tablet  Commonly known as:  NITROSTAT  Place 1 tablet (0.4 mg total) under the tongue every 5 (five) minutes as needed for Chest pain (and notify MD).     oxyCODONE 10 mg Tab immediate release tablet  Commonly known as:  ROXICODONE  Take 1 tablet (10 mg total) by mouth every 6 (six) hours as needed.     traMADol 50 mg tablet  Commonly known as:  ULTRAM  Take 50 mg by mouth as needed for Pain.            Discharge Procedure Orders  Diet Cardiac     Lifting restrictions   Scheduling Instructions: No lifting for 7 days     Notify your health care provider if you experience any of the following:  temperature >100.4     Notify your health care provider if you experience any of the following:  redness, tenderness, or signs of infection (pain, swelling, redness, odor or green/yellow discharge around incision site)     No dressing needed       Follow-up Information     Archie Montez MD In 4 weeks.    Specialties:  Cardiology, Electrophysiology  Why:  [facility] for Dr Montez   Contact information:  [address] [facility] [id number] [phone]

## 2018-07-12 DIAGNOSIS — R55 SYNCOPE AND COLLAPSE: Primary | ICD-10-CM

## 2018-07-12 DIAGNOSIS — Z95.818 STATUS POST PLACEMENT OF IMPLANTABLE LOOP RECORDER: ICD-10-CM

## 2018-07-23 ENCOUNTER — CLINICAL SUPPORT (OUTPATIENT)
Dept: ELECTROPHYSIOLOGY | Facility: CLINIC | Age: 78
End: 2018-07-23
Payer: MEDICARE

## 2018-07-23 DIAGNOSIS — R55 SYNCOPE AND COLLAPSE: ICD-10-CM

## 2018-07-23 DIAGNOSIS — Z95.818 STATUS POST PLACEMENT OF IMPLANTABLE LOOP RECORDER: Primary | ICD-10-CM

## 2018-07-23 DIAGNOSIS — Z95.818 STATUS POST PLACEMENT OF IMPLANTABLE LOOP RECORDER: ICD-10-CM

## 2018-07-23 PROCEDURE — 93291 INTERROG DEV EVAL SCRMS IP: CPT | Mod: S$GLB,,, | Performed by: INTERNAL MEDICINE

## 2018-08-14 ENCOUNTER — OFFICE VISIT (OUTPATIENT)
Dept: CARDIOLOGY | Facility: CLINIC | Age: 78
End: 2018-08-14
Payer: MEDICARE

## 2018-08-14 VITALS
WEIGHT: 168 LBS | SYSTOLIC BLOOD PRESSURE: 125 MMHG | DIASTOLIC BLOOD PRESSURE: 74 MMHG | OXYGEN SATURATION: 100 % | HEIGHT: 65 IN | BODY MASS INDEX: 27.99 KG/M2 | HEART RATE: 64 BPM

## 2018-08-14 DIAGNOSIS — I25.10 CORONARY ARTERY DISEASE INVOLVING NATIVE CORONARY ARTERY OF NATIVE HEART WITHOUT ANGINA PECTORIS: ICD-10-CM

## 2018-08-14 DIAGNOSIS — I47.10 PAROXYSMAL SVT (SUPRAVENTRICULAR TACHYCARDIA): ICD-10-CM

## 2018-08-14 DIAGNOSIS — I50.30 HEART FAILURE WITH PRESERVED LEFT VENTRICULAR FUNCTION (HFPEF): ICD-10-CM

## 2018-08-14 DIAGNOSIS — I47.10 SUPRAVENTRICULAR TACHYCARDIA: ICD-10-CM

## 2018-08-14 DIAGNOSIS — I95.1 ORTHOSTATIC HYPOTENSION: ICD-10-CM

## 2018-08-14 DIAGNOSIS — I49.5 SICK SINUS SYNDROME: ICD-10-CM

## 2018-08-14 DIAGNOSIS — Z95.818 STATUS POST PLACEMENT OF IMPLANTABLE LOOP RECORDER: ICD-10-CM

## 2018-08-14 DIAGNOSIS — Z98.890 HISTORY OF RADIOFREQUENCY ABLATION (RFA) PROCEDURE FOR CARDIAC ARRHYTHMIA: ICD-10-CM

## 2018-08-14 DIAGNOSIS — E78.5 HYPERLIPIDEMIA, UNSPECIFIED HYPERLIPIDEMIA TYPE: ICD-10-CM

## 2018-08-14 DIAGNOSIS — I10 ESSENTIAL HYPERTENSION: ICD-10-CM

## 2018-08-14 DIAGNOSIS — I49.5 SSS (SICK SINUS SYNDROME): Primary | ICD-10-CM

## 2018-08-14 DIAGNOSIS — N18.30 CKD (CHRONIC KIDNEY DISEASE) STAGE 3, GFR 30-59 ML/MIN: ICD-10-CM

## 2018-08-14 DIAGNOSIS — R00.1 SINUS BRADYCARDIA: ICD-10-CM

## 2018-08-14 DIAGNOSIS — I47.10 SVT (SUPRAVENTRICULAR TACHYCARDIA): ICD-10-CM

## 2018-08-14 PROCEDURE — 99213 OFFICE O/P EST LOW 20 MIN: CPT | Mod: S$GLB,,, | Performed by: INTERNAL MEDICINE

## 2018-08-14 PROCEDURE — 3078F DIAST BP <80 MM HG: CPT | Mod: CPTII,S$GLB,, | Performed by: INTERNAL MEDICINE

## 2018-08-14 PROCEDURE — 93000 ELECTROCARDIOGRAM COMPLETE: CPT | Mod: S$GLB,,, | Performed by: INTERNAL MEDICINE

## 2018-08-14 PROCEDURE — 3074F SYST BP LT 130 MM HG: CPT | Mod: CPTII,S$GLB,, | Performed by: INTERNAL MEDICINE

## 2018-08-14 PROCEDURE — 99999 PR PBB SHADOW E&M-EST. PATIENT-LVL III: CPT | Mod: PBBFAC,,, | Performed by: INTERNAL MEDICINE

## 2018-08-14 NOTE — PROGRESS NOTES
"  Subjective:      Patient ID: Crystal Alvarado is a 77 y.o. female.    Chief Complaint: Follow-up (SSS)    HPI:  Pt c/o shortness of breath walking around the house.   "I don't have any energy."  Pt takes tramadol for knee pain    Pt had RFA by Dr Montez for SVT and a loop recorder in July    Pt reports she is not using either nose spray.      Review of Systems   Cardiovascular: Positive for dyspnea on exertion. Negative for chest pain, claudication, irregular heartbeat, leg swelling, near-syncope, orthopnea, palpitations and syncope.      Pt c/o poor appetite    Pt sees Dr Coughlin for CKD in [place].  Past Medical History:   Diagnosis Date    Arthritis     CHF (congestive heart failure)     Coronary artery disease     Hyperlipidemia     Hypertension         Past Surgical History:   Procedure Laterality Date    HYSTERECTOMY N/A        No family history on file.    Social History     Socioeconomic History    Marital status: Single     Spouse name: None    Number of children: None    Years of education: None    Highest education level: None   Social Needs    Financial resource strain: None    Food insecurity - worry: None    Food insecurity - inability: None    Transportation needs - medical: None    Transportation needs - non-medical: None   Occupational History    None   Tobacco Use    Smoking status: Former Smoker     Packs/day: 0.50     Last attempt to quit: 2013     Years since quittin.3    Smokeless tobacco: Never Used   Substance and Sexual Activity    Alcohol use: No    Drug use: No    Sexual activity: No   Other Topics Concern    None   Social History Narrative    None       Current Outpatient Medications on File Prior to Visit   Medication Sig Dispense Refill    amlodipine (NORVASC) 10 MG tablet Take 10 mg by mouth once daily.      aspirin 81 MG Chew Take 81 mg by mouth once daily.  4    atorvastatin (LIPITOR) 20 MG tablet Take 20 mg by mouth every evening.  4    docusate " "sodium (COLACE) 100 MG capsule Take 1 capsule (100 mg total) by mouth 2 (two) times daily. 60 capsule 0    ergocalciferol (ERGOCALCIFEROL) 50,000 unit Cap Take 50,000 Units by mouth every 7 days.      losartan (COZAAR) 100 MG tablet TAKE 1 TABLET (100 MG TOTAL) BY MOUTH ONCE DAILY. 30 tablet 9    nitroGLYCERIN (NITROSTAT) 0.4 MG SL tablet Place 1 tablet (0.4 mg total) under the tongue every 5 (five) minutes as needed for Chest pain (and notify MD). 25 tablet 5    traMADol (ULTRAM) 50 mg tablet Take 50 mg by mouth as needed for Pain.      azelastine (ASTELIN) 137 mcg (0.1 %) nasal spray INHALE 1 SPRAY IN EACH NOSTRIL TWICE A DAY  3    calcitonin, salmon, (FORTICAL) 200 unit/actuation nasal spray 1 spray by Nasal route once daily. 1 Bottle 0    ferrous sulfate 325 (65 FE) MG EC tablet Take 1 tablet (325 mg total) by mouth 2 (two) times daily. 30 tablet 3    oxyCODONE (ROXICODONE) 10 mg Tab immediate release tablet Take 1 tablet (10 mg total) by mouth every 6 (six) hours as needed. 20 tablet 0     No current facility-administered medications on file prior to visit.        Review of patient's allergies indicates:  No Known Allergies  Objective:     Vitals:    08/14/18 1111   BP: 125/74   BP Location: Left arm   Patient Position: Sitting   Pulse: 64   SpO2: 100%   Weight: 76.2 kg (168 lb)   Height: 5' 5" (1.651 m)        Physical Exam   Constitutional: She is oriented to person, place, and time. She appears well-developed and well-nourished.   Eyes: No scleral icterus.   Neck: No JVD present. Carotid bruit is not present.   Cardiovascular: Normal rate and regular rhythm. Exam reveals no gallop.   Murmur (II/VI systolic) heard.  Pulmonary/Chest: Breath sounds normal.   Musculoskeletal: She exhibits no edema.   Neurological: She is alert and oriented to person, place, and time.   Skin: Skin is warm and dry.   Psychiatric: She has a normal mood and affect. Her behavior is normal. Judgment and thought content normal. "   Vitals reviewed.     Wt down 16 lbs over past year    Creat 1.5 last month,  Hgb 10.8 last month    ECG: NSR, slightly low T waves  Assessment:     1. SSS (sick sinus syndrome)    2. Sick sinus syndrome    3. Essential hypertension    4. Hyperlipidemia, unspecified hyperlipidemia type    5. Coronary artery disease involving native coronary artery of native heart without angina pectoris    6. Supraventricular tachycardia    7. Heart failure with preserved left ventricular function (HFpEF)    8. Sinus bradycardia    9. Orthostatic hypotension    10. SVT (supraventricular tachycardia)    11. CKD (chronic kidney disease) stage 3, GFR 30-59 ml/min    12. History of radiofrequency ablation (RFA) procedure for cardiac arrhythmia    13. Status post placement of implantable loop recorder      Plan:   Crystal was seen today for follow-up.    Diagnoses and all orders for this visit:    SSS (sick sinus syndrome)    Sick sinus syndrome    Essential hypertension    Hyperlipidemia, unspecified hyperlipidemia type    Coronary artery disease involving native coronary artery of native heart without angina pectoris    Supraventricular tachycardia    Heart failure with preserved left ventricular function (HFpEF)    Sinus bradycardia    Orthostatic hypotension    SVT (supraventricular tachycardia)    CKD (chronic kidney disease) stage 3, GFR 30-59 ml/min    History of radiofrequency ablation (RFA) procedure for cardiac arrhythmia    Status post placement of implantable loop recorder    Other orders  -     IN OFFICE EKG 12-LEAD (to [facility])       Try taking Tylenol in place of tramadol due to fatigue and loss of appetite    Follow-up in about 6 months (around 2/14/2019).

## 2018-08-27 ENCOUNTER — CLINICAL SUPPORT (OUTPATIENT)
Dept: ELECTROPHYSIOLOGY | Facility: CLINIC | Age: 78
End: 2018-08-27
Attending: INTERNAL MEDICINE
Payer: MEDICARE

## 2018-08-27 DIAGNOSIS — R55 SYNCOPE AND COLLAPSE: ICD-10-CM

## 2018-08-27 DIAGNOSIS — Z95.818 STATUS POST PLACEMENT OF IMPLANTABLE LOOP RECORDER: ICD-10-CM

## 2018-08-27 PROCEDURE — 93298 REM INTERROG DEV EVAL SCRMS: CPT | Mod: ,,, | Performed by: INTERNAL MEDICINE

## 2018-08-27 PROCEDURE — 93299 LOOP RECORDER REMOTE: CPT | Mod: ,,, | Performed by: INTERNAL MEDICINE

## 2018-10-30 ENCOUNTER — CLINICAL SUPPORT (OUTPATIENT)
Dept: ELECTROPHYSIOLOGY | Facility: CLINIC | Age: 78
End: 2018-10-30
Attending: INTERNAL MEDICINE
Payer: MEDICARE

## 2018-10-30 DIAGNOSIS — Z95.818 STATUS POST PLACEMENT OF IMPLANTABLE LOOP RECORDER: ICD-10-CM

## 2018-10-30 DIAGNOSIS — R55 SYNCOPE AND COLLAPSE: ICD-10-CM

## 2018-10-30 PROCEDURE — 93298 REM INTERROG DEV EVAL SCRMS: CPT | Mod: S$GLB,,, | Performed by: INTERNAL MEDICINE

## 2018-10-30 PROCEDURE — 93299 LOOP RECORDER REMOTE: CPT | Mod: S$GLB,,, | Performed by: INTERNAL MEDICINE

## 2018-10-31 ENCOUNTER — OFFICE VISIT (OUTPATIENT)
Dept: ELECTROPHYSIOLOGY | Facility: CLINIC | Age: 78
End: 2018-10-31
Payer: MEDICARE

## 2018-10-31 ENCOUNTER — HOSPITAL ENCOUNTER (OUTPATIENT)
Dept: CARDIOLOGY | Facility: CLINIC | Age: 78
Discharge: HOME | End: 2018-10-31
Payer: MEDICARE

## 2018-10-31 VITALS
SYSTOLIC BLOOD PRESSURE: 140 MMHG | HEART RATE: 65 BPM | HEIGHT: 65 IN | DIASTOLIC BLOOD PRESSURE: 62 MMHG | BODY MASS INDEX: 27.99 KG/M2 | WEIGHT: 168 LBS

## 2018-10-31 DIAGNOSIS — I47.10 SVT (SUPRAVENTRICULAR TACHYCARDIA): Primary | ICD-10-CM

## 2018-10-31 DIAGNOSIS — I10 ESSENTIAL HYPERTENSION: ICD-10-CM

## 2018-10-31 DIAGNOSIS — R55 POSTURAL DIZZINESS WITH PRESYNCOPE: ICD-10-CM

## 2018-10-31 DIAGNOSIS — I49.5 SSS (SICK SINUS SYNDROME): ICD-10-CM

## 2018-10-31 DIAGNOSIS — R42 POSTURAL DIZZINESS WITH PRESYNCOPE: ICD-10-CM

## 2018-10-31 PROBLEM — R00.1 SINUS BRADYCARDIA: Status: RESOLVED | Noted: 2018-04-26 | Resolved: 2018-10-31

## 2018-10-31 PROCEDURE — 93000 ELECTROCARDIOGRAM COMPLETE: CPT | Mod: S$GLB,,, | Performed by: INTERNAL MEDICINE

## 2018-10-31 PROCEDURE — 1101F PT FALLS ASSESS-DOCD LE1/YR: CPT | Mod: CPTII,,, | Performed by: INTERNAL MEDICINE

## 2018-10-31 PROCEDURE — 99999 PR PBB SHADOW E&M-EST. PATIENT-LVL III: CPT | Mod: PBBFAC,,, | Performed by: INTERNAL MEDICINE

## 2018-10-31 PROCEDURE — 3078F DIAST BP <80 MM HG: CPT | Mod: CPTII,,, | Performed by: INTERNAL MEDICINE

## 2018-10-31 PROCEDURE — 3077F SYST BP >= 140 MM HG: CPT | Mod: CPTII,,, | Performed by: INTERNAL MEDICINE

## 2018-10-31 PROCEDURE — 99213 OFFICE O/P EST LOW 20 MIN: CPT | Mod: PBBFAC | Performed by: INTERNAL MEDICINE

## 2018-10-31 PROCEDURE — 99213 OFFICE O/P EST LOW 20 MIN: CPT | Mod: S$PBB,,, | Performed by: INTERNAL MEDICINE

## 2018-10-31 RX ORDER — HYDROCODONE BITARTRATE AND ACETAMINOPHEN 5; 325 MG/1; MG/1
TABLET ORAL
Refills: 0 | COMMUNITY
Start: 2018-10-17 | End: 2019-02-26

## 2018-10-31 NOTE — PROGRESS NOTES
Subjective:    Patient ID:  Crystal Alvarado is a 78 y.o. female who presents for evaluation of SVT    Referring Cardiologist: Chris Kline MD    HPI   Prior Hx:  I had the pleasure of seeing Ms. Alvarado today in our electrophysiology clinic for her SVT and bradycardia. As you are aware she is a pleasant 78 year-old woman with hypertension, non-obstructive CAD and CKD stage III and chronic bradycardia. She presented to the ER 4/25/2018 with complaint of dizziness. She was in a narrow complex short RP tachycardia at a rate of 156 bpm that terminated with a vagal maneuver. During her stay she had sinus rhythm with rates of 40s-60s off AV kamar agents. She felt better. She had a holter monitor 12/2017 that noted sinus rhythm with rate range of 43-97 with average of 66. There were also rare PVCs and PACs with 2 episodes of NSAT.     Since her hospitalization she had several falls at home. She believes the falls were from losing her balance and not from light-headedness. She notes intermittent similar symptoms that she presented to the ER with. Episodes may last several minutes. One fall 7/2018 resulted in left clavicular fracture.    CONCLUSIONS     1 - Normal left ventricular systolic function (EF 60-65%).     2 - Impaired LV relaxation, increased LVEDP.     3 - Normal right ventricular systolic function .     4 - The estimated PA systolic pressure is 31 mmHg.    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 She underwent slow pathway modification and then underwent ILR implantation for rhythm monitoring for her syncope/falls. She returns for follow-up. To date no ILR events noted. She feels well. She notes occasional short episodes of skipped beats.    My interpretation of today's in clinic ECG is sinus rhythm at a rate of 65 bpm. Rhythm strip notes a few PACs and a PVC    Review of Systems   Constitution: Negative for weakness and malaise/fatigue.   HENT: Negative for congestion and sore  throat.    Eyes: Negative for blurred vision and visual disturbance.   Cardiovascular: Positive for palpitations. Negative for chest pain, leg swelling, near-syncope, paroxysmal nocturnal dyspnea and syncope.   Respiratory: Negative for cough and shortness of breath.    Hematologic/Lymphatic: Negative for bleeding problem. Does not bruise/bleed easily.   Musculoskeletal: Positive for arthritis.   Gastrointestinal: Negative for bloating and abdominal pain.   Neurological: Negative for light-headedness and loss of balance.        Objective:    Physical Exam   Constitutional: She is oriented to person, place, and time. She appears well-developed and well-nourished. No distress.   HENT:   Head: Normocephalic and atraumatic.   Eyes: Conjunctivae are normal. Right eye exhibits no discharge. Left eye exhibits no discharge.   Neck: Neck supple. No JVD present.   Cardiovascular: Normal rate and regular rhythm. Exam reveals no gallop and no friction rub.   No murmur heard.  Pulmonary/Chest: Effort normal and breath sounds normal. No respiratory distress. She has no wheezes. She has no rales.   Abdominal: Soft. Bowel sounds are normal. She exhibits no distension. There is no tenderness. There is no rebound.   Musculoskeletal: She exhibits no edema.   Neurological: She is alert and oriented to person, place, and time.   Skin: Skin is warm and dry. She is not diaphoretic.   Psychiatric: She has a normal mood and affect. Her behavior is normal. Judgment and thought content normal.   Vitals reviewed.        Assessment:       1. SVT (supraventricular tachycardia)    2. Essential hypertension    3. Postural dizziness with presyncope         Plan:       In summary, Ms. Alvarado is a pleasant 78 year-old woman with hypertension, non-obstructive CAD and CKD stage III and chronic bradycardia presenting for evaluation of SVT and bradycardia. She is s/p slow pathway modification for AVNRT and ILR implant for intermittent bradycardia with  falls/syncope. She is off kamar blocking agents. No events recorded on her ILR and has had no recents falls.     Continue monthly ILR monitoring. RTC in 1 year, sooner if needed.    Thank you for allowing me to participate in the care of this patient. Please do not hesitate to call me with any questions or concerns.    Archie Montez MD, PhD  Cardiac Electrophysiology

## 2018-12-03 ENCOUNTER — CLINICAL SUPPORT (OUTPATIENT)
Dept: CARDIOLOGY | Facility: HOSPITAL | Age: 78
End: 2018-12-03
Attending: INTERNAL MEDICINE
Payer: MEDICARE

## 2018-12-03 DIAGNOSIS — Z95.818 STATUS POST PLACEMENT OF IMPLANTABLE LOOP RECORDER: ICD-10-CM

## 2018-12-03 PROCEDURE — 93299 CARDIAC DEVICE CHECK - REMOTE: CPT

## 2018-12-22 PROCEDURE — 93299 PR INTERROG EVAL, REMOTE, UP TO 30 DAYS, CV MON/LOOP REC,TECH REVIEW: CPT | Mod: ,,, | Performed by: INTERNAL MEDICINE

## 2018-12-22 PROCEDURE — 93298 CARDIAC DEVICE CHECK - REMOTE: ICD-10-PCS | Mod: ,,, | Performed by: INTERNAL MEDICINE

## 2018-12-22 PROCEDURE — 93299 PR INTERROG EVAL, REMOTE, UP TO 30 DAYS, CV MON/LOOP REC,TECH REVIEW: ICD-10-PCS | Mod: ,,, | Performed by: INTERNAL MEDICINE

## 2018-12-22 PROCEDURE — 93298 REM INTERROG DEV EVAL SCRMS: CPT | Mod: ,,, | Performed by: INTERNAL MEDICINE

## 2018-12-26 DIAGNOSIS — Z95.818 STATUS POST PLACEMENT OF IMPLANTABLE LOOP RECORDER: Primary | ICD-10-CM

## 2019-01-03 ENCOUNTER — CLINICAL SUPPORT (OUTPATIENT)
Dept: CARDIOLOGY | Facility: HOSPITAL | Age: 79
End: 2019-01-03
Attending: INTERNAL MEDICINE
Payer: MEDICARE

## 2019-01-03 DIAGNOSIS — Z46.9 FITTING AND ADJUSTMENT OF DEVICE: ICD-10-CM

## 2019-01-03 PROCEDURE — 93299 CARDIAC DEVICE CHECK - REMOTE: CPT

## 2019-01-22 DIAGNOSIS — Z46.9 FITTING AND ADJUSTMENT OF DEVICE: Primary | ICD-10-CM

## 2019-02-14 ENCOUNTER — CLINICAL SUPPORT (OUTPATIENT)
Dept: CARDIOLOGY | Facility: HOSPITAL | Age: 79
End: 2019-02-14
Attending: INTERNAL MEDICINE
Payer: MEDICARE

## 2019-02-14 DIAGNOSIS — Z46.9 FITTING AND ADJUSTMENT OF DEVICE: ICD-10-CM

## 2019-02-14 PROCEDURE — 93299 CARDIAC DEVICE CHECK - REMOTE: CPT

## 2019-02-18 DIAGNOSIS — Z46.9 FITTING AND ADJUSTMENT OF DEVICE: Primary | ICD-10-CM

## 2019-02-26 ENCOUNTER — OFFICE VISIT (OUTPATIENT)
Dept: CARDIOLOGY | Facility: CLINIC | Age: 79
End: 2019-02-26
Payer: MEDICARE

## 2019-02-26 VITALS
HEART RATE: 64 BPM | OXYGEN SATURATION: 100 % | HEIGHT: 65 IN | SYSTOLIC BLOOD PRESSURE: 139 MMHG | WEIGHT: 169.88 LBS | DIASTOLIC BLOOD PRESSURE: 64 MMHG | BODY MASS INDEX: 28.3 KG/M2

## 2019-02-26 DIAGNOSIS — Z95.818 STATUS POST PLACEMENT OF IMPLANTABLE LOOP RECORDER: ICD-10-CM

## 2019-02-26 DIAGNOSIS — I49.5 SSS (SICK SINUS SYNDROME): ICD-10-CM

## 2019-02-26 DIAGNOSIS — I50.30 HEART FAILURE WITH PRESERVED LEFT VENTRICULAR FUNCTION (HFPEF): ICD-10-CM

## 2019-02-26 DIAGNOSIS — I47.10 SUPRAVENTRICULAR TACHYCARDIA: ICD-10-CM

## 2019-02-26 DIAGNOSIS — N18.30 CKD (CHRONIC KIDNEY DISEASE) STAGE 3, GFR 30-59 ML/MIN: ICD-10-CM

## 2019-02-26 DIAGNOSIS — I25.10 CORONARY ARTERY DISEASE INVOLVING NATIVE CORONARY ARTERY OF NATIVE HEART WITHOUT ANGINA PECTORIS: Primary | ICD-10-CM

## 2019-02-26 DIAGNOSIS — I10 ESSENTIAL HYPERTENSION: ICD-10-CM

## 2019-02-26 DIAGNOSIS — I95.1 ORTHOSTATIC HYPOTENSION: ICD-10-CM

## 2019-02-26 DIAGNOSIS — Z98.890 HISTORY OF RADIOFREQUENCY ABLATION (RFA) PROCEDURE FOR CARDIAC ARRHYTHMIA: ICD-10-CM

## 2019-02-26 DIAGNOSIS — I47.10 SVT (SUPRAVENTRICULAR TACHYCARDIA): ICD-10-CM

## 2019-02-26 DIAGNOSIS — E78.00 PURE HYPERCHOLESTEROLEMIA: ICD-10-CM

## 2019-02-26 PROCEDURE — 1101F PT FALLS ASSESS-DOCD LE1/YR: CPT | Mod: CPTII,S$GLB,, | Performed by: INTERNAL MEDICINE

## 2019-02-26 PROCEDURE — 3078F PR MOST RECENT DIASTOLIC BLOOD PRESSURE < 80 MM HG: ICD-10-PCS | Mod: CPTII,S$GLB,, | Performed by: INTERNAL MEDICINE

## 2019-02-26 PROCEDURE — 3078F DIAST BP <80 MM HG: CPT | Mod: CPTII,S$GLB,, | Performed by: INTERNAL MEDICINE

## 2019-02-26 PROCEDURE — 99213 PR OFFICE/OUTPT VISIT, EST, LEVL III, 20-29 MIN: ICD-10-PCS | Mod: S$GLB,,, | Performed by: INTERNAL MEDICINE

## 2019-02-26 PROCEDURE — 93000 ELECTROCARDIOGRAM COMPLETE: CPT | Mod: S$GLB,,, | Performed by: INTERNAL MEDICINE

## 2019-02-26 PROCEDURE — 99213 OFFICE O/P EST LOW 20 MIN: CPT | Mod: S$GLB,,, | Performed by: INTERNAL MEDICINE

## 2019-02-26 PROCEDURE — 3075F SYST BP GE 130 - 139MM HG: CPT | Mod: CPTII,S$GLB,, | Performed by: INTERNAL MEDICINE

## 2019-02-26 PROCEDURE — 3075F PR MOST RECENT SYSTOLIC BLOOD PRESS GE 130-139MM HG: ICD-10-PCS | Mod: CPTII,S$GLB,, | Performed by: INTERNAL MEDICINE

## 2019-02-26 PROCEDURE — 99999 PR PBB SHADOW E&M-EST. PATIENT-LVL III: CPT | Mod: PBBFAC,,, | Performed by: INTERNAL MEDICINE

## 2019-02-26 PROCEDURE — 93000 EKG 12-LEAD: ICD-10-PCS | Mod: S$GLB,,, | Performed by: INTERNAL MEDICINE

## 2019-02-26 PROCEDURE — 99999 PR PBB SHADOW E&M-EST. PATIENT-LVL III: ICD-10-PCS | Mod: PBBFAC,,, | Performed by: INTERNAL MEDICINE

## 2019-02-26 PROCEDURE — 1101F PR PT FALLS ASSESS DOC 0-1 FALLS W/OUT INJ PAST YR: ICD-10-PCS | Mod: CPTII,S$GLB,, | Performed by: INTERNAL MEDICINE

## 2019-02-26 NOTE — PROGRESS NOTES
Subjective:      Patient ID: Crystal Alvarado is a 78 y.o. female.    Chief Complaint: Follow-up (SSS, Hypertension)    HPI: Rare right infraclavicular tingling.  No shortness of breath.  No chest pain or palpitations since RFA by Dr Montez  No fainting or weak spells   Walks in the house  Pt has not used any NTG SL  Review of Systems   Cardiovascular: Negative for chest pain, claudication, dyspnea on exertion, irregular heartbeat, leg swelling, near-syncope, orthopnea, palpitations and syncope.        Past Medical History:   Diagnosis Date    Arthritis     CHF (congestive heart failure)     Coronary artery disease     Hyperlipidemia     Hypertension         Past Surgical History:   Procedure Laterality Date    HYSTERECTOMY N/A     INSERTION, CARDIAC PACEMAKER, DUAL CHAMBER N/A 2018    Performed by [facility]z [facility] at [facility] CATH LAB    PLACEMENT-LOOP RECORDER N/A 2018    Performed by Archie Montez MD at [facility] CATH LAB       No family history on file.    Social History     Socioeconomic History    Marital status: Single     Spouse name: None    Number of children: None    Years of education: None    Highest education level: None   Social Needs    Financial resource strain: None    Food insecurity - worry: None    Food insecurity - inability: None    Transportation needs - medical: None    Transportation needs - non-medical: None   Occupational History    None   Tobacco Use    Smoking status: Former Smoker     Packs/day: 0.50     Last attempt to quit: 2013     Years since quittin.8    Smokeless tobacco: Never Used   Substance and Sexual Activity    Alcohol use: No    Drug use: No    Sexual activity: No   Other Topics Concern    None   Social History Narrative    None       Current Outpatient Medications on File Prior to Visit   Medication Sig Dispense Refill    amlodipine (NORVASC) 10 MG tablet Take 10 mg by mouth once daily.      aspirin 81 MG Chew Take 81 mg by mouth  "once daily.  4    atorvastatin (LIPITOR) 20 MG tablet Take 20 mg by mouth every evening.  4    docusate sodium (COLACE) 100 MG capsule Take 1 capsule (100 mg total) by mouth 2 (two) times daily. 60 capsule 0    ergocalciferol (ERGOCALCIFEROL) 50,000 unit Cap Take 50,000 Units by mouth every 7 days.      losartan (COZAAR) 100 MG tablet TAKE 1 TABLET (100 MG TOTAL) BY MOUTH ONCE DAILY. 30 tablet 2    nitroGLYCERIN (NITROSTAT) 0.4 MG SL tablet Place 1 tablet (0.4 mg total) under the tongue every 5 (five) minutes as needed for Chest pain (and notify MD). 25 tablet 5    [DISCONTINUED] azelastine (ASTELIN) 137 mcg (0.1 %) nasal spray INHALE 1 SPRAY IN EACH NOSTRIL TWICE A DAY  3    [DISCONTINUED] calcitonin, salmon, (FORTICAL) 200 unit/actuation nasal spray 1 spray by Nasal route once daily. 1 Bottle 0    [DISCONTINUED] ferrous sulfate 325 (65 FE) MG EC tablet Take 1 tablet (325 mg total) by mouth 2 (two) times daily. 30 tablet 3    [DISCONTINUED] HYDROcodone-acetaminophen (NORCO) 5-325 mg per tablet TAKE 1 TABLET BY MOUTH UP TO TWICE DAILY AS NEEDED PAIN  0    [DISCONTINUED] traMADol (ULTRAM) 50 mg tablet Take 50 mg by mouth as needed for Pain.       No current facility-administered medications on file prior to visit.        Review of patient's allergies indicates:  No Known Allergies  Objective:     Vitals:    02/26/19 1101 02/26/19 1157   BP: (!) 146/71 139/64   BP Location: Left arm Left arm   Patient Position: Sitting Sitting   BP Method: Large (Automatic)    Pulse: 64    SpO2: 100%    Weight: 77 kg (169 lb 13.8 oz)    Height: 5' 5" (1.651 m)         Physical Exam   Constitutional: She is oriented to person, place, and time. She appears well-developed and well-nourished.   Eyes: No scleral icterus.   Neck: No JVD present. Carotid bruit is not present.   Cardiovascular: Normal rate and regular rhythm. Exam reveals no gallop.   Murmur (II/VI systolic) heard.  Pulmonary/Chest: Breath sounds normal. "   Musculoskeletal: She exhibits no edema.   Neurological: She is alert and oriented to person, place, and time.   Skin: Skin is warm and dry.   Psychiatric: She has a normal mood and affect. Her behavior is normal. Judgment and thought content normal.   Vitals reviewed.     Wt stable    ECG: NSR, clockwise rotation, WNL    Loop recorder remote interrogation noted  Assessment:     1. Coronary artery disease involving native coronary artery of native heart without angina pectoris    2. Pure hypercholesterolemia    3. Essential hypertension    4. SSS (sick sinus syndrome)    5. Supraventricular tachycardia    6. Heart failure with preserved left ventricular function (HFpEF)    7. Orthostatic hypotension    8. SVT (supraventricular tachycardia)    9. History of radiofrequency ablation (RFA) procedure for cardiac arrhythmia    10. Status post placement of implantable loop recorder    11. CKD (chronic kidney disease) stage 3, GFR 30-59 ml/min      Plan:   Crystal was seen today for follow-up.    Diagnoses and all orders for this visit:    Coronary artery disease involving native coronary artery of native heart without angina pectoris    Pure hypercholesterolemia    Essential hypertension    SSS (sick sinus syndrome)    Supraventricular tachycardia    Heart failure with preserved left ventricular function (HFpEF)    Orthostatic hypotension    SVT (supraventricular tachycardia)    History of radiofrequency ablation (RFA) procedure for cardiac arrhythmia    Status post placement of implantable loop recorder    CKD (chronic kidney disease) stage 3, GFR 30-59 ml/min    Other orders  -     IN OFFICE EKG 12-LEAD (to [facility])    F/u with PCP, Dr Raz Coughlin, nephrologist does labs    F/u with oncologist who is monitoring urine protein    Remote monitor of loop recorder per Dr Montez    Follow-up in about 6 months (around 8/26/2019).

## 2019-03-06 ENCOUNTER — CLINICAL SUPPORT (OUTPATIENT)
Dept: CARDIOLOGY | Facility: HOSPITAL | Age: 79
End: 2019-03-06
Attending: INTERNAL MEDICINE
Payer: MEDICARE

## 2019-03-06 DIAGNOSIS — Z46.9 FITTING AND ADJUSTMENT OF DEVICE: ICD-10-CM

## 2019-03-06 PROCEDURE — 93299 CARDIAC DEVICE CHECK - REMOTE: CPT

## 2019-04-08 ENCOUNTER — CLINICAL SUPPORT (OUTPATIENT)
Dept: CARDIOLOGY | Facility: HOSPITAL | Age: 79
End: 2019-04-08
Attending: INTERNAL MEDICINE
Payer: MEDICARE

## 2019-04-08 DIAGNOSIS — R55 SYNCOPE, UNSPECIFIED SYNCOPE TYPE: ICD-10-CM

## 2019-04-08 DIAGNOSIS — I49.5 SSS (SICK SINUS SYNDROME): ICD-10-CM

## 2019-04-08 PROCEDURE — 93299 CARDIAC DEVICE CHECK - REMOTE: CPT

## 2019-04-26 ENCOUNTER — HOSPITAL ENCOUNTER (EMERGENCY)
Facility: HOSPITAL | Age: 79
Discharge: HOME | End: 2019-04-27
Attending: EMERGENCY MEDICINE
Payer: MEDICARE
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
## 2019-04-26 DIAGNOSIS — R40.20 LOSS OF CONSCIOUSNESS: ICD-10-CM

## 2019-04-26 DIAGNOSIS — R55 SYNCOPE: ICD-10-CM

## 2019-04-26 LAB
ALBUMIN SERPL BCP-MCNC: 3.7 G/DL (ref 3.5–5.2)
ALP SERPL-CCNC: 85 U/L (ref 55–135)
ALT SERPL W/O P-5'-P-CCNC: 5 U/L (ref 10–44)
ANION GAP SERPL CALC-SCNC: 13 MMOL/L (ref 8–16)
AST SERPL-CCNC: 9 U/L (ref 10–40)
BASOPHILS # BLD AUTO: 0.03 K/UL (ref 0–0.2)
BASOPHILS NFR BLD: 0.4 % (ref 0–1.9)
BILIRUB SERPL-MCNC: 0.6 MG/DL (ref 0.1–1)
BUN SERPL-MCNC: 34 MG/DL (ref 8–23)
CALCIUM SERPL-MCNC: 9.9 MG/DL (ref 8.7–10.5)
CHLORIDE SERPL-SCNC: 104 MMOL/L (ref 95–110)
CO2 SERPL-SCNC: 22 MMOL/L (ref 23–29)
CREAT SERPL-MCNC: 1.6 MG/DL (ref 0.5–1.4)
DIFFERENTIAL METHOD: ABNORMAL
EOSINOPHIL # BLD AUTO: 0 K/UL (ref 0–0.5)
EOSINOPHIL NFR BLD: 0.1 % (ref 0–8)
ERYTHROCYTE [DISTWIDTH] IN BLOOD BY AUTOMATED COUNT: 13.8 % (ref 11.5–14.5)
EST. GFR  (AFRICAN AMERICAN): 34.8 ML/MIN/1.73 M^2
EST. GFR  (NON AFRICAN AMERICAN): 30.2 ML/MIN/1.73 M^2
GLUCOSE SERPL-MCNC: 111 MG/DL (ref 70–110)
HCT VFR BLD AUTO: 37.9 % (ref 37–48.5)
HGB BLD-MCNC: 11.9 G/DL (ref 12–16)
IMM GRANULOCYTES # BLD AUTO: 0.03 K/UL (ref 0–0.04)
IMM GRANULOCYTES NFR BLD AUTO: 0.4 % (ref 0–0.5)
LYMPHOCYTES # BLD AUTO: 0.7 K/UL (ref 1–4.8)
LYMPHOCYTES NFR BLD: 9 % (ref 18–48)
MAGNESIUM SERPL-MCNC: 2 MG/DL (ref 1.6–2.6)
MCH RBC QN AUTO: 29 PG (ref 27–31)
MCHC RBC AUTO-ENTMCNC: 31.4 G/DL (ref 32–36)
MCV RBC AUTO: 92 FL (ref 82–98)
MONOCYTES # BLD AUTO: 0.5 K/UL (ref 0.3–1)
MONOCYTES NFR BLD: 5.6 % (ref 4–15)
NEUTROPHILS # BLD AUTO: 6.8 K/UL (ref 1.8–7.7)
NEUTROPHILS NFR BLD: 84.5 % (ref 38–73)
NRBC BLD-RTO: 0 /100 WBC
PLATELET # BLD AUTO: 227 K/UL (ref 150–350)
PMV BLD AUTO: 10.3 FL (ref 9.2–12.9)
POTASSIUM SERPL-SCNC: 3.7 MMOL/L (ref 3.5–5.1)
PROT SERPL-MCNC: 7.6 G/DL (ref 6–8.4)
RBC # BLD AUTO: 4.1 M/UL (ref 4–5.4)
SODIUM SERPL-SCNC: 139 MMOL/L (ref 136–145)
TROPONIN I SERPL DL<=0.01 NG/ML-MCNC: 0.01 NG/ML (ref 0–0.03)
WBC # BLD AUTO: 8.04 K/UL (ref 3.9–12.7)

## 2019-04-26 PROCEDURE — 93005 ELECTROCARDIOGRAM TRACING: CPT

## 2019-04-26 PROCEDURE — 93010 ELECTROCARDIOGRAM REPORT: CPT | Mod: ,,, | Performed by: INTERNAL MEDICINE

## 2019-04-26 PROCEDURE — 93010 EKG 12-LEAD: ICD-10-PCS | Mod: ,,, | Performed by: INTERNAL MEDICINE

## 2019-04-26 PROCEDURE — 99285 PR EMERGENCY DEPT VISIT,LEVEL V: ICD-10-PCS | Mod: ,,, | Performed by: EMERGENCY MEDICINE

## 2019-04-26 PROCEDURE — 96361 HYDRATE IV INFUSION ADD-ON: CPT | Mod: 59

## 2019-04-26 PROCEDURE — 99285 EMERGENCY DEPT VISIT HI MDM: CPT | Mod: 25

## 2019-04-26 PROCEDURE — 96360 HYDRATION IV INFUSION INIT: CPT

## 2019-04-26 PROCEDURE — 99285 EMERGENCY DEPT VISIT HI MDM: CPT | Mod: ,,, | Performed by: EMERGENCY MEDICINE

## 2019-04-26 PROCEDURE — 25000003 PHARM REV CODE 250: Performed by: STUDENT IN AN ORGANIZED HEALTH CARE EDUCATION/TRAINING PROGRAM

## 2019-04-26 PROCEDURE — 85025 COMPLETE CBC W/AUTO DIFF WBC: CPT

## 2019-04-26 PROCEDURE — 29105 APPLICATION LONG ARM SPLINT: CPT | Mod: RT

## 2019-04-26 PROCEDURE — 83735 ASSAY OF MAGNESIUM: CPT

## 2019-04-26 PROCEDURE — 84484 ASSAY OF TROPONIN QUANT: CPT

## 2019-04-26 PROCEDURE — 80053 COMPREHEN METABOLIC PANEL: CPT

## 2019-04-26 RX ORDER — HYDROCODONE BITARTRATE AND ACETAMINOPHEN 5; 325 MG/1; MG/1
1 TABLET ORAL
Status: COMPLETED | OUTPATIENT
Start: 2019-04-26 | End: 2019-04-26

## 2019-04-26 RX ADMIN — SODIUM CHLORIDE 1000 ML: 0.9 INJECTION, SOLUTION INTRAVENOUS at 10:04

## 2019-04-26 RX ADMIN — HYDROCODONE BITARTRATE AND ACETAMINOPHEN 1 TABLET: 5; 325 TABLET ORAL at 09:04

## 2019-04-26 NOTE — PROVIDER PROGRESS NOTES - EMERGENCY DEPT.
Encounter Date: 4/26/2019    ED Physician Progress Notes         EKG - STEMI Decision  Initial Reading: No STEMI present.    I, Kassi Nathan, am scribing for, and in the presence of, Dr. Pugh. I performed the above scribed service and the documentation accurately describes the services I performed. I attest to the accuracy of the note.

## 2019-04-27 VITALS
SYSTOLIC BLOOD PRESSURE: 129 MMHG | OXYGEN SATURATION: 99 % | BODY MASS INDEX: 28.82 KG/M2 | DIASTOLIC BLOOD PRESSURE: 63 MMHG | RESPIRATION RATE: 18 BRPM | HEIGHT: 65 IN | HEART RATE: 68 BPM | TEMPERATURE: 99 F | WEIGHT: 173 LBS

## 2019-04-27 PROCEDURE — 25000003 PHARM REV CODE 250: Performed by: STUDENT IN AN ORGANIZED HEALTH CARE EDUCATION/TRAINING PROGRAM

## 2019-04-27 RX ORDER — ACETAMINOPHEN 325 MG/1
650 TABLET ORAL
Status: COMPLETED | OUTPATIENT
Start: 2019-04-27 | End: 2019-04-27

## 2019-04-27 RX ADMIN — ACETAMINOPHEN 650 MG: 325 TABLET ORAL at 02:04

## 2019-04-27 NOTE — CONSULTS
[facility]  Interventional Cardiology  Consult Note    Patient Name: Crystal Alvarado  MRN: [id number]  Admission Date: 4/26/2019  Hospital Length of Stay: 0 days  Code Status: Prior   Attending Provider: Leoncio Cobos MD  Consulting Provider: Demi Quinonez MD  Primary Care Physician: Shantell Goff MD  Principal Problem:<principal problem not specified>    Patient information was obtained from patient and ER records.     Consults  Subjective:     Chief Complaint:  Syncope      HPI:  Ms Alvarado is a pleasant 78 y.o female with history of hypertension, SVT s/p slow pathway modification by Dr NOA Montez on 7/10/2018 and ILR implant for syncopal episodes presented to ER with syncopal episodes this morning around 9 am.     Patient mentioned she was sitting on a chair then she stood up and she felt dizzy before losing her conscious for few seconds. She woke up immediatly after with no confusion, she said this is the first time she loses her conscious after ILR implant as she felt way better after her ablation. She denied any chest pain, SOB and palpitation.    ILR interrogation:    [employer] ILR  She is in sinus rhythm with HR in 60's.   She had about 7 pauses over the past 4 months ranging from 3-8 seconds all between 1-5:30 am (while she is sleeping, patient go to bed by 9-10 pm).  No pauses noted this morning around syncopal episode     Past Medical History:   Diagnosis Date    Arthritis     CHF (congestive heart failure)     Coronary artery disease     Hyperlipidemia     Hypertension        Past Surgical History:   Procedure Laterality Date    HYSTERECTOMY N/A     INSERTION, CARDIAC PACEMAKER, DUAL CHAMBER N/A 7/9/2018    [facility]    PLACEMENT-LOOP RECORDER N/A 7/9/2018    Performed by Archie Montez MD at [facility] CATH LAB       Review of patient's allergies indicates:  No Known Allergies      (Not in a hospital admission)  Family History     None         Tobacco Use    Smoking status: Former Smoker     Packs/day: 0.50     Last attempt to quit: 2013     Years since quittin.0    Smokeless tobacco: Never Used   Substance and Sexual Activity    Alcohol use: No    Drug use: No    Sexual activity: Never     Review of Systems   Neurological: Positive for dizziness and loss of balance.   All other systems reviewed and are negative.    Objective:     Vital Signs (Most Recent):  Temp: 98.8 °F (37.1 °C) (19 1722)  Pulse: 64 (19)  Resp: 15 (19)  BP: 139/64 (19)  SpO2: 100 % (19) Vital Signs (24h Range):  Temp:  [98.8 °F (37.1 °C)] 98.8 °F (37.1 °C)  Pulse:  [64-77] 64  Resp:  [15] 15  SpO2:  [98 %-100 %] 100 %  BP: (114-139)/(64-65) 139/64     Weight: 78.5 kg (173 lb)  Body mass index is 28.79 kg/m².    SpO2: 100 %       No intake or output data in the 24 hours ending 19 2240    Lines/Drains/Airways     Peripheral Intravenous Line                 Peripheral IV - Single Lumen 18 1450 Right Antecubital 291 days         Peripheral IV - Single Lumen 19 2108 22 G Left Antecubital less than 1 day                Physical Exam   Constitutional: She is oriented to person, place, and time. She appears well-developed.   HENT:   Head: Normocephalic.   Eyes: Pupils are equal, round, and reactive to light.   Cardiovascular: Normal rate.   Pulmonary/Chest: Effort normal.   Abdominal: Soft.   Neurological: She is alert and oriented to person, place, and time.   Skin: Skin is warm.       Significant Labs:   Blood Culture: No results for input(s): LABBLOO in the last 48 hours., BMP:   Recent Labs   Lab 19   *      K 3.7      CO2 22*   BUN 34*   CREATININE 1.6*   CALCIUM 9.9   MG 2.0   , CMP   Recent Labs   Lab 19      K 3.7      CO2 22*   *   BUN 34*   CREATININE 1.6*   CALCIUM 9.9   PROT 7.6   ALBUMIN 3.7   BILITOT 0.6   ALKPHOS 85   AST 9*   ALT 5*    ANIONGAP 13   ESTGFRAFRICA 34.8*   EGFRNONAA 30.2*    and CBC   Recent Labs   Lab 04/26/19  2108   WBC 8.04   HGB 11.9*   HCT 37.9              Assessment and Plan:     Syncope  - Syncopal episode with no pauses on ILR mostly represent vasovagal based on history.   - Patient ILR interrogation as above.  - May follow up with Dr NOA Montez as outpatient.  - Advised about adequate hydration and changing position slowly.  - Discussed with EP Staff Dr CLARI Sharma.       Thank you for your consult, please call cariology for any question.     Demi Quinonez MD  Cardiology Fellow  Pager: [phone]

## 2019-04-27 NOTE — SUBJECTIVE & OBJECTIVE
Past Medical History:   Diagnosis Date    Arthritis     CHF (congestive heart failure)     Coronary artery disease     Hyperlipidemia     Hypertension        Past Surgical History:   Procedure Laterality Date    HYSTERECTOMY N/A     INSERTION, CARDIAC PACEMAKER, DUAL CHAMBER N/A 2018    [facility]formed [facility] at [facility] CATH LAB    PLACEMENT-LOOP RECORDER N/A 2018    Performed by Archie Montez MD at [facility] CATH LAB       Review of patient's allergies indicates:  No Known Allergies      (Not in a hospital admission)  Family History     None        Tobacco Use    Smoking status: Former Smoker     Packs/day: 0.50     Last attempt to quit: 2013     Years since quittin.0    Smokeless tobacco: Never Used   Substance and Sexual Activity    Alcohol use: No    Drug use: No    Sexual activity: Never     Review of Systems   Neurological: Positive for dizziness and loss of balance.   All other systems reviewed and are negative.    Objective:     Vital Signs (Most Recent):  Temp: 98.8 °F (37.1 °C) (19 1722)  Pulse: 64 (19)  Resp: 15 (19)  BP: 139/64 (19)  SpO2: 100 % (19) Vital Signs (24h Range):  Temp:  [98.8 °F (37.1 °C)] 98.8 °F (37.1 °C)  Pulse:  [64-77] 64  Resp:  [15] 15  SpO2:  [98 %-100 %] 100 %  BP: (114-139)/(64-65) 139/64     Weight: 78.5 kg (173 lb)  Body mass index is 28.79 kg/m².    SpO2: 100 %       No intake or output data in the 24 hours ending 19 2240    Lines/Drains/Airways     Peripheral Intravenous Line                 Peripheral IV - Single Lumen 18 1450 Right Antecubital 291 days         Peripheral IV - Single Lumen 19 2108 22 G Left Antecubital less than 1 day                Physical Exam   Constitutional: She is oriented to person, place, and time. She appears well-developed.   HENT:   Head: Normocephalic.   Eyes: Pupils are equal, round, and reactive to light.   Cardiovascular: Normal rate.    Pulmonary/Chest: Effort normal.   Abdominal: Soft.   Neurological: She is alert and oriented to person, place, and time.   Skin: Skin is warm.       Significant Labs:   Blood Culture: No results for input(s): LABBLOO in the last 48 hours., BMP:   Recent Labs   Lab 04/26/19 2108   *      K 3.7      CO2 22*   BUN 34*   CREATININE 1.6*   CALCIUM 9.9   MG 2.0   , CMP   Recent Labs   Lab 04/26/19 2108      K 3.7      CO2 22*   *   BUN 34*   CREATININE 1.6*   CALCIUM 9.9   PROT 7.6   ALBUMIN 3.7   BILITOT 0.6   ALKPHOS 85   AST 9*   ALT 5*   ANIONGAP 13   ESTGFRAFRICA 34.8*   EGFRNONAA 30.2*    and CBC   Recent Labs   Lab 04/26/19 2108   WBC 8.04   HGB 11.9*   HCT 37.9

## 2019-04-27 NOTE — ED PROVIDER NOTES
"Encounter Date: 2019    SCRIBE #1 NOTE: I, Antonella Huizar, am scribing for, and in the presence of,  Dr. Cobos. I have scribed the following portions of the note - the EKG reading.       History     Chief Complaint   Patient presents with    Loss of Consciousness     states "blacked out" at 0930 today, fell, hurt right arm-wrist     Crystal Alvarado is an 78 year old lady who presented to the [facility] ED on 2019 after fall and loss of consciousness.  MHx includes CAD, HTN, SSS, CKD.  Fall occurred around 0930 this AM, was sitting down in a chair and felt dizzy & lightheaded after arising from her seat.  She lost consciousness and does not recall whether she hit her head or not, but was found shortly after by her daughter who heard the fall.  Her main complaint currently is pain in her R wrist which appears swollen.  Denies palpitations at time of event, patient has loop recorder in place.  Denying current SOB or CP.          Review of patient's allergies indicates:  No Known Allergies  Past Medical History:   Diagnosis Date    Arthritis     CHF (congestive heart failure)     Coronary artery disease     Hyperlipidemia     Hypertension      Past Surgical History:   Procedure Laterality Date    HYSTERECTOMY N/A     INSERTION, CARDIAC PACEMAKER, DUAL CHAMBER N/A 2018    Performed by Archie Montez MD at [facility] CATH LAB    PLACEMENT-LOOP RECORDER N/A 2018    Performed by Archie Montez MD at [facility] CATH LAB     History reviewed. No pertinent family history.  Social History     Tobacco Use    Smoking status: Former Smoker     Packs/day: 0.50     Last attempt to quit: 2013     Years since quittin.0    Smokeless tobacco: Never Used   Substance Use Topics    Alcohol use: No    Drug use: No     Review of Systems   Constitutional: Negative for chills and fever.   HENT: Negative for congestion and sore throat.    Eyes: Negative for photophobia and visual disturbance.   Respiratory: Negative for " cough, choking, chest tightness and shortness of breath.    Cardiovascular: Negative for chest pain, palpitations and leg swelling.   Gastrointestinal: Negative for abdominal distention, abdominal pain, diarrhea, nausea and vomiting.   Genitourinary: Negative for dysuria and hematuria.   Musculoskeletal: Positive for arthralgias and myalgias.   Skin: Positive for wound. Negative for color change.   Neurological: Positive for dizziness, syncope and light-headedness. Negative for tremors and weakness.   Psychiatric/Behavioral: Negative for agitation. The patient is not nervous/anxious.        Physical Exam     Initial Vitals [04/26/19 1722]   BP Pulse Resp Temp SpO2   114/65 77 15 98.8 °F (37.1 °C) 98 %      MAP       --         Physical Exam    Constitutional: She appears well-developed and well-nourished. She is not diaphoretic. No distress.   HENT:   Head: Normocephalic and atraumatic.   Right Ear: External ear normal.   Left Ear: External ear normal.   Eyes: Conjunctivae and EOM are normal. No scleral icterus.   Neck: Normal range of motion. Neck supple.   Cardiovascular: Normal rate and regular rhythm.   Murmur heard.  Pulmonary/Chest: Breath sounds normal. No respiratory distress. She has no wheezes. She has no rhonchi. She has no rales.   Abdominal: Soft. Bowel sounds are normal. She exhibits no distension. There is no tenderness. There is no rebound.   Musculoskeletal: Normal range of motion. She exhibits no edema or tenderness.   Painful swelling noted at R wrist, anatomical snuffbox tender to palpation   Neurological: She is alert and oriented to person, place, and time.   Skin: Skin is warm and dry. No rash noted. No erythema. No pallor.   Psychiatric: She has a normal mood and affect. Thought content normal.         ED Course   Procedures  Labs Reviewed   CBC W/ AUTO DIFFERENTIAL - Abnormal; Notable for the following components:       Result Value    Hemoglobin 11.9 (*)     MCHC 31.4 (*)     Lymph # 0.7  (*)     Gran% 84.5 (*)     Lymph% 9.0 (*)     All other components within normal limits   COMPREHENSIVE METABOLIC PANEL - Abnormal; Notable for the following components:    CO2 22 (*)     Glucose 111 (*)     BUN, Bld 34 (*)     Creatinine 1.6 (*)     AST 9 (*)     ALT 5 (*)     eGFR if  34.8 (*)     eGFR if non  30.2 (*)     All other components within normal limits   TROPONIN I   MAGNESIUM   URINALYSIS, REFLEX TO URINE CULTURE     EKG Readings: (Independently Interpreted)   Rhythm: Normal Sinus Rhythm. Heart Rate: 69. ST Segments: Normal ST Segments. T Waves: Normal. Axis: Normal.       Imaging Results          CT Head Without Contrast (Final result)  Result time 04/26/19 23:40:33    Final result by Marky Fletcher MD (04/26/19 23:40:33)                 Impression:      No acute intracranial abnormalities      Electronically signed by: Marky Fletcher MD  Date:    04/26/2019  Time:    23:40             Narrative:    EXAMINATION:  CT HEAD WITHOUT CONTRAST    CLINICAL HISTORY:  Syncope/fainting;    TECHNIQUE:  Low dose axial images were obtained through the head.  Coronal and sagittal reformations were also performed. Contrast was not administered.    COMPARISON:  July 4, 2018.    FINDINGS:  The brain parenchyma appears normal for age with good corticomedullary differentiation.  There is no evidence of acute infarct, hemorrhage, or mass.  The ventricular system is normal in size.  No mass-effect or midline shift.  There are no abnormal extra-axial fluid collections.  The paranasal sinuses and mastoid air cells are clear.  The calvarium appears intact.  .                               X-Ray Wrist Complete Right (Final result)  Result time 04/26/19 23:21:31    Final result by Marky Fletcher MD (04/26/19 23:21:31)                 Impression:      Nondisplaced radial styloid fracture suggested with cortical break involving the articular surface close to the radial styloid.    No  additional fracture.  No dislocation.    Degenerative changes at the right wrist most pronounced at the greater multangular joint.      Electronically signed by: Marky Fletcher MD  Date:    04/26/2019  Time:    23:21             Narrative:    EXAMINATION:  XR WRIST COMPLETE 3 VIEWS RIGHT    CLINICAL HISTORY:  Unspecified fall, initial encounter    TECHNIQUE:  PA, lateral, and oblique views of the right wrist were performed.    COMPARISON:  None    FINDINGS:  Nondisplaced radial styloid fracture suggested with cortical break involving the articular surface close to the radial styloid.  No additional fracture.  No dislocation.  Degenerative changes at the right wrist most pronounced at the greater multangular joint.  Soft tissue swelling about the wrist.                                 Medical Decision Making:   History:   Old Medical Records: I decided to obtain old medical records.  Old Records Summarized: records from previous admission(s) and records from clinic visits.       <> Summary of Records: 78F w/ MHx CAD, HTN, SSS, CKD.  Had loop recorder placed in July 2018.  Initial Assessment:   78 year old lady w/ syncopal event and fall & loss of consciousness, unsure if hit head.  Tender swelling in R wrist.  Initiating workup w/ basic labs, imaging (wrist XR, CT head), EKG & interrogation, troponin, UA, Mg.  Will treat her pain w/ 1x norco 5mg.    Independently Interpreted Test(s):   I have ordered and independently interpreted EKG Reading(s) - see prior notes  Clinical Tests:   Lab Tests: Ordered and Reviewed  Radiological Study: Reviewed and Ordered  Medical Tests: Ordered and Reviewed       APC / Resident Notes:   10:12 PM Troponin, Mg, CBC unremarkable.  Cr elevated at 1.6.  Giving 1L normal saline, pending imaging results.  Device interrogated by cardiology.  12:40 AM CT head negative, XR wrist revealing distal radial fracture.  Plan to place sugar tong splint.  2:49 AM Sugar tong splint successfully placed.   Arm sling ordered.  Will discharge, instructed patient to follow up w/ PCP and cardiologist.       Scribe Attestation:   Scribe #1: I performed the above scribed service and the documentation accurately describes the services I performed. I attest to the accuracy of the note.    Attending Attestation:   Physician Attestation Statement for Resident:  As the supervising MD   Physician Attestation Statement: I have personally seen and examined this patient.   I agree with the above history. -:   As the supervising MD I agree with the above PE.   -: 78-year-old woman with past medical history of sick sinus syndrome, CKD, CAD, hypertension, loop recorder placement presents with chief complaint syncope.  Incident occurred this morning.  She has a extensive history of syncope.  She endorses pain in the right wrist.  She has a deformity with tenderness along the radius and snuffbox.  We obtained labs and imaging.  Cardiology performed device interrogation.  My shift is coming to a close prior to obtaining any results.   As the supervising MD I agree with the above treatment, course, plan, and disposition.                       Clinical Impression:       ICD-10-CM ICD-9-CM   1. Fall W19.XXXA E888.9   2. Loss of consciousness R40.20 780.09   3. Syncope R55 780.2         Disposition:   Disposition: Discharged                        Hawk Padilla MD  Resident  04/27/19 0254

## 2019-04-27 NOTE — ED TRIAGE NOTES
"Crystal Alvarado, a 78 y.o. female presents to the ED w/ complaint of fall precipitated by LOC at 0930. PT family stated that she was out for 2-3 minutes and it is possible PT hit her head. PT not on blood thinners.  PT denies chest pain, palpitations, dizziness, fever, chills, and n/v/d.     Triage note:  Chief Complaint   Patient presents with    Loss of Consciousness     states "blacked out" at 0930 today, fell, hurt right arm-wrist     Review of patient's allergies indicates:  No Known Allergies  Past Medical History:   Diagnosis Date    Arthritis     CHF (congestive heart failure)     Coronary artery disease     Hyperlipidemia     Hypertension      Adult Physical Assessment  LOC: Crystal Alvarado, 78 y.o. female verified via two identifiers.  The patient is awake, alert, oriented and speaking appropriately at this time.  APPEARANCE: Patient resting comfortably and appears to be in no acute distress at this time. Patient is clean and well groomed, patient's clothing is properly fastened.  SKIN:The skin is warm and dry, color consistent with ethnicity, patient has normal skin turgor and moist mucus membranes, skin intact, no breakdown or brusing noted.  MUSCULOSKELETAL: Patient moving all extremities well,  obvious swelling to right hand.  RESPIRATORY: Airway is open and patent, respirations are spontaneous, patient has a normal effort and rate, no accessory muscle use noted.  CARDIAC: Patient has a normal rate and rhythm, no periphreal edema noted in any extremity, capillary refill < 3 seconds in all extremities  ABDOMEN: Soft and non tender to palpation, no abdominal distention noted. Bowel sounds present in all four quadrants.  NEUROLOGIC: Eyes open spontaneously, behavior appropriate to situation, follows commands, facial expression symmetrical, bilateral hand grasp equal and even, purposeful motor response noted, normal sensation in all extremities when touched with a finger.    "

## 2019-04-27 NOTE — ASSESSMENT & PLAN NOTE
- Syncopal episode with no pauses on ILR mostly represent vasovagal based on history.   - Patient ILR interrogation as above.  - May follow up with Dr NOA Montez as outpatient.  - Advised about adequate hydration and changing position slowly.  - Discussed with EP Staff Dr CLARI Sharma.

## 2019-04-27 NOTE — HPI
Ms Alvarado is a pleasant 78 y.o female with history of hypertension, SVT s/p slow pathway modification by Dr NOA Montez on 7/10/2018 and ILR implant for syncopal episodes presented to ER with syncopal episodes this morning around 9 am.     Patient mentioned she was sitting on a chair then she stood up and she felt dizzy before losing her conscious for few seconds. She woke up immediatly after with no confusion, she said this is the first time she loses her conscious after ILR implant as she felt way better after her ablation. She denied any chest pain, SOB and palpitation.    ILR interrogation:    [employer] ILR  She is in sinus rhythm with HR in 60's.   She had about 7 pauses over the past 4 months ranging from 3-8 seconds all between 1-5:30 am (while she is sleeping, patient go to bed by 9-10 pm).  No pauses noted this morning around syncopal episode

## 2019-04-28 ENCOUNTER — HOSPITAL ENCOUNTER (EMERGENCY)
Facility: HOSPITAL | Age: 79
Discharge: HOME | End: 2019-04-28
Attending: EMERGENCY MEDICINE
Payer: MEDICARE

## 2019-04-28 VITALS
WEIGHT: 173 LBS | SYSTOLIC BLOOD PRESSURE: 151 MMHG | HEIGHT: 65 IN | OXYGEN SATURATION: 98 % | TEMPERATURE: 98 F | HEART RATE: 63 BPM | DIASTOLIC BLOOD PRESSURE: 76 MMHG | BODY MASS INDEX: 28.82 KG/M2 | RESPIRATION RATE: 15 BRPM

## 2019-04-28 DIAGNOSIS — S52.501D CLOSED FRACTURE OF DISTAL END OF RIGHT RADIUS WITH ROUTINE HEALING, UNSPECIFIED FRACTURE MORPHOLOGY, SUBSEQUENT ENCOUNTER: Primary | ICD-10-CM

## 2019-04-28 DIAGNOSIS — S52.90XA FRACTURE, RADIUS: ICD-10-CM

## 2019-04-28 PROCEDURE — 99283 EMERGENCY DEPT VISIT LOW MDM: CPT | Mod: 25

## 2019-04-28 PROCEDURE — 29125 APPL SHORT ARM SPLINT STATIC: CPT | Mod: RT

## 2019-04-28 PROCEDURE — 29125 APPL SHORT ARM SPLINT STATIC: CPT | Mod: RT,,, | Performed by: EMERGENCY MEDICINE

## 2019-04-28 PROCEDURE — 99283 PR EMERGENCY DEPT VISIT,LEVEL III: ICD-10-PCS | Mod: 25,,, | Performed by: EMERGENCY MEDICINE

## 2019-04-28 PROCEDURE — 25000003 PHARM REV CODE 250: Performed by: PHYSICIAN ASSISTANT

## 2019-04-28 PROCEDURE — 99283 EMERGENCY DEPT VISIT LOW MDM: CPT | Mod: 25,,, | Performed by: EMERGENCY MEDICINE

## 2019-04-28 PROCEDURE — 29125 PR APPLY FOREARM SPLINT,STATIC: ICD-10-PCS | Mod: RT,,, | Performed by: EMERGENCY MEDICINE

## 2019-04-28 RX ORDER — ACETAMINOPHEN 500 MG
1000 TABLET ORAL
Status: COMPLETED | OUTPATIENT
Start: 2019-04-28 | End: 2019-04-28

## 2019-04-28 RX ADMIN — ACETAMINOPHEN 1000 MG: 500 TABLET ORAL at 09:04

## 2019-04-29 NOTE — ED NOTES
Patient identifiers verified and correct for Crystal Alvarado.  C/C: Arm Pain  APPEARANCE: awake and alert in NAD.  SKIN: warm, dry and intact. No breakdown or bruising.  MUSCULOSKELETAL: Patient moving all extremities spontaneously, no obvious swelling or deformities noted. Ambulates independently. +Right Arm Pain  RESPIRATORY: Denies shortness of breath. Respirations unlabored.   CARDIAC: Denies CP, 2+ distal pulses; no peripheral edema  ABDOMEN: S/ND/NT, Denies nausea.  : voids spontaneously, denies difficulty  Neurologic: AAO x 4; follows commands equal strength in all extremities; denies numbness/tingling. +Dizziness   Weak peripheral pulses

## 2019-04-29 NOTE — ED TRIAGE NOTES
Patient presents to the ED from home with complaints of Right arm pain. Patient was seen here this weekend for Right arm injury. Right arm was splinted here in the ED but splint has moved. Appears to not be in place anymore. Patient also complains of dizziness that started this past weekend. The dizziness comes when patient stands to walk. Patient denies any chest pain or SOB. Patient has been taking Tylenol for pain with no relief of symptoms.

## 2019-04-29 NOTE — ED PROVIDER NOTES
Encounter Date: 2019       History     Chief Complaint   Patient presents with    Arm Pain     Pt reports she was here saturday morning and recieved a splint. Pt reports splint is slipping off R arm. Pt denies numbness or tingling in R arm/hand. Pt denies CP, SOB.     77 y/o AAF with history of HTN, hyperlipidemia, CHF, CAD presents to the ED c/o R arm pain. She was seen in this ED on  and diagnosed with R distal radius fracture - sugar tong splint applied. Today, she reports the splint slipped off and is loose. She denies any new trauma to the arm or wrist. She reports she has been wearing her sling but does not have it on currently. She reports her pain is 8/10 - she has not taken any OTC medications. She denies numbness, paresthesias, chest pain, SOB.     The history is provided by the patient.     Review of patient's allergies indicates:  No Known Allergies  Past Medical History:   Diagnosis Date    Arthritis     CHF (congestive heart failure)     Coronary artery disease     Hyperlipidemia     Hypertension      Past Surgical History:   Procedure Laterality Date    HYSTERECTOMY N/A     INSERTION, CARDIAC PACEMAKER, DUAL CHAMBER N/A 2018    Performed [facility]    PLACEMENT-LOOP RECORDER N/A 2018    Performed by [facility]z [facility] at [facility]     History reviewed. No pertinent family history.  Social History     Tobacco Use    Smoking status: Former Smoker     Packs/day: 0.50     Last attempt to quit: 2013     Years since quittin.0    Smokeless tobacco: Never Used   Substance Use Topics    Alcohol use: No    Drug use: No     Review of Systems   Constitutional: Negative for chills and fever.   Respiratory: Negative for shortness of breath.    Cardiovascular: Negative for chest pain.   Gastrointestinal: Negative for abdominal pain.   Genitourinary: Negative for dysuria and hematuria.   Musculoskeletal: Positive for arthralgias and myalgias.  Negative for neck pain.   Skin: Negative for rash.   Neurological: Negative for numbness and headaches.   Psychiatric/Behavioral: Negative for confusion.       Physical Exam     Initial Vitals [04/28/19 1943]   BP Pulse Resp Temp SpO2   (!) 146/71 69 18 97.9 °F (36.6 °C) 100 %      MAP       --         Physical Exam    Nursing note and vitals reviewed.  Constitutional: She appears well-developed and well-nourished. She is not diaphoretic. No distress.   HENT:   Head: Normocephalic and atraumatic.   Neck: Normal range of motion. Neck supple.   Musculoskeletal:   Splint noted to R arm is loose and not in proper positioning. R radial pulse 2+.    Neurological: She is alert and oriented to person, place, and time. No sensory deficit.   Skin: Skin is warm and dry. No rash noted. No erythema.   Psychiatric: She has a normal mood and affect.         ED Course   Splint Application  Date/Time: 4/29/2019 12:50 AM  Performed by: Nel De La O MD  Authorized by: Nel De La O MD   Location details: right arm  Splint type: volar short arm  Supplies used: plaster  Post-procedure: The splinted body part was neurovascularly unchanged following the procedure.  Patient tolerance: Patient tolerated the procedure well with no immediate complications  Comments: Right short arm cast        Labs Reviewed - No data to display       Imaging Results          X-Ray Wrist Complete Right (Final result)  Result time 04/28/19 22:21:01    Final result by Marky Fletcher MD (04/28/19 22:21:01)                 Impression:      Once again, nondisplaced radial styloid fracture is suggested with cortical break again identified at the articular surface close to the radial styloid.    No additional fracture. No dislocation.    DJD.      Electronically signed by: Marky Fletcher MD  Date:    04/28/2019  Time:    22:21             Narrative:    EXAMINATION:  XR WRIST COMPLETE 3 VIEWS RIGHT    CLINICAL HISTORY:  Unspecified fracture of  unspecified forearm, initial encounter for closed fracture    TECHNIQUE:  PA, lateral, and oblique views of the right wrist were performed.    COMPARISON:  Right wrist April 26, 2019.    FINDINGS:  Once again, nondisplaced radial styloid fracture is suggested with cortical break again identified at the articular surface close to the radial styloid.    No additional fracture.  No dislocation.    Degenerative changes at the greater multangular joint with some subluxation of the 1st metacarpal relative to the trapezium, unchanged.                                 Medical Decision Making:   History:   Old Medical Records: I decided to obtain old medical records.  Old Records Summarized: records from previous admission(s).       <> Summary of Records: Seen in this ED 4/26 - diagnosed with distal radius fracture. Sugar tong splint applied. Given a sling for comfort.   Clinical Tests:   Radiological Study: Ordered and Reviewed       APC / Resident Notes:   77 y/o AAF with history of HTN, hyperlipidemia, CHF, CAD presents to the ED c/o R arm pain. VSS. Splint noted to the RUE is not in place. Splint removed. R radial pulse 2+. No significant swelling. Normal capillary refill. Normal sensation of the hand.  Will obtain repeat xray to make sure there is no dislocation or new/worsening fracture. If xray stable, will place another sugar tong splint.     Xray R wrist shows persistent radial styloid fracture.     Splint applied by Dr De La O. See above procedure note.     I do not feel that she needs any further labs or imaging at this time. Stable for discharge.    She was discharged without any new prescriptions.  She will follow up with her PCP.  All of the patient's questions were answered.  I reviewed the patient's chart and imaging and discussed the case with my supervising physician.            Attending Attestation:     Physician Attestation Statement for NP/PA:   I have conducted a face to face encounter with this patient  in addition to the NP/PA, due to Medical Complexity    Other NP/PA Attestation Additions:      Medical Decision Makin yo F presents as her right arm splint fell off. She sustained a distal radius fx on     patient comfortable  rue +2 radial, no significant wrist edema  Short arm cast placed. Good cap refill, normal sensation and skin color digits.   The patient has been carefully educated about symptoms and conditions that should prompt immediate return to the ED for recheck or further evaluation. (numbness,severe pain under the cast, digits skin discoloration)                    Clinical Impression:       ICD-10-CM ICD-9-CM   1. Closed fracture of distal end of right radius with routine healing, unspecified fracture morphology, subsequent encounter S52.501D V54.12   2. Fracture, radius S52.90XA 813.81         Disposition:   Disposition: Discharged  Condition: Stable                        Samantha Khan PA-C  19 [id number]

## 2019-05-07 ENCOUNTER — CLINICAL SUPPORT (OUTPATIENT)
Dept: CARDIOLOGY | Facility: HOSPITAL | Age: 79
End: 2019-05-07
Attending: INTERNAL MEDICINE
Payer: MEDICARE

## 2019-05-07 DIAGNOSIS — R55 SYNCOPE, UNSPECIFIED SYNCOPE TYPE: ICD-10-CM

## 2019-05-07 DIAGNOSIS — I49.5 SSS (SICK SINUS SYNDROME): ICD-10-CM

## 2019-05-07 PROCEDURE — 93299 CARDIAC DEVICE CHECK - REMOTE: CPT

## 2019-05-13 ENCOUNTER — TELEPHONE (OUTPATIENT)
Dept: CARDIOLOGY | Facility: HOSPITAL | Age: 79
End: 2019-05-13

## 2019-05-13 NOTE — TELEPHONE ENCOUNTER
----- Message from Glenna Olivo sent at 5/9/2019  9:02 PM CDT -----      ----- Message -----  From: Lexx Bolanos MA  Sent: 5/7/2019   3:33 PM  To: [facility] Staff    Dr. Quinonez would like for someone to the contact pt to schedule a follow up appointment.     Thanks

## 2019-05-27 ENCOUNTER — OFFICE VISIT (OUTPATIENT)
Dept: ORTHOPEDICS | Facility: CLINIC | Age: 79
End: 2019-05-27
Payer: MEDICARE

## 2019-05-27 ENCOUNTER — HOSPITAL ENCOUNTER (OUTPATIENT)
Dept: RADIOLOGY | Facility: HOSPITAL | Age: 79
Discharge: HOME | End: 2019-05-27
Attending: ORTHOPAEDIC SURGERY
Payer: MEDICARE

## 2019-05-27 VITALS — BODY MASS INDEX: 28.82 KG/M2 | HEIGHT: 65 IN | WEIGHT: 173 LBS

## 2019-05-27 DIAGNOSIS — M79.641 PAIN OF RIGHT HAND: Primary | ICD-10-CM

## 2019-05-27 DIAGNOSIS — M79.641 PAIN OF RIGHT HAND: ICD-10-CM

## 2019-05-27 DIAGNOSIS — S62.101A CLOSED FRACTURE OF RIGHT WRIST, INITIAL ENCOUNTER: ICD-10-CM

## 2019-05-27 PROCEDURE — 73130 XR HAND COMPLETE 3 VIEW RIGHT: ICD-10-PCS | Mod: 26,RT,, | Performed by: RADIOLOGY

## 2019-05-27 PROCEDURE — 99203 OFFICE O/P NEW LOW 30 MIN: CPT | Mod: S$GLB,,, | Performed by: ORTHOPAEDIC SURGERY

## 2019-05-27 PROCEDURE — 99999 PR PBB SHADOW E&M-EST. PATIENT-LVL III: ICD-10-PCS | Mod: PBBFAC,,, | Performed by: ORTHOPAEDIC SURGERY

## 2019-05-27 PROCEDURE — 99203 PR OFFICE/OUTPT VISIT, NEW, LEVL III, 30-44 MIN: ICD-10-PCS | Mod: S$GLB,,, | Performed by: ORTHOPAEDIC SURGERY

## 2019-05-27 PROCEDURE — 99999 PR PBB SHADOW E&M-EST. PATIENT-LVL III: CPT | Mod: PBBFAC,,, | Performed by: ORTHOPAEDIC SURGERY

## 2019-05-27 PROCEDURE — 1101F PR PT FALLS ASSESS DOC 0-1 FALLS W/OUT INJ PAST YR: ICD-10-PCS | Mod: CPTII,S$GLB,, | Performed by: ORTHOPAEDIC SURGERY

## 2019-05-27 PROCEDURE — 73130 X-RAY EXAM OF HAND: CPT | Mod: TC,PN,RT

## 2019-05-27 PROCEDURE — 1101F PT FALLS ASSESS-DOCD LE1/YR: CPT | Mod: CPTII,S$GLB,, | Performed by: ORTHOPAEDIC SURGERY

## 2019-05-27 PROCEDURE — 73130 X-RAY EXAM OF HAND: CPT | Mod: 26,RT,, | Performed by: RADIOLOGY

## 2019-05-27 RX ORDER — TRAMADOL HYDROCHLORIDE 50 MG/1
50 TABLET ORAL EVERY 8 HOURS PRN
Qty: 40 TABLET | Refills: 0 | Status: SHIPPED | OUTPATIENT
Start: 2019-05-27 | End: 2019-06-06

## 2019-05-27 NOTE — PROGRESS NOTES
"Subjective:      Patient ID: Crystal Alvarado is a 78 y.o. female.    Chief Complaint: Pain of the Right Hand      HPI  Crystal Alvarado is a  78 y.o. female presenting today for right wrist injury.  There was a history of trauma.  Onset of symptoms began about 3 weeks ago when she fell on her outstretched right hand  She was seen in the emergency room noted to have a possible wrist fracture  Placed in a splint and is here today in follow-up symptoms are better now although it has been 3 weeks since the fall.      Review of patient's allergies indicates:  No Known Allergies      Current Outpatient Medications   Medication Sig Dispense Refill    amlodipine (NORVASC) 10 MG tablet Take 10 mg by mouth once daily.      aspirin 81 MG Chew Take 81 mg by mouth once daily.  4    atorvastatin (LIPITOR) 20 MG tablet Take 20 mg by mouth every evening.  4    docusate sodium (COLACE) 100 MG capsule Take 1 capsule (100 mg total) by mouth 2 (two) times daily. 60 capsule 0    ergocalciferol (ERGOCALCIFEROL) 50,000 unit Cap Take 50,000 Units by mouth every 7 days.      losartan (COZAAR) 100 MG tablet TAKE 1 TABLET (100 MG TOTAL) BY MOUTH ONCE DAILY. 90 tablet 3    nitroGLYCERIN (NITROSTAT) 0.4 MG SL tablet Place 1 tablet (0.4 mg total) under the tongue every 5 (five) minutes as needed for Chest pain (and notify MD). 25 tablet 5     No current facility-administered medications for this visit.        Past Medical History:   Diagnosis Date    Arthritis     CHF (congestive heart failure)     Coronary artery disease     Hyperlipidemia     Hypertension        Past Surgical History:   Procedure Laterality Date    HYSTERECTOMY N/A     INSERTION, CARDIAC PACEMAKER, DUAL CHAMBER N/A 7/9/2018    [facility] LAB    PLACEMENT-LOOP RECORDER N/A 7/9/2018    Performed by Ar[facility]z MD at [facility] CATH LAB       Review of Systems:  ROS    OBJECTIVE:     PHYSICAL EXAM:  Height: 5' 5" (165.1 cm) Weight: " "78.5 kg (173 lb)  Vitals:    05/27/19 1049   Weight: 78.5 kg (173 lb)   Height: 5' 5" (1.651 m)   PainSc:   2     Well developed, well nourished female in no acute distress  Alert and oriented x 3  HEENT- Normal exam  Lungs- Clear to auscultation  Heart- Regular rate and rhythm  Abdomen- Soft nontender  Extremity exam- examination right hand wrist after removal of the splint she has some slight tenderness over the radial styloid no significant swelling  Range of motion is actually pretty good of the fingers and wrist sensation intact all digits    RADIOGRAPHS:  AP and lateral x-rays right wrist demonstrate what appears to be a healing nondisplaced radial styloid fracture of the wrist  She does have severe arthritic changes of the wrist including the scaphoid  Comments: I have personally reviewed the imaging and I agree with the above radiologist's report.    ASSESSMENT/PLAN:     IMPRESSION:  Subacute fracture radial styloid right wrist minimally displaced    PLAN:  She was given a wrist splint for full-time use for the next several weeks although she can remove it for bathing and showering and watching TV no weight-bearing on the arm  Follow-up 3-4 weeks       - We talked at length about the anatomy and pathophysiology of   Encounter Diagnoses   Name Primary?    Pain of right hand Yes    Closed fracture of right wrist, initial encounter            Disclaimer: This note has been generated using voice-recognition software. There may be typographical errors that have been missed during proof-reading.  "

## 2019-06-04 ENCOUNTER — OFFICE VISIT (OUTPATIENT)
Dept: ELECTROPHYSIOLOGY | Facility: CLINIC | Age: 79
End: 2019-06-04
Payer: MEDICARE

## 2019-06-04 ENCOUNTER — HOSPITAL ENCOUNTER (OUTPATIENT)
Dept: CARDIOLOGY | Facility: CLINIC | Age: 79
Discharge: HOME | End: 2019-06-04
Payer: MEDICARE

## 2019-06-04 ENCOUNTER — CLINICAL SUPPORT (OUTPATIENT)
Dept: CARDIOLOGY | Facility: HOSPITAL | Age: 79
End: 2019-06-04
Attending: INTERNAL MEDICINE
Payer: MEDICARE

## 2019-06-04 VITALS
HEIGHT: 65 IN | WEIGHT: 189 LBS | BODY MASS INDEX: 31.49 KG/M2 | SYSTOLIC BLOOD PRESSURE: 120 MMHG | DIASTOLIC BLOOD PRESSURE: 60 MMHG | HEART RATE: 66 BPM

## 2019-06-04 DIAGNOSIS — R42 DIZZINESS: ICD-10-CM

## 2019-06-04 DIAGNOSIS — I95.1 ORTHOSTATIC HYPOTENSION: ICD-10-CM

## 2019-06-04 DIAGNOSIS — I10 ESSENTIAL HYPERTENSION: Primary | ICD-10-CM

## 2019-06-04 DIAGNOSIS — R55 SYNCOPE AND COLLAPSE: ICD-10-CM

## 2019-06-04 DIAGNOSIS — I49.5 SSS (SICK SINUS SYNDROME): ICD-10-CM

## 2019-06-04 DIAGNOSIS — Z95.818 STATUS POST PLACEMENT OF IMPLANTABLE LOOP RECORDER: ICD-10-CM

## 2019-06-04 DIAGNOSIS — R55 SYNCOPE, UNSPECIFIED SYNCOPE TYPE: Primary | ICD-10-CM

## 2019-06-04 DIAGNOSIS — S06.0X0A CONCUSSION WITHOUT LOSS OF CONSCIOUSNESS, INITIAL ENCOUNTER: ICD-10-CM

## 2019-06-04 DIAGNOSIS — R55 SYNCOPE, UNSPECIFIED SYNCOPE TYPE: ICD-10-CM

## 2019-06-04 PROCEDURE — 3078F DIAST BP <80 MM HG: CPT | Mod: CPTII,S$GLB,, | Performed by: INTERNAL MEDICINE

## 2019-06-04 PROCEDURE — 1101F PT FALLS ASSESS-DOCD LE1/YR: CPT | Mod: CPTII,S$GLB,, | Performed by: INTERNAL MEDICINE

## 2019-06-04 PROCEDURE — 99214 OFFICE O/P EST MOD 30 MIN: CPT | Mod: S$GLB,,, | Performed by: INTERNAL MEDICINE

## 2019-06-04 PROCEDURE — 99999 PR PBB SHADOW E&M-EST. PATIENT-LVL III: CPT | Mod: PBBFAC,,, | Performed by: INTERNAL MEDICINE

## 2019-06-04 PROCEDURE — 93005 RHYTHM STRIP: ICD-10-PCS | Mod: S$GLB,,, | Performed by: INTERNAL MEDICINE

## 2019-06-04 PROCEDURE — 3078F PR MOST RECENT DIASTOLIC BLOOD PRESSURE < 80 MM HG: ICD-10-PCS | Mod: CPTII,S$GLB,, | Performed by: INTERNAL MEDICINE

## 2019-06-04 PROCEDURE — 93299 CARDIAC DEVICE CHECK CHECK - REMOTE: CPT

## 2019-06-04 PROCEDURE — 93010 RHYTHM STRIP: ICD-10-PCS | Mod: S$GLB,,, | Performed by: INTERNAL MEDICINE

## 2019-06-04 PROCEDURE — 3074F PR MOST RECENT SYSTOLIC BLOOD PRESSURE < 130 MM HG: ICD-10-PCS | Mod: CPTII,S$GLB,, | Performed by: INTERNAL MEDICINE

## 2019-06-04 PROCEDURE — 1101F PR PT FALLS ASSESS DOC 0-1 FALLS W/OUT INJ PAST YR: ICD-10-PCS | Mod: CPTII,S$GLB,, | Performed by: INTERNAL MEDICINE

## 2019-06-04 PROCEDURE — 99999 PR PBB SHADOW E&M-EST. PATIENT-LVL III: ICD-10-PCS | Mod: PBBFAC,,, | Performed by: INTERNAL MEDICINE

## 2019-06-04 PROCEDURE — 3074F SYST BP LT 130 MM HG: CPT | Mod: CPTII,S$GLB,, | Performed by: INTERNAL MEDICINE

## 2019-06-04 PROCEDURE — 93005 ELECTROCARDIOGRAM TRACING: CPT | Mod: S$GLB,,, | Performed by: INTERNAL MEDICINE

## 2019-06-04 PROCEDURE — 99214 PR OFFICE/OUTPT VISIT, EST, LEVL IV, 30-39 MIN: ICD-10-PCS | Mod: S$GLB,,, | Performed by: INTERNAL MEDICINE

## 2019-06-04 PROCEDURE — 93010 ELECTROCARDIOGRAM REPORT: CPT | Mod: S$GLB,,, | Performed by: INTERNAL MEDICINE

## 2019-06-04 RX ORDER — AZELASTINE 1 MG/ML
SPRAY, METERED NASAL
COMMUNITY
Start: 2019-05-17 | End: 2020-12-22

## 2019-06-04 RX ORDER — FLUTICASONE PROPIONATE 50 MCG
SPRAY, SUSPENSION (ML) NASAL
COMMUNITY
Start: 2019-05-17 | End: 2023-01-12

## 2019-06-04 NOTE — PROGRESS NOTES
Subjective:    Patient ID:  Crystal Alvarado is a 78 y.o. female who presents for evaluation of Loss of Consciousness    Referring Cardiologist: Chris Kline MD    HPI     Prior Hx:  I had the pleasure of seeing Ms. Alvarado today in our electrophysiology clinic for her SVT and bradycardia. As you are aware she is a pleasant 78 year-old woman with hypertension, non-obstructive CAD and CKD stage III and chronic bradycardia. She presented to the ER 4/25/2018 with complaint of dizziness. She was in a narrow complex short RP tachycardia at a rate of 156 bpm that terminated with a vagal maneuver. During her stay she had sinus rhythm with rates of 40s-60s off AV kamar agents. She felt better. She had a holter monitor 12/2017 that noted sinus rhythm with rate range of 43-97 with average of 66. There were also rare PVCs and PACs with 2 episodes of NSAT.     Since her hospitalization she had several falls at home. She believes the falls were from losing her balance and not from light-headedness. She notes intermittent similar symptoms that she presented to the ER with. Episodes may last several minutes. One fall 7/2018 resulted in left clavicular fracture.    CONCLUSIONS     1 - Normal left ventricular systolic function (EF 60-65%).     2 - Impaired LV relaxation, increased LVEDP.     3 - Normal right ventricular systolic function .     4 - The estimated PA systolic pressure is 31 mmHg.    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 She underwent slow pathway modification and then underwent ILR implantation for rhythm monitoring for her syncope/falls.     Interim Hx:  Ms. Alvarado returns for follow-up. She went to the ER April 26, 2019 after standing up suddenly then fell. She broke her right wrist. She is unable to tell me if she lost consciousness. Only says she stood up and fell. ILR interrogation noted no corresponding arrhythmias. She did have bradycardia with a 3-4 second pause on April 1st at 4AM during sleep.  "She reports she normally wakes up after 6 AM. Other "Pauses" noted on interrogation actually represented undersensing. She was felt to be dehydrated. Her creatinine was elevated. She reports she only drinks 1 glass of water a day. She is known to have orthostatic hypotension.    My interpretation of today's in clinic ECG is sinus rhythm at a rate of 66 bpm with normal intervals.    Review of Systems   Constitution: Negative for malaise/fatigue.   HENT: Negative for congestion and sore throat.    Eyes: Negative for blurred vision and visual disturbance.   Cardiovascular: Negative for chest pain, leg swelling, near-syncope, palpitations, paroxysmal nocturnal dyspnea and syncope.   Respiratory: Negative for cough and shortness of breath.    Hematologic/Lymphatic: Negative for bleeding problem. Does not bruise/bleed easily.   Musculoskeletal: Positive for arthritis.   Gastrointestinal: Negative for bloating and abdominal pain.   Neurological: Negative for light-headedness, loss of balance and weakness.        Objective:    Physical Exam   Constitutional: She is oriented to person, place, and time. She appears well-developed and well-nourished. No distress.   HENT:   Head: Normocephalic and atraumatic.   Eyes: Conjunctivae are normal. Right eye exhibits no discharge. Left eye exhibits no discharge.   Neck: Neck supple. No JVD present.   Cardiovascular: Normal rate and regular rhythm. Exam reveals no gallop and no friction rub.   No murmur heard.  Pulmonary/Chest: Effort normal and breath sounds normal. No respiratory distress. She has no wheezes. She has no rales.   Abdominal: Soft. Bowel sounds are normal. She exhibits no distension. There is no tenderness. There is no rebound.   Musculoskeletal: She exhibits no edema.   Neurological: She is alert and oriented to person, place, and time.   Skin: Skin is warm and dry. She is not diaphoretic.   Psychiatric: She has a normal mood and affect. Her behavior is normal. " Judgment and thought content normal.   Vitals reviewed.        Assessment:       1. Essential hypertension    2. Orthostatic hypotension    3. Concussion without loss of consciousness, initial encounter    4. Dizziness         Plan:       In summary, Ms. Alvarado is a pleasant 78 year-old woman with hypertension, non-obstructive CAD and CKD stage III and chronic bradycardia presenting for evaluation of SVT and bradycardia. 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 Encouraged appropriate hydration and use of compression stockings. No indication that a dysrhythmia was related to her fall.    Continue monthly ILR monitoring. RTC in 1 year, sooner if needed.    Thank you for allowing me to participate in the care of this patient. Please do not hesitate to call me with any questions or concerns.    Archie Montez MD, PhD  Cardiac Electrophysiology

## 2019-06-10 ENCOUNTER — CLINICAL SUPPORT (OUTPATIENT)
Dept: CARDIOLOGY | Facility: HOSPITAL | Age: 79
End: 2019-06-10
Attending: INTERNAL MEDICINE
Payer: MEDICARE

## 2019-06-10 DIAGNOSIS — Z95.818 STATUS POST PLACEMENT OF IMPLANTABLE LOOP RECORDER: ICD-10-CM

## 2019-06-10 DIAGNOSIS — R55 SYNCOPE AND COLLAPSE: ICD-10-CM

## 2019-06-10 PROCEDURE — 93299 CARDIAC DEVICE CHECK CHECK - REMOTE: CPT

## 2019-07-01 ENCOUNTER — HOSPITAL ENCOUNTER (OUTPATIENT)
Dept: RADIOLOGY | Facility: HOSPITAL | Age: 79
Discharge: HOME | End: 2019-07-01
Attending: ORTHOPAEDIC SURGERY
Payer: MEDICARE

## 2019-07-01 ENCOUNTER — OFFICE VISIT (OUTPATIENT)
Dept: ORTHOPEDICS | Facility: CLINIC | Age: 79
End: 2019-07-01
Payer: MEDICARE

## 2019-07-01 VITALS — HEIGHT: 65 IN | BODY MASS INDEX: 31.49 KG/M2 | WEIGHT: 189 LBS

## 2019-07-01 DIAGNOSIS — S62.101D CLOSED FRACTURE OF RIGHT WRIST WITH ROUTINE HEALING, SUBSEQUENT ENCOUNTER: ICD-10-CM

## 2019-07-01 DIAGNOSIS — M25.531 RIGHT WRIST PAIN: ICD-10-CM

## 2019-07-01 DIAGNOSIS — M25.531 RIGHT WRIST PAIN: Primary | ICD-10-CM

## 2019-07-01 PROCEDURE — 1101F PR PT FALLS ASSESS DOC 0-1 FALLS W/OUT INJ PAST YR: ICD-10-PCS | Mod: CPTII,S$GLB,, | Performed by: ORTHOPAEDIC SURGERY

## 2019-07-01 PROCEDURE — 99213 PR OFFICE/OUTPT VISIT, EST, LEVL III, 20-29 MIN: ICD-10-PCS | Mod: S$GLB,,, | Performed by: ORTHOPAEDIC SURGERY

## 2019-07-01 PROCEDURE — 99999 PR PBB SHADOW E&M-EST. PATIENT-LVL III: CPT | Mod: PBBFAC,,, | Performed by: ORTHOPAEDIC SURGERY

## 2019-07-01 PROCEDURE — 99999 PR PBB SHADOW E&M-EST. PATIENT-LVL III: ICD-10-PCS | Mod: PBBFAC,,, | Performed by: ORTHOPAEDIC SURGERY

## 2019-07-01 PROCEDURE — 1101F PT FALLS ASSESS-DOCD LE1/YR: CPT | Mod: CPTII,S$GLB,, | Performed by: ORTHOPAEDIC SURGERY

## 2019-07-01 PROCEDURE — 99213 OFFICE O/P EST LOW 20 MIN: CPT | Mod: S$GLB,,, | Performed by: ORTHOPAEDIC SURGERY

## 2019-07-01 PROCEDURE — 73100 X-RAY EXAM OF WRIST: CPT | Mod: 26,RT,, | Performed by: RADIOLOGY

## 2019-07-01 PROCEDURE — 73100 X-RAY EXAM OF WRIST: CPT | Mod: TC,PN,RT

## 2019-07-01 PROCEDURE — 73100 XR WRIST 2 VIEW RIGHT: ICD-10-PCS | Mod: 26,RT,, | Performed by: RADIOLOGY

## 2019-07-01 RX ORDER — TRAMADOL HYDROCHLORIDE 50 MG/1
50 TABLET ORAL EVERY 6 HOURS PRN
Qty: 30 TABLET | Refills: 0 | Status: SHIPPED | OUTPATIENT
Start: 2019-07-01 | End: 2019-07-11

## 2019-07-01 NOTE — PROGRESS NOTES
"Subjective:      Patient ID: Crystal Alvarado is a 78 y.o. female.  Chief Complaint: Pain of the Right Arm      HPI  Crystal Alvarado is a  78 y.o. female presenting today for follow up of right distal radius fracture 6 weeks out from injury.  She reports that she is doing well except for occasional pain.    Review of patient's allergies indicates:  No Known Allergies      Current Outpatient Medications   Medication Sig Dispense Refill    amlodipine (NORVASC) 10 MG tablet Take 10 mg by mouth once daily.      aspirin 81 MG Chew Take 81 mg by mouth once daily.  4    atorvastatin (LIPITOR) 20 MG tablet Take 20 mg by mouth every evening.  4    azelastine (ASTELIN) 137 mcg (0.1 %) nasal spray       docusate sodium (COLACE) 100 MG capsule Take 1 capsule (100 mg total) by mouth 2 (two) times daily. 60 capsule 0    ergocalciferol (ERGOCALCIFEROL) 50,000 unit Cap Take 50,000 Units by mouth every 7 days.      fluticasone propionate (FLONASE) 50 mcg/actuation nasal spray       losartan (COZAAR) 100 MG tablet TAKE 1 TABLET (100 MG TOTAL) BY MOUTH ONCE DAILY. 90 tablet 3    nitroGLYCERIN (NITROSTAT) 0.4 MG SL tablet Place 1 tablet (0.4 mg total) under the tongue every 5 (five) minutes as needed for Chest pain (and notify MD). 25 tablet 5    traMADol (ULTRAM) 50 mg tablet Take 1 tablet (50 mg total) by mouth every 6 (six) hours as needed for Pain. 30 tablet 0     No current facility-administered medications for this visit.        Past Medical History:   Diagnosis Date    Arthritis     CHF (congestive heart failure)     Coronary artery disease     Hyperlipidemia     Hypertension        Past Surgical History:   Procedure Laterality Date    HYSTERECTOMY N/A     INSERTION, CARDIAC PACEMAKER, DUAL CHAMBER N/A 7/9/2018    Performed by [facility]z MD at [facility] CATH LAB    PLACEMENT-LOOP RECORDER N/A 7/9/2018    Performed by Archie Montez MD at [facility] CATH LAB       OBJECTIVE:   PHYSICAL EXAM:  Height: 5' 5" (165.1 cm) " "Weight: 85.7 kg (189 lb)  Vitals:    07/01/19 0933   Weight: 85.7 kg (189 lb)   Height: 5' 5" (1.651 m)   PainSc:   3   PainLoc: Arm     Ortho/SPM Exam  * Examination right wrist fracture site nontender slight swelling range of motion limited   strength decreased sensation intact **    RADIOGRAPHS:  AP lateral x-rays right wrist demonstrate healing radial styloid fracture good position severe degenerative changes of the wrist joint are noted  Comments: I have personally reviewed the imaging and I agree with the above radiologist's report.    ASSESSMENT/PLAN:     IMPRESSION:  1.  Right distal radius fracture healing.  2.  Severe underlying right wrist arthritis    PLAN:  She can start weaning out of the splint now light activities no heavy lifting  I have ordered some OT at [facility] for range of motion gentle strengthening  Ultram for pain      FOLLOW UP:  4-6 weeks    Disclaimer: This note has been generated using voice-recognition software. There may be typographical errors that have been missed during proof-reading.  "

## 2019-07-08 ENCOUNTER — CLINICAL SUPPORT (OUTPATIENT)
Dept: CARDIOLOGY | Facility: HOSPITAL | Age: 79
End: 2019-07-08
Attending: INTERNAL MEDICINE
Payer: MEDICARE

## 2019-07-08 DIAGNOSIS — Z95.818 STATUS POST PLACEMENT OF IMPLANTABLE LOOP RECORDER: ICD-10-CM

## 2019-07-08 PROCEDURE — 93299 CARDIAC DEVICE CHECK - REMOTE: CPT

## 2019-07-29 ENCOUNTER — OFFICE VISIT (OUTPATIENT)
Dept: ORTHOPEDICS | Facility: CLINIC | Age: 79
End: 2019-07-29
Payer: MEDICARE

## 2019-07-29 ENCOUNTER — HOSPITAL ENCOUNTER (OUTPATIENT)
Dept: RADIOLOGY | Facility: HOSPITAL | Age: 79
Discharge: HOME | End: 2019-07-29
Attending: ORTHOPAEDIC SURGERY
Payer: MEDICARE

## 2019-07-29 VITALS — BODY MASS INDEX: 31.49 KG/M2 | HEIGHT: 65 IN | WEIGHT: 189 LBS

## 2019-07-29 DIAGNOSIS — M25.531 RIGHT WRIST PAIN: Primary | ICD-10-CM

## 2019-07-29 DIAGNOSIS — S62.101D CLOSED FRACTURE OF RIGHT WRIST WITH ROUTINE HEALING, SUBSEQUENT ENCOUNTER: ICD-10-CM

## 2019-07-29 DIAGNOSIS — M25.531 RIGHT WRIST PAIN: ICD-10-CM

## 2019-07-29 PROCEDURE — 1100F PR PT FALLS ASSESS DOC 2+ FALLS/FALL W/INJURY/YR: ICD-10-PCS | Mod: CPTII,S$GLB,, | Performed by: ORTHOPAEDIC SURGERY

## 2019-07-29 PROCEDURE — 3288F FALL RISK ASSESSMENT DOCD: CPT | Mod: CPTII,S$GLB,, | Performed by: ORTHOPAEDIC SURGERY

## 2019-07-29 PROCEDURE — 99999 PR PBB SHADOW E&M-EST. PATIENT-LVL III: ICD-10-PCS | Mod: PBBFAC,,, | Performed by: ORTHOPAEDIC SURGERY

## 2019-07-29 PROCEDURE — 73100 XR WRIST 2 VIEW RIGHT: ICD-10-PCS | Mod: 26,RT,, | Performed by: RADIOLOGY

## 2019-07-29 PROCEDURE — 73100 X-RAY EXAM OF WRIST: CPT | Mod: 26,RT,, | Performed by: RADIOLOGY

## 2019-07-29 PROCEDURE — 99999 PR PBB SHADOW E&M-EST. PATIENT-LVL III: CPT | Mod: PBBFAC,,, | Performed by: ORTHOPAEDIC SURGERY

## 2019-07-29 PROCEDURE — 73100 X-RAY EXAM OF WRIST: CPT | Mod: TC,PN,RT

## 2019-07-29 PROCEDURE — 1100F PTFALLS ASSESS-DOCD GE2>/YR: CPT | Mod: CPTII,S$GLB,, | Performed by: ORTHOPAEDIC SURGERY

## 2019-07-29 PROCEDURE — 99213 PR OFFICE/OUTPT VISIT, EST, LEVL III, 20-29 MIN: ICD-10-PCS | Mod: S$GLB,,, | Performed by: ORTHOPAEDIC SURGERY

## 2019-07-29 PROCEDURE — 99213 OFFICE O/P EST LOW 20 MIN: CPT | Mod: S$GLB,,, | Performed by: ORTHOPAEDIC SURGERY

## 2019-07-29 PROCEDURE — 3288F PR FALLS RISK ASSESSMENT DOCUMENTED: ICD-10-PCS | Mod: CPTII,S$GLB,, | Performed by: ORTHOPAEDIC SURGERY

## 2019-07-29 RX ORDER — NAPROXEN 375 MG/1
375 TABLET ORAL 2 TIMES DAILY
Qty: 60 TABLET | Refills: 1 | Status: SHIPPED | OUTPATIENT
Start: 2019-07-29 | End: 2019-08-09

## 2019-07-29 NOTE — PROGRESS NOTES
Subjective:      Patient ID: Crystal Alvarado is a 78 y.o. female.  Chief Complaint: Pain and Follow-up of the Right Hand and Pain and Follow-up of the Right Wrist      HPI  Crystal Alvarado is a  78 y.o. female presenting today for follow up of right distal radius fracture.  She reports that she is about 7 or 8 weeks out from injury  Last visit I ordered some therapy but she says it no in colder from [facility]  Still having some pain in the wrist  .    Review of patient's allergies indicates:  No Known Allergies      Current Outpatient Medications   Medication Sig Dispense Refill    amlodipine (NORVASC) 10 MG tablet Take 10 mg by mouth once daily.      aspirin 81 MG Chew Take 81 mg by mouth once daily.  4    atorvastatin (LIPITOR) 20 MG tablet Take 20 mg by mouth every evening.  4    azelastine (ASTELIN) 137 mcg (0.1 %) nasal spray       docusate sodium (COLACE) 100 MG capsule Take 1 capsule (100 mg total) by mouth 2 (two) times daily. 60 capsule 0    ergocalciferol (ERGOCALCIFEROL) 50,000 unit Cap Take 50,000 Units by mouth every 7 days.      fluticasone propionate (FLONASE) 50 mcg/actuation nasal spray       losartan (COZAAR) 100 MG tablet TAKE 1 TABLET (100 MG TOTAL) BY MOUTH ONCE DAILY. 90 tablet 3    nitroGLYCERIN (NITROSTAT) 0.4 MG SL tablet Place 1 tablet (0.4 mg total) under the tongue every 5 (five) minutes as needed for Chest pain (and notify MD). 25 tablet 5     No current facility-administered medications for this visit.        Past Medical History:   Diagnosis Date    Arthritis     CHF (congestive heart failure)     Coronary artery disease     Hyperlipidemia     Hypertension        Past Surgical History:   Procedure Laterality Date    HYSTERECTOMY N/A     INSERTION, CARDIAC PACEMAKER, DUAL CHAMBER N/A 7/9/2018    [facility]formed [facility] at [facility] CATH LAB    PLACEMENT-LOOP RECORDER N/A 7/9/2018    Performed by Archie Montez MD at [facility] CATH LAB       OBJECTIVE:   PHYSICAL  "EXAM:  Height: 5' 5" (165.1 cm) Weight: 85.7 kg (189 lb)  Vitals:    07/29/19 0955   Weight: 85.7 kg (189 lb)   Height: 5' 5" (1.651 m)   PainSc:   3   PainLoc: Wrist     Ortho/SPM Exam  Examination right hand and wrist slight swelling slight tenderness range of motion limited   strength decreased  Sensation intact all digits    RADIOGRAPHS:  AP and lateral x-rays right wrist demonstrate healing radial styloid fracture good position  Severe arthritic changes of the joint noted  Comments: I have personally reviewed the imaging and I agree with the above radiologist's report.    ASSESSMENT/PLAN:     IMPRESSION:  1.  Right wrist radial styloid fracture healing.  2.  Severe underlying right wrist arthritis    PLAN:  I have reordered therapy in [facility] close to where she lives  Continue weaning out of the wrist splint  I have ordered some Naprosyn by mouth      FOLLOW UP:  4-6 weeks    Disclaimer: This note has been generated using voice-recognition software. There may be typographical errors that have been missed during proof-reading.  "

## 2019-07-31 PROBLEM — S62.109A WRIST FRACTURE: Status: ACTIVE | Noted: 2019-07-31

## 2019-08-09 ENCOUNTER — HOSPITAL ENCOUNTER (INPATIENT)
Facility: HOSPITAL | Age: 79
LOS: 6 days | Discharge: HOME | DRG: 372 | End: 2019-08-15
Attending: EMERGENCY MEDICINE | Admitting: SURGERY
Payer: MEDICARE

## 2019-08-09 DIAGNOSIS — R06.02 SOB (SHORTNESS OF BREATH): ICD-10-CM

## 2019-08-09 DIAGNOSIS — K35.32 APPENDICITIS WITH PERFORATION: Primary | ICD-10-CM

## 2019-08-09 DIAGNOSIS — R07.9 CHEST PAIN: ICD-10-CM

## 2019-08-09 LAB
ALBUMIN SERPL BCP-MCNC: 2.8 G/DL (ref 3.5–5.2)
ALP SERPL-CCNC: 103 U/L (ref 55–135)
ALT SERPL W/O P-5'-P-CCNC: 12 U/L (ref 10–44)
ANION GAP SERPL CALC-SCNC: 12 MMOL/L (ref 8–16)
AST SERPL-CCNC: 17 U/L (ref 10–40)
BACTERIA #/AREA URNS AUTO: NORMAL /HPF
BASOPHILS # BLD AUTO: 0.03 K/UL (ref 0–0.2)
BASOPHILS NFR BLD: 0.2 % (ref 0–1.9)
BILIRUB SERPL-MCNC: 0.7 MG/DL (ref 0.1–1)
BILIRUB UR QL STRIP: ABNORMAL
BNP SERPL-MCNC: 57 PG/ML (ref 0–99)
BUN SERPL-MCNC: 15 MG/DL (ref 8–23)
CALCIUM SERPL-MCNC: 9.8 MG/DL (ref 8.7–10.5)
CHLORIDE SERPL-SCNC: 103 MMOL/L (ref 95–110)
CLARITY UR REFRACT.AUTO: ABNORMAL
CO2 SERPL-SCNC: 25 MMOL/L (ref 23–29)
COLOR UR AUTO: ABNORMAL
CREAT SERPL-MCNC: 1.2 MG/DL (ref 0.5–1.4)
DIFFERENTIAL METHOD: ABNORMAL
EOSINOPHIL # BLD AUTO: 0 K/UL (ref 0–0.5)
EOSINOPHIL NFR BLD: 0.1 % (ref 0–8)
ERYTHROCYTE [DISTWIDTH] IN BLOOD BY AUTOMATED COUNT: 14.2 % (ref 11.5–14.5)
EST. GFR  (AFRICAN AMERICAN): 49.3 ML/MIN/1.73 M^2
EST. GFR  (NON AFRICAN AMERICAN): 42.8 ML/MIN/1.73 M^2
GLUCOSE SERPL-MCNC: 101 MG/DL (ref 70–110)
GLUCOSE UR QL STRIP: NEGATIVE
HCT VFR BLD AUTO: 33.2 % (ref 37–48.5)
HGB BLD-MCNC: 10.3 G/DL (ref 12–16)
HGB UR QL STRIP: NEGATIVE
HYALINE CASTS UR QL AUTO: 0 /LPF
IMM GRANULOCYTES # BLD AUTO: 0.08 K/UL (ref 0–0.04)
IMM GRANULOCYTES NFR BLD AUTO: 0.6 % (ref 0–0.5)
KETONES UR QL STRIP: NEGATIVE
LEUKOCYTE ESTERASE UR QL STRIP: NEGATIVE
LYMPHOCYTES # BLD AUTO: 0.9 K/UL (ref 1–4.8)
LYMPHOCYTES NFR BLD: 6.1 % (ref 18–48)
MCH RBC QN AUTO: 28.4 PG (ref 27–31)
MCHC RBC AUTO-ENTMCNC: 31 G/DL (ref 32–36)
MCV RBC AUTO: 92 FL (ref 82–98)
MICROSCOPIC COMMENT: NORMAL
MONOCYTES # BLD AUTO: 1 K/UL (ref 0.3–1)
MONOCYTES NFR BLD: 6.8 % (ref 4–15)
NEUTROPHILS # BLD AUTO: 12 K/UL (ref 1.8–7.7)
NEUTROPHILS NFR BLD: 86.2 % (ref 38–73)
NITRITE UR QL STRIP: NEGATIVE
NRBC BLD-RTO: 0 /100 WBC
PH UR STRIP: 5 [PH] (ref 5–8)
PLATELET # BLD AUTO: 273 K/UL (ref 150–350)
PMV BLD AUTO: 11.1 FL (ref 9.2–12.9)
POTASSIUM SERPL-SCNC: 3.5 MMOL/L (ref 3.5–5.1)
PROT SERPL-MCNC: 7.2 G/DL (ref 6–8.4)
PROT UR QL STRIP: ABNORMAL
RBC # BLD AUTO: 3.63 M/UL (ref 4–5.4)
RBC #/AREA URNS AUTO: 0 /HPF (ref 0–4)
SODIUM SERPL-SCNC: 140 MMOL/L (ref 136–145)
SP GR UR STRIP: 1.02 (ref 1–1.03)
SQUAMOUS #/AREA URNS AUTO: 0 /HPF
TROPONIN I SERPL DL<=0.01 NG/ML-MCNC: <0.006 NG/ML (ref 0–0.03)
URN SPEC COLLECT METH UR: ABNORMAL
WBC # BLD AUTO: 13.91 K/UL (ref 3.9–12.7)
WBC #/AREA URNS AUTO: 0 /HPF (ref 0–5)

## 2019-08-09 PROCEDURE — 63600175 PHARM REV CODE 636 W HCPCS: Performed by: EMERGENCY MEDICINE

## 2019-08-09 PROCEDURE — 25500020 PHARM REV CODE 255: Performed by: EMERGENCY MEDICINE

## 2019-08-09 PROCEDURE — 81001 URINALYSIS AUTO W/SCOPE: CPT

## 2019-08-09 PROCEDURE — 99222 PR INITIAL HOSPITAL CARE,LEVL II: ICD-10-PCS | Mod: AI,,, | Performed by: SURGERY

## 2019-08-09 PROCEDURE — 99285 PR EMERGENCY DEPT VISIT,LEVEL V: ICD-10-PCS | Mod: ,,, | Performed by: EMERGENCY MEDICINE

## 2019-08-09 PROCEDURE — 85025 COMPLETE CBC W/AUTO DIFF WBC: CPT

## 2019-08-09 PROCEDURE — 93010 EKG 12-LEAD: ICD-10-PCS | Mod: ,,, | Performed by: INTERNAL MEDICINE

## 2019-08-09 PROCEDURE — 80053 COMPREHEN METABOLIC PANEL: CPT

## 2019-08-09 PROCEDURE — 83880 ASSAY OF NATRIURETIC PEPTIDE: CPT

## 2019-08-09 PROCEDURE — 93010 ELECTROCARDIOGRAM REPORT: CPT | Mod: ,,, | Performed by: INTERNAL MEDICINE

## 2019-08-09 PROCEDURE — 99222 1ST HOSP IP/OBS MODERATE 55: CPT | Mod: AI,,, | Performed by: SURGERY

## 2019-08-09 PROCEDURE — 12000002 HC ACUTE/MED SURGE SEMI-PRIVATE ROOM

## 2019-08-09 PROCEDURE — 84484 ASSAY OF TROPONIN QUANT: CPT

## 2019-08-09 PROCEDURE — 99285 EMERGENCY DEPT VISIT HI MDM: CPT

## 2019-08-09 PROCEDURE — 99285 EMERGENCY DEPT VISIT HI MDM: CPT | Mod: ,,, | Performed by: EMERGENCY MEDICINE

## 2019-08-09 PROCEDURE — 93005 ELECTROCARDIOGRAM TRACING: CPT

## 2019-08-09 RX ORDER — AMLODIPINE BESYLATE 10 MG/1
10 TABLET ORAL DAILY
Status: DISCONTINUED | OUTPATIENT
Start: 2019-08-10 | End: 2019-08-15 | Stop reason: HOSPADM

## 2019-08-09 RX ORDER — CIPROFLOXACIN 2 MG/ML
400 INJECTION, SOLUTION INTRAVENOUS
Status: DISCONTINUED | OUTPATIENT
Start: 2019-08-10 | End: 2019-08-14

## 2019-08-09 RX ORDER — SODIUM CHLORIDE 9 MG/ML
INJECTION, SOLUTION INTRAVENOUS CONTINUOUS
Status: DISCONTINUED | OUTPATIENT
Start: 2019-08-10 | End: 2019-08-10

## 2019-08-09 RX ORDER — ACETAMINOPHEN 325 MG/1
650 TABLET ORAL EVERY 8 HOURS PRN
Status: DISCONTINUED | OUTPATIENT
Start: 2019-08-09 | End: 2019-08-15 | Stop reason: HOSPADM

## 2019-08-09 RX ORDER — HYDROCODONE BITARTRATE AND ACETAMINOPHEN 5; 325 MG/1; MG/1
1 TABLET ORAL EVERY 4 HOURS PRN
Status: DISCONTINUED | OUTPATIENT
Start: 2019-08-09 | End: 2019-08-15 | Stop reason: HOSPADM

## 2019-08-09 RX ORDER — TRAMADOL HYDROCHLORIDE 50 MG/1
50 TABLET ORAL EVERY 6 HOURS PRN
Status: ON HOLD | COMMUNITY
End: 2019-08-15 | Stop reason: HOSPADM

## 2019-08-09 RX ORDER — ONDANSETRON 8 MG/1
8 TABLET, ORALLY DISINTEGRATING ORAL EVERY 8 HOURS PRN
Status: DISCONTINUED | OUTPATIENT
Start: 2019-08-09 | End: 2019-08-15 | Stop reason: HOSPADM

## 2019-08-09 RX ORDER — SODIUM CHLORIDE 0.9 % (FLUSH) 0.9 %
10 SYRINGE (ML) INJECTION
Status: DISCONTINUED | OUTPATIENT
Start: 2019-08-09 | End: 2019-08-15 | Stop reason: HOSPADM

## 2019-08-09 RX ORDER — ATORVASTATIN CALCIUM 20 MG/1
20 TABLET, FILM COATED ORAL NIGHTLY
Status: DISCONTINUED | OUTPATIENT
Start: 2019-08-10 | End: 2019-08-15 | Stop reason: HOSPADM

## 2019-08-09 RX ORDER — LOSARTAN POTASSIUM 50 MG/1
100 TABLET ORAL DAILY
Status: DISCONTINUED | OUTPATIENT
Start: 2019-08-10 | End: 2019-08-10

## 2019-08-09 RX ORDER — METRONIDAZOLE 500 MG/100ML
500 INJECTION, SOLUTION INTRAVENOUS
Status: DISCONTINUED | OUTPATIENT
Start: 2019-08-10 | End: 2019-08-14

## 2019-08-09 RX ADMIN — SODIUM CHLORIDE: 0.9 INJECTION, SOLUTION INTRAVENOUS at 11:08

## 2019-08-09 RX ADMIN — PIPERACILLIN AND TAZOBACTAM 4.5 G: 4; .5 INJECTION, POWDER, LYOPHILIZED, FOR SOLUTION INTRAVENOUS; PARENTERAL at 11:08

## 2019-08-09 RX ADMIN — IOHEXOL 100 ML: 350 INJECTION, SOLUTION INTRAVENOUS at 08:08

## 2019-08-09 NOTE — ED NOTES
Patient identifiers verified and correct for Ms Alvarado  C/C: SOB, Cough SEE NN  APPEARANCE: awake and alert in NAD.  SKIN: warm, dry and intact. No breakdown or bruising.  MUSCULOSKELETAL: Patient moving all extremities spontaneously, no obvious swelling or deformities noted. Ambulates with walker  RESPIRATORY: Denies shortness of breath.Respirations unlabored. Positive cough with white frothy secretions,   CARDIAC: Denies CP, 2+ distal pulses; no peripheral edema  ABDOMEN: S/ND/NT, Denies nausea  : voids spontaneously, denies difficulty  Neurologic: AAO x 4; follows commands equal strength in all extremities; denies numbness/tingling. Denies dizziness Denies weakness

## 2019-08-09 NOTE — PROVIDER PROGRESS NOTES - EMERGENCY DEPT.
Encounter Date: 8/9/2019    ED Physician Progress Notes         EKG - STEMI Decision  Initial Reading: No STEMI present.    ____________________  Isauro Aguilera MD, [facility]  Emergency Medicine Staff  4:50 PM 8/9/2019

## 2019-08-09 NOTE — ED PROVIDER NOTES
Encounter Date: 2019       History     Chief Complaint   Patient presents with    Shortness of Breath     aicd, feeling weak for 2d, lower abd pain     78-year-old female with past medical history of AICD, CHF, hypertension, hyperlipidemia, remote hysterectomy, presenting with 2 days of increasing fatigue, cough productive of white sputum, dyspnea on exertion, and lower abdominal pain which she describes as a constant ache. She also endorses a decreased appetite and some lower back pain when she ranges her back, but denies any nausea or vomiting, chest pain, fever or chills, numbness or weakness in extremities.         Review of patient's allergies indicates:  No Known Allergies  Past Medical History:   Diagnosis Date    Arthritis     CHF (congestive heart failure)     Coronary artery disease     Hyperlipidemia     Hypertension      Past Surgical History:   Procedure Laterality Date    HYSTERECTOMY N/A     INSERTION, CARDIAC PACEMAKER, DUAL CHAMBER N/A 2018    Performed by Archie Montez MD at [facility] CATH LAB    PLACEMENT-LOOP RECORDER N/A 2018    Performed by Archie Montez MD at [facility] CATH LAB     History reviewed. No pertinent family history.  Social History     Tobacco Use    Smoking status: Former Smoker     Packs/day: 0.50     Last attempt to quit: 2013     Years since quittin.3    Smokeless tobacco: Never Used   Substance Use Topics    Alcohol use: No    Drug use: No     Review of Systems   Constitutional: Positive for appetite change (decreased) and fatigue. Negative for chills and fever.   Eyes: Negative for visual disturbance.        Neg vision changes   Respiratory: Positive for cough. Negative for shortness of breath.         RITTER   Cardiovascular: Negative for chest pain and leg swelling.   Gastrointestinal: Negative for abdominal pain, blood in stool, constipation, diarrhea, nausea and vomiting.        Neg changes in stool   Genitourinary: Negative for dysuria, flank pain,  frequency and urgency.        Neg changes in urination   Musculoskeletal: Positive for back pain (lower). Negative for arthralgias, joint swelling, myalgias and neck pain.   Skin: Negative for color change and rash.   Allergic/Immunologic: Negative for immunocompromised state.   Neurological: Negative for dizziness, light-headedness and headaches.   Hematological: Does not bruise/bleed easily.   Psychiatric/Behavioral: Negative for confusion.       Physical Exam     Initial Vitals [08/09/19 1630]   BP Pulse Resp Temp SpO2   (!) 141/87 98 18 99.8 °F (37.7 °C) 99 %      MAP       --         Physical Exam    Nursing note and vitals reviewed.  Constitutional: She appears well-developed and well-nourished. She is not diaphoretic. No distress.   HENT:   Head: Normocephalic and atraumatic.   Nose: Nose normal.   Eyes: EOM are normal. Pupils are equal, round, and reactive to light. No scleral icterus.   Neck: Normal range of motion. Neck supple. No JVD present.   Cardiovascular: Normal rate and regular rhythm.   No murmur heard.  Pulmonary/Chest: Breath sounds normal. No stridor. No respiratory distress. She has no wheezes. She has no rhonchi. She has no rales. She exhibits no tenderness.   Abdominal: Soft. Bowel sounds are normal. She exhibits no distension. There is tenderness (RLQ and LLQ, mild). There is no rebound and no guarding.   Musculoskeletal: Normal range of motion. She exhibits no edema or tenderness.   Lymphadenopathy:     She has no cervical adenopathy.   Neurological: She is alert and oriented to person, place, and time. She has normal strength. No sensory deficit.   Skin: Skin is warm and dry. No rash noted.   Psychiatric: She has a normal mood and affect. Her behavior is normal. Thought content normal.         ED Course   Procedures  Labs Reviewed   CBC W/ AUTO DIFFERENTIAL - Abnormal; Notable for the following components:       Result Value    WBC 13.91 (*)     RBC 3.63 (*)     Hemoglobin 10.3 (*)      Hematocrit 33.2 (*)     Mean Corpuscular Hemoglobin Conc 31.0 (*)     Immature Granulocytes 0.6 (*)     Gran # (ANC) 12.0 (*)     Immature Grans (Abs) 0.08 (*)     Lymph # 0.9 (*)     Gran% 86.2 (*)     Lymph% 6.1 (*)     All other components within normal limits    Narrative:     shared lavender   COMPREHENSIVE METABOLIC PANEL - Abnormal; Notable for the following components:    Albumin 2.8 (*)     eGFR if  49.3 (*)     eGFR if non  42.8 (*)     All other components within normal limits    Narrative:     shared lavender   URINALYSIS, REFLEX TO URINE CULTURE - Abnormal; Notable for the following components:    Appearance, UA Cloudy (*)     Protein, UA 1+ (*)     Bilirubin (UA) 1+ (*)     All other components within normal limits    Narrative:     Preferred Collection Type->Urine, Clean Catch   TROPONIN I    Narrative:     shared lavender   B-TYPE NATRIURETIC PEPTIDE    Narrative:     shared lavender   URINALYSIS MICROSCOPIC    Narrative:     Preferred Collection Type->Urine, Clean Catch        ECG Results          EKG 12-lead (Final result)  Result time 08/10/19 12:58:03    Final result by Interface, Lab In [facility] (08/10/19 12:58:03)                 Narrative:    Test Reason : DIZZINESS    Vent. Rate : 078 BPM     Atrial Rate : 078 BPM     P-R Int : 144 ms          QRS Dur : 072 ms      QT Int : 368 ms       P-R-T Axes : 052 -03 035 degrees     QTc Int : 419 ms    Normal sinus rhythm  Minimal voltage criteria for LVH, may be normal variant  Borderline Abnormal ECG  When compared with ECG of 04-JUN-2019 15:02,  No significant change was found  Confirmed by Jerilyn DEGROOT, Mala ([id number]) on 8/10/2019 12:57:54 PM    Referred By: [email or web address]   SELF           Confirmed By:Mala Jean-Baptiste MD                            Imaging Results           CT Abdomen Pelvis With Contrast (Final result)  Result time 08/09/19 22:01:22    Final result by Jamel Zambrano MD (08/09/19 22:01:22)                  Impression:      Findings detailed above favored to reflect perforated appendicitis with abscess formation.    Prominent colonic diverticulosis most notable in the sigmoid colon, no convincing evidence of acute diverticulitis.    Cholelithiasis versus biliary sludge.  No evidence of acute cholecystitis.    Additional findings as detailed above.    This report was flagged in Epic as abnormal.    COMMUNICATION  This critical result was discovered/received at 2110. The critical information above was relayed directly by Singh Lee MD by telephone to Brittany Hoang MD on 08/09/2019 at 2114.    Electronically signed by resident: Singh Lee MD  Date:    08/09/2019  Time:    20:52    Electronically signed by: Jamel Zambrano MD  Date:    08/09/2019  Time:    22:01             Narrative:    EXAMINATION:  CT ABDOMEN PELVIS WITH CONTRAST    CLINICAL HISTORY:  LLQ pain, suspect diverticulitis;    TECHNIQUE:  The patient was surveyed from the lung bases through the pelvis after the administration of 100 cc Omni 350 IV contrast and data was reconstructed for coronal, sagittal, and axial images.  Oral contrast not administered.    COMPARISON:  CT lumbar spine 02/24/2018.  CT abdomen pelvis, 06/16/2010.    FINDINGS:  The visualized thoracic aorta is normal in course, caliber, and contour without significant atherosclerotic calcifications.The heart does not appear enlarged and there is no pericardial effusion.    The lung bases are symmetrically expanded and demonstrate no convincing evidence of consolidation, pleural thickening, pneumothorax, or pleural fluid.    Liver is normal in size and attenuation.  Several scattered hepatic cysts are noted.  The gallbladder is distended with dependent high density material, likely studs versus stones, with no wall thickening or pericholecystic fluid to suggest acute cholecystitis.  There is no intra or extrahepatic biliary ductal dilatation.    The distal esophagus, stomach,  spleen, pancreas, and adrenal glands are unremarkable.    The right kidney is normal in size and location, with capsular irregularity, and several tiny cysts.  The left kidney is atrophic, with several scattered cysts.  There is no evidence of hydronephrosis.  The ureters appear normal in course and caliber without evidence of ureteral dilatation. The urinary bladder demonstrates no significant abnormality. The uterus is not definitively visualized and may be surgically absent.    The abdominal aorta is normal in course and caliber with advanced atherosclerosis extending into the branch vessels.    Visualized loops of small bowel show no evidence of obstruction or inflammation.  There is prominent diverticulosis, most notable in the descending and sigmoid colon, with no convincing evidence of acute diverticulitis.  Mild wall thickening in the proximal aspect of the cecum.  There is a blind-ending tubular structure in the right lower quadrant terminating in a walled-off mixed density collection measuring 5.4 x 3.7 cm, with internal foci of air and surrounding inflammatory change in the right hemipelvis.  Prominent mesenteric lymph nodes are noted in the right lower quadrant.  No intraperitoneal free air or significant abdominopelvic free fluid.    No interval multilevel degenerative change, with grade 1 anterolisthesis of L4 on L5, and a remote L4 compression fracture with progressive height loss compared to prior lumbar spine of 02/24/2018.  There are chronic changes involving the pubic symphysis which may be from prior trauma and appear stable dating back to 2010.  There is a ventral abdominal wall hernia containing fat and mesenteric vasculature.                               X-Ray Chest PA And Lateral (Final result)  Result time 08/09/19 20:54:56    Final result by Jamel Zambrano MD (08/09/19 20:54:56)                 Impression:      Borderline cardiomegaly.  No acute finding.      Electronically signed  "by: Jamel Zambrano MD  Date:    08/09/2019  Time:    20:54             Narrative:    EXAMINATION:  XR CHEST PA AND LATERAL    CLINICAL HISTORY:  Provided history is "Chest Pain;  ".    TECHNIQUE:  Frontal and lateral views of the chest were performed.    COMPARISON:  07/04/2018.    FINDINGS:  Cardiac silhouette is prominent but stable.  Atherosclerotic calcifications overlie the aortic arch.  Cardiac loop recorder device is present.  No focal consolidation.  No sizable pleural effusion.  No pneumothorax.  No detrimental change in lung aeration.                                 Medical Decision Making:   History:   Old Medical Records: I decided to obtain old medical records.  Old Records Summarized: records from clinic visits and records from previous admission(s).  Initial Assessment:   78-year-old female past medical history of AICD, hypertension, hyperlipidemia, presenting with 2 days of dyspnea on exertion, fatigue, and lower abdominal pain.   Differential Diagnosis:   CHF, arrhythmia, PNA, URI, ACS, UTI, pyelonephritis, colitis, appendicitis, diverticulitis  Independently Interpreted Test(s):   I have ordered and independently interpreted EKG Reading(s) - see prior notes  Clinical Tests:   Lab Tests: Ordered and Reviewed  Radiological Study: Ordered and Reviewed  Medical Tests: Ordered and Reviewed  ED Management:  Will obtain CBC, CMP, BNP, troponin to rule out CHF/ACS, as well as CT abdomen/pelvis and urinalysis for lower abdominal pain.    CT showing perforated appy, will give Zosyn and c/s Gen Surgery. Pt appears stable, in minimal pain distress  Other:   I have discussed this case with another health care provider.       <> Summary of the Discussion: Gen Surgery                      Clinical Impression:       ICD-10-CM ICD-9-CM   1. Appendicitis with perforation K35.32 540.0   2. SOB (shortness of breath) R06.02 786.05   3. Chest pain R07.9 786.50                                Brittany Hoang, " MD  08/13/19 1180       Brittany Hoang MD  08/13/19 [id number]

## 2019-08-10 LAB
ANION GAP SERPL CALC-SCNC: 11 MMOL/L (ref 8–16)
BASOPHILS # BLD AUTO: 0.03 K/UL (ref 0–0.2)
BASOPHILS NFR BLD: 0.2 % (ref 0–1.9)
BUN SERPL-MCNC: 15 MG/DL (ref 8–23)
CALCIUM SERPL-MCNC: 9.4 MG/DL (ref 8.7–10.5)
CHLORIDE SERPL-SCNC: 104 MMOL/L (ref 95–110)
CO2 SERPL-SCNC: 24 MMOL/L (ref 23–29)
CREAT SERPL-MCNC: 1.2 MG/DL (ref 0.5–1.4)
DIFFERENTIAL METHOD: ABNORMAL
EOSINOPHIL # BLD AUTO: 0 K/UL (ref 0–0.5)
EOSINOPHIL NFR BLD: 0.2 % (ref 0–8)
ERYTHROCYTE [DISTWIDTH] IN BLOOD BY AUTOMATED COUNT: 14.4 % (ref 11.5–14.5)
EST. GFR  (AFRICAN AMERICAN): 49.3 ML/MIN/1.73 M^2
EST. GFR  (NON AFRICAN AMERICAN): 42.8 ML/MIN/1.73 M^2
GLUCOSE SERPL-MCNC: 99 MG/DL (ref 70–110)
HCT VFR BLD AUTO: 30.9 % (ref 37–48.5)
HGB BLD-MCNC: 9.3 G/DL (ref 12–16)
IMM GRANULOCYTES # BLD AUTO: 0.07 K/UL (ref 0–0.04)
IMM GRANULOCYTES NFR BLD AUTO: 0.5 % (ref 0–0.5)
INR PPP: 1 (ref 0.8–1.2)
LYMPHOCYTES # BLD AUTO: 0.7 K/UL (ref 1–4.8)
LYMPHOCYTES NFR BLD: 5.3 % (ref 18–48)
MAGNESIUM SERPL-MCNC: 1.7 MG/DL (ref 1.6–2.6)
MCH RBC QN AUTO: 28.4 PG (ref 27–31)
MCHC RBC AUTO-ENTMCNC: 30.1 G/DL (ref 32–36)
MCV RBC AUTO: 94 FL (ref 82–98)
MONOCYTES # BLD AUTO: 0.9 K/UL (ref 0.3–1)
MONOCYTES NFR BLD: 7.2 % (ref 4–15)
NEUTROPHILS # BLD AUTO: 11.1 K/UL (ref 1.8–7.7)
NEUTROPHILS NFR BLD: 86.6 % (ref 38–73)
NRBC BLD-RTO: 0 /100 WBC
PHOSPHATE SERPL-MCNC: 3.5 MG/DL (ref 2.7–4.5)
PLATELET # BLD AUTO: 261 K/UL (ref 150–350)
PMV BLD AUTO: 10.6 FL (ref 9.2–12.9)
POTASSIUM SERPL-SCNC: 3 MMOL/L (ref 3.5–5.1)
PROTHROMBIN TIME: 10.4 SEC (ref 9–12.5)
RBC # BLD AUTO: 3.28 M/UL (ref 4–5.4)
SODIUM SERPL-SCNC: 139 MMOL/L (ref 136–145)
WBC # BLD AUTO: 12.83 K/UL (ref 3.9–12.7)

## 2019-08-10 PROCEDURE — S0030 INJECTION, METRONIDAZOLE: HCPCS | Performed by: STUDENT IN AN ORGANIZED HEALTH CARE EDUCATION/TRAINING PROGRAM

## 2019-08-10 PROCEDURE — 99232 PR SUBSEQUENT HOSPITAL CARE,LEVL II: ICD-10-PCS | Mod: ,,, | Performed by: SURGERY

## 2019-08-10 PROCEDURE — 84100 ASSAY OF PHOSPHORUS: CPT

## 2019-08-10 PROCEDURE — 11000001 HC ACUTE MED/SURG PRIVATE ROOM

## 2019-08-10 PROCEDURE — 25000003 PHARM REV CODE 250: Performed by: STUDENT IN AN ORGANIZED HEALTH CARE EDUCATION/TRAINING PROGRAM

## 2019-08-10 PROCEDURE — 36415 COLL VENOUS BLD VENIPUNCTURE: CPT

## 2019-08-10 PROCEDURE — 80048 BASIC METABOLIC PNL TOTAL CA: CPT

## 2019-08-10 PROCEDURE — 99232 SBSQ HOSP IP/OBS MODERATE 35: CPT | Mod: ,,, | Performed by: SURGERY

## 2019-08-10 PROCEDURE — 63600175 PHARM REV CODE 636 W HCPCS: Performed by: STUDENT IN AN ORGANIZED HEALTH CARE EDUCATION/TRAINING PROGRAM

## 2019-08-10 PROCEDURE — 85025 COMPLETE CBC W/AUTO DIFF WBC: CPT

## 2019-08-10 PROCEDURE — 85610 PROTHROMBIN TIME: CPT

## 2019-08-10 PROCEDURE — 83735 ASSAY OF MAGNESIUM: CPT

## 2019-08-10 RX ORDER — LABETALOL HCL 20 MG/4 ML
10 SYRINGE (ML) INTRAVENOUS EVERY 6 HOURS PRN
Status: DISCONTINUED | OUTPATIENT
Start: 2019-08-10 | End: 2019-08-10

## 2019-08-10 RX ORDER — POTASSIUM CHLORIDE 20 MEQ/1
60 TABLET, EXTENDED RELEASE ORAL ONCE
Status: COMPLETED | OUTPATIENT
Start: 2019-08-10 | End: 2019-08-10

## 2019-08-10 RX ORDER — DEXTROSE MONOHYDRATE, SODIUM CHLORIDE, AND POTASSIUM CHLORIDE 50; 1.49; 9 G/1000ML; G/1000ML; G/1000ML
INJECTION, SOLUTION INTRAVENOUS CONTINUOUS
Status: DISCONTINUED | OUTPATIENT
Start: 2019-08-10 | End: 2019-08-14

## 2019-08-10 RX ORDER — HYDRALAZINE HYDROCHLORIDE 50 MG/1
50 TABLET, FILM COATED ORAL EVERY 8 HOURS PRN
Status: DISCONTINUED | OUTPATIENT
Start: 2019-08-10 | End: 2019-08-15 | Stop reason: HOSPADM

## 2019-08-10 RX ORDER — HYDRALAZINE HYDROCHLORIDE 20 MG/ML
10 INJECTION INTRAMUSCULAR; INTRAVENOUS EVERY 6 HOURS PRN
Status: DISCONTINUED | OUTPATIENT
Start: 2019-08-10 | End: 2019-08-10

## 2019-08-10 RX ADMIN — AMLODIPINE BESYLATE 10 MG: 10 TABLET ORAL at 09:08

## 2019-08-10 RX ADMIN — METRONIDAZOLE 500 MG: 500 INJECTION, SOLUTION INTRAVENOUS at 06:08

## 2019-08-10 RX ADMIN — POTASSIUM CHLORIDE, DEXTROSE MONOHYDRATE AND SODIUM CHLORIDE: 150; 5; 900 INJECTION, SOLUTION INTRAVENOUS at 11:08

## 2019-08-10 RX ADMIN — POTASSIUM CHLORIDE 60 MEQ: 20 TABLET, EXTENDED RELEASE ORAL at 06:08

## 2019-08-10 RX ADMIN — CIPROFLOXACIN 400 MG: 2 INJECTION, SOLUTION INTRAVENOUS at 09:08

## 2019-08-10 RX ADMIN — METRONIDAZOLE 500 MG: 500 INJECTION, SOLUTION INTRAVENOUS at 02:08

## 2019-08-10 RX ADMIN — HYDROCODONE BITARTRATE AND ACETAMINOPHEN 1 TABLET: 5; 325 TABLET ORAL at 02:08

## 2019-08-10 NOTE — PROGRESS NOTES
"[facility]  General Surgery  Progress Note    Subjective:     History of Present Illness:  Crystal Alvarado is a 78 y.o. female with h/o CHF (preserved EF of 60%), SVT s/p ablation, HTN, HLD, COPD, non-obstructive CAD, and CKD3 who presents with two days of lower abdominal pain. Reports pain started gradually in her lower abdomen, is now more located in RLQ. Denies fever, chills, nausea, or vomiting. Does endorse decreased appetite. Patient currently in wheelchair, but reports that she is able to ambulate around her house. She is not able to walk an entire block without shortness of breath.   On admission, she is hemodynamically stable and afebrile. Labs significant for leukocytosis of  13.9 with left shift. CT scan showed "a blind-ending tubular structure in the right lower quadrant terminating in a walled-off mixed density collection measuring 5.4 x 3.7 cm, with internal foci of air and surrounding inflammatory change in the right hemipelvis". No free air. Scan preliminarily discussed with Radiology, likely a window for IR drain placement.    Post-Op Info:  * No surgery found *         Interval History: No acute events overnight. Abdominal pain improved. WBC trending down, now at 12 from 14. No nausea/vomiting.    Medications:  Continuous Infusions:   sodium chloride 0.9% 100 mL/hr at 08/09/19 2321     Scheduled Meds:   amLODIPine  10 mg Oral Daily    atorvastatin  20 mg Oral QHS    ciprofloxacin  400 mg Intravenous Q12H    metronidazole  500 mg Intravenous Q8H     PRN Meds:acetaminophen, hydrALAZINE, HYDROcodone-acetaminophen, ondansetron, sodium chloride 0.9%     Review of patient's allergies indicates:  No Known Allergies  Objective:     Vital Signs (Most Recent):  Temp: 97.6 °F (36.4 °C) (08/10/19 0643)  Pulse: 86 (08/10/19 0643)  Resp: 18 (08/10/19 0643)  BP: (!) 179/74 (08/10/19 0643)  SpO2: 98 % (08/10/19 0643) Vital Signs (24h Range):  Temp:  [97.6 °F (36.4 °C)-100.5 °F (38.1 °C)] " 97.6 °F (36.4 °C)  Pulse:  [72-98] 86  Resp:  [18] 18  SpO2:  [98 %-99 %] 98 %  BP: (141-179)/(74-87) 179/74     Weight: 82.7 kg (182 lb 5.1 oz)  Body mass index is 30.34 kg/m².    Intake/Output - Last 3 Shifts       08/08 0700 - 08/09 0659 08/09 0700 - 08/10 0659 08/10 0700 - 08/11 0659    IV Piggyback  100     Total Intake(mL/kg)  100 (1.2)     Net  +100                  Physical Exam   Constitutional: She is oriented to person, place, and time. She appears well-developed and well-nourished.   HENT:   Head: Normocephalic and atraumatic.   Cardiovascular: Normal rate.   Pulmonary/Chest: Effort normal. No respiratory distress.   Abdominal: Soft. She exhibits no distension. There is tenderness (Mild tenderness to palpation in RLQ).   Neurological: She is alert and oriented to person, place, and time.   Skin: Skin is warm and dry.   Psychiatric: She has a normal mood and affect.       Significant Labs:  CBC:   Recent Labs   Lab 08/10/19  0427   WBC 12.83*   RBC 3.28*   HGB 9.3*   HCT 30.9*      MCV 94   MCH 28.4   MCHC 30.1*     CMP:   Recent Labs   Lab 08/09/19  1912 08/10/19  0427    99   CALCIUM 9.8 9.4   ALBUMIN 2.8*  --    PROT 7.2  --     139   K 3.5 3.0*   CO2 25 24    104   BUN 15 15   CREATININE 1.2 1.2   ALKPHOS 103  --    ALT 12  --    AST 17  --    BILITOT 0.7  --        Significant Diagnostics:  none new    Assessment/Plan:     Appendicitis with perforation  77yo female w/perforated appendicitis and abscess    - NPO  - IVF  - discussed with IR - planning for drain placement today  - IV cipro/flagyl  - daily labs  - PRN pain meds  - home amlodipine, hold off on losartan for now, PRN hydralazine for SBP>170  - home statin    Hypokalemia  - replace with 60meq K tablets  - daily labs    Essential hypertension  - see acute appendicitis      Latrice Wakefield MD  General Surgery  [facility]

## 2019-08-10 NOTE — HPI
"Crystal Alvarado is a 78 y.o. female with h/o CHF (preserved EF of 60%), SVT s/p ablation, HTN, HLD, COPD, non-obstructive CAD, and CKD3 who presents with two days of lower abdominal pain. Reports pain started gradually in her lower abdomen, is now more located in RLQ. Denies fever, chills, nausea, or vomiting. Does endorse decreased appetite. Patient currently in wheelchair, but reports that she is able to ambulate around her house. She is not able to walk an entire block without shortness of breath.   On admission, she is hemodynamically stable and afebrile. Labs significant for leukocytosis of  13.9 with left shift. CT scan showed "a blind-ending tubular structure in the right lower quadrant terminating in a walled-off mixed density collection measuring 5.4 x 3.7 cm, with internal foci of air and surrounding inflammatory change in the right hemipelvis". No free air. Scan preliminarily discussed with Radiology, likely a window for IR drain placement.  "

## 2019-08-10 NOTE — ASSESSMENT & PLAN NOTE
79yo female w/perforated appendicitis and abscess    - NPO  - IVF  - discussed with IR - planning for drain placement today  - IV cipro/flagyl  - daily labs  - PRN pain meds  - home amlodipine, hold off on losartan for now, PRN hydralazine for SBP>170  - home statin

## 2019-08-10 NOTE — NURSING
After many attempts about status of pt possible procedure- per radiology they will not do procedure today--requested if med team could come speak to pt and if pt could have diet- pt resting quietly - moderate pain to abd- has been up to bedside commode- +bm--family at bedside

## 2019-08-10 NOTE — CONSULTS
Radiology Consult    Crystal Alvarado is a 78 y.o. female with a history of COPD with recent RLQ pain requiring hospital admission. CT abdomen pelvis revealed perforated appendicitis with abscess formation. IR consulted for drainage.    Past Medical History:   Diagnosis Date    Arthritis     CHF (congestive heart failure)     Coronary artery disease     Hyperlipidemia     Hypertension      Past Surgical History:   Procedure Laterality Date    HYSTERECTOMY N/A     INSERTION, CARDIAC PACEMAKER, DUAL CHAMBER N/A 7/9/2018    Performed by Archie Montez MD at [facility] CATH LAB    PLACEMENT-LOOP RECORDER N/A 7/9/2018    Performed by Archie Montez MD at [facility] CATH LAB       Discussed with primary team.    Imaging reviewed with Radiology staff, Dr. Barone.     Procedure: RLQ abscess drainage.    Scheduled Meds:    amLODIPine  10 mg Oral Daily    atorvastatin  20 mg Oral QHS    ciprofloxacin  400 mg Intravenous Q12H    metronidazole  500 mg Intravenous Q8H     Continuous Infusions:    dextrose 5 % and 0.9 % NaCl with KCl 20 mEq 100 mL/hr at 08/10/19 1128     PRN Meds:acetaminophen, hydrALAZINE, HYDROcodone-acetaminophen, ondansetron, sodium chloride 0.9%    Allergies: Review of patient's allergies indicates:  No Known Allergies    Labs:  Recent Labs   Lab 08/10/19  0427   INR 1.0       Recent Labs   Lab 08/10/19  0427   WBC 12.83*   HGB 9.3*   HCT 30.9*   MCV 94         Recent Labs   Lab 08/09/19  1912 08/10/19  0427    99    139   K 3.5 3.0*    104   CO2 25 24   BUN 15 15   CREATININE 1.2 1.2   CALCIUM 9.8 9.4   MG  --  1.7   ALT 12  --    AST 17  --    ALBUMIN 2.8*  --    BILITOT 0.7  --          Vitals (Most Recent):  Temp: 98.4 °F (36.9 °C) (08/10/19 1631)  Pulse: 69 (08/10/19 1631)  Resp: 16 (08/10/19 1631)  BP: (!) 144/66 (08/10/19 1631)  SpO2: 97 % (08/10/19 1631)    Plan:   Imaging reviewed with staff, Dr. Barone. No safe pocket visualized to perform abscess drainage.  Recommend close observation and repeating imaging at interval determined by primary team for re-assessment. Case discussed with primary team.    Dat Reyes MD  PGY-3 Radiology Resident  [address] [facility]  Pager: [phone]

## 2019-08-10 NOTE — SUBJECTIVE & OBJECTIVE
Interval History: No acute events overnight. Abdominal pain improved. WBC trending down, now at 12 from 14. No nausea/vomiting.    Medications:  Continuous Infusions:   sodium chloride 0.9% 100 mL/hr at 08/09/19 2321     Scheduled Meds:   amLODIPine  10 mg Oral Daily    atorvastatin  20 mg Oral QHS    ciprofloxacin  400 mg Intravenous Q12H    metronidazole  500 mg Intravenous Q8H     PRN Meds:acetaminophen, hydrALAZINE, HYDROcodone-acetaminophen, ondansetron, sodium chloride 0.9%     Review of patient's allergies indicates:  No Known Allergies  Objective:     Vital Signs (Most Recent):  Temp: 97.6 °F (36.4 °C) (08/10/19 0643)  Pulse: 86 (08/10/19 0643)  Resp: 18 (08/10/19 0643)  BP: (!) 179/74 (08/10/19 0643)  SpO2: 98 % (08/10/19 0643) Vital Signs (24h Range):  Temp:  [97.6 °F (36.4 °C)-100.5 °F (38.1 °C)] 97.6 °F (36.4 °C)  Pulse:  [72-98] 86  Resp:  [18] 18  SpO2:  [98 %-99 %] 98 %  BP: (141-179)/(74-87) 179/74     Weight: 82.7 kg (182 lb 5.1 oz)  Body mass index is 30.34 kg/m².    Intake/Output - Last 3 Shifts       08/08 0700 - 08/09 0659 08/09 0700 - 08/10 0659 08/10 0700 - 08/11 0659    IV Piggyback  100     Total Intake(mL/kg)  100 (1.2)     Net  +100                  Physical Exam   Constitutional: She is oriented to person, place, and time. She appears well-developed and well-nourished.   HENT:   Head: Normocephalic and atraumatic.   Cardiovascular: Normal rate.   Pulmonary/Chest: Effort normal. No respiratory distress.   Abdominal: Soft. She exhibits no distension. There is tenderness (Mild tenderness to palpation in RLQ).   Neurological: She is alert and oriented to person, place, and time.   Skin: Skin is warm and dry.   Psychiatric: She has a normal mood and affect.       Significant Labs:  CBC:   Recent Labs   Lab 08/10/19  0427   WBC 12.83*   RBC 3.28*   HGB 9.3*   HCT 30.9*      MCV 94   MCH 28.4   MCHC 30.1*     CMP:   Recent Labs   Lab 08/09/19  1912 08/10/19  0427    99    CALCIUM 9.8 9.4   ALBUMIN 2.8*  --    PROT 7.2  --     139   K 3.5 3.0*   CO2 25 24    104   BUN 15 15   CREATININE 1.2 1.2   ALKPHOS 103  --    ALT 12  --    AST 17  --    BILITOT 0.7  --        Significant Diagnostics:  none new

## 2019-08-10 NOTE — CONSULTS
"[facility]  General Surgery  Consult Note    Inpatient consult to General Surgery  Consult performed by: Dolores Villagran MD  Consult ordered by: Brittany Hoang MD        Subjective:     Chief Complaint/Reason for Admission: Abdominal pain    History of Present Illness:   Crystal Alvarado is a 78 y.o. female with h/o CHF (preserved EF of 60%), SVT s/p ablation, HTN, HLD, COPD, non-obstructive CAD, and CKD3 who presents with two days of lower abdominal pain. Reports pain started gradually in her lower abdomen, is now more located in RLQ. Denies fever, chills, nausea, or vomiting. Does endorse decreased appetite. Patient currently in wheelchair, but reports that she is able to ambulate around her house. She is not able to walk an entire block without shortness of breath.   On admission, she is hemodynamically stable and afebrile. Labs significant for leukocytosis of  13.9 with left shift. CT scan showed "a blind-ending tubular structure in the right lower quadrant terminating in a walled-off mixed density collection measuring 5.4 x 3.7 cm, with internal foci of air and surrounding inflammatory change in the right hemipelvis". No free air. Scan preliminarily discussed with Radiology, likely a window for IR drain placement.    No current facility-administered medications on file prior to encounter.      Current Outpatient Medications on File Prior to Encounter   Medication Sig    aspirin 81 MG Chew Take 81 mg by mouth once daily.    atorvastatin (LIPITOR) 20 MG tablet Take 20 mg by mouth every evening.    losartan (COZAAR) 100 MG tablet TAKE 1 TABLET (100 MG TOTAL) BY MOUTH ONCE DAILY.    traMADol (ULTRAM) 50 mg tablet Take 50 mg by mouth every 6 (six) hours as needed for Pain.    amlodipine (NORVASC) 10 MG tablet Take 10 mg by mouth once daily.    azelastine (ASTELIN) 137 mcg (0.1 %) nasal spray     docusate sodium (COLACE) 100 MG capsule Take 1 capsule (100 mg total) by mouth 2 " (two) times daily.    ergocalciferol (ERGOCALCIFEROL) 50,000 unit Cap Take 50,000 Units by mouth every 7 days.    fluticasone propionate (FLONASE) 50 mcg/actuation nasal spray     nitroGLYCERIN (NITROSTAT) 0.4 MG SL tablet Place 1 tablet (0.4 mg total) under the tongue every 5 (five) minutes as needed for Chest pain (and notify MD).    [DISCONTINUED] naproxen (EC-NAPROSYN) 375 MG TbEC EC tablet Take 1 tablet (375 mg total) by mouth 2 (two) times daily.       Review of patient's allergies indicates:  No Known Allergies    Past Medical History:   Diagnosis Date    Arthritis     CHF (congestive heart failure)     Coronary artery disease     Hyperlipidemia     Hypertension      Past Surgical History:   Procedure Laterality Date    HYSTERECTOMY N/A     INSERTION, CARDIAC PACEMAKER, DUAL CHAMBER N/A 2018    Performed by [facility] at [facility] C[facility]    PLACEMENT-LOOP RECORDER N/A 2018    Performed by [facility]z MD at [facility] CATH LAB     Family History     None        Tobacco Use    Smoking status: Former Smoker     Packs/day: 0.50     Last attempt to quit: 2013     Years since quittin.2    Smokeless tobacco: Never Used   Substance and Sexual Activity    Alcohol use: No    Drug use: No    Sexual activity: Never     Review of Systems   Constitutional: Positive for appetite change. Negative for chills and fever.   Respiratory: Negative for cough and shortness of breath.    Cardiovascular: Negative for chest pain and palpitations.   Gastrointestinal: Positive for abdominal pain. Negative for abdominal distention, constipation, diarrhea, nausea and vomiting.   Neurological: Negative for dizziness and headaches.   Psychiatric/Behavioral: Negative for agitation and confusion.     Objective:     Vital Signs (Most Recent):  Temp: 99.8 °F (37.7 °C) (19 163)  Pulse: 92 (19 1920)  Resp: 18 (19 163)  BP: (!) 141/87 (19 163)  SpO2: 99 % (19) Vital Signs (24h  Range):  Temp:  [99.8 °F (37.7 °C)] 99.8 °F (37.7 °C)  Pulse:  [92-98] 92  Resp:  [18] 18  SpO2:  [99 %] 99 %  BP: (141)/(87) 141/87     Weight: 81.2 kg (179 lb)  Body mass index is 29.79 kg/m².    No intake or output data in the 24 hours ending 08/09/19 2326    Physical Exam   Constitutional: She is oriented to person, place, and time. No distress.   In wheelchair   Eyes: EOM are normal.   Cardiovascular: Normal rate and regular rhythm.   Pulmonary/Chest: Effort normal. No respiratory distress.   Abdominal: Soft. She exhibits no distension. There is tenderness.   Tender in RLQ  No peritonitis  No rebound     Neurological: She is alert and oriented to person, place, and time.   Skin: Skin is warm and dry.   Psychiatric: She has a normal mood and affect. Her behavior is normal.       Significant Labs:  CBC:   Recent Labs   Lab 08/09/19 1912   WBC 13.91*   RBC 3.63*   HGB 10.3*   HCT 33.2*      MCV 92   MCH 28.4   MCHC 31.0*     CMP:   Recent Labs   Lab 08/09/19 1912      CALCIUM 9.8   ALBUMIN 2.8*   PROT 7.2      K 3.5   CO2 25      BUN 15   CREATININE 1.2   ALKPHOS 103   ALT 12   AST 17   BILITOT 0.7       Significant Diagnostics:  I have reviewed all pertinent imaging results/findings within the past 24 hours.    Assessment/Plan:     Active Diagnoses:    Diagnosis Date Noted POA    Appendicitis with perforation [K35.32] 08/09/2019 Yes      Problems Resolved During this Admission:     Patient stable with contained perforation of appendix.    Admit to General Surgery  IVF   IV antibiotics  NPO  IR consult for drain placement  Home meds  Will discuss interval appy vs. nonoperative management    Dolores Villagran MD  General Surgery  [facility]

## 2019-08-11 LAB
ALBUMIN SERPL BCP-MCNC: 2.2 G/DL (ref 3.5–5.2)
ALP SERPL-CCNC: 92 U/L (ref 55–135)
ALT SERPL W/O P-5'-P-CCNC: 12 U/L (ref 10–44)
ANION GAP SERPL CALC-SCNC: 7 MMOL/L (ref 8–16)
AST SERPL-CCNC: 16 U/L (ref 10–40)
BASOPHILS # BLD AUTO: 0.02 K/UL (ref 0–0.2)
BASOPHILS NFR BLD: 0.2 % (ref 0–1.9)
BILIRUB SERPL-MCNC: 0.4 MG/DL (ref 0.1–1)
BUN SERPL-MCNC: 11 MG/DL (ref 8–23)
CALCIUM SERPL-MCNC: 8.8 MG/DL (ref 8.7–10.5)
CHLORIDE SERPL-SCNC: 109 MMOL/L (ref 95–110)
CO2 SERPL-SCNC: 23 MMOL/L (ref 23–29)
CREAT SERPL-MCNC: 1 MG/DL (ref 0.5–1.4)
DIFFERENTIAL METHOD: ABNORMAL
EOSINOPHIL # BLD AUTO: 0.1 K/UL (ref 0–0.5)
EOSINOPHIL NFR BLD: 0.6 % (ref 0–8)
ERYTHROCYTE [DISTWIDTH] IN BLOOD BY AUTOMATED COUNT: 14.5 % (ref 11.5–14.5)
EST. GFR  (AFRICAN AMERICAN): >60 ML/MIN/1.73 M^2
EST. GFR  (NON AFRICAN AMERICAN): 53.3 ML/MIN/1.73 M^2
GLUCOSE SERPL-MCNC: 129 MG/DL (ref 70–110)
HCT VFR BLD AUTO: 30.9 % (ref 37–48.5)
HGB BLD-MCNC: 9.2 G/DL (ref 12–16)
IMM GRANULOCYTES # BLD AUTO: 0.08 K/UL (ref 0–0.04)
IMM GRANULOCYTES NFR BLD AUTO: 0.7 % (ref 0–0.5)
LYMPHOCYTES # BLD AUTO: 0.8 K/UL (ref 1–4.8)
LYMPHOCYTES NFR BLD: 6.9 % (ref 18–48)
MAGNESIUM SERPL-MCNC: 1.6 MG/DL (ref 1.6–2.6)
MCH RBC QN AUTO: 27.9 PG (ref 27–31)
MCHC RBC AUTO-ENTMCNC: 29.8 G/DL (ref 32–36)
MCV RBC AUTO: 94 FL (ref 82–98)
MONOCYTES # BLD AUTO: 0.7 K/UL (ref 0.3–1)
MONOCYTES NFR BLD: 6 % (ref 4–15)
NEUTROPHILS # BLD AUTO: 9.9 K/UL (ref 1.8–7.7)
NEUTROPHILS NFR BLD: 85.6 % (ref 38–73)
NRBC BLD-RTO: 0 /100 WBC
PHOSPHATE SERPL-MCNC: 2.7 MG/DL (ref 2.7–4.5)
PLATELET # BLD AUTO: 284 K/UL (ref 150–350)
PMV BLD AUTO: 10.4 FL (ref 9.2–12.9)
POTASSIUM SERPL-SCNC: 3.7 MMOL/L (ref 3.5–5.1)
PROT SERPL-MCNC: 6.3 G/DL (ref 6–8.4)
RBC # BLD AUTO: 3.3 M/UL (ref 4–5.4)
SODIUM SERPL-SCNC: 139 MMOL/L (ref 136–145)
WBC # BLD AUTO: 11.59 K/UL (ref 3.9–12.7)

## 2019-08-11 PROCEDURE — 63600175 PHARM REV CODE 636 W HCPCS: Performed by: STUDENT IN AN ORGANIZED HEALTH CARE EDUCATION/TRAINING PROGRAM

## 2019-08-11 PROCEDURE — S0030 INJECTION, METRONIDAZOLE: HCPCS | Performed by: STUDENT IN AN ORGANIZED HEALTH CARE EDUCATION/TRAINING PROGRAM

## 2019-08-11 PROCEDURE — 80053 COMPREHEN METABOLIC PANEL: CPT

## 2019-08-11 PROCEDURE — 83735 ASSAY OF MAGNESIUM: CPT

## 2019-08-11 PROCEDURE — 25000003 PHARM REV CODE 250: Performed by: STUDENT IN AN ORGANIZED HEALTH CARE EDUCATION/TRAINING PROGRAM

## 2019-08-11 PROCEDURE — 84100 ASSAY OF PHOSPHORUS: CPT

## 2019-08-11 PROCEDURE — 99232 SBSQ HOSP IP/OBS MODERATE 35: CPT | Mod: ,,, | Performed by: SURGERY

## 2019-08-11 PROCEDURE — 99232 PR SUBSEQUENT HOSPITAL CARE,LEVL II: ICD-10-PCS | Mod: ,,, | Performed by: SURGERY

## 2019-08-11 PROCEDURE — 85025 COMPLETE CBC W/AUTO DIFF WBC: CPT

## 2019-08-11 PROCEDURE — 36415 COLL VENOUS BLD VENIPUNCTURE: CPT

## 2019-08-11 PROCEDURE — 11000001 HC ACUTE MED/SURG PRIVATE ROOM

## 2019-08-11 RX ORDER — POTASSIUM CHLORIDE 750 MG/1
30 CAPSULE, EXTENDED RELEASE ORAL ONCE
Status: COMPLETED | OUTPATIENT
Start: 2019-08-11 | End: 2019-08-11

## 2019-08-11 RX ADMIN — POTASSIUM CHLORIDE, DEXTROSE MONOHYDRATE AND SODIUM CHLORIDE: 150; 5; 900 INJECTION, SOLUTION INTRAVENOUS at 03:08

## 2019-08-11 RX ADMIN — AMLODIPINE BESYLATE 10 MG: 10 TABLET ORAL at 08:08

## 2019-08-11 RX ADMIN — METRONIDAZOLE 500 MG: 500 INJECTION, SOLUTION INTRAVENOUS at 10:08

## 2019-08-11 RX ADMIN — POTASSIUM CHLORIDE 30 MEQ: 750 CAPSULE, EXTENDED RELEASE ORAL at 09:08

## 2019-08-11 RX ADMIN — METRONIDAZOLE 500 MG: 500 INJECTION, SOLUTION INTRAVENOUS at 12:08

## 2019-08-11 RX ADMIN — CIPROFLOXACIN 400 MG: 2 INJECTION, SOLUTION INTRAVENOUS at 10:08

## 2019-08-11 RX ADMIN — HYDRALAZINE HYDROCHLORIDE 50 MG: 50 TABLET ORAL at 12:08

## 2019-08-11 RX ADMIN — ATORVASTATIN CALCIUM 20 MG: 20 TABLET, FILM COATED ORAL at 10:08

## 2019-08-11 RX ADMIN — CIPROFLOXACIN 400 MG: 2 INJECTION, SOLUTION INTRAVENOUS at 08:08

## 2019-08-11 RX ADMIN — METRONIDAZOLE 500 MG: 500 INJECTION, SOLUTION INTRAVENOUS at 03:08

## 2019-08-11 RX ADMIN — ATORVASTATIN CALCIUM 20 MG: 20 TABLET, FILM COATED ORAL at 12:08

## 2019-08-11 RX ADMIN — HYDRALAZINE HYDROCHLORIDE 50 MG: 50 TABLET ORAL at 10:08

## 2019-08-11 RX ADMIN — CIPROFLOXACIN 400 MG: 2 INJECTION, SOLUTION INTRAVENOUS at 12:08

## 2019-08-11 NOTE — SUBJECTIVE & OBJECTIVE
Interval History: No acute events overnight. IR unable to place drain due to lack of window. Patient states pain continues to slowly improve.  in the RLQ. Had BM yesterday. No nausea/vomiting    Medications:  Continuous Infusions:   dextrose 5 % and 0.9 % NaCl with KCl 20 mEq 100 mL/hr at 08/11/19 0351     Scheduled Meds:   amLODIPine  10 mg Oral Daily    atorvastatin  20 mg Oral QHS    ciprofloxacin  400 mg Intravenous Q12H    metronidazole  500 mg Intravenous Q8H     PRN Meds:acetaminophen, hydrALAZINE, HYDROcodone-acetaminophen, ondansetron, sodium chloride 0.9%     Review of patient's allergies indicates:  No Known Allergies  Objective:     Vital Signs (Most Recent):  Temp: 98.5 °F (36.9 °C) (08/10/19 2320)  Pulse: 74 (08/10/19 2320)  Resp: 20 (08/10/19 2320)  BP: (!) 186/76 (08/10/19 2320)  SpO2: (!) 92 % (08/10/19 2320) Vital Signs (24h Range):  Temp:  [98.4 °F (36.9 °C)-99.2 °F (37.3 °C)] 98.5 °F (36.9 °C)  Pulse:  [66-78] 74  Resp:  [16-20] 20  SpO2:  [92 %-98 %] 92 %  BP: (144-186)/(66-77) 186/76     Weight: 82.7 kg (182 lb 5.1 oz)  Body mass index is 30.34 kg/m².    Intake/Output - Last 3 Shifts       08/09 0700 - 08/10 0659 08/10 0700 - 08/11 0659 08/11 0700 - 08/12 0659    IV Piggyback 100      Total Intake(mL/kg) 100 (1.2)      Urine (mL/kg/hr)  800 (0.4)     Stool  1     Total Output  801     Net +100 -801                  Physical Exam   Constitutional: She is oriented to person, place, and time. She appears well-developed and well-nourished.   HENT:   Head: Normocephalic and atraumatic.   Cardiovascular: Normal rate.   Pulmonary/Chest: Effort normal. No respiratory distress.   Abdominal: Soft. She exhibits no distension. There is tenderness (Mild tenderness to palpation in RLQ).   Neurological: She is alert and oriented to person, place, and time.   Skin: Skin is warm and dry.   Psychiatric: She has a normal mood and affect.       Significant Labs:  CBC:   Recent Labs   Lab  08/11/19  0658   WBC 11.59   RBC 3.30*   HGB 9.2*   HCT 30.9*      MCV 94   MCH 27.9   MCHC 29.8*     CMP:   Recent Labs   Lab 08/11/19  0658   *   CALCIUM 8.8   ALBUMIN 2.2*   PROT 6.3      K 3.7   CO2 23      BUN 11   CREATININE 1.0   ALKPHOS 92   ALT 12   AST 16   BILITOT 0.4       Significant Diagnostics:  none new

## 2019-08-11 NOTE — PROGRESS NOTES
"[facility]  General Surgery  Progress Note    Subjective:     History of Present Illness:  Crystal Alvarado is a 78 y.o. female with h/o CHF (preserved EF of 60%), SVT s/p ablation, HTN, HLD, COPD, non-obstructive CAD, and CKD3 who presents with two days of lower abdominal pain. Reports pain started gradually in her lower abdomen, is now more located in RLQ. Denies fever, chills, nausea, or vomiting. Does endorse decreased appetite. Patient currently in wheelchair, but reports that she is able to ambulate around her house. She is not able to walk an entire block without shortness of breath.   On admission, she is hemodynamically stable and afebrile. Labs significant for leukocytosis of  13.9 with left shift. CT scan showed "a blind-ending tubular structure in the right lower quadrant terminating in a walled-off mixed density collection measuring 5.4 x 3.7 cm, with internal foci of air and surrounding inflammatory change in the right hemipelvis". No free air. Scan preliminarily discussed with Radiology, likely a window for IR drain placement.    Post-Op Info:  * No surgery found *         Interval History: No acute events overnight. IR unable to place drain due to lack of window. Patient states pain continues to slowly improve.  in the RLQ. Had BM yesterday. No nausea/vomiting    Medications:  Continuous Infusions:   dextrose 5 % and 0.9 % NaCl with KCl 20 mEq 100 mL/hr at 08/11/19 0351     Scheduled Meds:   amLODIPine  10 mg Oral Daily    atorvastatin  20 mg Oral QHS    ciprofloxacin  400 mg Intravenous Q12H    metronidazole  500 mg Intravenous Q8H     PRN Meds:acetaminophen, hydrALAZINE, HYDROcodone-acetaminophen, ondansetron, sodium chloride 0.9%     Review of patient's allergies indicates:  No Known Allergies  Objective:     Vital Signs (Most Recent):  Temp: 98.5 °F (36.9 °C) (08/10/19 2320)  Pulse: 74 (08/10/19 2320)  Resp: 20 (08/10/19 2320)  BP: (!) 186/76 (08/10/19 " 2320)  SpO2: (!) 92 % (08/10/19 2320) Vital Signs (24h Range):  Temp:  [98.4 °F (36.9 °C)-99.2 °F (37.3 °C)] 98.5 °F (36.9 °C)  Pulse:  [66-78] 74  Resp:  [16-20] 20  SpO2:  [92 %-98 %] 92 %  BP: (144-186)/(66-77) 186/76     Weight: 82.7 kg (182 lb 5.1 oz)  Body mass index is 30.34 kg/m².    Intake/Output - Last 3 Shifts       08/09 0700 - 08/10 0659 08/10 0700 - 08/11 0659 08/11 0700 - 08/12 0659    IV Piggyback 100      Total Intake(mL/kg) 100 (1.2)      Urine (mL/kg/hr)  800 (0.4)     Stool  1     Total Output  801     Net +100 -801                  Physical Exam   Constitutional: She is oriented to person, place, and time. She appears well-developed and well-nourished.   HENT:   Head: Normocephalic and atraumatic.   Cardiovascular: Normal rate.   Pulmonary/Chest: Effort normal. No respiratory distress.   Abdominal: Soft. She exhibits no distension. There is tenderness (Mild tenderness to palpation in RLQ).   Neurological: She is alert and oriented to person, place, and time.   Skin: Skin is warm and dry.   Psychiatric: She has a normal mood and affect.       Significant Labs:  CBC:   Recent Labs   Lab 08/11/19  0658   WBC 11.59   RBC 3.30*   HGB 9.2*   HCT 30.9*      MCV 94   MCH 27.9   MCHC 29.8*     CMP:   Recent Labs   Lab 08/11/19  0658   *   CALCIUM 8.8   ALBUMIN 2.2*   PROT 6.3      K 3.7   CO2 23      BUN 11   CREATININE 1.0   ALKPHOS 92   ALT 12   AST 16   BILITOT 0.4       Significant Diagnostics:  none new    Assessment/Plan:     Appendicitis with perforation  79yo female w/perforated appendicitis and abscess    - NPO  - IVF  - discussed with IR, unable to find window for drain, plan to reimage tomorrow to reassess ability to place drain  - continue IV cipro/flagyl  - daily labs  - PRN pain meds  - home amlodipine, hold off on losartan, PRN hydralazine for SBP>170  - home statin    Hypokalemia  - replace with 30meq K tablets  - daily labs    Essential hypertension  - see acute  appendicitis        Latrice Wakefield MD  General Surgery  [facility]

## 2019-08-11 NOTE — ASSESSMENT & PLAN NOTE
79yo female w/perforated appendicitis and abscess    - NPO  - IVF  - discussed with IR, unable to find window for drain, plan to reimage tomorrow to reassess ability to place drain  - continue IV cipro/flagyl  - daily labs  - PRN pain meds  - home amlodipine, hold off on losartan, PRN hydralazine for SBP>170  - home statin

## 2019-08-12 PROBLEM — E83.42 HYPOMAGNESEMIA: Status: ACTIVE | Noted: 2019-08-12

## 2019-08-12 LAB
ALBUMIN SERPL BCP-MCNC: 2.2 G/DL (ref 3.5–5.2)
ALP SERPL-CCNC: 99 U/L (ref 55–135)
ALT SERPL W/O P-5'-P-CCNC: 12 U/L (ref 10–44)
ANION GAP SERPL CALC-SCNC: 9 MMOL/L (ref 8–16)
AST SERPL-CCNC: 15 U/L (ref 10–40)
BASOPHILS # BLD AUTO: 0.04 K/UL (ref 0–0.2)
BASOPHILS NFR BLD: 0.4 % (ref 0–1.9)
BILIRUB SERPL-MCNC: 0.4 MG/DL (ref 0.1–1)
BUN SERPL-MCNC: 7 MG/DL (ref 8–23)
CALCIUM SERPL-MCNC: 8.9 MG/DL (ref 8.7–10.5)
CHLORIDE SERPL-SCNC: 109 MMOL/L (ref 95–110)
CO2 SERPL-SCNC: 21 MMOL/L (ref 23–29)
CREAT SERPL-MCNC: 0.9 MG/DL (ref 0.5–1.4)
DIFFERENTIAL METHOD: ABNORMAL
EOSINOPHIL # BLD AUTO: 0.1 K/UL (ref 0–0.5)
EOSINOPHIL NFR BLD: 0.5 % (ref 0–8)
ERYTHROCYTE [DISTWIDTH] IN BLOOD BY AUTOMATED COUNT: 14.5 % (ref 11.5–14.5)
EST. GFR  (AFRICAN AMERICAN): >60 ML/MIN/1.73 M^2
EST. GFR  (NON AFRICAN AMERICAN): >60 ML/MIN/1.73 M^2
GLUCOSE SERPL-MCNC: 123 MG/DL (ref 70–110)
HCT VFR BLD AUTO: 32.2 % (ref 37–48.5)
HGB BLD-MCNC: 10 G/DL (ref 12–16)
IMM GRANULOCYTES # BLD AUTO: 0.11 K/UL (ref 0–0.04)
IMM GRANULOCYTES NFR BLD AUTO: 1.1 % (ref 0–0.5)
LYMPHOCYTES # BLD AUTO: 0.6 K/UL (ref 1–4.8)
LYMPHOCYTES NFR BLD: 5.9 % (ref 18–48)
MAGNESIUM SERPL-MCNC: 1.5 MG/DL (ref 1.6–2.6)
MCH RBC QN AUTO: 28.5 PG (ref 27–31)
MCHC RBC AUTO-ENTMCNC: 31.1 G/DL (ref 32–36)
MCV RBC AUTO: 92 FL (ref 82–98)
MONOCYTES # BLD AUTO: 0.6 K/UL (ref 0.3–1)
MONOCYTES NFR BLD: 5.5 % (ref 4–15)
NEUTROPHILS # BLD AUTO: 8.7 K/UL (ref 1.8–7.7)
NEUTROPHILS NFR BLD: 86.6 % (ref 38–73)
NRBC BLD-RTO: 0 /100 WBC
PHOSPHATE SERPL-MCNC: 2.7 MG/DL (ref 2.7–4.5)
PLATELET # BLD AUTO: 323 K/UL (ref 150–350)
PMV BLD AUTO: 10.6 FL (ref 9.2–12.9)
POTASSIUM SERPL-SCNC: 3.9 MMOL/L (ref 3.5–5.1)
PROT SERPL-MCNC: 6.5 G/DL (ref 6–8.4)
RBC # BLD AUTO: 3.51 M/UL (ref 4–5.4)
SODIUM SERPL-SCNC: 139 MMOL/L (ref 136–145)
WBC # BLD AUTO: 10.09 K/UL (ref 3.9–12.7)

## 2019-08-12 PROCEDURE — 99232 PR SUBSEQUENT HOSPITAL CARE,LEVL II: ICD-10-PCS | Mod: ,,, | Performed by: SURGERY

## 2019-08-12 PROCEDURE — 25000003 PHARM REV CODE 250: Performed by: PHYSICIAN ASSISTANT

## 2019-08-12 PROCEDURE — 11000001 HC ACUTE MED/SURG PRIVATE ROOM

## 2019-08-12 PROCEDURE — 80053 COMPREHEN METABOLIC PANEL: CPT

## 2019-08-12 PROCEDURE — 83735 ASSAY OF MAGNESIUM: CPT

## 2019-08-12 PROCEDURE — 84100 ASSAY OF PHOSPHORUS: CPT

## 2019-08-12 PROCEDURE — 25000003 PHARM REV CODE 250: Performed by: STUDENT IN AN ORGANIZED HEALTH CARE EDUCATION/TRAINING PROGRAM

## 2019-08-12 PROCEDURE — 63600175 PHARM REV CODE 636 W HCPCS: Performed by: STUDENT IN AN ORGANIZED HEALTH CARE EDUCATION/TRAINING PROGRAM

## 2019-08-12 PROCEDURE — 85025 COMPLETE CBC W/AUTO DIFF WBC: CPT

## 2019-08-12 PROCEDURE — 25500020 PHARM REV CODE 255: Performed by: SURGERY

## 2019-08-12 PROCEDURE — 99232 SBSQ HOSP IP/OBS MODERATE 35: CPT | Mod: ,,, | Performed by: SURGERY

## 2019-08-12 PROCEDURE — S0030 INJECTION, METRONIDAZOLE: HCPCS | Performed by: STUDENT IN AN ORGANIZED HEALTH CARE EDUCATION/TRAINING PROGRAM

## 2019-08-12 PROCEDURE — 36415 COLL VENOUS BLD VENIPUNCTURE: CPT

## 2019-08-12 RX ORDER — LANOLIN ALCOHOL/MO/W.PET/CERES
400 CREAM (GRAM) TOPICAL ONCE
Status: COMPLETED | OUTPATIENT
Start: 2019-08-12 | End: 2019-08-12

## 2019-08-12 RX ADMIN — METRONIDAZOLE 500 MG: 500 INJECTION, SOLUTION INTRAVENOUS at 01:08

## 2019-08-12 RX ADMIN — HYDRALAZINE HYDROCHLORIDE 50 MG: 50 TABLET ORAL at 08:08

## 2019-08-12 RX ADMIN — METRONIDAZOLE 500 MG: 500 INJECTION, SOLUTION INTRAVENOUS at 05:08

## 2019-08-12 RX ADMIN — MAGNESIUM OXIDE TAB 400 MG (241.3 MG ELEMENTAL MG) 400 MG: 400 (241.3 MG) TAB at 10:08

## 2019-08-12 RX ADMIN — CIPROFLOXACIN 400 MG: 2 INJECTION, SOLUTION INTRAVENOUS at 08:08

## 2019-08-12 RX ADMIN — CIPROFLOXACIN 400 MG: 2 INJECTION, SOLUTION INTRAVENOUS at 10:08

## 2019-08-12 RX ADMIN — METRONIDAZOLE 500 MG: 500 INJECTION, SOLUTION INTRAVENOUS at 10:08

## 2019-08-12 RX ADMIN — AMLODIPINE BESYLATE 10 MG: 10 TABLET ORAL at 10:08

## 2019-08-12 RX ADMIN — HYDROCODONE BITARTRATE AND ACETAMINOPHEN 1 TABLET: 5; 325 TABLET ORAL at 05:08

## 2019-08-12 RX ADMIN — HYDROCODONE BITARTRATE AND ACETAMINOPHEN 1 TABLET: 5; 325 TABLET ORAL at 08:08

## 2019-08-12 RX ADMIN — ATORVASTATIN CALCIUM 20 MG: 20 TABLET, FILM COATED ORAL at 08:08

## 2019-08-12 RX ADMIN — IOHEXOL 100 ML: 350 INJECTION, SOLUTION INTRAVENOUS at 04:08

## 2019-08-12 NOTE — NURSING
Spoke with CT technician.  We are currently awaiting radiologist to protocol CT before patient can go down for test.  CT technician will notify this nurse when ready for patient.

## 2019-08-12 NOTE — PLAN OF CARE
08/12/19 1031   Post-Acute Status   Post-Acute Authorization Other   Other Status No Post-Acute Service Needs   This SW in communication with  and medical team. SW will continue to follow for discharge needs and offer support as needed.    No SW needs identified at this time.    Essence Galarza LMSW  [facility]  [id number]

## 2019-08-12 NOTE — PLAN OF CARE
Problem: Adult Inpatient Plan of Care  Goal: Plan of Care Review  Outcome: Ongoing (interventions implemented as appropriate)  POC reviewed with pt and family.  Pt verbalized understanding.  Questions and concerns addressed, and no acute events today.  Pt had no complaints of pain throughout the day.  Continues to receive IV antibiotics.  Pt remained NPO throughout most of the day for CT of ABd/Pelvis.  Pt progressing toward goals, will continue to monitor.  See flowsheets for full assessment and VS info.

## 2019-08-12 NOTE — ASSESSMENT & PLAN NOTE
79yo female w/perforated appendicitis and abscess    - NPO for CT scan today  - IVF d5 100  - CT scan today for possible drain placement  - IV cipro/flagyl started 8/10  - PRN pain/nausea meds

## 2019-08-12 NOTE — NURSING
Physician paged and requested diet order.  New orders were to keep patient NPO for now.  Will continue to monitor.

## 2019-08-12 NOTE — NURSING
Spoke with CT technician again.  We are currently awaiting radiologist to protocol CT before patient can go down for test.  CT technician will notify this nurse when ready for patient

## 2019-08-12 NOTE — PROGRESS NOTES
"[facility]  General Surgery  Progress Note    Subjective:     History of Present Illness:  Crystal Alvarado is a 78 y.o. female with h/o CHF (preserved EF of 60%), SVT s/p ablation, HTN, HLD, COPD, non-obstructive CAD, and CKD3 who presents with two days of lower abdominal pain. Reports pain started gradually in her lower abdomen, is now more located in RLQ. Denies fever, chills, nausea, or vomiting. Does endorse decreased appetite. Patient currently in wheelchair, but reports that she is able to ambulate around her house. She is not able to walk an entire block without shortness of breath.   On admission, she is hemodynamically stable and afebrile. Labs significant for leukocytosis of  13.9 with left shift. CT scan showed "a blind-ending tubular structure in the right lower quadrant terminating in a walled-off mixed density collection measuring 5.4 x 3.7 cm, with internal foci of air and surrounding inflammatory change in the right hemipelvis". No free air. Scan preliminarily discussed with Radiology, likely a window for IR drain placement.    Post-Op Info:  * No surgery found *         Interval History:   Patient seen and examined, no acute events overnight  Pain well controlled  Denies N/V  Afebrile/VSS    Medications:  Continuous Infusions:   dextrose 5 % and 0.9 % NaCl with KCl 20 mEq 100 mL/hr at 08/11/19 0351     Scheduled Meds:   amLODIPine  10 mg Oral Daily    atorvastatin  20 mg Oral QHS    ciprofloxacin  400 mg Intravenous Q12H    metronidazole  500 mg Intravenous Q8H     PRN Meds:acetaminophen, hydrALAZINE, HYDROcodone-acetaminophen, ondansetron, sodium chloride 0.9%     Review of patient's allergies indicates:  No Known Allergies  Objective:     Vital Signs (Most Recent):  Temp: 97.5 °F (36.4 °C) (08/12/19 0735)  Pulse: 70 (08/12/19 0735)  Resp: 18 (08/12/19 0735)  BP: (!) 149/69 (08/12/19 0735)  SpO2: (!) 94 % (08/12/19 0735) Vital Signs (24h Range):  Temp:  [97.5 °F (36.4 " °C)-99.3 °F (37.4 °C)] 97.5 °F (36.4 °C)  Pulse:  [66-87] 70  Resp:  [18-20] 18  SpO2:  [93 %-96 %] 94 %  BP: (134-164)/(64-72) 149/69     Weight: 82 kg (180 lb 12.4 oz)  Body mass index is 30.08 kg/m².    Intake/Output - Last 3 Shifts       08/10 0700 - 08/11 0659 08/11 0700 - 08/12 0659 08/12 0700 - 08/13 0659    IV Piggyback       Total Intake(mL/kg)       Urine (mL/kg/hr) 800 (0.4)      Stool 1      Total Output 801      Net -801                   Physical Exam   Constitutional: She is oriented to person, place, and time. She appears well-developed and well-nourished. No distress.   HENT:   Head: Normocephalic and atraumatic.   Cardiovascular: Normal rate and regular rhythm.   Pulmonary/Chest: Effort normal. No respiratory distress.   Abdominal:   Soft, +TTP RLQ   Neurological: She is alert and oriented to person, place, and time.       Significant Labs:  CBC:   Recent Labs   Lab 08/12/19  0354   WBC 10.09   RBC 3.51*   HGB 10.0*   HCT 32.2*      MCV 92   MCH 28.5   MCHC 31.1*     CMP:   Recent Labs   Lab 08/12/19  0354   *   CALCIUM 8.9   ALBUMIN 2.2*   PROT 6.5      K 3.9   CO2 21*      BUN 7*   CREATININE 0.9   ALKPHOS 99   ALT 12   AST 15   BILITOT 0.4     Assessment/Plan:     * Appendicitis with perforation  79yo female w/perforated appendicitis and abscess    - NPO for CT scan today  - IVF d5 100  - CT scan today for possible drain placement  - IV cipro/flagyl started 8/10  - PRN pain/nausea meds      Hypomagnesemia  Replace     Hypokalemia  - replace with 30meq K tablets  - daily labs    Hyperlipidemia  Atorvastatin     Essential hypertension  -home amlodipine, hold off on losartan, PRN hydralazine for SBP>170        Agnieszka Lopez PA-C   [id number]  General Surgery  [facility]

## 2019-08-12 NOTE — PLAN OF CARE
Patient lives in a 1 story house, w/4 [facility], without railings, w/her daughter. Daughter is at her BS. Patient is independent & agile w/home DME. Is receiving OP PT/OT. Currently no needs are determined.     [facility] given to patient after informed about it;patient verbalized their understanding.        08/12/19 1300   Discharge Assessment   Assessment Type Discharge Planning Assessment   Confirmed/corrected address and phone number on facesheet? Yes   Assessment information obtained from? Medical Record;Other  (Daughter-Shimon)   Expected Length of Stay (days)   (TBD)   Communicated expected length of stay with patient/caregiver no  (Per MD)   Prior to hospitilization cognitive status: Alert/Oriented;No Deficits   Prior to hospitalization functional status: Independent;Assistive Equipment   Current cognitive status: Alert/Oriented;No Deficits   Current Functional Status: Independent;Assistive Equipment;Needs Assistance   Facility Arrived From:   (N/A)   Lives With child(brendon), adult  (Daughter-Shimon)   Able to Return to Prior Arrangements yes   Is patient able to care for self after discharge? Yes   Who are your caregiver(s) and their phone number(s)?   (Shimon Hutchins Daughter     [phone]   )   Patient's perception of discharge disposition home or selfcare   Readmission Within the Last 30 Days no previous admission in last 30 days   Patient currently being followed by outpatient case management? No   Patient currently receives any other outside agency services? No   Equipment Currently Used at Home walker, standard;rollator;shower chair   Do you have any problems affording any of your prescribed medications? No   Is the patient taking medications as prescribed? yes   Does the patient have transportation home? Yes   Transportation Anticipated family or friend will provide   Dialysis Name and Scheduled days   (N/A)   Does the patient receive services at the Coumadin Clinic? No   Discharge Plan A Home  with family   Discharge Plan B Home with family   DME Needed Upon Discharge    (TBD)   Patient/Family in Agreement with Plan yes

## 2019-08-12 NOTE — SUBJECTIVE & OBJECTIVE
Interval History:   Patient seen and examined, no acute events overnight  Pain well controlled  Denies N/V  Afebrile/VSS    Medications:  Continuous Infusions:   dextrose 5 % and 0.9 % NaCl with KCl 20 mEq 100 mL/hr at 08/11/19 0351     Scheduled Meds:   amLODIPine  10 mg Oral Daily    atorvastatin  20 mg Oral QHS    ciprofloxacin  400 mg Intravenous Q12H    metronidazole  500 mg Intravenous Q8H     PRN Meds:acetaminophen, hydrALAZINE, HYDROcodone-acetaminophen, ondansetron, sodium chloride 0.9%     Review of patient's allergies indicates:  No Known Allergies  Objective:     Vital Signs (Most Recent):  Temp: 97.5 °F (36.4 °C) (08/12/19 0735)  Pulse: 70 (08/12/19 0735)  Resp: 18 (08/12/19 0735)  BP: (!) 149/69 (08/12/19 0735)  SpO2: (!) 94 % (08/12/19 0735) Vital Signs (24h Range):  Temp:  [97.5 °F (36.4 °C)-99.3 °F (37.4 °C)] 97.5 °F (36.4 °C)  Pulse:  [66-87] 70  Resp:  [18-20] 18  SpO2:  [93 %-96 %] 94 %  BP: (134-164)/(64-72) 149/69     Weight: 82 kg (180 lb 12.4 oz)  Body mass index is 30.08 kg/m².    Intake/Output - Last 3 Shifts       08/10 0700 - 08/11 0659 08/11 0700 - 08/12 0659 08/12 0700 - 08/13 0659    IV Piggyback       Total Intake(mL/kg)       Urine (mL/kg/hr) 800 (0.4)      Stool 1      Total Output 801      Net -801                   Physical Exam   Constitutional: She is oriented to person, place, and time. She appears well-developed and well-nourished. No distress.   HENT:   Head: Normocephalic and atraumatic.   Cardiovascular: Normal rate and regular rhythm.   Pulmonary/Chest: Effort normal. No respiratory distress.   Abdominal:   Soft, +TTP RLQ   Neurological: She is alert and oriented to person, place, and time.       Significant Labs:  CBC:   Recent Labs   Lab 08/12/19 0354   WBC 10.09   RBC 3.51*   HGB 10.0*   HCT 32.2*      MCV 92   MCH 28.5   MCHC 31.1*     CMP:   Recent Labs   Lab 08/12/19 0354   *   CALCIUM 8.9   ALBUMIN 2.2*   PROT 6.5      K 3.9   CO2 21*   CL  109   BUN 7*   CREATININE 0.9   ALKPHOS 99   ALT 12   AST 15   BILITOT 0.4

## 2019-08-13 LAB
ALBUMIN SERPL BCP-MCNC: 2.2 G/DL (ref 3.5–5.2)
ALP SERPL-CCNC: 87 U/L (ref 55–135)
ALT SERPL W/O P-5'-P-CCNC: 10 U/L (ref 10–44)
ANION GAP SERPL CALC-SCNC: 8 MMOL/L (ref 8–16)
ANISOCYTOSIS BLD QL SMEAR: SLIGHT
AST SERPL-CCNC: 14 U/L (ref 10–40)
BASOPHILS # BLD AUTO: 0.05 K/UL (ref 0–0.2)
BASOPHILS NFR BLD: 0.6 % (ref 0–1.9)
BILIRUB SERPL-MCNC: 0.3 MG/DL (ref 0.1–1)
BUN SERPL-MCNC: 6 MG/DL (ref 8–23)
CALCIUM SERPL-MCNC: 8.7 MG/DL (ref 8.7–10.5)
CHLORIDE SERPL-SCNC: 106 MMOL/L (ref 95–110)
CO2 SERPL-SCNC: 22 MMOL/L (ref 23–29)
CREAT SERPL-MCNC: 0.9 MG/DL (ref 0.5–1.4)
DIFFERENTIAL METHOD: ABNORMAL
EOSINOPHIL # BLD AUTO: 0.2 K/UL (ref 0–0.5)
EOSINOPHIL NFR BLD: 1.8 % (ref 0–8)
ERYTHROCYTE [DISTWIDTH] IN BLOOD BY AUTOMATED COUNT: 15 % (ref 11.5–14.5)
EST. GFR  (AFRICAN AMERICAN): >60 ML/MIN/1.73 M^2
EST. GFR  (NON AFRICAN AMERICAN): >60 ML/MIN/1.73 M^2
GLUCOSE SERPL-MCNC: 95 MG/DL (ref 70–110)
HCT VFR BLD AUTO: 36.3 % (ref 37–48.5)
HGB BLD-MCNC: 11.1 G/DL (ref 12–16)
IMM GRANULOCYTES # BLD AUTO: 0.17 K/UL (ref 0–0.04)
IMM GRANULOCYTES NFR BLD AUTO: 2.1 % (ref 0–0.5)
LYMPHOCYTES # BLD AUTO: 1 K/UL (ref 1–4.8)
LYMPHOCYTES NFR BLD: 12.3 % (ref 18–48)
MAGNESIUM SERPL-MCNC: 1.5 MG/DL (ref 1.6–2.6)
MCH RBC QN AUTO: 29 PG (ref 27–31)
MCHC RBC AUTO-ENTMCNC: 30.6 G/DL (ref 32–36)
MCV RBC AUTO: 95 FL (ref 82–98)
MONOCYTES # BLD AUTO: 0.5 K/UL (ref 0.3–1)
MONOCYTES NFR BLD: 5.5 % (ref 4–15)
NEUTROPHILS # BLD AUTO: 6.4 K/UL (ref 1.8–7.7)
NEUTROPHILS NFR BLD: 77.7 % (ref 38–73)
NRBC BLD-RTO: 0 /100 WBC
PHOSPHATE SERPL-MCNC: 3.8 MG/DL (ref 2.7–4.5)
PLATELET # BLD AUTO: 300 K/UL (ref 150–350)
PLATELET BLD QL SMEAR: ABNORMAL
PMV BLD AUTO: 10.4 FL (ref 9.2–12.9)
POTASSIUM SERPL-SCNC: 3.8 MMOL/L (ref 3.5–5.1)
PROT SERPL-MCNC: 6.3 G/DL (ref 6–8.4)
RBC # BLD AUTO: 3.83 M/UL (ref 4–5.4)
SODIUM SERPL-SCNC: 136 MMOL/L (ref 136–145)
TOXIC GRANULES BLD QL SMEAR: PRESENT
WBC # BLD AUTO: 8.2 K/UL (ref 3.9–12.7)

## 2019-08-13 PROCEDURE — 84100 ASSAY OF PHOSPHORUS: CPT

## 2019-08-13 PROCEDURE — 25000003 PHARM REV CODE 250: Performed by: STUDENT IN AN ORGANIZED HEALTH CARE EDUCATION/TRAINING PROGRAM

## 2019-08-13 PROCEDURE — 85025 COMPLETE CBC W/AUTO DIFF WBC: CPT

## 2019-08-13 PROCEDURE — 25000003 PHARM REV CODE 250: Performed by: PHYSICIAN ASSISTANT

## 2019-08-13 PROCEDURE — 63600175 PHARM REV CODE 636 W HCPCS: Performed by: STUDENT IN AN ORGANIZED HEALTH CARE EDUCATION/TRAINING PROGRAM

## 2019-08-13 PROCEDURE — 11000001 HC ACUTE MED/SURG PRIVATE ROOM

## 2019-08-13 PROCEDURE — 99232 SBSQ HOSP IP/OBS MODERATE 35: CPT | Mod: ,,, | Performed by: SURGERY

## 2019-08-13 PROCEDURE — 83735 ASSAY OF MAGNESIUM: CPT

## 2019-08-13 PROCEDURE — 36415 COLL VENOUS BLD VENIPUNCTURE: CPT

## 2019-08-13 PROCEDURE — 99232 PR SUBSEQUENT HOSPITAL CARE,LEVL II: ICD-10-PCS | Mod: ,,, | Performed by: SURGERY

## 2019-08-13 PROCEDURE — 80053 COMPREHEN METABOLIC PANEL: CPT

## 2019-08-13 PROCEDURE — S0030 INJECTION, METRONIDAZOLE: HCPCS | Performed by: STUDENT IN AN ORGANIZED HEALTH CARE EDUCATION/TRAINING PROGRAM

## 2019-08-13 PROCEDURE — 63600175 PHARM REV CODE 636 W HCPCS: Performed by: RADIOLOGY

## 2019-08-13 RX ORDER — LANOLIN ALCOHOL/MO/W.PET/CERES
800 CREAM (GRAM) TOPICAL DAILY
Status: DISCONTINUED | OUTPATIENT
Start: 2019-08-13 | End: 2019-08-15 | Stop reason: HOSPADM

## 2019-08-13 RX ORDER — MIDAZOLAM HYDROCHLORIDE 1 MG/ML
INJECTION INTRAMUSCULAR; INTRAVENOUS CODE/TRAUMA/SEDATION MEDICATION
Status: COMPLETED | OUTPATIENT
Start: 2019-08-13 | End: 2019-08-13

## 2019-08-13 RX ORDER — FENTANYL CITRATE 50 UG/ML
INJECTION, SOLUTION INTRAMUSCULAR; INTRAVENOUS CODE/TRAUMA/SEDATION MEDICATION
Status: COMPLETED | OUTPATIENT
Start: 2019-08-13 | End: 2019-08-13

## 2019-08-13 RX ADMIN — FENTANYL CITRATE 25 MCG: 50 INJECTION, SOLUTION INTRAMUSCULAR; INTRAVENOUS at 10:08

## 2019-08-13 RX ADMIN — CIPROFLOXACIN 400 MG: 2 INJECTION, SOLUTION INTRAVENOUS at 09:08

## 2019-08-13 RX ADMIN — METRONIDAZOLE 500 MG: 500 INJECTION, SOLUTION INTRAVENOUS at 05:08

## 2019-08-13 RX ADMIN — HYDROCODONE BITARTRATE AND ACETAMINOPHEN 1 TABLET: 5; 325 TABLET ORAL at 09:08

## 2019-08-13 RX ADMIN — MAGNESIUM OXIDE TAB 400 MG (241.3 MG ELEMENTAL MG) 800 MG: 400 (241.3 MG) TAB at 09:08

## 2019-08-13 RX ADMIN — METRONIDAZOLE 500 MG: 500 INJECTION, SOLUTION INTRAVENOUS at 10:08

## 2019-08-13 RX ADMIN — HYDRALAZINE HYDROCHLORIDE 50 MG: 50 TABLET ORAL at 09:08

## 2019-08-13 RX ADMIN — METRONIDAZOLE 500 MG: 500 INJECTION, SOLUTION INTRAVENOUS at 02:08

## 2019-08-13 RX ADMIN — AMLODIPINE BESYLATE 10 MG: 10 TABLET ORAL at 09:08

## 2019-08-13 RX ADMIN — ATORVASTATIN CALCIUM 20 MG: 20 TABLET, FILM COATED ORAL at 09:08

## 2019-08-13 RX ADMIN — POTASSIUM CHLORIDE, DEXTROSE MONOHYDRATE AND SODIUM CHLORIDE: 150; 5; 900 INJECTION, SOLUTION INTRAVENOUS at 05:08

## 2019-08-13 RX ADMIN — HYDROCODONE BITARTRATE AND ACETAMINOPHEN 1 TABLET: 5; 325 TABLET ORAL at 03:08

## 2019-08-13 RX ADMIN — MIDAZOLAM HYDROCHLORIDE 2 MG: 1 INJECTION, SOLUTION INTRAMUSCULAR; INTRAVENOUS at 10:08

## 2019-08-13 RX ADMIN — POTASSIUM CHLORIDE, DEXTROSE MONOHYDRATE AND SODIUM CHLORIDE: 150; 5; 900 INJECTION, SOLUTION INTRAVENOUS at 02:08

## 2019-08-13 NOTE — SUBJECTIVE & OBJECTIVE
Interval History:   Patient seen and examined, no acute events overnight  Pain well controlled  Denies N/V  Afebrile/VSS    Medications:  Continuous Infusions:   dextrose 5 % and 0.9 % NaCl with KCl 20 mEq 100 mL/hr at 08/13/19 0525     Scheduled Meds:   amLODIPine  10 mg Oral Daily    atorvastatin  20 mg Oral QHS    ciprofloxacin  400 mg Intravenous Q12H    metronidazole  500 mg Intravenous Q8H     PRN Meds:acetaminophen, hydrALAZINE, HYDROcodone-acetaminophen, ondansetron, sodium chloride 0.9%     Review of patient's allergies indicates:  No Known Allergies  Objective:     Vital Signs (Most Recent):  Temp: 97.2 °F (36.2 °C) (08/13/19 0437)  Pulse: 68 (08/13/19 0716)  Resp: 20 (08/13/19 0437)  BP: (!) 154/69 (08/13/19 0437)  SpO2: 97 % (08/13/19 0437) Vital Signs (24h Range):  Temp:  [97.2 °F (36.2 °C)-98.6 °F (37 °C)] 97.2 °F (36.2 °C)  Pulse:  [66-88] 68  Resp:  [18-20] 20  SpO2:  [92 %-99 %] 97 %  BP: (139-196)/(63-78) 154/69     Weight: 82 kg (180 lb 12.4 oz)  Body mass index is 30.08 kg/m².    Intake/Output - Last 3 Shifts       08/11 0700 - 08/12 0659 08/12 0700 - 08/13 0659 08/13 0700 - 08/14 0659    P.O.  0     I.V. (mL/kg)  1638.3 (20)     IV Piggyback  300     Total Intake(mL/kg)  1938.3 (23.6)     Urine (mL/kg/hr)  400 (0.2)     Stool       Total Output  400     Net  +1538.3            Urine Occurrence  2 x     Stool Occurrence  0 x     Emesis Occurrence  0 x           Physical Exam   Constitutional: She is oriented to person, place, and time. She appears well-developed and well-nourished. No distress.   HENT:   Head: Normocephalic and atraumatic.   Cardiovascular: Normal rate and regular rhythm.   Pulmonary/Chest: Effort normal. No respiratory distress.   Abdominal:   Soft, +TTP RLQ   Neurological: She is alert and oriented to person, place, and time.       Significant Labs:  CBC:   Recent Labs   Lab 08/12/19  0354   WBC 10.09   RBC 3.51*   HGB 10.0*   HCT 32.2*      MCV 92   MCH 28.5   MCHC  31.1*     CMP:   Recent Labs   Lab 08/13/19  0517   GLU 95   CALCIUM 8.7   ALBUMIN 2.2*   PROT 6.3      K 3.8   CO2 22*      BUN 6*   CREATININE 0.9   ALKPHOS 87   ALT 10   AST 14   BILITOT 0.3

## 2019-08-13 NOTE — PROGRESS NOTES
"[facility]  General Surgery  Progress Note    Subjective:     History of Present Illness:  Crystal Alvarado is a 78 y.o. female with h/o CHF (preserved EF of 60%), SVT s/p ablation, HTN, HLD, COPD, non-obstructive CAD, and CKD3 who presents with two days of lower abdominal pain. Reports pain started gradually in her lower abdomen, is now more located in RLQ. Denies fever, chills, nausea, or vomiting. Does endorse decreased appetite. Patient currently in wheelchair, but reports that she is able to ambulate around her house. She is not able to walk an entire block without shortness of breath.   On admission, she is hemodynamically stable and afebrile. Labs significant for leukocytosis of  13.9 with left shift. CT scan showed "a blind-ending tubular structure in the right lower quadrant terminating in a walled-off mixed density collection measuring 5.4 x 3.7 cm, with internal foci of air and surrounding inflammatory change in the right hemipelvis". No free air. Scan preliminarily discussed with Radiology, likely a window for IR drain placement.    Post-Op Info:  * No surgery found *         Interval History:   Patient seen and examined, no acute events overnight  Pain well controlled  Denies N/V  Afebrile/VSS    Medications:  Continuous Infusions:   dextrose 5 % and 0.9 % NaCl with KCl 20 mEq 100 mL/hr at 08/13/19 0525     Scheduled Meds:   amLODIPine  10 mg Oral Daily    atorvastatin  20 mg Oral QHS    ciprofloxacin  400 mg Intravenous Q12H    metronidazole  500 mg Intravenous Q8H     PRN Meds:acetaminophen, hydrALAZINE, HYDROcodone-acetaminophen, ondansetron, sodium chloride 0.9%     Review of patient's allergies indicates:  No Known Allergies  Objective:     Vital Signs (Most Recent):  Temp: 97.2 °F (36.2 °C) (08/13/19 0437)  Pulse: 68 (08/13/19 0716)  Resp: 20 (08/13/19 0437)  BP: (!) 154/69 (08/13/19 0437)  SpO2: 97 % (08/13/19 0437) Vital Signs (24h Range):  Temp:  [97.2 °F (36.2 °C)-98.6 " °F (37 °C)] 97.2 °F (36.2 °C)  Pulse:  [66-88] 68  Resp:  [18-20] 20  SpO2:  [92 %-99 %] 97 %  BP: (139-196)/(63-78) 154/69     Weight: 82 kg (180 lb 12.4 oz)  Body mass index is 30.08 kg/m².    Intake/Output - Last 3 Shifts       08/11 0700 - 08/12 0659 08/12 0700 - 08/13 0659 08/13 0700 - 08/14 0659    P.O.  0     I.V. (mL/kg)  1638.3 (20)     IV Piggyback  300     Total Intake(mL/kg)  1938.3 (23.6)     Urine (mL/kg/hr)  400 (0.2)     Stool       Total Output  400     Net  +1538.3            Urine Occurrence  2 x     Stool Occurrence  0 x     Emesis Occurrence  0 x           Physical Exam   Constitutional: She is oriented to person, place, and time. She appears well-developed and well-nourished. No distress.   HENT:   Head: Normocephalic and atraumatic.   Cardiovascular: Normal rate and regular rhythm.   Pulmonary/Chest: Effort normal. No respiratory distress.   Abdominal:   Soft, +TTP RLQ   Neurological: She is alert and oriented to person, place, and time.       Significant Labs:  CBC:   Recent Labs   Lab 08/12/19  0354   WBC 10.09   RBC 3.51*   HGB 10.0*   HCT 32.2*      MCV 92   MCH 28.5   MCHC 31.1*     CMP:   Recent Labs   Lab 08/13/19  0517   GLU 95   CALCIUM 8.7   ALBUMIN 2.2*   PROT 6.3      K 3.8   CO2 22*      BUN 6*   CREATININE 0.9   ALKPHOS 87   ALT 10   AST 14   BILITOT 0.3     Assessment/Plan:     * Appendicitis with perforation  79yo female w/perforated appendicitis and abscess    - NPO for IR drain placement today  - IVF d5 100  - IV cipro/flagyl started 8/10  - PRN pain/nausea meds      Hypomagnesemia  Replace     Hypokalemia  - replace with 30meq K tablets  - daily labs    Hyperlipidemia  Atorvastatin     Essential hypertension  -home amlodipine, hold off on losartan, PRN hydralazine for SBP>170        Agnieszka Lopez PA-C   [id number]  General Surgery  [facility]

## 2019-08-13 NOTE — ASSESSMENT & PLAN NOTE
79yo female w/perforated appendicitis and abscess    - NPO for IR drain placement today  - IVF d5 100  - IV cipro/flagyl started 8/10  - PRN pain/nausea meds

## 2019-08-13 NOTE — PROGRESS NOTES
Pt arrived  for  IR abscess aspiration and catheter placement. No acute distress noted . VSS. Labs, orders and consent reviewed. Md notified.

## 2019-08-13 NOTE — CONSULTS
Radiology Consult    Crystal Alvarado is a 78 y.o. female with a history of appendical perforation with abscess formation.  Past Medical History:   Diagnosis Date    Arthritis     CHF (congestive heart failure)     Coronary artery disease     Hyperlipidemia     Hypertension      Past Surgical History:   Procedure Laterality Date    HYSTERECTOMY N/A     INSERTION, CARDIAC PACEMAKER, DUAL CHAMBER N/A 7/9/2018    Performed by Archie [facility]z [facility] at [facility] CATH LAB    PLACEMENT-LOOP RECORDER N/A 7/9/2018    Performed by Archie Montez MD at [facility] CATH LAB       Discussed with primary team.    Imaging reviewed with Radiology staff, Dr. Schmidt.     Procedure: IR abscess aspiration and catheter placement    Scheduled Meds:    amLODIPine  10 mg Oral Daily    atorvastatin  20 mg Oral QHS    ciprofloxacin  400 mg Intravenous Q12H    magnesium oxide  800 mg Oral Daily    metronidazole  500 mg Intravenous Q8H     Continuous Infusions:    dextrose 5 % and 0.9 % NaCl with KCl 20 mEq 100 mL/hr at 08/13/19 0525     PRN Meds:acetaminophen, hydrALAZINE, HYDROcodone-acetaminophen, ondansetron, sodium chloride 0.9%    Allergies: Review of patient's allergies indicates:  No Known Allergies    Labs:  Recent Labs   Lab 08/10/19  0427   INR 1.0       Recent Labs   Lab 08/13/19  0734   WBC 8.20   HGB 11.1*   HCT 36.3*   MCV 95         Recent Labs   Lab 08/13/19  0517   GLU 95      K 3.8      CO2 22*   BUN 6*   CREATININE 0.9   CALCIUM 8.7   MG 1.5*   ALT 10   AST 14   ALBUMIN 2.2*   BILITOT 0.3         Vitals (Most Recent):  Temp: 98.4 °F (36.9 °C) (08/13/19 0825)  Pulse: 77 (08/13/19 0825)  Resp: 20 (08/13/19 0825)  BP: (!) 189/80 (08/13/19 0825)  SpO2: 96 % (08/13/19 0825)     ASA:2  Mallampati:2      Plan:   NPO for now, will attempt aspiration and drain placement today  Up to moderate sedation    Rashawn Licea M.D.  PGY-II Radiology  Pager: [phone]

## 2019-08-13 NOTE — PLAN OF CARE
Problem: Pain Acute  Goal: Optimal Pain Control    Intervention: Develop Pain Management Plan  Increase comfort, decrease pain.

## 2019-08-13 NOTE — PROGRESS NOTES
Pt  IR abscess aspiration and catheter placement procedure complete. RL Back site CDI. VSS. Pt to [facility] Bed 5. Bedside report given to [facility] RN. Report called to  A.

## 2019-08-13 NOTE — PROCEDURES
Radiology Post Procedure Note:     Procedure: CT Drainage    (s): Linh    Blood Loss: Minimal    Specimen: Thick purulent fluid    Findings:   Patient came to CT and under imaging guidance had a 10 F APDL placed into the [ abdominal] collection without complication.     Rojelio FRIAS M.D. personally reviewed and agree with the above dictated note.

## 2019-08-14 LAB
ALBUMIN SERPL BCP-MCNC: 2.2 G/DL (ref 3.5–5.2)
ALP SERPL-CCNC: 79 U/L (ref 55–135)
ALT SERPL W/O P-5'-P-CCNC: 11 U/L (ref 10–44)
ANION GAP SERPL CALC-SCNC: 9 MMOL/L (ref 8–16)
ANISOCYTOSIS BLD QL SMEAR: SLIGHT
AST SERPL-CCNC: 16 U/L (ref 10–40)
BASOPHILS # BLD AUTO: 0.02 K/UL (ref 0–0.2)
BASOPHILS NFR BLD: 0.3 % (ref 0–1.9)
BILIRUB SERPL-MCNC: 0.3 MG/DL (ref 0.1–1)
BUN SERPL-MCNC: 6 MG/DL (ref 8–23)
BURR CELLS BLD QL SMEAR: ABNORMAL
CALCIUM SERPL-MCNC: 8.5 MG/DL (ref 8.7–10.5)
CHLORIDE SERPL-SCNC: 107 MMOL/L (ref 95–110)
CO2 SERPL-SCNC: 21 MMOL/L (ref 23–29)
CREAT SERPL-MCNC: 0.9 MG/DL (ref 0.5–1.4)
DIFFERENTIAL METHOD: ABNORMAL
EOSINOPHIL # BLD AUTO: 0.1 K/UL (ref 0–0.5)
EOSINOPHIL NFR BLD: 2.1 % (ref 0–8)
ERYTHROCYTE [DISTWIDTH] IN BLOOD BY AUTOMATED COUNT: 14.7 % (ref 11.5–14.5)
EST. GFR  (AFRICAN AMERICAN): >60 ML/MIN/1.73 M^2
EST. GFR  (NON AFRICAN AMERICAN): >60 ML/MIN/1.73 M^2
GLUCOSE SERPL-MCNC: 94 MG/DL (ref 70–110)
HCT VFR BLD AUTO: 33.6 % (ref 37–48.5)
HGB BLD-MCNC: 9.7 G/DL (ref 12–16)
HYPOCHROMIA BLD QL SMEAR: ABNORMAL
IMM GRANULOCYTES # BLD AUTO: 0.05 K/UL (ref 0–0.04)
IMM GRANULOCYTES NFR BLD AUTO: 0.8 % (ref 0–0.5)
LYMPHOCYTES # BLD AUTO: 1 K/UL (ref 1–4.8)
LYMPHOCYTES NFR BLD: 15.3 % (ref 18–48)
MAGNESIUM SERPL-MCNC: 1.6 MG/DL (ref 1.6–2.6)
MCH RBC QN AUTO: 28.1 PG (ref 27–31)
MCHC RBC AUTO-ENTMCNC: 28.9 G/DL (ref 32–36)
MCV RBC AUTO: 97 FL (ref 82–98)
MONOCYTES # BLD AUTO: 0.4 K/UL (ref 0.3–1)
MONOCYTES NFR BLD: 6.5 % (ref 4–15)
NEUTROPHILS # BLD AUTO: 4.6 K/UL (ref 1.8–7.7)
NEUTROPHILS NFR BLD: 75 % (ref 38–73)
NRBC BLD-RTO: 0 /100 WBC
OVALOCYTES BLD QL SMEAR: ABNORMAL
PHOSPHATE SERPL-MCNC: 3.8 MG/DL (ref 2.7–4.5)
PLATELET # BLD AUTO: 245 K/UL (ref 150–350)
PMV BLD AUTO: 10.8 FL (ref 9.2–12.9)
POIKILOCYTOSIS BLD QL SMEAR: SLIGHT
POLYCHROMASIA BLD QL SMEAR: ABNORMAL
POTASSIUM SERPL-SCNC: 4.1 MMOL/L (ref 3.5–5.1)
PROT SERPL-MCNC: 6.3 G/DL (ref 6–8.4)
RBC # BLD AUTO: 3.45 M/UL (ref 4–5.4)
SODIUM SERPL-SCNC: 137 MMOL/L (ref 136–145)
WBC # BLD AUTO: 6.19 K/UL (ref 3.9–12.7)

## 2019-08-14 PROCEDURE — 11000001 HC ACUTE MED/SURG PRIVATE ROOM

## 2019-08-14 PROCEDURE — 99232 PR SUBSEQUENT HOSPITAL CARE,LEVL II: ICD-10-PCS | Mod: ,,, | Performed by: SURGERY

## 2019-08-14 PROCEDURE — 99232 SBSQ HOSP IP/OBS MODERATE 35: CPT | Mod: ,,, | Performed by: SURGERY

## 2019-08-14 PROCEDURE — 25000003 PHARM REV CODE 250: Performed by: STUDENT IN AN ORGANIZED HEALTH CARE EDUCATION/TRAINING PROGRAM

## 2019-08-14 PROCEDURE — 84100 ASSAY OF PHOSPHORUS: CPT

## 2019-08-14 PROCEDURE — 36415 COLL VENOUS BLD VENIPUNCTURE: CPT

## 2019-08-14 PROCEDURE — 85025 COMPLETE CBC W/AUTO DIFF WBC: CPT

## 2019-08-14 PROCEDURE — 97166 OT EVAL MOD COMPLEX 45 MIN: CPT

## 2019-08-14 PROCEDURE — 97530 THERAPEUTIC ACTIVITIES: CPT

## 2019-08-14 PROCEDURE — 25000003 PHARM REV CODE 250: Performed by: SURGERY

## 2019-08-14 PROCEDURE — 97161 PT EVAL LOW COMPLEX 20 MIN: CPT

## 2019-08-14 PROCEDURE — 83735 ASSAY OF MAGNESIUM: CPT

## 2019-08-14 PROCEDURE — 97535 SELF CARE MNGMENT TRAINING: CPT

## 2019-08-14 PROCEDURE — 80053 COMPREHEN METABOLIC PANEL: CPT

## 2019-08-14 PROCEDURE — 25000003 PHARM REV CODE 250: Performed by: PHYSICIAN ASSISTANT

## 2019-08-14 RX ORDER — METRONIDAZOLE 500 MG/1
500 TABLET ORAL EVERY 8 HOURS
Status: DISCONTINUED | OUTPATIENT
Start: 2019-08-14 | End: 2019-08-15 | Stop reason: HOSPADM

## 2019-08-14 RX ORDER — CIPROFLOXACIN 500 MG/1
500 TABLET ORAL EVERY 12 HOURS
Status: DISCONTINUED | OUTPATIENT
Start: 2019-08-14 | End: 2019-08-15 | Stop reason: HOSPADM

## 2019-08-14 RX ADMIN — HYDROCODONE BITARTRATE AND ACETAMINOPHEN 1 TABLET: 5; 325 TABLET ORAL at 07:08

## 2019-08-14 RX ADMIN — METRONIDAZOLE 500 MG: 500 TABLET ORAL at 09:08

## 2019-08-14 RX ADMIN — ATORVASTATIN CALCIUM 20 MG: 20 TABLET, FILM COATED ORAL at 09:08

## 2019-08-14 RX ADMIN — METRONIDAZOLE 500 MG: 500 TABLET ORAL at 02:08

## 2019-08-14 RX ADMIN — ONDANSETRON 8 MG: 8 TABLET, ORALLY DISINTEGRATING ORAL at 06:08

## 2019-08-14 RX ADMIN — AMLODIPINE BESYLATE 10 MG: 10 TABLET ORAL at 09:08

## 2019-08-14 RX ADMIN — HYDROCODONE BITARTRATE AND ACETAMINOPHEN 1 TABLET: 5; 325 TABLET ORAL at 12:08

## 2019-08-14 RX ADMIN — METRONIDAZOLE 500 MG: 500 TABLET ORAL at 07:08

## 2019-08-14 RX ADMIN — HYDRALAZINE HYDROCHLORIDE 50 MG: 50 TABLET ORAL at 04:08

## 2019-08-14 RX ADMIN — HYDROCODONE BITARTRATE AND ACETAMINOPHEN 1 TABLET: 5; 325 TABLET ORAL at 06:08

## 2019-08-14 RX ADMIN — MAGNESIUM OXIDE TAB 400 MG (241.3 MG ELEMENTAL MG) 800 MG: 400 (241.3 MG) TAB at 09:08

## 2019-08-14 RX ADMIN — CIPROFLOXACIN HYDROCHLORIDE 500 MG: 500 TABLET, FILM COATED ORAL at 09:08

## 2019-08-14 NOTE — PT/OT/SLP EVAL
Physical Therapy Evaluation and Discharge Note    Patient Name:  Crystal Alvarado   MRN:  [id number]    Recommendations:     Discharge Recommendations:  outpatient OT   Discharge Equipment Recommendations: walker, rolling   Barriers to discharge: None    Assessment:     Crystal Alvarado is a 78 y.o. female admitted with a medical diagnosis of Appendicitis with perforation. .  At this time, patient is functioning at their prior level of function and does not require further acute PT services.     Recent Surgery: * No surgery found *      Plan:     During this hospitalization, patient does not require further acute PT services.  Please re-consult if situation changes.      Subjective     Chief Complaint: none noted   Patient/Family Comments/goals: Pt pleasant and willing to participate with therapy on this date.    Pain/Comfort:  · Pain Rating 1: 0/10    Patients cultural, spiritual, Zoroastrianism conflicts given the current situation: no    Living Environment:  Pt lives with daughter in [facility] with 4 VANDANA.   Prior to admission, patients level of function was Mod I with amb using standard walker household distances limited by SOB.  Equipment used at home: walker, standard, shower chair.  DME owned (not currently used): none.  Upon discharge, patient will have assistance from daughter.    Objective:     Communicated with RN prior to session.  Patient found supine with   upon PT entry to room.    General Precautions: Standard, fall   Orthopedic Precautions:N/A   Braces: N/A     Exams:  · Gross Motor Coordination:  WFL  · RLE ROM: WFL  · RLE Strength: WFL  · LLE ROM: WFL  · LLE Strength: WFL    Functional Mobility:  · Bed Mobility:     · Rolling Right: modified independence  · Supine to Sit: modified independence  · Transfers:     · Sit to Stand:  modified independence with rolling walker  · Gait: 125ft with RW Mod I   · Balance: Mod I     AM-PAC 6 CLICK MOBILITY  Total Score:24       Therapeutic Activities and Exercises:   - Pt  educated on:   -PT roles, expectations, and POC    -Safety with mobility   -Benefits of OOB activities to increase strength and functional mobility    -Performing ther ex for increasing LE ROM and strength   -Discharge recommendations     AM-PAC 6 CLICK MOBILITY  Total Score:24     Patient left up in chair with call button in reach and daughter present.    GOALS:   Multidisciplinary Problems     Physical Therapy Goals     Not on file          Multidisciplinary Problems (Resolved)        Problem: Physical Therapy Goal    Goal Priority Disciplines Outcome Goal Variances Interventions   Physical Therapy Goal   (Resolved)     PT, PT/OT Outcome(s) achieved                     History:     Past Medical History:   Diagnosis Date    Arthritis     CHF (congestive heart failure)     Coronary artery disease     Hyperlipidemia     Hypertension        Past Surgical History:   Procedure Laterality Date    HYSTERECTOMY N/A     INSERTION, CARDIAC PACEMAKER, DUAL CHAMBER N/A 7/9/2018    Performed by [facility] at [facility] CATH LAB    PLACEMENT-LOOP RECORDER N/A 7/9/2018    Performed by Archie Montez MD at [facility] CATH LAB       Time Tracking:     PT Received On: 08/14/19  PT Start Time: 1025     PT Stop Time: 1048  PT Total Time (min): 23 min     Billable Minutes: Evaluation 10 and Therapeutic Activity 13      Jonathan Guerra, PT  08/14/2019

## 2019-08-14 NOTE — ASSESSMENT & PLAN NOTE
79yo female w/perforated appendicitis and abscess, s/p IR drain 8/13    - regular diet  - will discuss with IR replacing drain prior to discharge   - d/c IVF   - PO cipro/flagy - end date 8/24  - PRN pain/nausea meds  - planning for dc today vs tomorrow

## 2019-08-14 NOTE — PLAN OF CARE
Problem: Physical Therapy Goal  Goal: Physical Therapy Goal  Outcome: Outcome(s) achieved Date Met: 08/14/19  No goals set, pt does not require additional acute PT services at this time.     Pt educated on asking medical staff for PT consult if changes in functional status occurs.     - Skyler Guerra, PT, DPT  8/14/2019

## 2019-08-14 NOTE — SUBJECTIVE & OBJECTIVE
Interval History:   Patient seen and examined, no acute events overnight  Pain well controlled  Denies N/V  IR drain placed yesterday - not holding suction, put out 50mL since placement  Afebrile/VSS    Medications:  Continuous Infusions:   dextrose 5 % and 0.9 % NaCl with KCl 20 mEq 100 mL/hr at 08/13/19 1408     Scheduled Meds:   amLODIPine  10 mg Oral Daily    atorvastatin  20 mg Oral QHS    ciprofloxacin  400 mg Intravenous Q12H    magnesium oxide  800 mg Oral Daily    metronidazole  500 mg Intravenous Q8H     PRN Meds:acetaminophen, hydrALAZINE, HYDROcodone-acetaminophen, ondansetron, sodium chloride 0.9%     Review of patient's allergies indicates:  No Known Allergies  Objective:     Vital Signs (Most Recent):  Temp: 97.7 °F (36.5 °C) (08/14/19 0448)  Pulse: 81 (08/14/19 0448)  Resp: 20 (08/14/19 0448)  BP: (!) 175/79 (08/14/19 0448)  SpO2: 98 % (08/14/19 0448) Vital Signs (24h Range):  Temp:  [97.2 °F (36.2 °C)-98.7 °F (37.1 °C)] 97.7 °F (36.5 °C)  Pulse:  [] 81  Resp:  [16-20] 20  SpO2:  [93 %-100 %] 98 %  BP: (125-189)/(52-80) 175/79     Weight: 82 kg (180 lb 12.4 oz)  Body mass index is 30.08 kg/m².    Intake/Output - Last 3 Shifts       08/12 0700 - 08/13 0659 08/13 0700 - 08/14 0659    P.O. 0 0    I.V. (mL/kg) 8228.3 (100.3) 1115 (13.6)    IV Piggyback 300 100    Total Intake(mL/kg) 8528.3 (104) 1215 (14.8)    Urine (mL/kg/hr) 700 (0.4) 1140 (0.6)    Other 250 50    Total Output 950 1190    Net +7578.3 +25          Urine Occurrence 2 x     Stool Occurrence 0 x     Emesis Occurrence 0 x           Physical Exam   Constitutional: She is oriented to person, place, and time. She appears well-developed and well-nourished. No distress.   HENT:   Head: Normocephalic and atraumatic.   Cardiovascular: Normal rate and regular rhythm.   Pulmonary/Chest: Effort normal. No respiratory distress.   Abdominal:   Soft, NTND  IR drain - not holding suction, purulent drainage   Neurological: She is alert and  oriented to person, place, and time.       Significant Labs:  CBC:   Recent Labs   Lab 08/13/19  0734 08/14/19  0459   WBC 8.20 6.19   RBC 3.83* 3.45*   HGB 11.1* 9.7*   HCT 36.3* 33.6*     --    MCV 95 97   MCH 29.0 28.1   MCHC 30.6* 28.9*     CMP:   Recent Labs   Lab 08/14/19  0459   GLU 94   CALCIUM 8.5*   ALBUMIN 2.2*   PROT 6.3      K 4.1   CO2 21*      BUN 6*   CREATININE 0.9   ALKPHOS 79   ALT 11   AST 16   BILITOT 0.3

## 2019-08-14 NOTE — PROGRESS NOTES
"[facility]  General Surgery  Progress Note    Subjective:     History of Present Illness:  Crystal Alvarado is a 78 y.o. female with h/o CHF (preserved EF of 60%), SVT s/p ablation, HTN, HLD, COPD, non-obstructive CAD, and CKD3 who presents with two days of lower abdominal pain. Reports pain started gradually in her lower abdomen, is now more located in RLQ. Denies fever, chills, nausea, or vomiting. Does endorse decreased appetite. Patient currently in wheelchair, but reports that she is able to ambulate around her house. She is not able to walk an entire block without shortness of breath.   On admission, she is hemodynamically stable and afebrile. Labs significant for leukocytosis of  13.9 with left shift. CT scan showed "a blind-ending tubular structure in the right lower quadrant terminating in a walled-off mixed density collection measuring 5.4 x 3.7 cm, with internal foci of air and surrounding inflammatory change in the right hemipelvis". No free air. Scan preliminarily discussed with Radiology, likely a window for IR drain placement.    Post-Op Info:  * No surgery found *         Interval History:   Patient seen and examined, no acute events overnight  Pain well controlled  Denies N/V  IR drain placed yesterday - not holding suction, put out 50mL since placement  Afebrile/VSS    Medications:  Continuous Infusions:   dextrose 5 % and 0.9 % NaCl with KCl 20 mEq 100 mL/hr at 08/13/19 1408     Scheduled Meds:   amLODIPine  10 mg Oral Daily    atorvastatin  20 mg Oral QHS    ciprofloxacin  400 mg Intravenous Q12H    magnesium oxide  800 mg Oral Daily    metronidazole  500 mg Intravenous Q8H     PRN Meds:acetaminophen, hydrALAZINE, HYDROcodone-acetaminophen, ondansetron, sodium chloride 0.9%     Review of patient's allergies indicates:  No Known Allergies  Objective:     Vital Signs (Most Recent):  Temp: 97.7 °F (36.5 °C) (08/14/19 0448)  Pulse: 81 (08/14/19 0448)  Resp: 20 (08/14/19 " 0448)  BP: (!) 175/79 (08/14/19 0448)  SpO2: 98 % (08/14/19 0448) Vital Signs (24h Range):  Temp:  [97.2 °F (36.2 °C)-98.7 °F (37.1 °C)] 97.7 °F (36.5 °C)  Pulse:  [] 81  Resp:  [16-20] 20  SpO2:  [93 %-100 %] 98 %  BP: (125-189)/(52-80) 175/79     Weight: 82 kg (180 lb 12.4 oz)  Body mass index is 30.08 kg/m².    Intake/Output - Last 3 Shifts       08/12 0700 - 08/13 0659 08/13 0700 - 08/14 0659    P.O. 0 0    I.V. (mL/kg) 8228.3 (100.3) 1115 (13.6)    IV Piggyback 300 100    Total Intake(mL/kg) 8528.3 (104) 1215 (14.8)    Urine (mL/kg/hr) 700 (0.4) 1140 (0.6)    Other 250 50    Total Output 950 1190    Net +7578.3 +25          Urine Occurrence 2 x     Stool Occurrence 0 x     Emesis Occurrence 0 x           Physical Exam   Constitutional: She is oriented to person, place, and time. She appears well-developed and well-nourished. No distress.   HENT:   Head: Normocephalic and atraumatic.   Cardiovascular: Normal rate and regular rhythm.   Pulmonary/Chest: Effort normal. No respiratory distress.   Abdominal:   Soft, NTND  IR drain - not holding suction, purulent drainage   Neurological: She is alert and oriented to person, place, and time.       Significant Labs:  CBC:   Recent Labs   Lab 08/13/19  0734 08/14/19  0459   WBC 8.20 6.19   RBC 3.83* 3.45*   HGB 11.1* 9.7*   HCT 36.3* 33.6*     --    MCV 95 97   MCH 29.0 28.1   MCHC 30.6* 28.9*     CMP:   Recent Labs   Lab 08/14/19  0459   GLU 94   CALCIUM 8.5*   ALBUMIN 2.2*   PROT 6.3      K 4.1   CO2 21*      BUN 6*   CREATININE 0.9   ALKPHOS 79   ALT 11   AST 16   BILITOT 0.3     Assessment/Plan:     * Appendicitis with perforation  79yo female w/perforated appendicitis and abscess, s/p IR drain 8/13    - regular diet  - will discuss with IR replacing drain prior to discharge   - d/c IVF   - PO cipro/flagy - end date 8/24  - PRN pain/nausea meds  - planning for dc today vs tomorrow      Hypomagnesemia  Replace     Hypokalemia  - replace with  30meq K tablets  - daily labs    Hyperlipidemia  Atorvastatin     Essential hypertension  -home amlodipine, hold off on losartan, PRN hydralazine for SBP>170        Agnieszka Lopez PA-C   [id number]  General Surgery  [facility]

## 2019-08-14 NOTE — PLAN OF CARE
Problem: Occupational Therapy Goal  Goal: Occupational Therapy Goal  Goals to be met by: 9/14/2019    Patient will increase functional independence with ADLs by performing:    UE Dressing with Set-up Assistance.  Grooming while standing at sink with Set-up Assistance.  Bathing from  standing at sink with Set-up Assistance.   Complete HEP w/ supervision as needed to improve mobility in BUE to complete adls/iadls      Outcome: Ongoing (interventions implemented as appropriate)  Eval complete. Pt tolerated session well. Initiate OT POC.

## 2019-08-14 NOTE — PT/OT/SLP EVAL
Occupational Therapy   Evaluation    Name: Crystal Alvarado  MRN: [id number]  Admitting Diagnosis:  Appendicitis with perforation      Recommendations:     Discharge Recommendations: outpatient OT  Discharge Equipment Recommendations:  walker, rolling  Barriers to discharge:  None    Assessment:     Crystal Alvarado is a 78 y.o. female with a medical diagnosis of Appendicitis with perforation.  She presents with poor BUE AROM w/ limits her ability to complete valued activities such as putting her shirt on and cooking.. Performance deficits affecting function: weakness, impaired endurance, impaired self care skills, decreased coordination, decreased upper extremity function, edema, impaired skin.      Rehab Prognosis: Fair; patient would benefit from acute skilled OT services to address these deficits and reach maximum level of function.       Plan:     Patient to be seen 3 x/week to address the above listed problems via self-care/home management, community/work re-entry, therapeutic activities  · Plan of Care Expires: 09/14/19  · Plan of Care Reviewed with: patient, daughter    Subjective     Chief Complaint: weakness and SOB  Patient/Family Comments/goals: Medical management and discharge.     Occupational Profile:  Living Environment: Pt lives with daughter in [facility], with 4-5 VANDANA, and tub/shower combo.  Previous level of function: Required moderate assistance from daughter.  Roles and Routines: Mother, retiree  Equipment Used at Home:  walker, standard, shower chair  Assistance upon Discharge: yes, from family.    Pain/Comfort:  · Pain Rating 1: other (see comments)(Pain w/ BUE shldr elevation >65*)  · Location - Side 1: Bilateral  · Location - Orientation 1: generalized  · Location 1: shoulder  · Pain Addressed 1: Cessation of Activity, Distraction, Reposition  · Pain Rating Post-Intervention 1: 0/10    Patients cultural, spiritual, Restoration conflicts given the current situation: no    Objective:     Communicated  with: RN prior to session.  Patient found HOB elevated with telemetry upon OT entry to room.    General Precautions: Standard, fall   Orthopedic Precautions:N/A   Braces: N/A     Occupational Performance:    Bed Mobility:    · Patient completed Rolling/Turning to Left with  modified independence  · Patient completed Rolling/Turning to Right with modified independence  · Patient completed Scooting/Bridging with modified independence  · Patient completed Supine to Sit with modified independence  · Patient completed Sit to Supine with modified independence    Functional Mobility/Transfers:  · Patient completed Sit <> Stand Transfer with modified independence  with  rolling walker   Functional Mobility:  Pt was able to walk short household distances w/ RW, and Mod I.      Activities of Daily Living:  · Lower Body Dressing: supervision seated at EOB.    Cognitive/Visual Perceptual:  Completely cognitively intact adult w/ no glasses worn or present during eval.       Physical Exam:  Balance:    -       seated: fair, Standing: Fair -   Dominant hand:    -       RH  Upper Extremity Range of Motion:     -       Right Upper Extremity: Deficits: Limited to 65* of shoulder flex w/ some gliding of scapula  -       Left Upper Extremity: Deficits: Limited to 65* of shoulder flex w/ some gliding of scapula  Upper Extremity Strength:    -       Right Upper Extremity: WFL except within tolerated range.  -       Left Upper Extremity: WFL except within tolerated range.   Strength:    -       Right Upper Extremity: WFL  -       Left Upper Extremity: WFL  Fine Motor Coordination:    -       Intact  Gross motor coordination:   WFL    AMPAC 6 Click ADL:  AMPAC Total Score: 17    Treatment & Education:  -Pt edu on OT role/POC  -Importance of OOB activity with staff assistance  -Safety during functional t/f and mobility  - White board updated  - Multiple self care tasks/functional mobility completed-- assistance level noted above  - All  questions/concerns answered within OT scope of practice    Education:    Patient left up in chair with all lines intact, call button in reach and RN notified    GOALS:   Multidisciplinary Problems     Occupational Therapy Goals        Problem: Occupational Therapy Goal    Goal Priority Disciplines Outcome Interventions   Occupational Therapy Goal     OT, PT/OT Ongoing (interventions implemented as appropriate)    Description:  Goals to be met by: 9/14/2019    Patient will increase functional independence with ADLs by performing:    UE Dressing with Set-up Assistance.  Grooming while standing at sink with Set-up Assistance.  Bathing from  standing at sink with Set-up Assistance.   Complete HEP w/ supervision as needed to improve mobility in BUE to complete adls/iadls                       History:     Past Medical History:   Diagnosis Date    Arthritis     CHF (congestive heart failure)     Coronary artery disease     Hyperlipidemia     Hypertension        Past Surgical History:   Procedure Laterality Date    HYSTERECTOMY N/A     INSERTION, CARDIAC PACEMAKER, DUAL CHAMBER N/A 7/9/2018    Performed by Archie [facility]z MD at [facility] CATH LAB    PLACEMENT-LOOP RECORDER N/A 7/9/2018    Performed by Archie Montez MD at [facility] CATH LAB       Time Tracking:     OT Date of Treatment: 08/14/19  OT Start Time: 1026  OT Stop Time: 1049  OT Total Time (min): 23 min    Billable Minutes:Evaluation 10 mins  Self Care/Home Management 13 mins    Driss Cotto, OT  8/14/2019

## 2019-08-15 VITALS
RESPIRATION RATE: 14 BRPM | HEIGHT: 65 IN | HEART RATE: 80 BPM | BODY MASS INDEX: 30.12 KG/M2 | WEIGHT: 180.75 LBS | TEMPERATURE: 99 F | SYSTOLIC BLOOD PRESSURE: 153 MMHG | OXYGEN SATURATION: 95 % | DIASTOLIC BLOOD PRESSURE: 67 MMHG

## 2019-08-15 LAB
ALBUMIN SERPL BCP-MCNC: 2.3 G/DL (ref 3.5–5.2)
ALP SERPL-CCNC: 76 U/L (ref 55–135)
ALT SERPL W/O P-5'-P-CCNC: 12 U/L (ref 10–44)
ANION GAP SERPL CALC-SCNC: 9 MMOL/L (ref 8–16)
AST SERPL-CCNC: 27 U/L (ref 10–40)
BASOPHILS # BLD AUTO: 0.03 K/UL (ref 0–0.2)
BASOPHILS NFR BLD: 0.4 % (ref 0–1.9)
BILIRUB SERPL-MCNC: 0.3 MG/DL (ref 0.1–1)
BUN SERPL-MCNC: 9 MG/DL (ref 8–23)
CALCIUM SERPL-MCNC: 9 MG/DL (ref 8.7–10.5)
CHLORIDE SERPL-SCNC: 107 MMOL/L (ref 95–110)
CO2 SERPL-SCNC: 19 MMOL/L (ref 23–29)
CREAT SERPL-MCNC: 1 MG/DL (ref 0.5–1.4)
DIFFERENTIAL METHOD: ABNORMAL
EOSINOPHIL # BLD AUTO: 0.2 K/UL (ref 0–0.5)
EOSINOPHIL NFR BLD: 2.5 % (ref 0–8)
ERYTHROCYTE [DISTWIDTH] IN BLOOD BY AUTOMATED COUNT: 14.6 % (ref 11.5–14.5)
EST. GFR  (AFRICAN AMERICAN): >60 ML/MIN/1.73 M^2
EST. GFR  (NON AFRICAN AMERICAN): 53.3 ML/MIN/1.73 M^2
GLUCOSE SERPL-MCNC: 80 MG/DL (ref 70–110)
HCT VFR BLD AUTO: 32.3 % (ref 37–48.5)
HGB BLD-MCNC: 10.2 G/DL (ref 12–16)
IMM GRANULOCYTES # BLD AUTO: 0.09 K/UL (ref 0–0.04)
IMM GRANULOCYTES NFR BLD AUTO: 1.3 % (ref 0–0.5)
LYMPHOCYTES # BLD AUTO: 1.2 K/UL (ref 1–4.8)
LYMPHOCYTES NFR BLD: 18.2 % (ref 18–48)
MAGNESIUM SERPL-MCNC: 1.8 MG/DL (ref 1.6–2.6)
MCH RBC QN AUTO: 28.6 PG (ref 27–31)
MCHC RBC AUTO-ENTMCNC: 31.6 G/DL (ref 32–36)
MCV RBC AUTO: 91 FL (ref 82–98)
MONOCYTES # BLD AUTO: 0.6 K/UL (ref 0.3–1)
MONOCYTES NFR BLD: 9 % (ref 4–15)
NEUTROPHILS # BLD AUTO: 4.6 K/UL (ref 1.8–7.7)
NEUTROPHILS NFR BLD: 68.6 % (ref 38–73)
NRBC BLD-RTO: 0 /100 WBC
PHOSPHATE SERPL-MCNC: 4 MG/DL (ref 2.7–4.5)
PLATELET # BLD AUTO: 343 K/UL (ref 150–350)
PMV BLD AUTO: 10 FL (ref 9.2–12.9)
POTASSIUM SERPL-SCNC: 5 MMOL/L (ref 3.5–5.1)
PROT SERPL-MCNC: 6.2 G/DL (ref 6–8.4)
RBC # BLD AUTO: 3.57 M/UL (ref 4–5.4)
SODIUM SERPL-SCNC: 135 MMOL/L (ref 136–145)
WBC # BLD AUTO: 6.75 K/UL (ref 3.9–12.7)

## 2019-08-15 PROCEDURE — 83735 ASSAY OF MAGNESIUM: CPT

## 2019-08-15 PROCEDURE — 36415 COLL VENOUS BLD VENIPUNCTURE: CPT

## 2019-08-15 PROCEDURE — 84100 ASSAY OF PHOSPHORUS: CPT

## 2019-08-15 PROCEDURE — 25000003 PHARM REV CODE 250: Performed by: PHYSICIAN ASSISTANT

## 2019-08-15 PROCEDURE — 99232 PR SUBSEQUENT HOSPITAL CARE,LEVL II: ICD-10-PCS | Mod: ,,, | Performed by: SURGERY

## 2019-08-15 PROCEDURE — 97110 THERAPEUTIC EXERCISES: CPT

## 2019-08-15 PROCEDURE — 99232 SBSQ HOSP IP/OBS MODERATE 35: CPT | Mod: ,,, | Performed by: SURGERY

## 2019-08-15 PROCEDURE — 97535 SELF CARE MNGMENT TRAINING: CPT

## 2019-08-15 PROCEDURE — 25000003 PHARM REV CODE 250: Performed by: SURGERY

## 2019-08-15 PROCEDURE — 25000003 PHARM REV CODE 250: Performed by: STUDENT IN AN ORGANIZED HEALTH CARE EDUCATION/TRAINING PROGRAM

## 2019-08-15 PROCEDURE — 85025 COMPLETE CBC W/AUTO DIFF WBC: CPT

## 2019-08-15 PROCEDURE — 80053 COMPREHEN METABOLIC PANEL: CPT

## 2019-08-15 RX ORDER — METRONIDAZOLE 500 MG/1
500 TABLET ORAL EVERY 8 HOURS
Qty: 27 TABLET | Refills: 0 | Status: SHIPPED | OUTPATIENT
Start: 2019-08-15 | End: 2019-08-24

## 2019-08-15 RX ORDER — CIPROFLOXACIN 500 MG/1
500 TABLET ORAL EVERY 12 HOURS
Qty: 18 TABLET | Refills: 0 | Status: SHIPPED | OUTPATIENT
Start: 2019-08-15 | End: 2019-08-24

## 2019-08-15 RX ORDER — TRAMADOL HYDROCHLORIDE 50 MG/1
50 TABLET ORAL EVERY 6 HOURS PRN
Qty: 20 TABLET | Refills: 0 | Status: ON HOLD | OUTPATIENT
Start: 2019-08-15 | End: 2020-08-07 | Stop reason: HOSPADM

## 2019-08-15 RX ADMIN — MAGNESIUM OXIDE TAB 400 MG (241.3 MG ELEMENTAL MG) 800 MG: 400 (241.3 MG) TAB at 09:08

## 2019-08-15 RX ADMIN — HYDROCODONE BITARTRATE AND ACETAMINOPHEN 1 TABLET: 5; 325 TABLET ORAL at 12:08

## 2019-08-15 RX ADMIN — METRONIDAZOLE 500 MG: 500 TABLET ORAL at 05:08

## 2019-08-15 RX ADMIN — AMLODIPINE BESYLATE 10 MG: 10 TABLET ORAL at 09:08

## 2019-08-15 RX ADMIN — METRONIDAZOLE 500 MG: 500 TABLET ORAL at 01:08

## 2019-08-15 RX ADMIN — CIPROFLOXACIN HYDROCHLORIDE 500 MG: 500 TABLET, FILM COATED ORAL at 09:08

## 2019-08-15 NOTE — PT/OT/SLP PROGRESS
"Occupational Therapy   Treatment    Name: Crystal Alvarado  MRN: [id number]  Admitting Diagnosis:  Appendicitis with perforation       Recommendations:     Discharge Recommendations: outpatient OT  Discharge Equipment Recommendations:  walker, rolling  Barriers to discharge:  None    Assessment:     Crystal Alvarado is a 78 y.o. female with a medical diagnosis of Appendicitis with perforation.  Performance deficits affecting function are weakness, impaired self care skills, impaired functional mobilty, gait instability, impaired balance, decreased upper extremity function, edema, impaired skin, decreased coordination. Patient became dizzy and reported that she felt as if she was going to "pass out" when on toilet. Patient was able to recover after returning back to bed. Nursing notified of patient's dizziness with activity as well.      Rehab Prognosis:  Good; patient would benefit from acute skilled OT services to address these deficits and reach maximum level of function.       Plan:     Patient to be seen 3 x/week to address the above listed problems via self-care/home management, therapeutic activities, therapeutic exercises, community/work re-entry  · Plan of Care Expires: 09/14/19  · Plan of Care Reviewed with: patient    Subjective     Pain/Comfort:  · Pain Rating 1: (patient did not rate)  · Pain Rating Post-Intervention 1: (patient did not rate)    Objective:     Communicated with: patient and nurse prior to session.  Patient found HOB elevated with telemetry upon OT entry to room.    General Precautions: Standard, fall   Orthopedic Precautions:N/A   Braces: N/A     Occupational Performance:     Bed Mobility:    · Patient completed Supine to Sit with supervision /c HOB elevated and using handrail  · Patient completed Sit to Supine with supervision /c HOB elevated    Functional Mobility/Transfers:  · Patient completed Sit <> Stand Transfer with contact guard assistance  with  hand-held assist   · Patient " completed Toilet Transfer bed<>toilet with functional ambulation technique with contact guard assistance with  rolling walker    Activities of Daily Living:  · Toileting: contact guard assistance        [facility] 6 Click ADL: 17    Treatment & Education:  Role of OT and POC  ADL retraining  Functional mobility training and safety    Patient performed L and R AROM/AAROM exercises in all available pain free planes and joints focusing to improve strength and endurance to increase independence with ADLs.     Patient left HOB elevated with call button in reach, nurse notified and all needs met. Education:      GOALS:   Multidisciplinary Problems     Occupational Therapy Goals        Problem: Occupational Therapy Goal    Goal Priority Disciplines Outcome Interventions   Occupational Therapy Goal     OT, PT/OT Ongoing (interventions implemented as appropriate)    Description:  Goals to be met by: 9/14/2019    Patient will increase functional independence with ADLs by performing:    UE Dressing with Set-up Assistance.  Grooming while standing at sink with Set-up Assistance.  Bathing from  standing at sink with Set-up Assistance.   Complete HEP w/ supervision as needed to improve mobility in BUE to complete adls/iadls   Added goal: Toilet transfer to toilet with Supervision.                        Time Tracking:     OT Date of Treatment: 08/15/19  OT Start Time: 0928  OT Stop Time: 0955  OT Total Time (min): 27 min    Billable Minutes:Self Care/Home Management 15  Therapeutic Exercise 12    PAUL Cavazos  8/15/2019

## 2019-08-15 NOTE — NURSING
D/C instructions reviewed with daughter and patient, questions answered and copy of instructions given to patient.  Daughter familiar with drain and care thereof.  Able to demonstrate charging and emptying drain.

## 2019-08-15 NOTE — PLAN OF CARE
Problem: Occupational Therapy Goal  Goal: Occupational Therapy Goal  Goals to be met by: 9/14/2019    Patient will increase functional independence with ADLs by performing:    UE Dressing with Set-up Assistance.  Grooming while standing at sink with Set-up Assistance.  Bathing from  standing at sink with Set-up Assistance.   Complete HEP w/ supervision as needed to improve mobility in BUE to complete adls/iadls   Added goal: Toilet transfer to toilet with Supervision.      Outcome: Ongoing (interventions implemented as appropriate)  Patient's goals remain appropriate. Added toileting goal.   PAUL Cavazos  8/15/2019

## 2019-08-15 NOTE — DISCHARGE SUMMARY
"[facility]  General Surgery  Discharge Summary      Patient Name: Crystal Alvarado  MRN: [id number]  Admission Date: 8/9/2019  Hospital Length of Stay: 6 days  Discharge Date and Time:  08/15/2019 7:49 AM  Attending Physician: Darren Stoll MD   Discharging Provider: Agnieszka Lopez PA-C  Primary Care Provider: Shantell Goff MD    HPI:   Crystal Alvarado is a 78 y.o. female with h/o CHF (preserved EF of 60%), SVT s/p ablation, HTN, HLD, COPD, non-obstructive CAD, and CKD3 who presents with two days of lower abdominal pain. Reports pain started gradually in her lower abdomen, is now more located in RLQ. Denies fever, chills, nausea, or vomiting. Does endorse decreased appetite. Patient currently in wheelchair, but reports that she is able to ambulate around her house. She is not able to walk an entire block without shortness of breath.   On admission, she is hemodynamically stable and afebrile. Labs significant for leukocytosis of  13.9 with left shift. CT scan showed "a blind-ending tubular structure in the right lower quadrant terminating in a walled-off mixed density collection measuring 5.4 x 3.7 cm, with internal foci of air and surrounding inflammatory change in the right hemipelvis". No free air. Scan preliminarily discussed with Radiology, likely a window for IR drain placement.    * No surgery found *      Indwelling Lines/Drains at time of discharge:   Lines/Drains/Airways     Drain                 Drain/Device  08/13/19 1059 Right lower back 1 day              Hospital Course:   Patient presented to the ER with perforated appendicitis.  She was admitted to the surgical service, made NPO and IR was consulted for drain placement.  She tolerated the procedure well and was transferred to the floor after recovery from anesthesia. Please see the procedure note for further procedure details. Vitals remained stable, and she was afebrile all throughout the post operative period. Labs " were reviewed and electrolytes were replaced appropriately. Physical exam was appropriate for post operative state.  Diet was advanced, and she was able to tolerate a regular diet prior to discharge. She was ambulating without difficulty and had normal bowel function prior to discharge. Patient was deemed suitable for discharge on hospital day 6. She was discharged home with medications and instructions as below. She voiced understanding of the instructions prior to discharge.     For more thorough information, please refer to the hospital records.    Consults:   Consults (From admission, onward)        Status Ordering Provider     Inpatient consult to General Surgery  Once     Provider:  (Not yet assigned)    Completed ASHLEY LEMUS     Inpatient consult to Interventional Radiology  Once     Provider:  (Not yet assigned)    Completed AMBAR GAMINO     Inpatient consult to Interventional Radiology  Once     Provider:  (Not yet assigned)    Completed JUSTEN RAMIREZ          Significant Diagnostic Studies: Labs:   BMP:   Recent Labs   Lab 08/14/19  0459 08/15/19  0537   GLU 94 80    135*   K 4.1 5.0    107   CO2 21* 19*   BUN 6* 9   CREATININE 0.9 1.0   CALCIUM 8.5* 9.0   MG 1.6 1.8   , CMP   Recent Labs   Lab 08/14/19  0459 08/15/19  0537    135*   K 4.1 5.0    107   CO2 21* 19*   GLU 94 80   BUN 6* 9   CREATININE 0.9 1.0   CALCIUM 8.5* 9.0   PROT 6.3 6.2   ALBUMIN 2.2* 2.3*   BILITOT 0.3 0.3   ALKPHOS 79 76   AST 16 27   ALT 11 12   ANIONGAP 9 9   ESTGFRAFRICA >60.0 >60.0   EGFRNONAA >60.0 53.3*   , CBC   Recent Labs   Lab 08/14/19  0459 08/15/19  0537   WBC 6.19 6.75   HGB 9.7* 10.2*   HCT 33.6* 32.3*    343   , INR   Lab Results   Component Value Date    INR 1.0 08/10/2019    INR 0.9 07/06/2018    INR 0.9 07/04/2018   , Troponin   Recent Labs   Lab 08/09/19  1912   TROPONINI <0.006   All labs within the past 24 hours have been reviewed    Radiology:  X-Ray Chest PA and  "Lateral (CXR):   Results for orders placed or performed during the hospital encounter of 08/09/19   X-Ray Chest PA And Lateral    Narrative    EXAMINATION:  XR CHEST PA AND LATERAL    CLINICAL HISTORY:  Provided history is "Chest Pain;  ".    TECHNIQUE:  Frontal and lateral views of the chest were performed.    COMPARISON:  07/04/2018.    FINDINGS:  Cardiac silhouette is prominent but stable.  Atherosclerotic calcifications overlie the aortic arch.  Cardiac loop recorder device is present.  No focal consolidation.  No sizable pleural effusion.  No pneumothorax.  No detrimental change in lung aeration.      Impression    Borderline cardiomegaly.  No acute finding.      Electronically signed by: Jamel Zambrano MD  Date:    08/09/2019  Time:    20:54   .  CT scan: CT ABDOMEN PELVIS WITH CONTRAST:   Results for orders placed or performed during the hospital encounter of 08/09/19   CT Abdomen Pelvis With Contrast    Narrative    EXAMINATION:  CT ABDOMEN PELVIS WITH CONTRAST    CLINICAL HISTORY:  Abd pain, fever, abscess suspected;    TECHNIQUE:  Low dose axial images, sagittal and coronal reformations were obtained from the lung bases to the pubic symphysis following the IV administration of 100 mL of Omnipaque 350 without oral contrast    COMPARISON:  08/09/2019 abdominal CT, and thoracic spine CT of 07/04/2018.    FINDINGS:  Chest: Visualized heart appears enlarged.  There may be a small posterior pericardial effusion of uncertain etiology or significance.  The visualized thoracic aorta and esophagus appear grossly normal.  There is breathing motion.  There is mild streaky confluent opacities at the posterior lung bases likely dependent atelectasis.  No pneumothorax or pleural effusion or interstitial edema.    Abdomen: There is breathing motion degradation.  There is a 14 x 12 mm hypodense focus of the right lobe of the liver axial image 53 likely a cyst better shown on 08/09/2019.  No suspicious liver lesion found.  " The hepatic and portal veins and SMV and splenic vein show normal enhancement.  The spleen, pancreas, adrenal glands and gallbladder appear normal.  There may be dependent sludge in the gallbladder not well demonstrated from motion.  The common bile duct is not well demonstrated but probably normal at about 7 mm.    The abdominal aorta and IVC show normal morphology.  No periaortic adenopathy.  There is moderate left renal atrophy similar with 07/04/2018.  There is a 2 cm cyst of the left mid kidney unchanged.  There is a small amount of apparent contrast within the right renal collecting system.  Breathing motion degradation is noted again.  No definite ureteral dilation or calculus found.  No perinephric stranding.  There is a small amount of contrast within the left renal collecting system as well.    Pelvis: The urinary bladder appears normal.  No inguinal hernia or pelvic adenopathy.    There is a 6.8 x 4.5 cm irregular fluid collection with irregular moderately enhancing wall in the right aspect of pelvis axial image 101 consistent with abscess.  This is increased from 5.3 x 3.7 cm on 08/09/2019 and is likely related to a perforated appendicitis.  There is moderate adjacent stranding slightly increased as well.    The terminal ileum appears normal axial image 94.  No colonic wall thickening or dilation.  No definite small bowel wall thickening or pneumatosis.  There are numerous diverticula of the left colon and sigmoid colon without acute diverticulitis.  There is a small amount of free fluid in the inferior left perirectal region.    Skeleton: There is degenerative changes of the pubic symphysis and SI joints probably normal for age.  There is severe degenerative changes of the lumbar spine.  No acute rib fracture.  There is 70% compression deformity of L3 without acute feature unchanged from 08/09/2019 this is similar with 02/24/2018 lumbar spine CT.  No other compression deformity found.  There is grade 1  degenerative anterolisthesis at L3-4 also similar with 02/24/2018.  No endplate erosion.      Impression    A right lower quadrant abscess measures 6.8 x 4.5 cm and is mildly increased in size from 08/09/2019 consistent with ruptured appendicitis.  No bowel dilation or gross free air.    Diverticulosis without acute diverticulitis.  Breathing motion degraded exam.  Moderate left renal atrophy unchanged.    Mild presumed atelectasis at the lung bases.    This report was flagged in Epic as abnormal.      Electronically signed by: Chelsie Mitchell  Date:    08/12/2019  Time:    17:10      Pending Diagnostic Studies:     Procedure Component Value Units Date/Time    IR Ascess Drainage With Tube Placement [[id number]] Resulted:  08/13/19 1031    Order Status:  Sent Lab Status:  In process Updated:  08/13/19 1110        Final Active Diagnoses:    Diagnosis Date Noted POA    PRINCIPAL PROBLEM:  Appendicitis with perforation [K35.32] 08/09/2019 Yes    Hypomagnesemia [E83.42] 08/12/2019 No    Hypokalemia [E87.6]  Yes    Hyperlipidemia [E78.5]  Yes    Essential hypertension [I10] 08/11/2015 Yes      Problems Resolved During this Admission:      Patient Active Problem List   Diagnosis    Syncope    Tobacco abuse    Pain in the chest    SSS (sick sinus syndrome)    Dizziness    Light headedness    Essential hypertension    Hyperlipidemia    Coronary artery disease involving native coronary artery of native heart without angina pectoris    CKD (chronic kidney disease) stage 3, GFR 30-59 ml/min    Normocytic anemia    Supraventricular tachycardia    Postural dizziness with presyncope    Hypokalemia    Physical deconditioning    Heart failure with preserved left ventricular function (HFpEF)    Orthostatic hypotension    Pre-syncope    Concussion with no loss of consciousness    Closed fracture of acromial end of clavicle    SVT (supraventricular tachycardia)    History of radiofrequency ablation (RFA)  procedure for cardiac arrhythmia    Status post placement of implantable loop recorder    Closed fracture of right wrist    Wrist fracture    Appendicitis with perforation    Hypomagnesemia     Discharged Condition: good    Disposition: Home or Self Care    Follow Up:  Follow-up Information     Darren Stoll MD In 1 week.    Specialty:  General Surgery  Contact information:  [address]  [facility] [id number]  [phone]                 Patient Instructions:      Diet Adult Regular     Notify your health care provider if you experience any of the following:  difficulty breathing or increased cough     Notify your health care provider if you experience any of the following:  redness, tenderness, or signs of infection (pain, swelling, redness, odor or green/yellow discharge around incision site)     Notify your health care provider if you experience any of the following:  severe uncontrolled pain     Notify your health care provider if you experience any of the following:  persistent nausea and vomiting or diarrhea     Notify your health care provider if you experience any of the following:  temperature >100.4     Change dressing (specify)   Order Comments: Cover drain with gauze and tape. Change PRN. Monitor and record drain output and bring record with you to clinic.     Activity as tolerated     Medications:  Reconciled Home Medications:      Medication List      START taking these medications    ciprofloxacin HCl 500 MG tablet  Commonly known as:  CIPRO  Take 1 tablet (500 mg total) by mouth every 12 (twelve) hours. for 9 days     metroNIDAZOLE 500 MG tablet  Commonly known as:  FLAGYL  Take 1 tablet (500 mg total) by mouth every 8 (eight) hours. for 9 days        CONTINUE taking these medications    amLODIPine 10 MG tablet  Commonly known as:  NORVASC  Take 10 mg by mouth once daily.     aspirin 81 MG Chew  Take 81 mg by mouth once daily.     atorvastatin 20 MG tablet  Commonly known as:   LIPITOR  Take 20 mg by mouth every evening.     azelastine 137 mcg (0.1 %) nasal spray  Commonly known as:  ASTELIN     docusate sodium 100 MG capsule  Commonly known as:  COLACE  Take 1 capsule (100 mg total) by mouth 2 (two) times daily.     ergocalciferol 50,000 unit Cap  Commonly known as:  ERGOCALCIFEROL  Take 50,000 Units by mouth every 7 days.     fluticasone propionate 50 mcg/actuation nasal spray  Commonly known as:  FLONASE     losartan 100 MG tablet  Commonly known as:  COZAAR  TAKE 1 TABLET (100 MG TOTAL) BY MOUTH ONCE DAILY.     nitroGLYCERIN 0.4 MG SL tablet  Commonly known as:  NITROSTAT  Place 1 tablet (0.4 mg total) under the tongue every 5 (five) minutes as needed for Chest pain (and notify MD).     traMADol 50 mg tablet  Commonly known as:  ULTRAM  Take 1 tablet (50 mg total) by mouth every 6 (six) hours as needed for Pain.        STOP taking these medications    naproxen 375 MG Tbec EC tablet  Commonly known as:  EC-NAPROSYN          Time spent on the discharge of patient: 2 minutes    Agnieszka Lopez PA-C  General Surgery  [facility]

## 2019-08-15 NOTE — PLAN OF CARE
Problem: Fall Injury Risk  Goal: Absence of Fall and Fall-Related Injury    Intervention: Identify and Manage Contributors to Fall Injury Risk     08/14/19 0844   Identify and Manage Contributors to Fall Injury Risk   Self-Care Promotion independence encouraged;BADL personal objects within reach;BADL personal routines maintained;safe use of adaptive equipment encouraged   Manage Acute Allergic Reaction   Medication Review/Management medications reviewed         Problem: Adult Inpatient Plan of Care  Goal: Plan of Care Review  Outcome: Ongoing (interventions implemented as appropriate)     08/14/19 1951   Plan of Care Review   Plan of Care Reviewed With patient   Progress improving       Problem: Infection  Goal: Infection Symptom Resolution    Intervention: Prevent or Manage Infection     08/14/19 0744 08/14/19 0844   Prevent or Manage Infection   Fever Reduction/Comfort Measures medication administered  --    Infection Management  --  aseptic technique maintained   Manage Diarrhea   Isolation Precautions  --  protective environment maintained

## 2019-08-15 NOTE — SUBJECTIVE & OBJECTIVE
Interval History:   Patient seen and examined, no acute events overnight  Pain well controlled  Denies N/V  IR replaced bulb yesterday - drain put out 30mL  Afebrile/VSS    Medications:  Continuous Infusions:    Scheduled Meds:   amLODIPine  10 mg Oral Daily    atorvastatin  20 mg Oral QHS    ciprofloxacin HCl  500 mg Oral Q12H    magnesium oxide  800 mg Oral Daily    metroNIDAZOLE  500 mg Oral Q8H     PRN Meds:acetaminophen, hydrALAZINE, HYDROcodone-acetaminophen, ondansetron, sodium chloride 0.9%     Review of patient's allergies indicates:  No Known Allergies  Objective:     Vital Signs (Most Recent):  Temp: 97.4 °F (36.3 °C) (08/15/19 0557)  Pulse: 83 (08/15/19 0557)  Resp: 18 (08/15/19 0557)  BP: (!) 149/66 (08/15/19 0557)  SpO2: (!) 94 % (08/15/19 0557) Vital Signs (24h Range):  Temp:  [97 °F (36.1 °C)-98.3 °F (36.8 °C)] 97.4 °F (36.3 °C)  Pulse:  [67-92] 83  Resp:  [18-20] 18  SpO2:  [94 %-97 %] 94 %  BP: (129-173)/(65-74) 149/66     Weight: 82 kg (180 lb 12.4 oz)  Body mass index is 30.08 kg/m².    Intake/Output - Last 3 Shifts       08/13 0700 - 08/14 0659 08/14 0700 - 08/15 0659 08/15 0700 - 08/16 0659    P.O. 0 640     I.V. (mL/kg) 1115 (13.6)      IV Piggyback 100      Total Intake(mL/kg) 1215 (14.8) 640 (7.8)     Urine (mL/kg/hr) 1140 (0.6) 300 (0.2)     Emesis/NG output  0     Other 80 30     Stool  0     Total Output 1220 330     Net -5 +310            Urine Occurrence  4 x     Stool Occurrence  0 x     Emesis Occurrence  0 x           Physical Exam   Constitutional: She is oriented to person, place, and time. She appears well-developed and well-nourished. No distress.   HENT:   Head: Normocephalic and atraumatic.   Cardiovascular: Normal rate and regular rhythm.   Pulmonary/Chest: Effort normal. No respiratory distress.   Abdominal:   Soft, NTND  IR drain - purulent drainage in bulb   Neurological: She is alert and oriented to person, place, and time.       Significant Labs:  CBC:   Recent Labs    Lab 08/15/19  0537   WBC 6.75   RBC 3.57*   HGB 10.2*   HCT 32.3*      MCV 91   MCH 28.6   MCHC 31.6*     CMP:   Recent Labs   Lab 08/15/19  0537   GLU 80   CALCIUM 9.0   ALBUMIN 2.3*   PROT 6.2   *   K 5.0   CO2 19*      BUN 9   CREATININE 1.0   ALKPHOS 76   ALT 12   AST 27   BILITOT 0.3

## 2019-08-15 NOTE — PROGRESS NOTES
"[facility]  General Surgery  Progress Note    Subjective:     History of Present Illness:  Crystal Alvarado is a 78 y.o. female with h/o CHF (preserved EF of 60%), SVT s/p ablation, HTN, HLD, COPD, non-obstructive CAD, and CKD3 who presents with two days of lower abdominal pain. Reports pain started gradually in her lower abdomen, is now more located in RLQ. Denies fever, chills, nausea, or vomiting. Does endorse decreased appetite. Patient currently in wheelchair, but reports that she is able to ambulate around her house. She is not able to walk an entire block without shortness of breath.   On admission, she is hemodynamically stable and afebrile. Labs significant for leukocytosis of  13.9 with left shift. CT scan showed "a blind-ending tubular structure in the right lower quadrant terminating in a walled-off mixed density collection measuring 5.4 x 3.7 cm, with internal foci of air and surrounding inflammatory change in the right hemipelvis". No free air. Scan preliminarily discussed with Radiology, likely a window for IR drain placement.    Post-Op Info:  * No surgery found *         Interval History:   Patient seen and examined, no acute events overnight  Pain well controlled  Denies N/V  IR replaced bulb yesterday - drain put out 30mL  Afebrile/VSS    Medications:  Continuous Infusions:    Scheduled Meds:   amLODIPine  10 mg Oral Daily    atorvastatin  20 mg Oral QHS    ciprofloxacin HCl  500 mg Oral Q12H    magnesium oxide  800 mg Oral Daily    metroNIDAZOLE  500 mg Oral Q8H     PRN Meds:acetaminophen, hydrALAZINE, HYDROcodone-acetaminophen, ondansetron, sodium chloride 0.9%     Review of patient's allergies indicates:  No Known Allergies  Objective:     Vital Signs (Most Recent):  Temp: 97.4 °F (36.3 °C) (08/15/19 0557)  Pulse: 83 (08/15/19 0557)  Resp: 18 (08/15/19 0557)  BP: (!) 149/66 (08/15/19 0557)  SpO2: (!) 94 % (08/15/19 0557) Vital Signs (24h Range):  Temp:  [97 °F (36.1 " °C)-98.3 °F (36.8 °C)] 97.4 °F (36.3 °C)  Pulse:  [67-92] 83  Resp:  [18-20] 18  SpO2:  [94 %-97 %] 94 %  BP: (129-173)/(65-74) 149/66     Weight: 82 kg (180 lb 12.4 oz)  Body mass index is 30.08 kg/m².    Intake/Output - Last 3 Shifts       08/13 0700 - 08/14 0659 08/14 0700 - 08/15 0659 08/15 0700 - 08/16 0659    P.O. 0 640     I.V. (mL/kg) 1115 (13.6)      IV Piggyback 100      Total Intake(mL/kg) 1215 (14.8) 640 (7.8)     Urine (mL/kg/hr) 1140 (0.6) 300 (0.2)     Emesis/NG output  0     Other 80 30     Stool  0     Total Output 1220 330     Net -5 +310            Urine Occurrence  4 x     Stool Occurrence  0 x     Emesis Occurrence  0 x           Physical Exam   Constitutional: She is oriented to person, place, and time. She appears well-developed and well-nourished. No distress.   HENT:   Head: Normocephalic and atraumatic.   Cardiovascular: Normal rate and regular rhythm.   Pulmonary/Chest: Effort normal. No respiratory distress.   Abdominal:   Soft, NTND  IR drain - purulent drainage in bulb   Neurological: She is alert and oriented to person, place, and time.       Significant Labs:  CBC:   Recent Labs   Lab 08/15/19  0537   WBC 6.75   RBC 3.57*   HGB 10.2*   HCT 32.3*      MCV 91   MCH 28.6   MCHC 31.6*     CMP:   Recent Labs   Lab 08/15/19  0537   GLU 80   CALCIUM 9.0   ALBUMIN 2.3*   PROT 6.2   *   K 5.0   CO2 19*      BUN 9   CREATININE 1.0   ALKPHOS 76   ALT 12   AST 27   BILITOT 0.3     Assessment/Plan:     * Appendicitis with perforation  77yo female w/perforated appendicitis and abscess, s/p IR drain 8/13    - regular diet  - apprecaite IR assistance  - PO cipro/flagy - end date 8/24  - PRN pain/nausea meds  - planning for dc today      Hypomagnesemia  Replace     Hypokalemia  - replace with 30meq K tablets  - daily labs    Hyperlipidemia  Atorvastatin     Essential hypertension  -home amlodipine, hold off on losartan, PRN hydralazine for SBP>170        Agnieszka Lopez PA-C    [id number]  General Surgery  [facility]

## 2019-08-15 NOTE — PLAN OF CARE
08/15/19 0956   Final Note   Assessment Type Final Discharge Note   Anticipated Discharge Disposition Home   What phone number can be called within the next 1-3 days to see how you are doing after discharge?   ([phone])   Hospital Follow Up  Appt(s) scheduled? Yes   Discharge plans and expectations educations in teach back method with documentation complete? Yes   Right Care Referral Info   Post Acute Recommendation No Care

## 2019-08-15 NOTE — PLAN OF CARE
08/15/19 0816   Post-Acute Status   Post-Acute Authorization Other   Other Status No Post-Acute Service Needs   This SW in communication with  and medical team. SW will continue to follow for discharge needs and offer support as needed.    No SW needs identified at this time.    Essence Galarza LMSW  [facility]  [id number]

## 2019-08-15 NOTE — ASSESSMENT & PLAN NOTE
77yo female w/perforated appendicitis and abscess, s/p IR drain 8/13    - regular diet  - apprecaite IR assistance  - PO cipro/flagy - end date 8/24  - PRN pain/nausea meds  - planning for dc today

## 2019-08-19 ENCOUNTER — PATIENT OUTREACH (OUTPATIENT)
Dept: ADMINISTRATIVE | Facility: CLINIC | Age: 79
End: 2019-08-19

## 2019-08-19 ENCOUNTER — CLINICAL SUPPORT (OUTPATIENT)
Dept: CARDIOLOGY | Facility: HOSPITAL | Age: 79
End: 2019-08-19
Payer: MEDICARE

## 2019-08-19 PROCEDURE — 93298 CARDIAC DEVICE CHECK - REMOTE: ICD-10-PCS | Mod: ,,, | Performed by: INTERNAL MEDICINE

## 2019-08-19 PROCEDURE — 93298 REM INTERROG DEV EVAL SCRMS: CPT | Mod: ,,, | Performed by: INTERNAL MEDICINE

## 2019-08-19 PROCEDURE — 93299 CARDIAC DEVICE CHECK - REMOTE: CPT | Performed by: INTERNAL MEDICINE

## 2019-08-19 NOTE — TELEPHONE ENCOUNTER
Please forward this important TCC information to your provider in order to maximize the post discharge care delivery of this patient.    C3 nurse spoke with Crystal Alvarado ( daughter Shimon) for a TCC post hospital discharge follow up call. The patient does not have a scheduled HOSFU appointment with non [facility] PCP within 7-14 days post hospital discharge date 08/15/2019. C3 nurse was unable to schedule HOSFU appointment in [facility].  Please contact your PCP  and schedule follow up appointment using HOSFU visit type on or before 08/29/2019.    Respectfully,    Kiara Kendall LPN    [facility]    [email or web address]       Please do not reply to this message, as this inbox is not routinely monitored.

## 2019-08-19 NOTE — PROGRESS NOTES
C3 nurse attempted to contact patient. The following occurred:   C3 nurse attempted to contact Crystal Alvarado ( daughter) for a TCC post hospital discharge follow up call. The patient is unable to conduct the call @ this time. The patient requested a callback.    The patient does not have a scheduled HOSFU appointment within 7-14 days post hospital discharge date 08/15/2019. [facility]

## 2019-08-19 NOTE — PATIENT INSTRUCTIONS
After Laparoscopic Appendectomy (Appendix Removal)  You have had a surgery to remove your appendix. The appendix is a narrow pouch attached to the lower right part of your large intestine. During your surgery, the doctor made 2 to 4 small incisions. One was near your belly button, and the others were elsewhere on your abdomen. Through one incision, the doctor inserted a thin tube with a camera attached (laparoscope). Other surgery tools were used in the other incisions.  While you recover you may have pain in your shoulder and chest for up to 48 hours after surgery. This is common. It is caused by carbon dioxide gas used during the surgery. It will go away.   Home care  · Keep your incisions clean and dry.  · Don't pull off the thin strips of tape covering your incision. They should fall off on their own in a week or so.  · Wear loose-fitting clothes. This will help cause less irritation around your incisions.  · You can shower as usual. Gently wash around your incisions with soap and water. Dont take a bath until your incisions are fully healed.  · Dont drive until you have stopped taken prescription pain medicine.  · Dont lift anything heavier than 10 pounds until your healthcare provider says its OK.  · Limit sports and strenuous activities for 1 or 2 weeks.  · Resume light activities around your home as soon as you feel comfortable.  What to eat  Eat a bland, low-fat diet. This can include foods such as:  · Well-cooked soft cereals  · Mashed potatoes  · Plain toast or bread  · Plain crackers  · Plain pasta  · Rice  · Cottage cheese  · Pudding  · Low-fat yogurt  · Low-fat milk  · Ripe bananas  Drink 6 to 8 glasses of water a day, unless directed otherwise. If you are constipated, take a fiber laxative or a stool softener.  When to call your healthcare provider   Call your healthcare provider right away if you have any of the following:  · Swelling, pain, fluid, or redness in the incision that gets  worse  · Fever of 100.4°F (38°C) or higher, or as directed by your healthcare provider  · Belly (abdominal) pain that gets worse  · Severe diarrhea, bloating, or constipation  · Nausea or vomiting   Date Last Reviewed: 10/1/2016  © 0635-0007 [employer]. [address] [place] [address]. All rights reserved. This information is not intended as a substitute for professional medical care. Always follow your healthcare professional's instructions.

## 2019-08-21 ENCOUNTER — OFFICE VISIT (OUTPATIENT)
Dept: SURGERY | Facility: CLINIC | Age: 79
End: 2019-08-21
Payer: MEDICARE

## 2019-08-21 VITALS
HEART RATE: 76 BPM | TEMPERATURE: 98 F | HEIGHT: 65 IN | SYSTOLIC BLOOD PRESSURE: 116 MMHG | BODY MASS INDEX: 31.49 KG/M2 | WEIGHT: 189 LBS | DIASTOLIC BLOOD PRESSURE: 58 MMHG

## 2019-08-21 DIAGNOSIS — K37 APPENDICITIS, UNSPECIFIED APPENDICITIS TYPE: ICD-10-CM

## 2019-08-21 DIAGNOSIS — K35.32 PERFORATED APPENDICITIS: Primary | ICD-10-CM

## 2019-08-21 DIAGNOSIS — K35.32 PERFORATED APPENDIX: ICD-10-CM

## 2019-08-21 PROCEDURE — 99213 PR OFFICE/OUTPT VISIT, EST, LEVL III, 20-29 MIN: ICD-10-PCS | Mod: S$GLB,,, | Performed by: PHYSICIAN ASSISTANT

## 2019-08-21 PROCEDURE — 3288F FALL RISK ASSESSMENT DOCD: CPT | Mod: CPTII,S$GLB,, | Performed by: PHYSICIAN ASSISTANT

## 2019-08-21 PROCEDURE — 1100F PR PT FALLS ASSESS DOC 2+ FALLS/FALL W/INJURY/YR: ICD-10-PCS | Mod: CPTII,S$GLB,, | Performed by: PHYSICIAN ASSISTANT

## 2019-08-21 PROCEDURE — 3078F PR MOST RECENT DIASTOLIC BLOOD PRESSURE < 80 MM HG: ICD-10-PCS | Mod: CPTII,S$GLB,, | Performed by: PHYSICIAN ASSISTANT

## 2019-08-21 PROCEDURE — 99999 PR PBB SHADOW E&M-EST. PATIENT-LVL IV: CPT | Mod: PBBFAC,,, | Performed by: PHYSICIAN ASSISTANT

## 2019-08-21 PROCEDURE — 3074F SYST BP LT 130 MM HG: CPT | Mod: CPTII,S$GLB,, | Performed by: PHYSICIAN ASSISTANT

## 2019-08-21 PROCEDURE — 3078F DIAST BP <80 MM HG: CPT | Mod: CPTII,S$GLB,, | Performed by: PHYSICIAN ASSISTANT

## 2019-08-21 PROCEDURE — 1100F PTFALLS ASSESS-DOCD GE2>/YR: CPT | Mod: CPTII,S$GLB,, | Performed by: PHYSICIAN ASSISTANT

## 2019-08-21 PROCEDURE — 3074F PR MOST RECENT SYSTOLIC BLOOD PRESSURE < 130 MM HG: ICD-10-PCS | Mod: CPTII,S$GLB,, | Performed by: PHYSICIAN ASSISTANT

## 2019-08-21 PROCEDURE — 99999 PR PBB SHADOW E&M-EST. PATIENT-LVL IV: ICD-10-PCS | Mod: PBBFAC,,, | Performed by: PHYSICIAN ASSISTANT

## 2019-08-21 PROCEDURE — 99213 OFFICE O/P EST LOW 20 MIN: CPT | Mod: S$GLB,,, | Performed by: PHYSICIAN ASSISTANT

## 2019-08-21 PROCEDURE — 3288F PR FALLS RISK ASSESSMENT DOCUMENTED: ICD-10-PCS | Mod: CPTII,S$GLB,, | Performed by: PHYSICIAN ASSISTANT

## 2019-08-21 NOTE — PROGRESS NOTES
"Subjective:       Patient ID: Crystal Alvarado is a 78 y.o. female.    Chief Complaint: Follow-up    Crystal Alvarado is a 77yo female who presented to [facility] with perforated appendicitis. IR was consulted and drain was placed. She was discharged in good condition.   The patient and family have not been keeping a record of how much her drain has been putting out a day. They empty it multiple times a day, and state the quantity is "not a lot". She states she is occasionally nauseated. She denies vomiting, constipation or diarrhea.     Review of Systems   Constitutional: Negative for chills and fever.   HENT: Negative for congestion and rhinorrhea.    Eyes: Negative for photophobia and visual disturbance.   Respiratory: Negative for cough and shortness of breath.    Cardiovascular: Negative for chest pain and palpitations.   Gastrointestinal: Negative for abdominal pain, nausea and vomiting.   Endocrine: Negative for cold intolerance and heat intolerance.   Musculoskeletal: Negative for arthralgias and myalgias.   Skin: Negative for rash and wound.   Allergic/Immunologic: Negative for immunocompromised state.   Neurological: Negative for tremors and weakness.   Hematological: Negative for adenopathy.   Psychiatric/Behavioral: Negative for agitation.       Objective:      Physical Exam   Constitutional: She is oriented to person, place, and time. She appears well-developed and well-nourished. No distress.   HENT:   Head: Normocephalic and atraumatic.   Eyes: EOM are normal. No scleral icterus.   Neck: Normal range of motion. Neck supple.   Cardiovascular: Normal rate and regular rhythm.   Pulmonary/Chest: Effort normal. No respiratory distress.   Abdominal:   Soft, NTND  IR drain in place - clean, dry and intact   Musculoskeletal: Normal range of motion. She exhibits no edema or tenderness.   Neurological: She is oriented to person, place, and time.   Skin: Skin is warm and dry.   Psychiatric: She has a normal mood and " affect.       Assessment:   77yo female s/p perforated appendicitis, s/p IR drain placement   Plan:   Removed IR drain in clinic, patient tolerated it well  Complete course of antibiotics  Will follow up in one month with repeat CT for surgical preparation for interval appendectomy

## 2019-09-18 ENCOUNTER — CLINICAL SUPPORT (OUTPATIENT)
Dept: CARDIOLOGY | Facility: HOSPITAL | Age: 79
End: 2019-09-18
Payer: MEDICARE

## 2019-09-18 PROCEDURE — 93298 CARDIAC DEVICE CHECK - REMOTE: ICD-10-PCS | Mod: ,,, | Performed by: INTERNAL MEDICINE

## 2019-09-18 PROCEDURE — 93299 CARDIAC DEVICE CHECK - REMOTE: CPT | Performed by: INTERNAL MEDICINE

## 2019-09-18 PROCEDURE — 93298 REM INTERROG DEV EVAL SCRMS: CPT | Mod: ,,, | Performed by: INTERNAL MEDICINE

## 2019-10-18 ENCOUNTER — CLINICAL SUPPORT (OUTPATIENT)
Dept: CARDIOLOGY | Facility: HOSPITAL | Age: 79
End: 2019-10-18
Attending: INTERNAL MEDICINE
Payer: MEDICARE

## 2019-10-18 DIAGNOSIS — R55 SYNCOPE AND COLLAPSE: ICD-10-CM

## 2019-10-18 DIAGNOSIS — Z95.818 STATUS POST PLACEMENT OF IMPLANTABLE LOOP RECORDER: ICD-10-CM

## 2019-10-18 PROCEDURE — 93298 REM INTERROG DEV EVAL SCRMS: CPT | Mod: ,,, | Performed by: INTERNAL MEDICINE

## 2019-10-18 PROCEDURE — 93298 CARDIAC DEVICE CHECK - REMOTE: ICD-10-PCS | Mod: ,,, | Performed by: INTERNAL MEDICINE

## 2019-10-18 PROCEDURE — 93299 CARDIAC DEVICE CHECK - REMOTE: CPT | Performed by: INTERNAL MEDICINE

## 2019-11-04 ENCOUNTER — OFFICE VISIT (OUTPATIENT)
Dept: CARDIOLOGY | Facility: CLINIC | Age: 79
End: 2019-11-04
Payer: MEDICARE

## 2019-11-04 VITALS
DIASTOLIC BLOOD PRESSURE: 60 MMHG | HEART RATE: 69 BPM | BODY MASS INDEX: 28.14 KG/M2 | HEIGHT: 65 IN | SYSTOLIC BLOOD PRESSURE: 139 MMHG | WEIGHT: 168.88 LBS | OXYGEN SATURATION: 100 %

## 2019-11-04 DIAGNOSIS — Z98.890 HISTORY OF RADIOFREQUENCY ABLATION (RFA) PROCEDURE FOR CARDIAC ARRHYTHMIA: Primary | ICD-10-CM

## 2019-11-04 DIAGNOSIS — E78.00 PURE HYPERCHOLESTEROLEMIA: ICD-10-CM

## 2019-11-04 DIAGNOSIS — I10 ESSENTIAL HYPERTENSION: ICD-10-CM

## 2019-11-04 DIAGNOSIS — R55 SYNCOPE, UNSPECIFIED SYNCOPE TYPE: ICD-10-CM

## 2019-11-04 DIAGNOSIS — I49.5 SSS (SICK SINUS SYNDROME): ICD-10-CM

## 2019-11-04 DIAGNOSIS — I25.10 CORONARY ARTERY DISEASE INVOLVING NATIVE CORONARY ARTERY OF NATIVE HEART WITHOUT ANGINA PECTORIS: ICD-10-CM

## 2019-11-04 DIAGNOSIS — R55 PRE-SYNCOPE: ICD-10-CM

## 2019-11-04 DIAGNOSIS — R42 POSTURAL DIZZINESS WITH PRESYNCOPE: ICD-10-CM

## 2019-11-04 DIAGNOSIS — I47.19 PAT (PAROXYSMAL ATRIAL TACHYCARDIA): ICD-10-CM

## 2019-11-04 DIAGNOSIS — I47.10 SVT (SUPRAVENTRICULAR TACHYCARDIA): ICD-10-CM

## 2019-11-04 DIAGNOSIS — Z95.818 STATUS POST PLACEMENT OF IMPLANTABLE LOOP RECORDER: ICD-10-CM

## 2019-11-04 DIAGNOSIS — I35.0 AORTIC VALVE STENOSIS, ETIOLOGY OF CARDIAC VALVE DISEASE UNSPECIFIED: ICD-10-CM

## 2019-11-04 DIAGNOSIS — R55 POSTURAL DIZZINESS WITH PRESYNCOPE: ICD-10-CM

## 2019-11-04 PROCEDURE — 3075F SYST BP GE 130 - 139MM HG: CPT | Mod: CPTII,S$GLB,, | Performed by: INTERNAL MEDICINE

## 2019-11-04 PROCEDURE — 93000 ELECTROCARDIOGRAM COMPLETE: CPT | Mod: S$GLB,,, | Performed by: INTERNAL MEDICINE

## 2019-11-04 PROCEDURE — 93000 EKG 12-LEAD: ICD-10-PCS | Mod: S$GLB,,, | Performed by: INTERNAL MEDICINE

## 2019-11-04 PROCEDURE — 99999 PR PBB SHADOW E&M-EST. PATIENT-LVL III: CPT | Mod: PBBFAC,,, | Performed by: INTERNAL MEDICINE

## 2019-11-04 PROCEDURE — 99214 PR OFFICE/OUTPT VISIT, EST, LEVL IV, 30-39 MIN: ICD-10-PCS | Mod: 25,S$GLB,, | Performed by: INTERNAL MEDICINE

## 2019-11-04 PROCEDURE — 99214 OFFICE O/P EST MOD 30 MIN: CPT | Mod: 25,S$GLB,, | Performed by: INTERNAL MEDICINE

## 2019-11-04 PROCEDURE — 3078F PR MOST RECENT DIASTOLIC BLOOD PRESSURE < 80 MM HG: ICD-10-PCS | Mod: CPTII,S$GLB,, | Performed by: INTERNAL MEDICINE

## 2019-11-04 PROCEDURE — 1101F PT FALLS ASSESS-DOCD LE1/YR: CPT | Mod: CPTII,S$GLB,, | Performed by: INTERNAL MEDICINE

## 2019-11-04 PROCEDURE — 1101F PR PT FALLS ASSESS DOC 0-1 FALLS W/OUT INJ PAST YR: ICD-10-PCS | Mod: CPTII,S$GLB,, | Performed by: INTERNAL MEDICINE

## 2019-11-04 PROCEDURE — 99999 PR PBB SHADOW E&M-EST. PATIENT-LVL III: ICD-10-PCS | Mod: PBBFAC,,, | Performed by: INTERNAL MEDICINE

## 2019-11-04 PROCEDURE — 3078F DIAST BP <80 MM HG: CPT | Mod: CPTII,S$GLB,, | Performed by: INTERNAL MEDICINE

## 2019-11-04 PROCEDURE — 3075F PR MOST RECENT SYSTOLIC BLOOD PRESS GE 130-139MM HG: ICD-10-PCS | Mod: CPTII,S$GLB,, | Performed by: INTERNAL MEDICINE

## 2019-11-04 RX ORDER — AMLODIPINE BESYLATE 5 MG/1
5 TABLET ORAL DAILY
Qty: 90 TABLET | Refills: 3 | Status: ON HOLD | OUTPATIENT
Start: 2019-11-04 | End: 2020-08-07 | Stop reason: SDUPTHER

## 2019-11-04 RX ORDER — FLUTICASONE FUROATE AND VILANTEROL TRIFENATATE 100; 25 UG/1; UG/1
POWDER RESPIRATORY (INHALATION)
Refills: 3 | COMMUNITY
Start: 2019-10-29 | End: 2023-01-12

## 2019-11-04 NOTE — PROGRESS NOTES
Subjective:      Patient ID: Crystal Alvarado is a 79 y.o. female.    Chief Complaint: Follow-up    HPI:  Pt stood up from the chair a few months ago and fell and broke her arm.  Pt thinks she may have blacked out for a second.    Pt still rarely feels woozy and dizzy upon arising.  No fall since arm fracture.      Review of Systems   Cardiovascular: Positive for syncope. Negative for chest pain, claudication, dyspnea on exertion, irregular heartbeat, leg swelling, near-syncope, orthopnea and palpitations.      Pt walks around the house without difficulty.  Often uses a walker.  Past Medical History:   Diagnosis Date    Arthritis     CHF (congestive heart failure)     Coronary artery disease     Hyperlipidemia     Hypertension         Past Surgical History:   Procedure Laterality Date    A-V CARDIAC PACEMAKER INSERTION N/A 7/9/2018    Procedure: INSERTION, CARDIAC PACEMAKER, DUAL CHAMBER;  Surgeon: Archie Montez MD;  Location: [facility] CATH LAB;  Service: Cardiology;  Laterality: N/A;  SVT, RFA, +/- Dual PPM or ILR, IGLESIA, MDT, MAC, NC, 3 Prep    HYSTERECTOMY N/A     INSERTION OF IMPLANTABLE LOOP RECORDER N/A 7/9/2018    Procedure: PLACEMENT-LOOP RECORDER;  Surgeon: Archie Montez MD;  Location: [facility] CATH LAB;  Service: Cardiology;  Laterality: N/A;  SVT, RFA, +/- Dual PPM or ILR, IGLESIA, MDT, MAC, NC, 3 Prep       No family history on file.    Social History     Socioeconomic History    Marital status: Single     Spouse name: Not on file    Number of children: Not on file    Years of education: Not on file    Highest education level: Not on file   Occupational History    Not on file   Social Needs    Financial resource strain: Not on file    Food insecurity:     Worry: Not on file     Inability: Not on file    Transportation needs:     Medical: Not on file     Non-medical: Not on file   Tobacco Use    Smoking status: Former Smoker     Packs/day: 0.50     Last attempt to quit: 4/26/2013     Years since  quittin.5    Smokeless tobacco: Never Used   Substance and Sexual Activity    Alcohol use: No    Drug use: No    Sexual activity: Never   Lifestyle    Physical activity:     Days per week: Not on file     Minutes per session: Not on file    Stress: Not on file   Relationships    Social connections:     Talks on phone: Not on file     Gets together: Not on file     Attends [employer] service: Not on file     Active member of club or organization: Not on file     Attends meetings of clubs or organizations: Not on file     Relationship status: Not on file   Other Topics Concern    Not on file   Social History Narrative    Not on file       Current Outpatient Medications on File Prior to Visit   Medication Sig Dispense Refill    aspirin 81 MG Chew Take 81 mg by mouth once daily.  4    atorvastatin (LIPITOR) 20 MG tablet Take 20 mg by mouth every evening.  4    azelastine (ASTELIN) 137 mcg (0.1 %) nasal spray       BREO ELLIPTA 100-25 mcg/dose diskus inhaler INHALE 1 PUFF BY MOUTH ONCE A DAY  3    docusate sodium (COLACE) 100 MG capsule Take 1 capsule (100 mg total) by mouth 2 (two) times daily. 60 capsule 0    fluticasone propionate (FLONASE) 50 mcg/actuation nasal spray       losartan (COZAAR) 100 MG tablet TAKE 1 TABLET (100 MG TOTAL) BY MOUTH ONCE DAILY. 90 tablet 3    nitroGLYCERIN (NITROSTAT) 0.4 MG SL tablet Place 1 tablet (0.4 mg total) under the tongue every 5 (five) minutes as needed for Chest pain (and notify MD). 25 tablet 5    traMADol (ULTRAM) 50 mg tablet Take 1 tablet (50 mg total) by mouth every 6 (six) hours as needed for Pain. 20 tablet 0    [DISCONTINUED] amlodipine (NORVASC) 10 MG tablet Take 10 mg by mouth once daily.      ergocalciferol (ERGOCALCIFEROL) 50,000 unit Cap Take 50,000 Units by mouth every 7 days.       No current facility-administered medications on file prior to visit.        Review of patient's allergies indicates:  No Known Allergies  Objective:     Vitals:     "11/04/19 0914 11/04/19 0935   BP: (!) 152/74 139/60   BP Location: Right arm Left arm   Patient Position: Sitting Sitting   BP Method: Large (Automatic)    Pulse: 69    SpO2: 100%    Weight: 76.6 kg (168 lb 14 oz)    Height: 5' 5" (1.651 m)         Physical Exam   Constitutional: She is oriented to person, place, and time. She appears well-developed and well-nourished.   Eyes: No scleral icterus.   Neck: No JVD present. Carotid bruit is not present.   Cardiovascular: Normal rate and regular rhythm. Exam reveals no gallop.   Murmur (III/VI systolic) heard.  Pulmonary/Chest: Breath sounds normal.   Musculoskeletal: She exhibits no edema.   Neurological: She is alert and oriented to person, place, and time.   Skin: Skin is warm and dry.   Psychiatric: She has a normal mood and affect. Her behavior is normal. Judgment and thought content normal.   Vitals reviewed.     Note echocardiogram last year showed LVH, abnormal LV relaxation, normal LVEF, and mild aortic stenosis    Loop recorder remote interrogations show no SVT (following slow pathway modification radiofrequency ablation) and no severe bradycardia (some undersensing artifact noted)    ECG today: NSR, slightly low T waves.    Assessment:     1. History of radiofrequency ablation (RFA) procedure for cardiac arrhythmia    2. Coronary artery disease involving native coronary artery of native heart without angina pectoris    3. Pre-syncope    4. Postural dizziness with presyncope    5. SVT (supraventricular tachycardia)    6. Status post placement of implantable loop recorder    7. SSS (sick sinus syndrome)    8. Pure hypercholesterolemia    9. Essential hypertension    10. PAT (paroxysmal atrial tachycardia)    11. Syncope, unspecified syncope type    12. Aortic valve stenosis, etiology of cardiac valve disease unspecified      Plan:   Crystal was seen today for follow-up.    Diagnoses and all orders for this visit:    History of radiofrequency ablation (RFA) " procedure for cardiac arrhythmia  -     IN OFFICE EKG 12-LEAD (to [place])    Coronary artery disease involving native coronary artery of native heart without angina pectoris  -     IN OFFICE EKG 12-LEAD (to [place])    Pre-syncope    Postural dizziness with presyncope    SVT (supraventricular tachycardia)    Status post placement of implantable loop recorder    SSS (sick sinus syndrome)    Pure hypercholesterolemia    Essential hypertension  -     IN OFFICE EKG 12-LEAD (to [facility])    PAT (paroxysmal atrial tachycardia)  -     IN OFFICE EKG 12-LEAD (to [facility])    Syncope, unspecified syncope type    Aortic valve stenosis, etiology of cardiac valve disease unspecified    Other orders  -     amLODIPine (NORVASC) 5 MG tablet; Take 1 tablet (5 mg total) by mouth once daily.    Note that loop recorder does not demonstrate a dysrhythmia to account for fall.   Although orthostatic hypotension may be contributing to syncope along with deconditioning, it is problematic to reduce the dose of amlodipine since blood pressure is already at the upper limits of acceptable.   Nevertheless, hunter pt is still having episodes of weakness upon arising almost weekly and since a fall 3 months ago resulted in an arm fracture will reduce the amlodipine to 5 mg daily.      Rest of meds the same    Discussed with pt and daughter at length and in detail    Follow up in about 3 months (around 2/4/2020).     Will likely order another echocardiogram after next visit    F/u with Dr Goff

## 2019-11-17 ENCOUNTER — CLINICAL SUPPORT (OUTPATIENT)
Dept: CARDIOLOGY | Facility: HOSPITAL | Age: 79
End: 2019-11-17
Attending: INTERNAL MEDICINE
Payer: MEDICARE

## 2019-11-17 DIAGNOSIS — R55 SYNCOPE AND COLLAPSE: ICD-10-CM

## 2019-11-17 DIAGNOSIS — Z95.818 STATUS POST PLACEMENT OF IMPLANTABLE LOOP RECORDER: ICD-10-CM

## 2019-11-17 PROCEDURE — 93298 REM INTERROG DEV EVAL SCRMS: CPT | Mod: ,,, | Performed by: INTERNAL MEDICINE

## 2019-11-17 PROCEDURE — 93299 CARDIAC DEVICE CHECK - REMOTE: CPT | Performed by: INTERNAL MEDICINE

## 2019-11-17 PROCEDURE — 93298 CARDIAC DEVICE CHECK - REMOTE: ICD-10-PCS | Mod: ,,, | Performed by: INTERNAL MEDICINE

## 2019-12-17 ENCOUNTER — CLINICAL SUPPORT (OUTPATIENT)
Dept: CARDIOLOGY | Facility: HOSPITAL | Age: 79
End: 2019-12-17
Attending: INTERNAL MEDICINE
Payer: MEDICARE

## 2019-12-17 DIAGNOSIS — Z95.818 STATUS POST PLACEMENT OF IMPLANTABLE LOOP RECORDER: ICD-10-CM

## 2019-12-17 DIAGNOSIS — R55 SYNCOPE AND COLLAPSE: ICD-10-CM

## 2019-12-17 PROCEDURE — 93298 CARDIAC DEVICE CHECK - REMOTE: ICD-10-PCS | Mod: ,,, | Performed by: INTERNAL MEDICINE

## 2019-12-17 PROCEDURE — 93299 CARDIAC DEVICE CHECK - REMOTE: CPT | Performed by: INTERNAL MEDICINE

## 2019-12-17 PROCEDURE — 93298 REM INTERROG DEV EVAL SCRMS: CPT | Mod: ,,, | Performed by: INTERNAL MEDICINE

## 2020-01-16 ENCOUNTER — CLINICAL SUPPORT (OUTPATIENT)
Dept: CARDIOLOGY | Facility: HOSPITAL | Age: 80
End: 2020-01-16
Attending: INTERNAL MEDICINE
Payer: MEDICARE

## 2020-01-16 DIAGNOSIS — Z95.818 STATUS POST PLACEMENT OF IMPLANTABLE LOOP RECORDER: ICD-10-CM

## 2020-01-16 DIAGNOSIS — R55 SYNCOPE AND COLLAPSE: ICD-10-CM

## 2020-01-16 PROCEDURE — 93298 REM INTERROG DEV EVAL SCRMS: CPT | Mod: ,,, | Performed by: INTERNAL MEDICINE

## 2020-01-16 PROCEDURE — 93298 CARDIAC DEVICE CHECK - REMOTE: ICD-10-PCS | Mod: ,,, | Performed by: INTERNAL MEDICINE

## 2020-02-10 ENCOUNTER — LAB VISIT (OUTPATIENT)
Dept: LAB | Facility: HOSPITAL | Age: 80
End: 2020-02-10
Attending: INTERNAL MEDICINE
Payer: MEDICARE

## 2020-02-10 ENCOUNTER — OFFICE VISIT (OUTPATIENT)
Dept: CARDIOLOGY | Facility: CLINIC | Age: 80
End: 2020-02-10
Payer: MEDICARE

## 2020-02-10 VITALS
HEART RATE: 64 BPM | DIASTOLIC BLOOD PRESSURE: 60 MMHG | OXYGEN SATURATION: 99 % | WEIGHT: 173.06 LBS | SYSTOLIC BLOOD PRESSURE: 128 MMHG | HEIGHT: 65 IN | BODY MASS INDEX: 28.83 KG/M2

## 2020-02-10 DIAGNOSIS — I10 ESSENTIAL HYPERTENSION: ICD-10-CM

## 2020-02-10 DIAGNOSIS — Z98.890 HISTORY OF RADIOFREQUENCY ABLATION (RFA) PROCEDURE FOR CARDIAC ARRHYTHMIA: ICD-10-CM

## 2020-02-10 DIAGNOSIS — D64.9 ANEMIA, UNSPECIFIED TYPE: ICD-10-CM

## 2020-02-10 DIAGNOSIS — Z95.818 STATUS POST PLACEMENT OF IMPLANTABLE LOOP RECORDER: ICD-10-CM

## 2020-02-10 DIAGNOSIS — E78.00 PURE HYPERCHOLESTEROLEMIA: ICD-10-CM

## 2020-02-10 DIAGNOSIS — I47.10 SVT (SUPRAVENTRICULAR TACHYCARDIA): Primary | ICD-10-CM

## 2020-02-10 DIAGNOSIS — I47.10 SVT (SUPRAVENTRICULAR TACHYCARDIA): ICD-10-CM

## 2020-02-10 DIAGNOSIS — R55 SYNCOPE, UNSPECIFIED SYNCOPE TYPE: ICD-10-CM

## 2020-02-10 DIAGNOSIS — I25.10 CORONARY ARTERY DISEASE INVOLVING NATIVE CORONARY ARTERY OF NATIVE HEART WITHOUT ANGINA PECTORIS: ICD-10-CM

## 2020-02-10 DIAGNOSIS — I47.10 PAROXYSMAL SVT (SUPRAVENTRICULAR TACHYCARDIA): ICD-10-CM

## 2020-02-10 DIAGNOSIS — I95.1 ORTHOSTATIC HYPOTENSION: ICD-10-CM

## 2020-02-10 DIAGNOSIS — I49.5 SSS (SICK SINUS SYNDROME): ICD-10-CM

## 2020-02-10 LAB
ALBUMIN SERPL BCP-MCNC: 3.7 G/DL (ref 3.5–5.2)
ALP SERPL-CCNC: 80 U/L (ref 55–135)
ALT SERPL W/O P-5'-P-CCNC: 6 U/L (ref 10–44)
ANION GAP SERPL CALC-SCNC: 9 MMOL/L (ref 8–16)
AST SERPL-CCNC: 10 U/L (ref 10–40)
BASOPHILS # BLD AUTO: 0.03 K/UL (ref 0–0.2)
BASOPHILS NFR BLD: 0.7 % (ref 0–1.9)
BILIRUB SERPL-MCNC: 0.5 MG/DL (ref 0.1–1)
BUN SERPL-MCNC: 23 MG/DL (ref 8–23)
CALCIUM SERPL-MCNC: 9.6 MG/DL (ref 8.7–10.5)
CHLORIDE SERPL-SCNC: 108 MMOL/L (ref 95–110)
CHOLEST SERPL-MCNC: 147 MG/DL (ref 120–199)
CHOLEST/HDLC SERPL: 2.8 {RATIO} (ref 2–5)
CO2 SERPL-SCNC: 25 MMOL/L (ref 23–29)
CREAT SERPL-MCNC: 1.2 MG/DL (ref 0.5–1.4)
DIFFERENTIAL METHOD: ABNORMAL
EOSINOPHIL # BLD AUTO: 0.1 K/UL (ref 0–0.5)
EOSINOPHIL NFR BLD: 1.8 % (ref 0–8)
ERYTHROCYTE [DISTWIDTH] IN BLOOD BY AUTOMATED COUNT: 13.6 % (ref 11.5–14.5)
EST. GFR  (AFRICAN AMERICAN): 49 ML/MIN/1.73 M^2
EST. GFR  (NON AFRICAN AMERICAN): 42 ML/MIN/1.73 M^2
FERRITIN SERPL-MCNC: 30 NG/ML (ref 20–300)
GLUCOSE SERPL-MCNC: 91 MG/DL (ref 70–110)
HCT VFR BLD AUTO: 37.7 % (ref 37–48.5)
HDLC SERPL-MCNC: 53 MG/DL (ref 40–75)
HDLC SERPL: 36.1 % (ref 20–50)
HGB BLD-MCNC: 11.4 G/DL (ref 12–16)
IMM GRANULOCYTES # BLD AUTO: 0 K/UL (ref 0–0.04)
IMM GRANULOCYTES NFR BLD AUTO: 0 % (ref 0–0.5)
IRON SERPL-MCNC: 63 UG/DL (ref 30–160)
LDLC SERPL CALC-MCNC: 80.8 MG/DL (ref 63–159)
LYMPHOCYTES # BLD AUTO: 0.9 K/UL (ref 1–4.8)
LYMPHOCYTES NFR BLD: 19.8 % (ref 18–48)
MCH RBC QN AUTO: 28.6 PG (ref 27–31)
MCHC RBC AUTO-ENTMCNC: 30.2 G/DL (ref 32–36)
MCV RBC AUTO: 95 FL (ref 82–98)
MONOCYTES # BLD AUTO: 0.3 K/UL (ref 0.3–1)
MONOCYTES NFR BLD: 6.1 % (ref 4–15)
NEUTROPHILS # BLD AUTO: 3.2 K/UL (ref 1.8–7.7)
NEUTROPHILS NFR BLD: 71.6 % (ref 38–73)
NONHDLC SERPL-MCNC: 94 MG/DL
NRBC BLD-RTO: 0 /100 WBC
PLATELET # BLD AUTO: 228 K/UL (ref 150–350)
PMV BLD AUTO: 10.9 FL (ref 9.2–12.9)
POTASSIUM SERPL-SCNC: 4 MMOL/L (ref 3.5–5.1)
PROT SERPL-MCNC: 7.1 G/DL (ref 6–8.4)
RBC # BLD AUTO: 3.98 M/UL (ref 4–5.4)
SATURATED IRON: 20 % (ref 20–50)
SODIUM SERPL-SCNC: 142 MMOL/L (ref 136–145)
TOTAL IRON BINDING CAPACITY: 321 UG/DL (ref 250–450)
TRANSFERRIN SERPL-MCNC: 217 MG/DL (ref 200–375)
TRIGL SERPL-MCNC: 66 MG/DL (ref 30–150)
TSH SERPL DL<=0.005 MIU/L-ACNC: 1.05 UIU/ML (ref 0.4–4)
WBC # BLD AUTO: 4.4 K/UL (ref 3.9–12.7)

## 2020-02-10 PROCEDURE — 1101F PT FALLS ASSESS-DOCD LE1/YR: CPT | Mod: CPTII,S$GLB,, | Performed by: INTERNAL MEDICINE

## 2020-02-10 PROCEDURE — 99999 PR PBB SHADOW E&M-EST. PATIENT-LVL III: CPT | Mod: PBBFAC,,, | Performed by: INTERNAL MEDICINE

## 2020-02-10 PROCEDURE — 93000 ELECTROCARDIOGRAM COMPLETE: CPT | Mod: S$GLB,,, | Performed by: INTERNAL MEDICINE

## 2020-02-10 PROCEDURE — 3078F DIAST BP <80 MM HG: CPT | Mod: CPTII,S$GLB,, | Performed by: INTERNAL MEDICINE

## 2020-02-10 PROCEDURE — 82728 ASSAY OF FERRITIN: CPT

## 2020-02-10 PROCEDURE — 80053 COMPREHEN METABOLIC PANEL: CPT

## 2020-02-10 PROCEDURE — 83540 ASSAY OF IRON: CPT

## 2020-02-10 PROCEDURE — 1101F PR PT FALLS ASSESS DOC 0-1 FALLS W/OUT INJ PAST YR: ICD-10-PCS | Mod: CPTII,S$GLB,, | Performed by: INTERNAL MEDICINE

## 2020-02-10 PROCEDURE — 3074F SYST BP LT 130 MM HG: CPT | Mod: CPTII,S$GLB,, | Performed by: INTERNAL MEDICINE

## 2020-02-10 PROCEDURE — 99214 OFFICE O/P EST MOD 30 MIN: CPT | Mod: 25,S$GLB,, | Performed by: INTERNAL MEDICINE

## 2020-02-10 PROCEDURE — 36415 COLL VENOUS BLD VENIPUNCTURE: CPT

## 2020-02-10 PROCEDURE — 84443 ASSAY THYROID STIM HORMONE: CPT

## 2020-02-10 PROCEDURE — 3074F PR MOST RECENT SYSTOLIC BLOOD PRESSURE < 130 MM HG: ICD-10-PCS | Mod: CPTII,S$GLB,, | Performed by: INTERNAL MEDICINE

## 2020-02-10 PROCEDURE — 93000 EKG 12-LEAD: ICD-10-PCS | Mod: S$GLB,,, | Performed by: INTERNAL MEDICINE

## 2020-02-10 PROCEDURE — 85025 COMPLETE CBC W/AUTO DIFF WBC: CPT

## 2020-02-10 PROCEDURE — 99214 PR OFFICE/OUTPT VISIT, EST, LEVL IV, 30-39 MIN: ICD-10-PCS | Mod: 25,S$GLB,, | Performed by: INTERNAL MEDICINE

## 2020-02-10 PROCEDURE — 1159F PR MEDICATION LIST DOCUMENTED IN MEDICAL RECORD: ICD-10-PCS | Mod: S$GLB,,, | Performed by: INTERNAL MEDICINE

## 2020-02-10 PROCEDURE — 99999 PR PBB SHADOW E&M-EST. PATIENT-LVL III: ICD-10-PCS | Mod: PBBFAC,,, | Performed by: INTERNAL MEDICINE

## 2020-02-10 PROCEDURE — 3078F PR MOST RECENT DIASTOLIC BLOOD PRESSURE < 80 MM HG: ICD-10-PCS | Mod: CPTII,S$GLB,, | Performed by: INTERNAL MEDICINE

## 2020-02-10 PROCEDURE — 80061 LIPID PANEL: CPT

## 2020-02-10 PROCEDURE — 1159F MED LIST DOCD IN RCRD: CPT | Mod: S$GLB,,, | Performed by: INTERNAL MEDICINE

## 2020-02-10 RX ORDER — HYDROCODONE BITARTRATE AND ACETAMINOPHEN 5; 325 MG/1; MG/1
1 TABLET ORAL 2 TIMES DAILY PRN
Status: ON HOLD | COMMUNITY
Start: 2019-11-04 | End: 2020-08-07 | Stop reason: SDUPTHER

## 2020-02-10 RX ORDER — PREGABALIN 25 MG/1
CAPSULE ORAL
Status: ON HOLD | COMMUNITY
Start: 2019-12-18 | End: 2020-08-07 | Stop reason: SDUPTHER

## 2020-02-10 NOTE — PROGRESS NOTES
Subjective:      Patient ID: Crystal Alvarado is a 79 y.o. female.    Chief Complaint: Follow-up    HPI:  No fainting since reducing the dose of amlodipine to 5 mg.    Ambulatory in the house    Review of Systems   Cardiovascular: Negative for chest pain, claudication, dyspnea on exertion, irregular heartbeat, leg swelling, near-syncope, orthopnea, palpitations and syncope.        Past Medical History:   Diagnosis Date    Arthritis     CHF (congestive heart failure)     Coronary artery disease     Hyperlipidemia     Hypertension         Past Surgical History:   Procedure Laterality Date    A-V CARDIAC PACEMAKER INSERTION N/A 2018    Procedure: INSERTION, CARDIAC PACEMAKER, DUAL CHAMBER;  Surgeon: Archie Montez MD;  Location: [facility] CATH LAB;  Service: Cardiology;  Laterality: N/A;  SVT, RFA, +/- Dual PPM or ILIGLESIA HOLLEY MDT, MAC, [place], 3 Prep    HYSTERECTOMY N/A     INSERTION OF IMPLANTABLE LOOP RECORDER N/A 2018    Procedure: PLACEMENT-LOOP RECORDER;  Surgeon: Archie Montez MD;  Location: [facility] CATH LAB;  Service: Cardiology;  Laterality: N/A;  SVT, RFA, +/- Dual PPM or IGLESIA CROCKER MDT, [place], [place], 3 Prep       No family history on file.    Social History     Socioeconomic History    Marital status: Single     Spouse name: Not on file    Number of children: Not on file    Years of education: Not on file    Highest education level: Not on file   Occupational History    Not on file   Social Needs    Financial resource strain: Not on file    Food insecurity:     Worry: Not on file     Inability: Not on file    Transportation needs:     Medical: Not on file     Non-medical: Not on file   Tobacco Use    Smoking status: Former Smoker     Packs/day: 0.50     Last attempt to quit: 2013     Years since quittin.7    Smokeless tobacco: Never Used   Substance and Sexual Activity    Alcohol use: No    Drug use: No    Sexual activity: Never   Lifestyle    Physical activity:     Days per week: Not on  file     Minutes per session: Not on file    Stress: Not on file   Relationships    Social connections:     Talks on phone: Not on file     Gets together: Not on file     Attends Spiritism service: Not on file     Active member of club or organization: Not on file     Attends meetings of clubs or organizations: Not on file     Relationship status: Not on file   Other Topics Concern    Not on file   Social History Narrative    Not on file       Current Outpatient Medications on File Prior to Visit   Medication Sig Dispense Refill    amLODIPine (NORVASC) 5 MG tablet Take 1 tablet (5 mg total) by mouth once daily. 90 tablet 3    aspirin 81 MG Chew Take 81 mg by mouth once daily.  4    atorvastatin (LIPITOR) 20 MG tablet Take 20 mg by mouth every evening.  4    azelastine (ASTELIN) 137 mcg (0.1 %) nasal spray       BREO ELLIPTA 100-25 mcg/dose diskus inhaler INHALE 1 PUFF BY MOUTH ONCE A DAY  3    docusate sodium (COLACE) 100 MG capsule Take 1 capsule (100 mg total) by mouth 2 (two) times daily. 60 capsule 0    ergocalciferol (ERGOCALCIFEROL) 50,000 unit Cap Take 50,000 Units by mouth every 7 days.      fluticasone propionate (FLONASE) 50 mcg/actuation nasal spray       HYDROcodone-acetaminophen (NORCO) 5-325 mg per tablet Take 1 tablet by mouth 2 (two) times daily as needed.      losartan (COZAAR) 100 MG tablet TAKE 1 TABLET (100 MG TOTAL) BY MOUTH ONCE DAILY. 90 tablet 3    nitroGLYCERIN (NITROSTAT) 0.4 MG SL tablet Place 1 tablet (0.4 mg total) under the tongue every 5 (five) minutes as needed for Chest pain (and notify MD). 25 tablet 5    pregabalin (LYRICA) 25 MG capsule TAKE 1 CAPSULE BY MOUTH 2 TIMES A DAY FOR PAIN      traMADol (ULTRAM) 50 mg tablet Take 1 tablet (50 mg total) by mouth every 6 (six) hours as needed for Pain. 20 tablet 0     No current facility-administered medications on file prior to visit.        Review of patient's allergies indicates:  No Known Allergies  Objective:  "    Vitals:    02/10/20 0949 02/10/20 1012   BP: (!) 160/69 128/60   BP Location: Left arm Left arm   Patient Position: Sitting Sitting   BP Method: Large (Automatic)    Pulse: 64    SpO2: 99%    Weight: 78.5 kg (173 lb 1 oz)    Height: 5' 5" (1.651 m)         Physical Exam   Constitutional: She is oriented to person, place, and time. She appears well-developed and well-nourished.   Eyes: No scleral icterus.   Neck: No JVD present. Carotid bruit is not present.   Cardiovascular: Normal rate and regular rhythm. Exam reveals no gallop.   No murmur heard.  Pulmonary/Chest: Breath sounds normal.   Musculoskeletal: She exhibits no edema.   Neurological: She is alert and oriented to person, place, and time.   Skin: Skin is warm and dry.   Psychiatric: She has a normal mood and affect. Her behavior is normal. Judgment and thought content normal.   Vitals reviewed.     ECG: NSR, low QRS voltage,, low T I, AVL, clockwise rotation, unchanged  Assessment:     1. SVT (supraventricular tachycardia)    2. Status post placement of implantable loop recorder    3. SSS (sick sinus syndrome)    4. Orthostatic hypotension    5. Pure hypercholesterolemia    6. History of radiofrequency ablation (RFA) procedure for cardiac arrhythmia    7. Essential hypertension    8. Coronary artery disease involving native coronary artery of native heart without angina pectoris      Plan:   Crystal was seen today for follow-up.    Diagnoses and all orders for this visit:    SVT (supraventricular tachycardia)    Status post placement of implantable loop recorder    SSS (sick sinus syndrome)    Orthostatic hypotension    Pure hypercholesterolemia    History of radiofrequency ablation (RFA) procedure for cardiac arrhythmia    Essential hypertension    Coronary artery disease involving native coronary artery of native heart without angina pectoris    Other orders  -     IN OFFICE EKG 12-LEAD (to Muse)     Symptomatic orthostatic hypotension with syncope  " has resolved after reducing the dose of amlodipine    Will repeat lab to check on anemia and thrombocytopenia (addendum 2/10/20--note error in note--pt is not thrombocytopenic)    Same meds    F/u with Dr Goff    F/u with Dr Coughlin    Follow up in about 6 months (around 8/10/2020).

## 2020-02-11 ENCOUNTER — TELEPHONE (OUTPATIENT)
Dept: CARDIOLOGY | Facility: CLINIC | Age: 80
End: 2020-02-11

## 2020-02-15 ENCOUNTER — CLINICAL SUPPORT (OUTPATIENT)
Dept: CARDIOLOGY | Facility: HOSPITAL | Age: 80
End: 2020-02-15
Attending: INTERNAL MEDICINE
Payer: MEDICARE

## 2020-02-15 DIAGNOSIS — Z95.818 STATUS POST PLACEMENT OF IMPLANTABLE LOOP RECORDER: ICD-10-CM

## 2020-02-15 DIAGNOSIS — R55 SYNCOPE AND COLLAPSE: ICD-10-CM

## 2020-02-15 PROCEDURE — G2066 INTER DEVC REMOTE 30D: HCPCS | Performed by: INTERNAL MEDICINE

## 2020-02-15 PROCEDURE — 93298 CARDIAC DEVICE CHECK - REMOTE: ICD-10-PCS | Mod: ,,, | Performed by: INTERNAL MEDICINE

## 2020-02-15 PROCEDURE — 93298 REM INTERROG DEV EVAL SCRMS: CPT | Mod: ,,, | Performed by: INTERNAL MEDICINE

## 2020-03-16 ENCOUNTER — CLINICAL SUPPORT (OUTPATIENT)
Dept: CARDIOLOGY | Facility: HOSPITAL | Age: 80
End: 2020-03-16
Attending: INTERNAL MEDICINE
Payer: MEDICARE

## 2020-03-16 DIAGNOSIS — Z95.818 STATUS POST PLACEMENT OF IMPLANTABLE LOOP RECORDER: ICD-10-CM

## 2020-03-16 DIAGNOSIS — R55 SYNCOPE AND COLLAPSE: ICD-10-CM

## 2020-03-16 PROCEDURE — 93298 REM INTERROG DEV EVAL SCRMS: CPT | Mod: ,,, | Performed by: INTERNAL MEDICINE

## 2020-03-16 PROCEDURE — G2066 INTER DEVC REMOTE 30D: HCPCS | Performed by: INTERNAL MEDICINE

## 2020-03-16 PROCEDURE — 93298 CARDIAC DEVICE CHECK - REMOTE: ICD-10-PCS | Mod: ,,, | Performed by: INTERNAL MEDICINE

## 2020-04-03 RX ORDER — LOSARTAN POTASSIUM 100 MG/1
100 TABLET ORAL DAILY
Qty: 90 TABLET | Refills: 3 | Status: SHIPPED | OUTPATIENT
Start: 2020-04-03 | End: 2021-01-12

## 2020-04-15 ENCOUNTER — CLINICAL SUPPORT (OUTPATIENT)
Dept: CARDIOLOGY | Facility: HOSPITAL | Age: 80
End: 2020-04-15
Payer: MEDICARE

## 2020-04-15 DIAGNOSIS — Z95.818 PRESENCE OF OTHER CARDIAC IMPLANTS AND GRAFTS: ICD-10-CM

## 2020-04-15 PROCEDURE — 93298 REM INTERROG DEV EVAL SCRMS: CPT | Mod: ,,, | Performed by: INTERNAL MEDICINE

## 2020-04-15 PROCEDURE — G2066 INTER DEVC REMOTE 30D: HCPCS | Performed by: INTERNAL MEDICINE

## 2020-04-15 PROCEDURE — 93298 CARDIAC DEVICE CHECK - REMOTE: ICD-10-PCS | Mod: ,,, | Performed by: INTERNAL MEDICINE

## 2020-05-15 ENCOUNTER — CLINICAL SUPPORT (OUTPATIENT)
Dept: CARDIOLOGY | Facility: HOSPITAL | Age: 80
End: 2020-05-15
Payer: MEDICARE

## 2020-05-15 DIAGNOSIS — Z95.818 PRESENCE OF OTHER CARDIAC IMPLANTS AND GRAFTS: ICD-10-CM

## 2020-05-15 PROCEDURE — 93298 CARDIAC DEVICE CHECK - REMOTE: ICD-10-PCS | Mod: ,,, | Performed by: INTERNAL MEDICINE

## 2020-05-15 PROCEDURE — 93298 REM INTERROG DEV EVAL SCRMS: CPT | Mod: ,,, | Performed by: INTERNAL MEDICINE

## 2020-05-15 PROCEDURE — G2066 INTER DEVC REMOTE 30D: HCPCS | Performed by: INTERNAL MEDICINE

## 2020-06-14 ENCOUNTER — CLINICAL SUPPORT (OUTPATIENT)
Dept: CARDIOLOGY | Facility: HOSPITAL | Age: 80
End: 2020-06-14
Payer: MEDICARE

## 2020-06-14 DIAGNOSIS — Z95.818 PRESENCE OF OTHER CARDIAC IMPLANTS AND GRAFTS: ICD-10-CM

## 2020-06-14 PROCEDURE — 93298 REM INTERROG DEV EVAL SCRMS: CPT | Mod: ,,, | Performed by: INTERNAL MEDICINE

## 2020-06-14 PROCEDURE — 93298 CARDIAC DEVICE CHECK - REMOTE: ICD-10-PCS | Mod: ,,, | Performed by: INTERNAL MEDICINE

## 2020-06-14 PROCEDURE — G2066 INTER DEVC REMOTE 30D: HCPCS | Performed by: INTERNAL MEDICINE

## 2020-07-14 ENCOUNTER — CLINICAL SUPPORT (OUTPATIENT)
Dept: CARDIOLOGY | Facility: HOSPITAL | Age: 80
End: 2020-07-14
Payer: MEDICARE

## 2020-07-14 DIAGNOSIS — Z95.818 PRESENCE OF OTHER CARDIAC IMPLANTS AND GRAFTS: ICD-10-CM

## 2020-07-14 PROCEDURE — 93298 REM INTERROG DEV EVAL SCRMS: CPT | Mod: ,,, | Performed by: INTERNAL MEDICINE

## 2020-07-14 PROCEDURE — 93298 CARDIAC DEVICE CHECK - REMOTE: ICD-10-PCS | Mod: ,,, | Performed by: INTERNAL MEDICINE

## 2020-07-14 PROCEDURE — G2066 INTER DEVC REMOTE 30D: HCPCS | Performed by: INTERNAL MEDICINE

## 2020-07-29 ENCOUNTER — HOSPITAL ENCOUNTER (EMERGENCY)
Facility: HOSPITAL | Age: 80
Discharge: HOME | End: 2020-07-29
Attending: EMERGENCY MEDICINE
Payer: MEDICARE

## 2020-07-29 VITALS
TEMPERATURE: 98 F | BODY MASS INDEX: 31.54 KG/M2 | WEIGHT: 178 LBS | SYSTOLIC BLOOD PRESSURE: 196 MMHG | HEART RATE: 72 BPM | OXYGEN SATURATION: 100 % | RESPIRATION RATE: 18 BRPM | DIASTOLIC BLOOD PRESSURE: 115 MMHG | HEIGHT: 63 IN

## 2020-07-29 DIAGNOSIS — R07.9 CHEST PAIN: Primary | ICD-10-CM

## 2020-07-29 LAB
ALBUMIN SERPL BCP-MCNC: 3.2 G/DL (ref 3.5–5.2)
ALP SERPL-CCNC: 77 U/L (ref 55–135)
ALT SERPL W/O P-5'-P-CCNC: 9 U/L (ref 10–44)
ANION GAP SERPL CALC-SCNC: 9 MMOL/L (ref 8–16)
AST SERPL-CCNC: 13 U/L (ref 10–40)
BASOPHILS # BLD AUTO: 0.03 K/UL (ref 0–0.2)
BASOPHILS NFR BLD: 0.7 % (ref 0–1.9)
BILIRUB SERPL-MCNC: 0.4 MG/DL (ref 0.1–1)
BILIRUB UR QL STRIP: NEGATIVE
BNP SERPL-MCNC: 53 PG/ML (ref 0–99)
BUN SERPL-MCNC: 22 MG/DL (ref 8–23)
CALCIUM SERPL-MCNC: 9.5 MG/DL (ref 8.7–10.5)
CHLORIDE SERPL-SCNC: 108 MMOL/L (ref 95–110)
CLARITY UR REFRACT.AUTO: ABNORMAL
CO2 SERPL-SCNC: 25 MMOL/L (ref 23–29)
COLOR UR AUTO: YELLOW
CREAT SERPL-MCNC: 1.2 MG/DL (ref 0.5–1.4)
DIFFERENTIAL METHOD: ABNORMAL
EOSINOPHIL # BLD AUTO: 0.1 K/UL (ref 0–0.5)
EOSINOPHIL NFR BLD: 3.1 % (ref 0–8)
ERYTHROCYTE [DISTWIDTH] IN BLOOD BY AUTOMATED COUNT: 14.1 % (ref 11.5–14.5)
EST. GFR  (AFRICAN AMERICAN): 49 ML/MIN/1.73 M^2
EST. GFR  (NON AFRICAN AMERICAN): 42.5 ML/MIN/1.73 M^2
GLUCOSE SERPL-MCNC: 95 MG/DL (ref 70–110)
GLUCOSE UR QL STRIP: NEGATIVE
HCT VFR BLD AUTO: 37 % (ref 37–48.5)
HGB BLD-MCNC: 11.2 G/DL (ref 12–16)
HGB UR QL STRIP: NEGATIVE
IMM GRANULOCYTES # BLD AUTO: 0.02 K/UL (ref 0–0.04)
IMM GRANULOCYTES NFR BLD AUTO: 0.5 % (ref 0–0.5)
KETONES UR QL STRIP: NEGATIVE
LEUKOCYTE ESTERASE UR QL STRIP: NEGATIVE
LYMPHOCYTES # BLD AUTO: 0.9 K/UL (ref 1–4.8)
LYMPHOCYTES NFR BLD: 21.4 % (ref 18–48)
MCH RBC QN AUTO: 29.4 PG (ref 27–31)
MCHC RBC AUTO-ENTMCNC: 30.3 G/DL (ref 32–36)
MCV RBC AUTO: 97 FL (ref 82–98)
MONOCYTES # BLD AUTO: 0.3 K/UL (ref 0.3–1)
MONOCYTES NFR BLD: 6.4 % (ref 4–15)
NEUTROPHILS # BLD AUTO: 2.9 K/UL (ref 1.8–7.7)
NEUTROPHILS NFR BLD: 67.9 % (ref 38–73)
NITRITE UR QL STRIP: NEGATIVE
NRBC BLD-RTO: 0 /100 WBC
PH UR STRIP: 5 [PH] (ref 5–8)
PLATELET # BLD AUTO: 322 K/UL (ref 150–350)
PMV BLD AUTO: 9.6 FL (ref 9.2–12.9)
POTASSIUM SERPL-SCNC: 3.2 MMOL/L (ref 3.5–5.1)
PROT SERPL-MCNC: 6.8 G/DL (ref 6–8.4)
PROT UR QL STRIP: NEGATIVE
RBC # BLD AUTO: 3.81 M/UL (ref 4–5.4)
SARS-COV-2 RDRP RESP QL NAA+PROBE: NEGATIVE
SODIUM SERPL-SCNC: 142 MMOL/L (ref 136–145)
SP GR UR STRIP: 1.02 (ref 1–1.03)
TROPONIN I SERPL DL<=0.01 NG/ML-MCNC: 0.02 NG/ML (ref 0–0.03)
TROPONIN I SERPL DL<=0.01 NG/ML-MCNC: <0.006 NG/ML (ref 0–0.03)
URN SPEC COLLECT METH UR: ABNORMAL
WBC # BLD AUTO: 4.2 K/UL (ref 3.9–12.7)

## 2020-07-29 PROCEDURE — 99284 EMERGENCY DEPT VISIT MOD MDM: CPT | Mod: ,,, | Performed by: PHYSICIAN ASSISTANT

## 2020-07-29 PROCEDURE — 99285 EMERGENCY DEPT VISIT HI MDM: CPT | Mod: 25

## 2020-07-29 PROCEDURE — 93005 ELECTROCARDIOGRAM TRACING: CPT

## 2020-07-29 PROCEDURE — 81003 URINALYSIS AUTO W/O SCOPE: CPT

## 2020-07-29 PROCEDURE — 83880 ASSAY OF NATRIURETIC PEPTIDE: CPT

## 2020-07-29 PROCEDURE — 80053 COMPREHEN METABOLIC PANEL: CPT

## 2020-07-29 PROCEDURE — 93010 EKG 12-LEAD: ICD-10-PCS | Mod: ,,, | Performed by: INTERNAL MEDICINE

## 2020-07-29 PROCEDURE — 93010 ELECTROCARDIOGRAM REPORT: CPT | Mod: ,,, | Performed by: INTERNAL MEDICINE

## 2020-07-29 PROCEDURE — 99284 PR EMERGENCY DEPT VISIT,LEVEL IV: ICD-10-PCS | Mod: ,,, | Performed by: PHYSICIAN ASSISTANT

## 2020-07-29 PROCEDURE — 94761 N-INVAS EAR/PLS OXIMETRY MLT: CPT

## 2020-07-29 PROCEDURE — U0002 COVID-19 LAB TEST NON-CDC: HCPCS

## 2020-07-29 PROCEDURE — 84484 ASSAY OF TROPONIN QUANT: CPT

## 2020-07-29 PROCEDURE — 85025 COMPLETE CBC W/AUTO DIFF WBC: CPT

## 2020-07-29 PROCEDURE — 25000003 PHARM REV CODE 250: Performed by: PHYSICIAN ASSISTANT

## 2020-07-29 RX ORDER — ASPIRIN 325 MG
325 TABLET ORAL
Status: COMPLETED | OUTPATIENT
Start: 2020-07-29 | End: 2020-07-29

## 2020-07-29 RX ORDER — POTASSIUM CHLORIDE 20 MEQ/1
40 TABLET, EXTENDED RELEASE ORAL
Status: COMPLETED | OUTPATIENT
Start: 2020-07-29 | End: 2020-07-29

## 2020-07-29 RX ORDER — LIDOCAINE 50 MG/G
1 PATCH TOPICAL
Status: DISCONTINUED | OUTPATIENT
Start: 2020-07-29 | End: 2020-07-29 | Stop reason: HOSPADM

## 2020-07-29 RX ORDER — AMLODIPINE BESYLATE 5 MG/1
5 TABLET ORAL
Status: COMPLETED | OUTPATIENT
Start: 2020-07-29 | End: 2020-07-29

## 2020-07-29 RX ORDER — ACETAMINOPHEN 325 MG/1
650 TABLET ORAL
Status: COMPLETED | OUTPATIENT
Start: 2020-07-29 | End: 2020-07-29

## 2020-07-29 RX ORDER — LOSARTAN POTASSIUM 50 MG/1
100 TABLET ORAL
Status: COMPLETED | OUTPATIENT
Start: 2020-07-29 | End: 2020-07-29

## 2020-07-29 RX ADMIN — POTASSIUM CHLORIDE 40 MEQ: 1500 TABLET, EXTENDED RELEASE ORAL at 10:07

## 2020-07-29 RX ADMIN — LOSARTAN POTASSIUM 100 MG: 50 TABLET, FILM COATED ORAL at 08:07

## 2020-07-29 RX ADMIN — ACETAMINOPHEN 650 MG: 325 TABLET ORAL at 08:07

## 2020-07-29 RX ADMIN — ACETAMINOPHEN 650 MG: 325 TABLET ORAL at 12:07

## 2020-07-29 RX ADMIN — AMLODIPINE BESYLATE 5 MG: 5 TABLET ORAL at 08:07

## 2020-07-29 RX ADMIN — LIDOCAINE 1 PATCH: 50 PATCH TOPICAL at 12:07

## 2020-07-29 RX ADMIN — ASPIRIN 325 MG ORAL TABLET 325 MG: 325 PILL ORAL at 08:07

## 2020-07-29 NOTE — ED PROVIDER NOTES
"Encounter Date: 2020       History     Chief Complaint   Patient presents with    Breast Pain     patient states under her L breast is hurting her that started this morning.       This is a 79 y.o. year old female with a PMH of HTN, CAD, CHF who presents to the ED with a chief complaint of chest pain. Patient reports waking from her sleep early this morning with a "sticking" pain beneath her left breast. The pain has been constant since the onset. It is nonradiating. It is exacerbated by movement. She denies associated symptoms. Patient rates the pain 6/10. Attempted treatment includes none. She denies fever, chills, dizziness, syncope, nasal congestion, cough, shortness of breath, nausea, vomiting, diarrhea, abdominal pain, joint swelling or rashes. She is a former smoker.          Review of patient's allergies indicates:  No Known Allergies  Past Medical History:   Diagnosis Date    Arthritis     CHF (congestive heart failure)     Coronary artery disease     Hyperlipidemia     Hypertension      Past Surgical History:   Procedure Laterality Date    A-V CARDIAC PACEMAKER INSERTION N/A 2018    Procedure: INSERTION, CARDIAC PACEMAKER, DUAL CHAMBER;  Surgeon: Archie Montez MD;  Location: [facility] CATH LAB;  Service: Cardiology;  Laterality: N/A;  SVT, RFA, +/- Dual PPM or ILR, IGLESIA, MDT, MAC, [place], 3 Prep    HYSTERECTOMY N/A     INSERTION OF IMPLANTABLE LOOP RECORDER N/A 2018    Procedure: PLACEMENT-LOOP RECORDER;  Surgeon: Archie Montez MD;  Location: [facility] CATH LAB;  Service: Cardiology;  Laterality: N/A;  SVT, RFA, +/- Dual PPM or ILR, IGLESIA, MDT, MAC, [place], 3 Prep     No family history on file.  Social History     Tobacco Use    Smoking status: Former Smoker     Packs/day: 0.50     Quit date: 2013     Years since quittin.2    Smokeless tobacco: Never Used   Substance Use Topics    Alcohol use: No    Drug use: No     Review of Systems   Constitutional: Negative for chills and fever.   HENT: " Negative for sore throat.    Respiratory: Negative for shortness of breath.    Cardiovascular: Positive for chest pain.   Gastrointestinal: Negative for nausea and vomiting.   Genitourinary: Negative for dysuria.   Musculoskeletal: Negative for back pain.   Skin: Negative for rash.   Neurological: Negative for weakness.   Hematological: Does not bruise/bleed easily.       Physical Exam     Initial Vitals [07/29/20 0716]   BP Pulse Resp Temp SpO2   (!) 191/85 69 18 98.3 °F (36.8 °C) 99 %      MAP       --         Physical Exam    Constitutional: She appears well-developed and well-nourished. No distress.   HENT:   Head: Atraumatic.   Eyes: Conjunctivae and EOM are normal. Pupils are equal, round, and reactive to light.   Cardiovascular: Normal rate and regular rhythm.   Murmur heard.  Pulmonary/Chest: Breath sounds normal. No respiratory distress. She has no wheezes. She has no rhonchi. She has no rales. Left breast exhibits no mass, no nipple discharge, no skin change and no tenderness.   No rashes of chest wall or back   Abdominal: Soft. Bowel sounds are normal. There is no abdominal tenderness.   Neurological: She is alert and oriented to person, place, and time.   Skin: Skin is warm and dry. No rash noted.         ED Course   Procedures  Labs Reviewed   CBC W/ AUTO DIFFERENTIAL - Abnormal; Notable for the following components:       Result Value    RBC 3.81 (*)     Hemoglobin 11.2 (*)     Mean Corpuscular Hemoglobin Conc 30.3 (*)     Lymph # 0.9 (*)     All other components within normal limits   COMPREHENSIVE METABOLIC PANEL - Abnormal; Notable for the following components:    Potassium 3.2 (*)     Albumin 3.2 (*)     ALT 9 (*)     eGFR if  49.0 (*)     eGFR if non  42.5 (*)     All other components within normal limits   URINALYSIS, REFLEX TO URINE CULTURE - Abnormal; Notable for the following components:    Appearance, UA Hazy (*)     All other components within normal limits     Narrative:     Specimen Source->Urine   TROPONIN I   B-TYPE NATRIURETIC PEPTIDE   SARS-COV-2 RNA AMPLIFICATION, QUAL   TROPONIN I        ECG Results          EKG 12-lead (In process)  Result time 07/29/20 08:17:05    In process by Interface, Lab In [facility] (07/29/20 08:17:05)                 Narrative:    Test Reason : R07.9,    Vent. Rate : 060 BPM     Atrial Rate : 060 BPM     P-R Int : 158 ms          QRS Dur : 074 ms      QT Int : 416 ms       P-R-T Axes : 041 019 074 degrees     QTc Int : 416 ms    Normal sinus rhythm with sinus arrhythmia  Normal ECG  When compared with ECG of 10-FEB-2020 11:02,  No significant change was found    Referred By: [email or web address]   SELF           Confirmed By:                             Imaging Results          X-Ray Chest PA And Lateral (Final result)  Result time 07/29/20 08:45:01    Final result by Shelton Lang III, MD (07/29/20 08:45:01)                 Impression:      No acute intrathoracic abnormality.    Electronically signed by resident: Samuel Stiles  Date:    07/29/2020  Time:    08:34    Electronically signed by: Shelton Lang MD  Date:    07/29/2020  Time:    08:45             Narrative:    EXAMINATION:  XR CHEST PA AND LATERAL    CLINICAL HISTORY:  Chest Pain;    TECHNIQUE:  PA and lateral views of the chest were performed.    COMPARISON:  Chest radiograph 08/09/2019.    FINDINGS:  Cardiac loop recorder device in stable position.    The lungs are clear, with normal appearance of pulmonary vasculature and no pleural effusion or pneumothorax.    The cardiac silhouette is stable in size. The hilar and mediastinal contours are unremarkable.    Bones are intact.  Degenerative change of the visualized spine.                                 Medical Decision Making:   History:   Old Medical Records: I decided to obtain old medical records.  Old Records Summarized: records from clinic visits.  Clinical Tests:   Lab Tests: Ordered and Reviewed  Radiological Study: Ordered and  Reviewed  Medical Tests: Ordered and Reviewed       APC / Resident Notes:   79 y.o. year old female presenting with chest pain.    DDx includes but is not limited to ACS, CHF exacerbation, chest wall strain, zoster prodrome.    ED course  EKG normal sinus rhythm with sinus arrhythmia, rate of 60  Labs with K 3.2, replaced orally  Troponin negative x 2  CXR with no acute process    Plan  Supportive care - lidoderm, tylenol    I reviewed the findings and plan with the patient. I recommend outpatient f/u with Cardiology in 2-3 days. Return to ED precautions discussed. I discussed the care of this patient with my supervising physician.                              Clinical Impression:       ICD-10-CM ICD-9-CM   1. Chest pain  R07.9 786.50         Disposition:   Disposition: Discharged  Condition: Stable     ED Disposition Condition    Discharge Stable        ED Prescriptions     None        Follow-up Information     Follow up With Specialties Details Why Contact Info    Cecilio Kline MD Cardiology Schedule an appointment as soon as possible for a visit in 3 days  [address]  [address]  [facility] [place] [id number] [phone]

## 2020-07-29 NOTE — ED TRIAGE NOTES
Patient is a 79 year old female that presents to ED with c/o left sided chest pain and lower back pain that started last night. Patient states has had an ablation in the past. Denies SOB, fevers, and chills.

## 2020-08-03 ENCOUNTER — HOSPITAL ENCOUNTER (INPATIENT)
Facility: HOSPITAL | Age: 80
LOS: 5 days | Discharge: HOME HEALTH | DRG: 378 | End: 2020-08-08
Attending: EMERGENCY MEDICINE | Admitting: HOSPITALIST
Payer: MEDICARE

## 2020-08-03 ENCOUNTER — ANESTHESIA EVENT (OUTPATIENT)
Dept: ENDOSCOPY | Facility: HOSPITAL | Age: 80
DRG: 378 | End: 2020-08-03
Payer: MEDICARE

## 2020-08-03 DIAGNOSIS — K92.2 GASTROINTESTINAL HEMORRHAGE, UNSPECIFIED GASTROINTESTINAL HEMORRHAGE TYPE: Primary | ICD-10-CM

## 2020-08-03 DIAGNOSIS — R79.89 ELEVATED SERUM CREATININE: ICD-10-CM
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
## 2020-08-03 DIAGNOSIS — D62 ACUTE BLOOD LOSS ANEMIA: ICD-10-CM

## 2020-08-03 DIAGNOSIS — M25.562 PAIN OF LEFT KNEE AFTER INJURY: ICD-10-CM

## 2020-08-03 DIAGNOSIS — R42 DIZZINESS: ICD-10-CM
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
## 2020-08-03 DIAGNOSIS — D64.9 SYMPTOMATIC ANEMIA: ICD-10-CM

## 2020-08-03 DIAGNOSIS — M25.562 LEFT ANTERIOR KNEE PAIN: ICD-10-CM

## 2020-08-03 DIAGNOSIS — R07.81 RIB PAIN ON LEFT SIDE: ICD-10-CM

## 2020-08-03 DIAGNOSIS — N18.30 CKD (CHRONIC KIDNEY DISEASE) STAGE 3, GFR 30-59 ML/MIN: ICD-10-CM

## 2020-08-03 LAB
ABO + RH BLD: NORMAL
ALBUMIN SERPL BCP-MCNC: 2.9 G/DL (ref 3.5–5.2)
ALP SERPL-CCNC: 59 U/L (ref 55–135)
ALT SERPL W/O P-5'-P-CCNC: 6 U/L (ref 10–44)
ANION GAP SERPL CALC-SCNC: 6 MMOL/L (ref 8–16)
APTT BLDCRRT: <21 SEC (ref 21–32)
AST SERPL-CCNC: 9 U/L (ref 10–40)
BASOPHILS # BLD AUTO: 0.02 K/UL (ref 0–0.2)
BASOPHILS NFR BLD: 0.3 % (ref 0–1.9)
BILIRUB SERPL-MCNC: 0.3 MG/DL (ref 0.1–1)
BLD GP AB SCN CELLS X3 SERPL QL: NORMAL
BLD PROD TYP BPU: NORMAL
BLOOD UNIT EXPIRATION DATE: NORMAL
BLOOD UNIT TYPE CODE: 600
BLOOD UNIT TYPE: NORMAL
BNP SERPL-MCNC: 39 PG/ML (ref 0–99)
BUN SERPL-MCNC: 39 MG/DL (ref 8–23)
CALCIUM SERPL-MCNC: 8.3 MG/DL (ref 8.7–10.5)
CHLORIDE SERPL-SCNC: 113 MMOL/L (ref 95–110)
CO2 SERPL-SCNC: 23 MMOL/L (ref 23–29)
CODING SYSTEM: NORMAL
CREAT SERPL-MCNC: 1.2 MG/DL (ref 0.5–1.4)
DIFFERENTIAL METHOD: ABNORMAL
DISPENSE STATUS: NORMAL
EOSINOPHIL # BLD AUTO: 0 K/UL (ref 0–0.5)
EOSINOPHIL NFR BLD: 0.3 % (ref 0–8)
ERYTHROCYTE [DISTWIDTH] IN BLOOD BY AUTOMATED COUNT: 14.8 % (ref 11.5–14.5)
EST. GFR  (AFRICAN AMERICAN): 49 ML/MIN/1.73 M^2
EST. GFR  (NON AFRICAN AMERICAN): 42.5 ML/MIN/1.73 M^2
GLUCOSE SERPL-MCNC: 105 MG/DL (ref 70–110)
HCT VFR BLD AUTO: 24.9 % (ref 37–48.5)
HGB BLD-MCNC: 7.5 G/DL (ref 12–16)
IMM GRANULOCYTES # BLD AUTO: 0.02 K/UL (ref 0–0.04)
IMM GRANULOCYTES NFR BLD AUTO: 0.3 % (ref 0–0.5)
INR PPP: 0.9 (ref 0.8–1.2)
LYMPHOCYTES # BLD AUTO: 0.9 K/UL (ref 1–4.8)
LYMPHOCYTES NFR BLD: 12.5 % (ref 18–48)
MCH RBC QN AUTO: 30.5 PG (ref 27–31)
MCHC RBC AUTO-ENTMCNC: 30.1 G/DL (ref 32–36)
MCV RBC AUTO: 101 FL (ref 82–98)
MONOCYTES # BLD AUTO: 0.3 K/UL (ref 0.3–1)
MONOCYTES NFR BLD: 3.8 % (ref 4–15)
NEUTROPHILS # BLD AUTO: 6 K/UL (ref 1.8–7.7)
NEUTROPHILS NFR BLD: 82.8 % (ref 38–73)
NRBC BLD-RTO: 0 /100 WBC
NUM UNITS TRANS PACKED RBC: NORMAL
PLATELET # BLD AUTO: 221 K/UL (ref 150–350)
PMV BLD AUTO: 10.2 FL (ref 9.2–12.9)
POTASSIUM SERPL-SCNC: 4 MMOL/L (ref 3.5–5.1)
PROT SERPL-MCNC: 5.6 G/DL (ref 6–8.4)
PROTHROMBIN TIME: 10.5 SEC (ref 9–12.5)
RBC # BLD AUTO: 2.46 M/UL (ref 4–5.4)
SARS-COV-2 RDRP RESP QL NAA+PROBE: NEGATIVE
SODIUM SERPL-SCNC: 142 MMOL/L (ref 136–145)
TROPONIN I SERPL DL<=0.01 NG/ML-MCNC: 0.01 NG/ML (ref 0–0.03)
WBC # BLD AUTO: 7.29 K/UL (ref 3.9–12.7)

## 2020-08-03 PROCEDURE — 93010 ELECTROCARDIOGRAM REPORT: CPT | Mod: ,,, | Performed by: INTERNAL MEDICINE

## 2020-08-03 PROCEDURE — 80053 COMPREHEN METABOLIC PANEL: CPT

## 2020-08-03 PROCEDURE — P9016 RBC LEUKOCYTES REDUCED: HCPCS

## 2020-08-03 PROCEDURE — 99223 1ST HOSP IP/OBS HIGH 75: CPT | Mod: AI,,, | Performed by: HOSPITALIST

## 2020-08-03 PROCEDURE — 99223 PR INITIAL HOSPITAL CARE,LEVL III: ICD-10-PCS | Mod: AI,,, | Performed by: HOSPITALIST

## 2020-08-03 PROCEDURE — C9113 INJ PANTOPRAZOLE SODIUM, VIA: HCPCS | Performed by: HOSPITALIST

## 2020-08-03 PROCEDURE — 84484 ASSAY OF TROPONIN QUANT: CPT

## 2020-08-03 PROCEDURE — U0002 COVID-19 LAB TEST NON-CDC: HCPCS

## 2020-08-03 PROCEDURE — 93010 EKG 12-LEAD: ICD-10-PCS | Mod: ,,, | Performed by: INTERNAL MEDICINE

## 2020-08-03 PROCEDURE — 29505 APPLICATION LONG LEG SPLINT: CPT | Mod: 59,LT

## 2020-08-03 PROCEDURE — 85610 PROTHROMBIN TIME: CPT

## 2020-08-03 PROCEDURE — 85730 THROMBOPLASTIN TIME PARTIAL: CPT

## 2020-08-03 PROCEDURE — 93005 ELECTROCARDIOGRAM TRACING: CPT

## 2020-08-03 PROCEDURE — 99291 CRITICAL CARE FIRST HOUR: CPT | Mod: ,,, | Performed by: PHYSICIAN ASSISTANT

## 2020-08-03 PROCEDURE — 99291 CRITICAL CARE FIRST HOUR: CPT | Mod: 25

## 2020-08-03 PROCEDURE — 25000003 PHARM REV CODE 250: Performed by: HOSPITALIST

## 2020-08-03 PROCEDURE — 85025 COMPLETE CBC W/AUTO DIFF WBC: CPT

## 2020-08-03 PROCEDURE — 86850 RBC ANTIBODY SCREEN: CPT

## 2020-08-03 PROCEDURE — 99285 PR EMERGENCY DEPT VISIT,LEVEL V: ICD-10-PCS | Mod: ,,, | Performed by: INTERNAL MEDICINE

## 2020-08-03 PROCEDURE — 11000001 HC ACUTE MED/SURG PRIVATE ROOM

## 2020-08-03 PROCEDURE — 99291 PR CRITICAL CARE, E/M 30-74 MINUTES: ICD-10-PCS | Mod: ,,, | Performed by: PHYSICIAN ASSISTANT

## 2020-08-03 PROCEDURE — 63600175 PHARM REV CODE 636 W HCPCS: Performed by: PHYSICIAN ASSISTANT

## 2020-08-03 PROCEDURE — 36430 TRANSFUSION BLD/BLD COMPNT: CPT

## 2020-08-03 PROCEDURE — C9113 INJ PANTOPRAZOLE SODIUM, VIA: HCPCS | Performed by: PHYSICIAN ASSISTANT

## 2020-08-03 PROCEDURE — 83880 ASSAY OF NATRIURETIC PEPTIDE: CPT

## 2020-08-03 PROCEDURE — 99285 EMERGENCY DEPT VISIT HI MDM: CPT | Mod: ,,, | Performed by: INTERNAL MEDICINE

## 2020-08-03 PROCEDURE — 63600175 PHARM REV CODE 636 W HCPCS: Performed by: HOSPITALIST

## 2020-08-03 PROCEDURE — 81001 URINALYSIS AUTO W/SCOPE: CPT

## 2020-08-03 PROCEDURE — 86920 COMPATIBILITY TEST SPIN: CPT

## 2020-08-03 RX ORDER — SODIUM CHLORIDE 0.9 % (FLUSH) 0.9 %
10 SYRINGE (ML) INJECTION
Status: DISCONTINUED | OUTPATIENT
Start: 2020-08-03 | End: 2020-08-05

## 2020-08-03 RX ORDER — IBUPROFEN 200 MG
24 TABLET ORAL
Status: DISCONTINUED | OUTPATIENT
Start: 2020-08-03 | End: 2020-08-08 | Stop reason: HOSPADM

## 2020-08-03 RX ORDER — HYDROCODONE BITARTRATE AND ACETAMINOPHEN 500; 5 MG/1; MG/1
TABLET ORAL
Status: DISCONTINUED | OUTPATIENT
Start: 2020-08-03 | End: 2020-08-08 | Stop reason: HOSPADM

## 2020-08-03 RX ORDER — PANTOPRAZOLE SODIUM 40 MG/10ML
40 INJECTION, POWDER, LYOPHILIZED, FOR SOLUTION INTRAVENOUS
Status: COMPLETED | OUTPATIENT
Start: 2020-08-03 | End: 2020-08-03

## 2020-08-03 RX ORDER — TRAMADOL HYDROCHLORIDE 50 MG/1
50 TABLET ORAL EVERY 6 HOURS PRN
Status: DISCONTINUED | OUTPATIENT
Start: 2020-08-03 | End: 2020-08-04

## 2020-08-03 RX ORDER — ATORVASTATIN CALCIUM 20 MG/1
20 TABLET, FILM COATED ORAL NIGHTLY
Status: DISCONTINUED | OUTPATIENT
Start: 2020-08-03 | End: 2020-08-08 | Stop reason: HOSPADM

## 2020-08-03 RX ORDER — HYDRALAZINE HYDROCHLORIDE 20 MG/ML
5 INJECTION INTRAMUSCULAR; INTRAVENOUS EVERY 6 HOURS PRN
Status: DISCONTINUED | OUTPATIENT
Start: 2020-08-04 | End: 2020-08-03

## 2020-08-03 RX ORDER — IBUPROFEN 200 MG
16 TABLET ORAL
Status: DISCONTINUED | OUTPATIENT
Start: 2020-08-03 | End: 2020-08-08 | Stop reason: HOSPADM

## 2020-08-03 RX ORDER — ACETAMINOPHEN 325 MG/1
650 TABLET ORAL EVERY 4 HOURS PRN
Status: DISCONTINUED | OUTPATIENT
Start: 2020-08-03 | End: 2020-08-08 | Stop reason: HOSPADM

## 2020-08-03 RX ORDER — LOSARTAN POTASSIUM 50 MG/1
100 TABLET ORAL DAILY
Status: DISCONTINUED | OUTPATIENT
Start: 2020-08-04 | End: 2020-08-06

## 2020-08-03 RX ORDER — DOCUSATE SODIUM 100 MG/1
100 CAPSULE, LIQUID FILLED ORAL 2 TIMES DAILY
Status: DISCONTINUED | OUTPATIENT
Start: 2020-08-03 | End: 2020-08-08 | Stop reason: HOSPADM

## 2020-08-03 RX ORDER — PREGABALIN 50 MG/1
50 CAPSULE ORAL 2 TIMES DAILY
Status: DISCONTINUED | OUTPATIENT
Start: 2020-08-03 | End: 2020-08-08 | Stop reason: HOSPADM

## 2020-08-03 RX ORDER — ONDANSETRON 2 MG/ML
4 INJECTION INTRAMUSCULAR; INTRAVENOUS EVERY 8 HOURS PRN
Status: DISCONTINUED | OUTPATIENT
Start: 2020-08-03 | End: 2020-08-08 | Stop reason: HOSPADM

## 2020-08-03 RX ORDER — GLUCAGON 1 MG
1 KIT INJECTION
Status: DISCONTINUED | OUTPATIENT
Start: 2020-08-03 | End: 2020-08-08 | Stop reason: HOSPADM

## 2020-08-03 RX ORDER — OXYCODONE HYDROCHLORIDE 10 MG/1
10 TABLET ORAL ONCE
Status: DISCONTINUED | OUTPATIENT
Start: 2020-08-03 | End: 2020-08-06

## 2020-08-03 RX ORDER — PANTOPRAZOLE SODIUM 40 MG/10ML
40 INJECTION, POWDER, LYOPHILIZED, FOR SOLUTION INTRAVENOUS ONCE
Status: COMPLETED | OUTPATIENT
Start: 2020-08-03 | End: 2020-08-03

## 2020-08-03 RX ORDER — HYDRALAZINE HYDROCHLORIDE 25 MG/1
25 TABLET, FILM COATED ORAL EVERY 6 HOURS PRN
Status: DISCONTINUED | OUTPATIENT
Start: 2020-08-04 | End: 2020-08-08 | Stop reason: HOSPADM

## 2020-08-03 RX ORDER — FLUTICASONE FUROATE AND VILANTEROL 100; 25 UG/1; UG/1
1 POWDER RESPIRATORY (INHALATION) DAILY
Status: DISCONTINUED | OUTPATIENT
Start: 2020-08-04 | End: 2020-08-08 | Stop reason: HOSPADM

## 2020-08-03 RX ADMIN — ACETAMINOPHEN 650 MG: 325 TABLET ORAL at 04:08

## 2020-08-03 RX ADMIN — ATORVASTATIN CALCIUM 20 MG: 20 TABLET, FILM COATED ORAL at 08:08

## 2020-08-03 RX ADMIN — PANTOPRAZOLE SODIUM 40 MG: 40 INJECTION, POWDER, LYOPHILIZED, FOR SOLUTION INTRAVENOUS at 04:08

## 2020-08-03 RX ADMIN — PREGABALIN 50 MG: 50 CAPSULE ORAL at 08:08

## 2020-08-03 RX ADMIN — DOCUSATE SODIUM 100 MG: 100 CAPSULE, LIQUID FILLED ORAL at 08:08

## 2020-08-03 RX ADMIN — ACETAMINOPHEN 650 MG: 325 TABLET ORAL at 08:08

## 2020-08-03 RX ADMIN — PANTOPRAZOLE SODIUM 40 MG: 40 INJECTION, POWDER, LYOPHILIZED, FOR SOLUTION INTRAVENOUS at 12:08

## 2020-08-03 NOTE — HPI
Ms Alvarado is a 79 year old female with history of CHF (preserved EF of 60%), SVT s/p ablation, HTN, HLD, COPD, non-obstructive CAD, and CKD3, perforated appendicitis (8/2019) who is presenting to hospital with concern for syncope and GI bleed.    History is limited by patient recollection. She reports she was feeling well and went to get water in the morning. After getting water she turned and synopsized. She does not recall any events or symptoms preceding syncope. She reports currently she is feeling well. Denies any abdominal pain, nausea, vomitus. No chest pain, dyspnea. No headache or visual complaints.    She denies any history of GI bleeding. Denies any NSAID use. +ve ASA daily. No smoking or drinking. GAY in ED with melena; patient denies any knowledge of GI bleeding or melena.    On admission 122/59, 80/min. Hgb 7.5 (11.2 on 7/29), BUN 39. No Endo in system. Reports prior colonoscopy >10 years ago, no history of EGD.

## 2020-08-03 NOTE — CONSULTS
[facility]  Gastroenterology  Consult Note    Patient Name: Crystal Alvarado  MRN: [id number]  Admission Date: 8/3/2020  Hospital Length of Stay: 0 days  Code Status: Full Code   Attending Provider: Srikanth Anthony MD   Consulting Provider: Jurgen Mc MD  Primary Care Physician: Shantell Goff MD  Principal Problem:<principal problem not specified>    Inpatient consult to Gastroenterology  Consult performed by: Jurgen Mc MD  Consult ordered by: Srikanth Anthony MD        Subjective:     HPI:  Ms Alvarado is a 79 year old female with history of CHF (preserved EF of 60%), SVT s/p ablation, HTN, HLD, COPD, non-obstructive CAD, and CKD3, perforated appendicitis (8/2019) who is presenting to hospital with concern for syncope and GI bleed.    History is limited by patient recollection. She reports she was feeling well and went to get water in the morning. After getting water she turned and synopsized. She does not recall any events or symptoms preceding syncope. She reports currently she is feeling well. Denies any abdominal pain, nausea, vomitus. No chest pain, dyspnea. No headache or visual complaints.    She denies any history of GI bleeding. Denies any NSAID use. +ve ASA daily. No smoking or drinking. GAY in ED with melena; patient denies any knowledge of GI bleeding or melena.    On admission 122/59, 80/min. Hgb 7.5 (11.2 on 7/29), BUN 39. No Endo in system. Reports prior colonoscopy >10 years ago, no history of EGD.       Past Medical History:   Diagnosis Date    Arthritis     CHF (congestive heart failure)     Coronary artery disease     Hyperlipidemia     Hypertension        Past Surgical History:   Procedure Laterality Date    A-V CARDIAC PACEMAKER INSERTION N/A 7/9/2018    Procedure: INSERTION, CARDIAC PACEMAKER, DUAL CHAMBER;  Surgeon: Archie Montez MD;  Location: [facility] CATH LAB;  Service: Cardiology;  Laterality: N/A;  SVT, RFA, +/- Dual PPM or ILR, IGLESIA, MDT, MAC, [place], 3 Prep     HYSTERECTOMY N/A     INSERTION OF IMPLANTABLE LOOP RECORDER N/A 2018    Procedure: PLACEMENT-LOOP RECORDER;  Surgeon: Archie Montez MD;  Location: [facility] CATH LAB;  Service: Cardiology;  Laterality: N/A;  SVT, RFA, +/- Dual PPM or ILR, IGLESIA, MDT, MAC, [place], 3 Prep       Review of patient's allergies indicates:  No Known Allergies  Family History     None        Tobacco Use    Smoking status: Former Smoker     Packs/day: 0.50     Quit date: 2013     Years since quittin.2    Smokeless tobacco: Never Used   Substance and Sexual Activity    Alcohol use: No    Drug use: No    Sexual activity: Never     Review of Systems   Constitutional: Negative for activity change, appetite change, chills, diaphoresis, fatigue, fever and unexpected weight change.   HENT: Negative for sore throat and trouble swallowing.    Eyes: Negative for visual disturbance.   Respiratory: Negative for chest tightness and shortness of breath.    Cardiovascular: Negative for chest pain and leg swelling.   Gastrointestinal: Negative for abdominal distention, abdominal pain, anal bleeding, blood in stool, constipation, diarrhea, nausea and vomiting.   Genitourinary: Negative for dysuria and hematuria.   Musculoskeletal: Negative for arthralgias and myalgias.   Skin: Negative for rash.   Neurological: Negative for dizziness, weakness, light-headedness and headaches.   Psychiatric/Behavioral: Negative for agitation and confusion.     Objective:     Vital Signs (Most Recent):  Temp: 98.8 °F (37.1 °C) (20 1356)  Pulse: 65 (20 1356)  Resp: 18 (20 1356)  BP: (!) 147/64 (20 1356)  SpO2: 100 % (20 1356) Vital Signs (24h Range):  Temp:  [98.5 °F (36.9 °C)-98.8 °F (37.1 °C)] 98.8 °F (37.1 °C)  Pulse:  [62-85] 65  Resp:  [12-18] 18  SpO2:  [99 %-100 %] 100 %  BP: (114-186)/(59-99) 147/64     Weight: 80.7 kg (178 lb) (20 0937)  Body mass index is 31.53 kg/m².      Intake/Output Summary (Last 24 hours) at 8/3/2020  1359  Last data filed at 8/3/2020 1333  Gross per 24 hour   Intake 304 ml   Output --   Net 304 ml       Lines/Drains/Airways     Drain                 Drain/Device  08/13/19 1059 Right lower back 356 days          Peripheral Intravenous Line                 Peripheral IV - Single Lumen 08/03/20 1215 20 G Right Forearm less than 1 day                Physical Exam  Vitals signs and nursing note reviewed.   Constitutional:       General: She is not in acute distress.     Appearance: She is well-developed. She is not diaphoretic.      Comments: Calm cooperative, no distress.   HENT:      Head: Normocephalic and atraumatic.      Mouth/Throat:      Pharynx: No oropharyngeal exudate.   Eyes:      General: No scleral icterus.     Conjunctiva/sclera: Conjunctivae normal.   Cardiovascular:      Rate and Rhythm: Normal rate and regular rhythm.      Heart sounds: Normal heart sounds.   Pulmonary:      Effort: Pulmonary effort is normal. No respiratory distress.      Breath sounds: Normal breath sounds.   Abdominal:      General: Bowel sounds are normal. There is no distension.      Palpations: Abdomen is soft. There is no mass.      Tenderness: There is no abdominal tenderness. There is no guarding or rebound.      Comments: GAY with melena.    Musculoskeletal:         General: No tenderness.   Lymphadenopathy:      Cervical: No cervical adenopathy.   Skin:     General: Skin is warm.      Capillary Refill: Capillary refill takes less than 2 seconds.      Findings: No rash.   Neurological:      Mental Status: She is alert and oriented to person, place, and time.   Psychiatric:         Behavior: Behavior normal.         Thought Content: Thought content normal.         Judgment: Judgment normal.         Significant Labs:  All pertinent lab results from the last 24 hours have been reviewed.    Significant Imaging:  Imaging results within the past 24 hours have been reviewed.    Assessment/Plan:     Gastrointestinal hemorrhage  Ms  Christiano is a 79 year old female with history of HFpEF, SVT s/p ablation, HTN, HLD, COPD, non-obstructive CAD, and CKD3, perforated appendicitis (8/2019) who is presenting to hospital with concern for syncope and GI bleed.    History limited, presenting after episode of syncope, labs notable for decline in H&H 11 -> 7.5. Concern for UGIB.    Plan  - NPO  - continue IV resuscitation  - protonix 40mg BID  - EGD in AM  - maintain 2 large bore peripheral IV  - trend H&H, maintain hgb > 7          Thank you for your consult. I will follow-up with patient. Please contact us if you have any additional questions.    Jurgen Mc MD  Gastroenterology  [facility]

## 2020-08-03 NOTE — ED TRIAGE NOTES
Patient states she fell this morning around 530 am.  Patient states her walker got away from her and she believes she passed out.  Patient denies hitting her head, complains of pain of left rib pain, left knee pain.

## 2020-08-03 NOTE — ED NOTES
Pt requesting something for pain 6/10 for her knee. Dr. Anthony messaged on secure chat. Awaiting further orders.

## 2020-08-03 NOTE — ASSESSMENT & PLAN NOTE
Ms Alvarado is a 79 year old female with history of HFpEF, SVT s/p ablation, HTN, HLD, COPD, non-obstructive CAD, and CKD3, perforated appendicitis (8/2019) who is presenting to hospital with concern for syncope and GI bleed.    History limited, presenting after episode of syncope, labs notable for decline in H&H 11 -> 7.5. Concern for UGIB.    Plan  - NPO  - continue IV resuscitation  - protonix 40mg BID  - EGD in AM  - maintain 2 large bore peripheral IV  - trend H&H, maintain hgb > 7

## 2020-08-03 NOTE — ED PROVIDER NOTES
Encounter Date: 8/3/2020       History     Chief Complaint   Patient presents with    Loss of Consciousness     i had my walker, i think i blacked out for second and fell, pain under L breast and L knee      The patient is an 79 year old female who has a past medical history significant for HTN, CAD, CHF, pacemaker, and arthritis. She presents to the ER for an emergent evaluation due to acute dizziness and fall. She states that yesterday she felt  More fatigued than usual. She states that last night she slept poorly. She states that she woke up around 5 am feeling thirsty and went to the kitchen. She states that she was using her walker to ambulate until she reached the kitchen, at which point she placed it aside to fix herself a glass of water. It was at this point that she felt dizzy and fell down. She states that did not pass out or lose consciousness at any point. She denies hitting her head or HA. She is c/o left knee pain and left rib pain. She states that these pains are new. She states that she was able to get up and stand with the help of her daughter who lives with her. She states that she continues to feel dizzy if she sits up or stands up abruptly.          Review of patient's allergies indicates:  No Known Allergies  Past Medical History:   Diagnosis Date    Arthritis     CHF (congestive heart failure)     Coronary artery disease     Hyperlipidemia     Hypertension      Past Surgical History:   Procedure Laterality Date    A-V CARDIAC PACEMAKER INSERTION N/A 7/9/2018    Procedure: INSERTION, CARDIAC PACEMAKER, DUAL CHAMBER;  Surgeon: Archie Montez MD;  Location: [facility] CATH LAB;  Service: Cardiology;  Laterality: N/A;  SVT, RFA, +/- Dual PPM or IGLESIA CROCKER MDT, MAC, MO, 3 Prep    HYSTERECTOMY N/A     INSERTION OF IMPLANTABLE LOOP RECORDER N/A 7/9/2018    Procedure: PLACEMENT-LOOP RECORDER;  Surgeon: Archie Montez MD;  Location: [facility] CATH LAB;  Service: Cardiology;  Laterality: N/A;  SVT, RFA, +/-  Dual PPM or ILR, IGLESIA, MDT, MAC, [place], 3 Prep     No family history on file.  Social History     Tobacco Use    Smoking status: Former Smoker     Packs/day: 0.50     Quit date: 2013     Years since quittin.2    Smokeless tobacco: Never Used   Substance Use Topics    Alcohol use: No    Drug use: No     Review of Systems   Constitutional: Negative for chills and fever.   HENT: Negative for sore throat and trouble swallowing.    Eyes: Negative for pain and visual disturbance.   Respiratory: Negative for cough, chest tightness and shortness of breath.    Cardiovascular: Positive for chest pain. Negative for palpitations and leg swelling.   Gastrointestinal: Negative for abdominal pain, blood in stool, diarrhea, nausea and vomiting.   Genitourinary: Negative for decreased urine volume and difficulty urinating.   Musculoskeletal: Positive for arthralgias and joint swelling. Negative for back pain and neck pain.   Skin: Negative for color change, rash and wound.   Neurological: Positive for dizziness. Negative for tremors, seizures, syncope, facial asymmetry, speech difficulty, weakness, numbness and headaches.   Psychiatric/Behavioral: Negative for confusion.       Physical Exam     Initial Vitals [20 0937]   BP Pulse Resp Temp SpO2   (!) 122/59 80 18 98.5 °F (36.9 °C) 99 %      MAP       --         Physical Exam    Nursing note and vitals reviewed.  Constitutional: She appears well-developed and well-nourished. She is not diaphoretic.   She is alert and interactive.    HENT:   Head: Normocephalic and atraumatic.   Mouth/Throat: Oropharynx is clear and moist.   Eyes: Conjunctivae and EOM are normal. Pupils are equal, round, and reactive to light.   Atraumatic.    Neck: Normal range of motion.   Non-tender.    Cardiovascular: Normal rate and intact distal pulses.   Pulmonary/Chest: Breath sounds normal. No respiratory distress.   Mild left lower rib pain to palpation; no traumatic isaias/ecchymosis to chest  wall; no deformity; palpation reproduces pain.        Abdominal: Soft. She exhibits no distension. There is no abdominal tenderness. There is no rebound and no guarding.   Benign abdomen. No pain to palpation of spleen.    Genitourinary:    Genitourinary Comments: GAY: Female RN present throughout entire exam as a chaperone. No hemorrhoid appreciated. Normal tone. No impaction. Non-tender to digital exam. There is copious black-maroon stool.      Musculoskeletal:      Comments: Left knee pain reported during passive ROM; no deformity; no joint effusion; FROM observed; no laxity appreciated; no swelling or ecchymosis.   Neurological: She is alert and oriented to person, place, and time. She has normal strength. No sensory deficit.   No facial droop. No dysarthric speech. 5/5 strength extremities x 4.    Skin: Skin is warm and dry. No rash noted.   Psychiatric: She has a normal mood and affect. Her behavior is normal.         ED Course   Orthopedic Injury    Date/Time: 8/3/2020 11:37 AM  Performed by: Migue Nichole PA-C  Authorized by: Iveth Mercado MD     Location procedure was performed:  [facility] EMERGENCY DEPARTMENT  Consent Done?:  Yes  Universal Protocol:     Verbal consent obtained?: Yes      Risks and benefits: Risks, benefits and alternatives were discussed      Consent given by:  Patient    Patient states understanding of procedure being performed: Yes    Injury:     Injury location:  Knee    Location details:  Left knee    Injury type:  Fracture      Pre-procedure assessment:     Neurovascular status: Neurovascularly intact      Distal perfusion: normal      Neurological function: normal      Range of motion: normal        Selections made in this section will also lock the Injury type section above.:     Manipulation performed?: No      Immobilization:  Splint    Splint type: Knee immobilizer     Complications: No      Specimens: No    Post-procedure assessment:     Neurovascular status:  Neurovascularly intact      Distal perfusion: normal      Neurological function: normal      Range of motion: normal      Patient tolerance:  Patient tolerated the procedure well with no immediate complications  Critical Care    Date/Time: 8/3/2020 11:53 AM  Performed by: Migue Nichole PA-C  Authorized by: Iveth Mercado MD   Direct patient critical care time: 10 minutes  Additional history critical care time: 10 minutes  Ordering / reviewing critical care time: 10 minutes  Documentation critical care time: 10 minutes  Consulting other physicians critical care time: 10 minutes  Total critical care time (exclusive of procedural time) : 50 minutes  Critical care time was exclusive of separately billable procedures and treating other patients.  Critical care was necessary to treat or prevent imminent or life-threatening deterioration of the following conditions: cardiac failure and circulatory failure.  Critical care was time spent personally by me on the following activities: development of treatment plan with patient or surrogate, discussions with consultants, examination of patient, evaluation of patient's response to treatment, obtaining history from patient or surrogate, ordering and performing treatments and interventions, ordering and review of laboratory studies, ordering and review of radiographic studies, re-evaluation of patient's condition and review of old charts.  Comments: Pt with acute gastrointestinal hemorrhage and symptomatic anemia requiring emergent blood transfusion         Labs Reviewed   CBC W/ AUTO DIFFERENTIAL - Abnormal; Notable for the following components:       Result Value    RBC 2.46 (*)     Hemoglobin 7.5 (*)     Hematocrit 24.9 (*)     Mean Corpuscular Volume 101 (*)     Mean Corpuscular Hemoglobin Conc 30.1 (*)     RDW 14.8 (*)     Lymph # 0.9 (*)     Gran% 82.8 (*)     Lymph% 12.5 (*)     Mono% 3.8 (*)     All other components within normal limits   COMPREHENSIVE  METABOLIC PANEL - Abnormal; Notable for the following components:    Chloride 113 (*)     BUN, Bld 39 (*)     Calcium 8.3 (*)     Total Protein 5.6 (*)     Albumin 2.9 (*)     AST 9 (*)     ALT 6 (*)     Anion Gap 6 (*)     eGFR if  49.0 (*)     eGFR if non  42.5 (*)     All other components within normal limits   TROPONIN I   B-TYPE NATRIURETIC PEPTIDE   PROTIME-INR   APTT   SARS-COV-2 RNA AMPLIFICATION, QUAL   URINALYSIS, REFLEX TO URINE CULTURE   TYPE & SCREEN   PREPARE RBC SOFT     Results for orders placed or performed during the hospital encounter of 08/03/20   CBC auto differential   Result Value Ref Range    WBC 7.29 3.90 - 12.70 K/uL    RBC 2.46 (L) 4.00 - 5.40 M/uL    Hemoglobin 7.5 (L) 12.0 - 16.0 g/dL    Hematocrit 24.9 (L) 37.0 - 48.5 %    Mean Corpuscular Volume 101 (H) 82 - 98 fL    Mean Corpuscular Hemoglobin 30.5 27.0 - 31.0 pg    Mean Corpuscular Hemoglobin Conc 30.1 (L) 32.0 - 36.0 g/dL    RDW 14.8 (H) 11.5 - 14.5 %    Platelets 221 150 - 350 K/uL    MPV 10.2 9.2 - 12.9 fL    Immature Granulocytes 0.3 0.0 - 0.5 %    Gran # (ANC) 6.0 1.8 - 7.7 K/uL    Immature Grans (Abs) 0.02 0.00 - 0.04 K/uL    Lymph # 0.9 (L) 1.0 - 4.8 K/uL    Mono # 0.3 0.3 - 1.0 K/uL    Eos # 0.0 0.0 - 0.5 K/uL    Baso # 0.02 0.00 - 0.20 K/uL    nRBC 0 0 /100 WBC    Gran% 82.8 (H) 38.0 - 73.0 %    Lymph% 12.5 (L) 18.0 - 48.0 %    Mono% 3.8 (L) 4.0 - 15.0 %    Eosinophil% 0.3 0.0 - 8.0 %    Basophil% 0.3 0.0 - 1.9 %    Differential Method Automated    Comprehensive metabolic panel   Result Value Ref Range    Sodium 142 136 - 145 mmol/L    Potassium 4.0 3.5 - 5.1 mmol/L    Chloride 113 (H) 95 - 110 mmol/L    CO2 23 23 - 29 mmol/L    Glucose 105 70 - 110 mg/dL    BUN, Bld 39 (H) 8 - 23 mg/dL    Creatinine 1.2 0.5 - 1.4 mg/dL    Calcium 8.3 (L) 8.7 - 10.5 mg/dL    Total Protein 5.6 (L) 6.0 - 8.4 g/dL    Albumin 2.9 (L) 3.5 - 5.2 g/dL    Total Bilirubin 0.3 0.1 - 1.0 mg/dL    Alkaline Phosphatase 59  55 - 135 U/L    AST 9 (L) 10 - 40 U/L    ALT 6 (L) 10 - 44 U/L    Anion Gap 6 (L) 8 - 16 mmol/L    eGFR if African American 49.0 (A) >60 mL/min/1.73 m^2    eGFR if non  42.5 (A) >60 mL/min/1.73 m^2   Troponin I   Result Value Ref Range    Troponin I 0.015 0.000 - 0.026 ng/mL   Brain natriuretic peptide   Result Value Ref Range    BNP 39 0 - 99 pg/mL   Protime-INR   Result Value Ref Range    Prothrombin Time 10.5 9.0 - 12.5 sec    INR 0.9 0.8 - 1.2   APTT   Result Value Ref Range    aPTT <21.0 21.0 - 32.0 sec   COVID-19 Rapid Screening   Result Value Ref Range    SARS-CoV-2 RNA, Amplification, Qual Negative Negative   Type & Screen   Result Value Ref Range    Group & Rh A POS     Indirect Yeyo NEG    Prepare RBC 1 Unit   Result Value Ref Range    UNIT NUMBER [id number]     Product Code [id number]     DISPENSE STATUS ISSUED     CODING SYSTEM [id number]     Unit Blood Type Code 0600     Unit Blood Type A NEG     Unit Expiration [id number]        EKG Readings: (Independently Interpreted)   Initial Reading: No STEMI. Rhythm: Normal Sinus Rhythm. Heart Rate: 69. ST Segments: Normal ST Segments. T Waves: Normal.     ECG Results          EKG 12-lead (In process)  Result time 08/03/20 12:24:25    In process by Interface, Lab In [facility] (08/03/20 12:24:25)                 Narrative:    Test Reason : R55,    Vent. Rate : 069 BPM     Atrial Rate : 069 BPM     P-R Int : 168 ms          QRS Dur : 070 ms      QT Int : 390 ms       P-R-T Axes : 111 007 110 degrees     QTc Int : 417 ms    Normal sinus rhythm  Nonspecific T wave abnormality  Abnormal ECG  When compared with ECG of 29-JUL-2020 07:52,  No significant change was found    Referred By: [email or web address]   SELF           Confirmed By:                             Imaging Results          CT Knee Without Contrast Left (Final result)  Result time 08/03/20 13:06:42    Final result by Amado Womack MD (08/03/20 13:06:42)                 Impression:      1. CT  of left knee.  No fracture or dislocation identified.  2. DJD with small joint effusion.  Baker's cyst present.      Electronically signed by: Amado Womack MD  Date:    08/03/2020  Time:    13:06             Narrative:    EXAMINATION:  CT KNEE WITHOUT CONTRAST LEFT    CLINICAL HISTORY:  Knee pain, positive xray;    TECHNIQUE:  CT of the left knee performed at 2.0 mm sections without contrast.  Sagittal and coronal reconstructions were done.    COMPARISON:  Left knee radiograph today.    FINDINGS:  Skeletal structures show no dislocation or definite fracture. The fracture suspected at tibial plateau on preceding radiograph is not confirmed.  Degenerative joint disease is evident at the knee with marginal spurring at multiple positions and mild narrowing of the patellofemoral and lateral tibiofemoral joint spaces.  Small joint effusion is present, and approximate 4 cm long Baker's cyst is seen posteriorly.  Distal medullary canal of the femur shows  several small calcifications, could represent bone infarct or incidental enchondroma.  Soft tissues at the knees show no hematoma.  Atherosclerosis calcifications are seen along the femoral and popliteal arteries.  Visualized proximal calf musculature has some involution.                               X-Ray Ribs 2 View Left (Final result)  Result time 08/03/20 10:48:30    Final result by Shelton Lang III, MD (08/03/20 10:48:30)                 Impression:      No definite trauma seen.      Electronically signed by: Shelton Lang MD  Date:    08/03/2020  Time:    10:48             Narrative:    EXAMINATION:  XR RIBS 2 VIEW LEFT    CLINICAL HISTORY:  Pleurodynia    FINDINGS:  There is a loop recorder.  No pneumothorax pleural fluid or lung contusion seen.  No rib fracture or rib lesion seen.                               X-Ray Knee 3 View Left (Final result)  Result time 08/03/20 10:53:28    Final result by Esteban Thompson MD (08/03/20 10:53:28)                  Impression:      1.  Questionable subtle non depressed lateral tibial plateau fracture versus projectional artifact since it cannot be further documented on the lateral exam.    2.  Osteoarthritic changes and questionable minimal suprapatellar bursal effusion      Electronically signed by: Esteban Thompson  Date:    08/03/2020  Time:    10:53             Narrative:    EXAMINATION:  XR KNEE 3 VIEW LEFT    CLINICAL HISTORY:  Unspecified fall, initial encounter    TECHNIQUE:  AP, lateral, and Merchant views of the left knee were performed.    COMPARISON:  None    FINDINGS:  On the AP exam, questionable nondepressed left tibial plateau fracture.  This could relate to projectional artifact.  This is not confirmed on the lateral exam.  Clinical correlation.  No other fractures.  No narrowing of the tibiofemoral or patellofemoral joint spaces.  Periarticular spurring more so patellofemoral joint space than tibiofemoral joint space.  Questionable trace suprapatellar bursal effusion with no prepatellar soft tissue swelling.                               X-Ray Chest PA And Lateral (Final result)  Result time 08/03/20 10:46:05    Final result by Archie Grady MD (08/03/20 10:46:05)                 Impression:      No acute radiographic findings in the chest.      Electronically signed by: Archie Grady MD  Date:    08/03/2020  Time:    10:46             Narrative:    EXAMINATION:  XR CHEST PA AND LATERAL    CLINICAL HISTORY:  Dizziness and giddiness    TECHNIQUE:  PA and lateral views of the chest were performed.    COMPARISON:  07/29/2020    FINDINGS:  There is mild enlargement of the cardiomediastinal silhouette stable from prior.  Pulmonary vascularity is within range.  There is aortic atherosclerosis.  Lungs show no evidence of significant focal consolidation, large effusion or pneumothorax.  There is a loop recorder.  Bones show degenerative changes.                                 Medical Decision Making:   History:   Old  "Medical Records: I decided to obtain old medical records.  Initial Assessment:   The patient comes to the ER for an emergent evaluation after falling this morning. She reports increased fatigue and dizziness since yesterday. C/o left knee pain and left rib pain since the fall.   Differential Diagnosis:   Anemia, GI bleeding, Electrolyte abnormality, knee injury, chest wall/rib injury, infection, volume depletion, orthostatic, CVA, dysrhythmia, cardiac event, etc   Clinical Tests:   Lab Tests: Ordered and Reviewed  Radiological Study: Ordered and Reviewed  Medical Tests: Ordered and Reviewed  ED Management:  I discussed the case in detail with the ER attending physician   Significant drop in H & H; today she is 7.5 & 24.9 compared to 11.2 & 37 from very recent labs   She is heme positive on rectal exam. She is not anti-coagulated. She denies history of GI bleeding in the past. Last colonoscopy "at least 10 years ago". She denies any abdominal pain.   Type & screen ordered. PPI ordered. She signed consent for transfusion. I unit of blood ordered. GI consult ordered.   Left knee x ray shows possible acute non-displaced tibial plateau fracture. Will place in knee immobilizer and get CT for confirmation. Orthopedic consulted - will see in the ER  Ortho states not surgical - only immobilization indicated   specifies inpatient criteria is met.    I discussed the case with hospital medicine - admit to Dr Anthony                 Attending Attestation:     Physician Attestation Statement for NP/PA:   I discussed this assessment and plan of this patient with the NP/PA, but I did not personally examine the patient. The face to face encounter was performed by the NP/PA.                                Clinical Impression:       ICD-10-CM ICD-9-CM   1. Gastrointestinal hemorrhage, unspecified gastrointestinal hemorrhage type  K92.2 578.9   2. Dizziness  R42 780.4   3. Rib pain on left side  R07.81 786.50   4. Fall at " home, initial encounter  W19.XXXA E888.9    Y92.009 E849.0   5. Symptomatic anemia  D64.9 285.9   6. Pain of left knee after injury  M25.562 719.46    T14.90XA 959.7         Disposition:   Disposition: Admitted  Condition: Serious     ED Disposition Condition    Admit                           Iveth Mercado MD  08/03/20 1357

## 2020-08-03 NOTE — ANESTHESIA PREPROCEDURE EVALUATION
[facility]  Anesthesia Pre-Operative Evaluation         Patient Name: Crystal Alvarado  YOB: 1940  MRN: [id number]    SUBJECTIVE:     Pre-operative evaluation for Procedure(s) (LRB):  EGD (ESOPHAGOGASTRODUODENOSCOPY) (N/A)     08/03/2020    Crystal Alvarado is a 79 y.o. female with history of CHF (preserved EF of 60%), SVT s/p ablation, HTN, HLD, COPD, non-obstructive CAD, and CKD3, perforated appendicitis (8/2019) who is presenting to hospital with concern for syncope and GI bleed.    On admission 122/59, 80/min. Hgb 7.5 (11.2 on 7/29), BUN 39. No Endo in system. Reports prior colonoscopy >10 years ago, no history of EGD.     Patient now presents for the above procedure(s).      LDA: None documented.       Peripheral IV - Single Lumen 08/03/20 1215 20 G Right Forearm (Active)   Site Assessment Clean;Dry;Intact 08/03/20 1215   Line Status Blood return noted 08/03/20 1215   Dressing Status Clean;Dry;Intact 08/03/20 1215   Dressing Intervention First dressing 08/03/20 1215   Number of days: 0            Drain/Device  08/13/19 1059 Right lower back (Active)   Number of days: 356        Prev airway: None documented.    Drips: None documented       Patient Active Problem List   Diagnosis    Syncope    Tobacco abuse    Pain in the chest    SSS (sick sinus syndrome)    Dizziness    Light headedness    Essential hypertension    Hyperlipidemia    Coronary artery disease involving native coronary artery of native heart without angina pectoris    CKD (chronic kidney disease) stage 3, GFR 30-59 ml/min    Normocytic anemia    Supraventricular tachycardia    Postural dizziness with presyncope    Hypokalemia    Physical deconditioning    Heart failure with preserved left ventricular function (HFpEF)    Orthostatic hypotension    Pre-syncope    Concussion with no loss of consciousness    Closed fracture of acromial end of clavicle    SVT (supraventricular tachycardia)    History  of radiofrequency ablation (RFA) procedure for cardiac arrhythmia    Status post placement of implantable loop recorder    Closed fracture of right wrist    Wrist fracture    Appendicitis with perforation    Hypomagnesemia    Aortic stenosis    Gastrointestinal hemorrhage       Review of patient's allergies indicates:  No Known Allergies    Current Outpatient Medications:    Current Facility-Administered Medications:     0.9%  NaCl infusion (for blood administration), , Intravenous, Q24H PRN, Migue Nichole PA-C    sodium chloride 0.9% flush 10 mL, 10 mL, Intravenous, PRN, Migue Nichole PA-C    Current Outpatient Medications:     amLODIPine (NORVASC) 5 MG tablet, Take 1 tablet (5 mg total) by mouth once daily., Disp: 90 tablet, Rfl: 3    aspirin 81 MG Chew, Take 81 mg by mouth once daily., Disp: , Rfl: 4    atorvastatin (LIPITOR) 20 MG tablet, Take 20 mg by mouth every evening., Disp: , Rfl: 4    azelastine (ASTELIN) 137 mcg (0.1 %) nasal spray, , Disp: , Rfl:     BREO ELLIPTA 100-25 mcg/dose diskus inhaler, INHALE 1 PUFF BY MOUTH ONCE A DAY, Disp: , Rfl: 3    docusate sodium (COLACE) 100 MG capsule, Take 1 capsule (100 mg total) by mouth 2 (two) times daily., Disp: 60 capsule, Rfl: 0    ergocalciferol (ERGOCALCIFEROL) 50,000 unit Cap, Take 50,000 Units by mouth every 7 days., Disp: , Rfl:     fluticasone propionate (FLONASE) 50 mcg/actuation nasal spray, , Disp: , Rfl:     HYDROcodone-acetaminophen (NORCO) 5-325 mg per tablet, Take 1 tablet by mouth 2 (two) times daily as needed., Disp: , Rfl:     losartan (COZAAR) 100 MG tablet, Take 1 tablet (100 mg total) by mouth once daily., Disp: 90 tablet, Rfl: 3    nitroGLYCERIN (NITROSTAT) 0.4 MG SL tablet, Place 1 tablet (0.4 mg total) under the tongue every 5 (five) minutes as needed for Chest pain (and notify MD)., Disp: 25 tablet, Rfl: 5    pregabalin (LYRICA) 25 MG capsule, TAKE 1 CAPSULE BY MOUTH 2 TIMES A DAY FOR PAIN, Disp: , Rfl:      traMADol (ULTRAM) 50 mg tablet, Take 1 tablet (50 mg total) by mouth every 6 (six) hours as needed for Pain., Disp: 20 tablet, Rfl: 0    Past Surgical History:   Procedure Laterality Date    A-V CARDIAC PACEMAKER INSERTION N/A 2018    Procedure: INSERTION, CARDIAC PACEMAKER, DUAL CHAMBER;  Surgeon: Archie Montez MD;  Location: [facility] CATH LAB;  Service: Cardiology;  Laterality: N/A;  SVT, RFA, +/- Dual PPM or ILR, IGLESIA, MDT, MAC, [place], 3 Prep    HYSTERECTOMY N/A     INSERTION OF IMPLANTABLE LOOP RECORDER N/A 2018    Procedure: PLACEMENT-LOOP RECORDER;  Surgeon: Archie Montez MD;  Location: [facility] CATH LAB;  Service: Cardiology;  Laterality: N/A;  SVT, RFA, +/- Dual PPM or ILR, IGLESIA, MDT, MAC, [place], 3 Prep       Social History     Socioeconomic History    Marital status: Single     Spouse name: Not on file    Number of children: Not on file    Years of education: Not on file    Highest education level: Not on file   Occupational History    Not on file   Social Needs    Financial resource strain: Not on file    Food insecurity     Worry: Not on file     Inability: Not on file    Transportation needs     Medical: Not on file     Non-medical: Not on file   Tobacco Use    Smoking status: Former Smoker     Packs/day: 0.50     Quit date: 2013     Years since quittin.2    Smokeless tobacco: Never Used   Substance and Sexual Activity    Alcohol use: No    Drug use: No    Sexual activity: Never   Lifestyle    Physical activity     Days per week: Not on file     Minutes per session: Not on file    Stress: Not on file   Relationships    Social connections     Talks on phone: Not on file     Gets together: Not on file     Attends [employer] service: Not on file     Active member of club or organization: Not on file     Attends meetings of clubs or organizations: Not on file     Relationship status: Not on file   Other Topics Concern    Not on file   Social History Narrative    Not on file        OBJECTIVE:     Vital Signs Range (Last 24H):  Temp:  [36.9 °C (98.5 °F)-37.1 °C (98.8 °F)]   Pulse:  [62-85]   Resp:  [12-18]   BP: (114-186)/(59-99)   SpO2:  [99 %-100 %]       Significant Labs:  Lab Results   Component Value Date    WBC 7.29 08/03/2020    HGB 7.5 (L) 08/03/2020    HCT 24.9 (L) 08/03/2020     08/03/2020    CHOL 147 02/10/2020    TRIG 66 02/10/2020    HDL 53 02/10/2020    ALT 6 (L) 08/03/2020    AST 9 (L) 08/03/2020     08/03/2020    K 4.0 08/03/2020     (H) 08/03/2020    CREATININE 1.2 08/03/2020    BUN 39 (H) 08/03/2020    CO2 23 08/03/2020    TSH 1.050 02/10/2020    INR 0.9 08/03/2020    HGBA1C 5.2 11/20/2017       Diagnostic Studies: No relevant studies.    EKG:   Results for orders placed or performed during the hospital encounter of 08/03/20   EKG 12-lead    Collection Time: 08/03/20  9:56 AM    Narrative    Test Reason : R55,    Vent. Rate : 069 BPM     Atrial Rate : 069 BPM     P-R Int : 168 ms          QRS Dur : 070 ms      QT Int : 390 ms       P-R-T Axes : 111 007 110 degrees     QTc Int : 417 ms    Normal sinus rhythm  Nonspecific T wave abnormality  Abnormal ECG  When compared with ECG of 29-JUL-2020 07:52,  No significant change was found    Referred By: [email or web address]   SELF           Confirmed By:        2D ECHO:  TTE:  No results found for this or any previous visit.    CHINYERE:  No results found for this or any previous visit.    ASSESSMENT/PLAN:         Anesthesia Evaluation    I have reviewed the Patient Summary Reports.         Review of Systems  Anesthesia Hx:  No problems with previous Anesthesia  History of prior surgery of interest to airway management or planning:  Denies Personal Hx of Anesthesia complications.   Social:  Non-Smoker    EENT/Dental:EENT/Dental Normal   Cardiovascular:   Hypertension CAD   CHF    Pulmonary:   Shortness of breath    Renal/:   Chronic Renal Disease    Neurological:  Neurology Normal        Physical Exam  General:  Well  nourished, Obesity    Airway/Jaw/Neck:  AIRWAY FINDINGS: Normal      Eyes/Ears/Nose:  EYES/EARS/NOSE FINDINGS: Normal   Dental:  Dental Findings: Periodontal disease, Severe     Heart/Vascular:  Heart Findings: Normal       Mental Status:  Mental Status Findings: Normal        Anesthesia Plan  Type of Anesthesia, risks & benefits discussed:  Anesthesia Type:  general, MAC  Patient's Preference: General/MAC  Intra-op Monitoring Plan: standard ASA monitors  Intra-op Monitoring Plan Comments:   Post Op Pain Control Plan: multimodal analgesia, IV/PO Opioids PRN and per primary service following discharge from PACU  Post Op Pain Control Plan Comments: IV meds as needed  Induction:   IV  Beta Blocker:  Patient is not currently on a Beta-Blocker (No further documentation required).       Informed Consent: Patient understands risks and agrees with Anesthesia plan.  Questions answered. Anesthesia consent signed with patient.  ASA Score: 3     Day of Surgery Review of History & Physical:    H&P update referred to the provider.     Anesthesia Plan Notes: Discussed anesthetic options, pt understands and agrees with plan        Ready For Surgery From Anesthesia Perspective.

## 2020-08-03 NOTE — ED NOTES
Crystal Alvarado, a 79 y.o. female presents to the ED via personal transportation with CC patient states that she became dizzy and her walker was out of reach and fell.  Patient did not have LOC.  Patient has complaints of dizziness.        Patient identifiers verified verbally with patient and correct for Crystal Alvarado.    LOC/ APPEARANCE: The patient is AAOx4. Pt is speaking appropriately, no slurred speech. Pt changed into hospital gown. Continuous cardiac monitor, cont pulse ox, and auto BP cuff applied to patient. Pt is clean and well groomed. No JVD visible. Pt reports pain level of 6 to her left ribs and left knee. Pt updated on POC. Bed low and locked with side rails up x2, call bell in pt reach.  SKIN: Skin is warm dry and intact, and color is consistent with ethnicity. Capillary refill <3 seconds. No breakdown or brusing visible. Mucus membranes moist, acyanotic.  RESPIRATORY: Airway is open and patent. Respirations-spontaneous, unlabored, regular rate, equal bilaterally on inspiration and expiration. No accessory muscle use noted. Lungs clear to auscultation in all fields bilaterally anterior and posterior.   CARDIAC: Patient has regular heart rate.  No peripheral edema noted, and patient has no c/o chest pain. Peripheral pulses present equal and strong throughout.  ABDOMEN: Soft and non-tender to palpation with no distention noted. Normoactive bowel sounds x4 quadrants. Pt has no complaints of abnormal bowel movements, denies blood in stool. Pt reports normal appetite.   NEUROLOGIC: Eyes open spontaneously and facial expression symmetrical. Pt behavior appropriate to situation, and pt follows commands. Pt reports sensation present in all extremities when touched with a finger, denies any numbness or tingling. PERRLA  MUSCULOSKELETAL: Spontaneous movement noted to all extremities. Hand  equal and leg strength strong +5 bilaterally. Patient is complaining of generalized weakness and dizziness.     : No complaints of frequency, burning, urgency or blood in the urine. No complaints of incontinence.

## 2020-08-03 NOTE — SUBJECTIVE & OBJECTIVE
Past Medical History:   Diagnosis Date    Arthritis     CHF (congestive heart failure)     Coronary artery disease     Hyperlipidemia     Hypertension        Past Surgical History:   Procedure Laterality Date    A-V CARDIAC PACEMAKER INSERTION N/A 2018    Procedure: INSERTION, CARDIAC PACEMAKER, DUAL CHAMBER;  Surgeon: Archie Montez MD;  Location: [facility] CATH LAB;  Service: Cardiology;  Laterality: N/A;  SVT, RFA, +/- Dual PPM or ILR, IGLESIA, MDT, MAC, [place], 3 Prep    HYSTERECTOMY N/A     INSERTION OF IMPLANTABLE LOOP RECORDER N/A 2018    Procedure: PLACEMENT-LOOP RECORDER;  Surgeon: Archie Montez MD;  Location: [facility] CATH LAB;  Service: Cardiology;  Laterality: N/A;  SVT, RFA, +/- Dual PPM or ILR, IGLESIA, MDT, MAC, [place], 3 Prep       Review of patient's allergies indicates:  No Known Allergies  Family History     None        Tobacco Use    Smoking status: Former Smoker     Packs/day: 0.50     Quit date: 2013     Years since quittin.2    Smokeless tobacco: Never Used   Substance and Sexual Activity    Alcohol use: No    Drug use: No    Sexual activity: Never     Review of Systems   Constitutional: Negative for activity change, appetite change, chills, diaphoresis, fatigue, fever and unexpected weight change.   HENT: Negative for sore throat and trouble swallowing.    Eyes: Negative for visual disturbance.   Respiratory: Negative for chest tightness and shortness of breath.    Cardiovascular: Negative for chest pain and leg swelling.   Gastrointestinal: Negative for abdominal distention, abdominal pain, anal bleeding, blood in stool, constipation, diarrhea, nausea and vomiting.   Genitourinary: Negative for dysuria and hematuria.   Musculoskeletal: Negative for arthralgias and myalgias.   Skin: Negative for rash.   Neurological: Negative for dizziness, weakness, light-headedness and headaches.   Psychiatric/Behavioral: Negative for agitation and confusion.     Objective:     Vital Signs (Most  Recent):  Temp: 98.8 °F (37.1 °C) (08/03/20 1356)  Pulse: 65 (08/03/20 1356)  Resp: 18 (08/03/20 1356)  BP: (!) 147/64 (08/03/20 1356)  SpO2: 100 % (08/03/20 1356) Vital Signs (24h Range):  Temp:  [98.5 °F (36.9 °C)-98.8 °F (37.1 °C)] 98.8 °F (37.1 °C)  Pulse:  [62-85] 65  Resp:  [12-18] 18  SpO2:  [99 %-100 %] 100 %  BP: (114-186)/(59-99) 147/64     Weight: 80.7 kg (178 lb) (08/03/20 0937)  Body mass index is 31.53 kg/m².      Intake/Output Summary (Last 24 hours) at 8/3/2020 1359  Last data filed at 8/3/2020 1333  Gross per 24 hour   Intake 304 ml   Output --   Net 304 ml       Lines/Drains/Airways     Drain                 Drain/Device  08/13/19 1059 Right lower back 356 days          Peripheral Intravenous Line                 Peripheral IV - Single Lumen 08/03/20 1215 20 G Right Forearm less than 1 day                Physical Exam  Vitals signs and nursing note reviewed.   Constitutional:       General: She is not in acute distress.     Appearance: She is well-developed. She is not diaphoretic.      Comments: Calm cooperative, no distress.   HENT:      Head: Normocephalic and atraumatic.      Mouth/Throat:      Pharynx: No oropharyngeal exudate.   Eyes:      General: No scleral icterus.     Conjunctiva/sclera: Conjunctivae normal.   Cardiovascular:      Rate and Rhythm: Normal rate and regular rhythm.      Heart sounds: Normal heart sounds.   Pulmonary:      Effort: Pulmonary effort is normal. No respiratory distress.      Breath sounds: Normal breath sounds.   Abdominal:      General: Bowel sounds are normal. There is no distension.      Palpations: Abdomen is soft. There is no mass.      Tenderness: There is no abdominal tenderness. There is no guarding or rebound.      Comments: GAY with melena.    Musculoskeletal:         General: No tenderness.   Lymphadenopathy:      Cervical: No cervical adenopathy.   Skin:     General: Skin is warm.      Capillary Refill: Capillary refill takes less than 2 seconds.       Findings: No rash.   Neurological:      Mental Status: She is alert and oriented to person, place, and time.   Psychiatric:         Behavior: Behavior normal.         Thought Content: Thought content normal.         Judgment: Judgment normal.         Significant Labs:  All pertinent lab results from the last 24 hours have been reviewed.    Significant Imaging:  Imaging results within the past 24 hours have been reviewed.

## 2020-08-03 NOTE — CARE UPDATE
Crystal Alvarado is a 79 y.o. female who presents after fall earlier today. The patient is complaining of minor L anteromedial knee pain. The patient does have significant bilateral knee OA at baseline. The patient was able to bear weight after fall. The patient is able to actively and passively range her knee 0-90 with minimal pain, and some TTP over anteromedial knee at the area where she bumped it. The XR and CT scan were negative for acute fractures.     -WBAT LLE

## 2020-08-04 ENCOUNTER — ANESTHESIA EVENT (OUTPATIENT)
Dept: ENDOSCOPY | Facility: HOSPITAL | Age: 80
DRG: 378 | End: 2020-08-04
Payer: MEDICARE

## 2020-08-04 ENCOUNTER — ANESTHESIA (OUTPATIENT)
Dept: ENDOSCOPY | Facility: HOSPITAL | Age: 80
DRG: 378 | End: 2020-08-04
Payer: MEDICARE

## 2020-08-04 PROBLEM — D62 ACUTE BLOOD LOSS ANEMIA: Status: ACTIVE | Noted: 2020-08-04

## 2020-08-04 LAB
ANION GAP SERPL CALC-SCNC: 6 MMOL/L (ref 8–16)
BACTERIA #/AREA URNS AUTO: NORMAL /HPF
BASOPHILS # BLD AUTO: 0.04 K/UL (ref 0–0.2)
BASOPHILS NFR BLD: 0.6 % (ref 0–1.9)
BILIRUB UR QL STRIP: NEGATIVE
BUN SERPL-MCNC: 31 MG/DL (ref 8–23)
CALCIUM SERPL-MCNC: 8.6 MG/DL (ref 8.7–10.5)
CHLORIDE SERPL-SCNC: 111 MMOL/L (ref 95–110)
CLARITY UR REFRACT.AUTO: CLEAR
CO2 SERPL-SCNC: 23 MMOL/L (ref 23–29)
COLOR UR AUTO: YELLOW
CREAT SERPL-MCNC: 1.1 MG/DL (ref 0.5–1.4)
DIFFERENTIAL METHOD: ABNORMAL
EOSINOPHIL # BLD AUTO: 0.1 K/UL (ref 0–0.5)
EOSINOPHIL NFR BLD: 1.6 % (ref 0–8)
ERYTHROCYTE [DISTWIDTH] IN BLOOD BY AUTOMATED COUNT: 17.8 % (ref 11.5–14.5)
EST. GFR  (AFRICAN AMERICAN): 54.4 ML/MIN/1.73 M^2
EST. GFR  (NON AFRICAN AMERICAN): 47.2 ML/MIN/1.73 M^2
GLUCOSE SERPL-MCNC: 83 MG/DL (ref 70–110)
GLUCOSE UR QL STRIP: NEGATIVE
HCT VFR BLD AUTO: 28.5 % (ref 37–48.5)
HGB BLD-MCNC: 8.5 G/DL (ref 12–16)
HGB UR QL STRIP: ABNORMAL
HYALINE CASTS UR QL AUTO: 1 /LPF
IMM GRANULOCYTES # BLD AUTO: 0.02 K/UL (ref 0–0.04)
IMM GRANULOCYTES NFR BLD AUTO: 0.3 % (ref 0–0.5)
KETONES UR QL STRIP: NEGATIVE
LEUKOCYTE ESTERASE UR QL STRIP: NEGATIVE
LYMPHOCYTES # BLD AUTO: 1.3 K/UL (ref 1–4.8)
LYMPHOCYTES NFR BLD: 20.9 % (ref 18–48)
MAGNESIUM SERPL-MCNC: 2 MG/DL (ref 1.6–2.6)
MCH RBC QN AUTO: 28.8 PG (ref 27–31)
MCHC RBC AUTO-ENTMCNC: 29.8 G/DL (ref 32–36)
MCV RBC AUTO: 97 FL (ref 82–98)
MICROSCOPIC COMMENT: NORMAL
MONOCYTES # BLD AUTO: 0.3 K/UL (ref 0.3–1)
MONOCYTES NFR BLD: 5.4 % (ref 4–15)
NEUTROPHILS # BLD AUTO: 4.5 K/UL (ref 1.8–7.7)
NEUTROPHILS NFR BLD: 71.2 % (ref 38–73)
NITRITE UR QL STRIP: NEGATIVE
NRBC BLD-RTO: 0 /100 WBC
PH UR STRIP: 5 [PH] (ref 5–8)
PLATELET # BLD AUTO: 216 K/UL (ref 150–350)
PMV BLD AUTO: 10.2 FL (ref 9.2–12.9)
POTASSIUM SERPL-SCNC: 3.9 MMOL/L (ref 3.5–5.1)
PROT UR QL STRIP: NEGATIVE
RBC # BLD AUTO: 2.95 M/UL (ref 4–5.4)
RBC #/AREA URNS AUTO: 0 /HPF (ref 0–4)
SODIUM SERPL-SCNC: 140 MMOL/L (ref 136–145)
SP GR UR STRIP: 1.02 (ref 1–1.03)
SQUAMOUS #/AREA URNS AUTO: 1 /HPF
URN SPEC COLLECT METH UR: ABNORMAL
WBC # BLD AUTO: 6.28 K/UL (ref 3.9–12.7)
WBC #/AREA URNS AUTO: 0 /HPF (ref 0–5)

## 2020-08-04 PROCEDURE — 25000003 PHARM REV CODE 250: Performed by: INTERNAL MEDICINE

## 2020-08-04 PROCEDURE — 94761 N-INVAS EAR/PLS OXIMETRY MLT: CPT

## 2020-08-04 PROCEDURE — 88305 TISSUE EXAM BY PATHOLOGIST: CPT | Performed by: PATHOLOGY

## 2020-08-04 PROCEDURE — 27201012 HC FORCEPS, HOT/COLD, DISP: Performed by: INTERNAL MEDICINE

## 2020-08-04 PROCEDURE — 99233 SBSQ HOSP IP/OBS HIGH 50: CPT | Mod: ,,, | Performed by: HOSPITALIST

## 2020-08-04 PROCEDURE — C9113 INJ PANTOPRAZOLE SODIUM, VIA: HCPCS | Performed by: HOSPITALIST

## 2020-08-04 PROCEDURE — 85025 COMPLETE CBC W/AUTO DIFF WBC: CPT

## 2020-08-04 PROCEDURE — D9220A PRA ANESTHESIA: Mod: ANES,,, | Performed by: ANESTHESIOLOGY

## 2020-08-04 PROCEDURE — 25000242 PHARM REV CODE 250 ALT 637 W/ HCPCS: Performed by: HOSPITALIST

## 2020-08-04 PROCEDURE — 88305 TISSUE EXAM BY PATHOLOGIST: ICD-10-PCS | Mod: 26,,, | Performed by: PATHOLOGY

## 2020-08-04 PROCEDURE — D9220A PRA ANESTHESIA: Mod: CRNA,,, | Performed by: NURSE ANESTHETIST, CERTIFIED REGISTERED

## 2020-08-04 PROCEDURE — 63600175 PHARM REV CODE 636 W HCPCS: Performed by: ANESTHESIOLOGY

## 2020-08-04 PROCEDURE — D9220A PRA ANESTHESIA: ICD-10-PCS | Mod: ANES,,, | Performed by: ANESTHESIOLOGY

## 2020-08-04 PROCEDURE — 25000003 PHARM REV CODE 250: Performed by: HOSPITALIST

## 2020-08-04 PROCEDURE — 88305 TISSUE EXAM BY PATHOLOGIST: CPT | Mod: 26,,, | Performed by: PATHOLOGY

## 2020-08-04 PROCEDURE — 36415 COLL VENOUS BLD VENIPUNCTURE: CPT

## 2020-08-04 PROCEDURE — 37000009 HC ANESTHESIA EA ADD 15 MINS: Performed by: INTERNAL MEDICINE

## 2020-08-04 PROCEDURE — 25000003 PHARM REV CODE 250: Performed by: NURSE PRACTITIONER

## 2020-08-04 PROCEDURE — 43239 EGD BIOPSY SINGLE/MULTIPLE: CPT | Mod: ,,, | Performed by: INTERNAL MEDICINE

## 2020-08-04 PROCEDURE — 37000008 HC ANESTHESIA 1ST 15 MINUTES: Performed by: INTERNAL MEDICINE

## 2020-08-04 PROCEDURE — 63600175 PHARM REV CODE 636 W HCPCS: Performed by: HOSPITALIST

## 2020-08-04 PROCEDURE — 94640 AIRWAY INHALATION TREATMENT: CPT

## 2020-08-04 PROCEDURE — 43239 PR EGD, FLEX, W/BIOPSY, SGL/MULTI: ICD-10-PCS | Mod: ,,, | Performed by: INTERNAL MEDICINE

## 2020-08-04 PROCEDURE — 63600175 PHARM REV CODE 636 W HCPCS: Performed by: NURSE ANESTHETIST, CERTIFIED REGISTERED

## 2020-08-04 PROCEDURE — 11000001 HC ACUTE MED/SURG PRIVATE ROOM

## 2020-08-04 PROCEDURE — 80048 BASIC METABOLIC PNL TOTAL CA: CPT

## 2020-08-04 PROCEDURE — 43239 EGD BIOPSY SINGLE/MULTIPLE: CPT | Performed by: INTERNAL MEDICINE

## 2020-08-04 PROCEDURE — 25000003 PHARM REV CODE 250: Performed by: NURSE ANESTHETIST, CERTIFIED REGISTERED

## 2020-08-04 PROCEDURE — 83735 ASSAY OF MAGNESIUM: CPT

## 2020-08-04 PROCEDURE — D9220A PRA ANESTHESIA: ICD-10-PCS | Mod: CRNA,,, | Performed by: NURSE ANESTHETIST, CERTIFIED REGISTERED

## 2020-08-04 PROCEDURE — 99233 PR SUBSEQUENT HOSPITAL CARE,LEVL III: ICD-10-PCS | Mod: ,,, | Performed by: HOSPITALIST

## 2020-08-04 RX ORDER — LIDOCAINE HYDROCHLORIDE 20 MG/ML
INJECTION INTRAVENOUS
Status: DISCONTINUED | OUTPATIENT
Start: 2020-08-04 | End: 2020-08-04

## 2020-08-04 RX ORDER — LIDOCAINE 50 MG/G
2 PATCH TOPICAL
Status: DISCONTINUED | OUTPATIENT
Start: 2020-08-04 | End: 2020-08-08 | Stop reason: HOSPADM

## 2020-08-04 RX ORDER — PANTOPRAZOLE SODIUM 40 MG/10ML
40 INJECTION, POWDER, LYOPHILIZED, FOR SOLUTION INTRAVENOUS 2 TIMES DAILY
Status: DISCONTINUED | OUTPATIENT
Start: 2020-08-04 | End: 2020-08-06 | Stop reason: DRUGHIGH

## 2020-08-04 RX ORDER — POLYETHYLENE GLYCOL 3350, SODIUM SULFATE ANHYDROUS, SODIUM BICARBONATE, SODIUM CHLORIDE, POTASSIUM CHLORIDE 236; 22.74; 6.74; 5.86; 2.97 G/4L; G/4L; G/4L; G/4L; G/4L
4000 POWDER, FOR SOLUTION ORAL ONCE
Status: COMPLETED | OUTPATIENT
Start: 2020-08-04 | End: 2020-08-04

## 2020-08-04 RX ORDER — PROPOFOL 10 MG/ML
VIAL (ML) INTRAVENOUS
Status: DISCONTINUED | OUTPATIENT
Start: 2020-08-04 | End: 2020-08-04

## 2020-08-04 RX ORDER — FENTANYL CITRATE 50 UG/ML
25 INJECTION, SOLUTION INTRAMUSCULAR; INTRAVENOUS EVERY 5 MIN PRN
Status: DISCONTINUED | OUTPATIENT
Start: 2020-08-04 | End: 2020-08-04 | Stop reason: HOSPADM

## 2020-08-04 RX ORDER — SODIUM CHLORIDE 9 MG/ML
INJECTION, SOLUTION INTRAVENOUS CONTINUOUS PRN
Status: DISCONTINUED | OUTPATIENT
Start: 2020-08-04 | End: 2020-08-04

## 2020-08-04 RX ORDER — OXYCODONE HYDROCHLORIDE 10 MG/1
10 TABLET ORAL EVERY 4 HOURS PRN
Status: DISCONTINUED | OUTPATIENT
Start: 2020-08-04 | End: 2020-08-08 | Stop reason: HOSPADM

## 2020-08-04 RX ORDER — HYDROMORPHONE HYDROCHLORIDE 1 MG/ML
0.2 INJECTION, SOLUTION INTRAMUSCULAR; INTRAVENOUS; SUBCUTANEOUS EVERY 5 MIN PRN
Status: DISCONTINUED | OUTPATIENT
Start: 2020-08-04 | End: 2020-08-04 | Stop reason: HOSPADM

## 2020-08-04 RX ORDER — BISACODYL 5 MG
10 TABLET, DELAYED RELEASE (ENTERIC COATED) ORAL ONCE
Status: COMPLETED | OUTPATIENT
Start: 2020-08-04 | End: 2020-08-04

## 2020-08-04 RX ORDER — ONDANSETRON 2 MG/ML
4 INJECTION INTRAMUSCULAR; INTRAVENOUS ONCE AS NEEDED
Status: DISCONTINUED | OUTPATIENT
Start: 2020-08-04 | End: 2020-08-04 | Stop reason: HOSPADM

## 2020-08-04 RX ORDER — PROPOFOL 10 MG/ML
VIAL (ML) INTRAVENOUS CONTINUOUS PRN
Status: DISCONTINUED | OUTPATIENT
Start: 2020-08-04 | End: 2020-08-04

## 2020-08-04 RX ORDER — OXYCODONE HYDROCHLORIDE 10 MG/1
10 TABLET ORAL ONCE
Status: DISCONTINUED | OUTPATIENT
Start: 2020-08-04 | End: 2020-08-06

## 2020-08-04 RX ADMIN — PANTOPRAZOLE SODIUM 40 MG: 40 INJECTION, POWDER, LYOPHILIZED, FOR SOLUTION INTRAVENOUS at 08:08

## 2020-08-04 RX ADMIN — PREGABALIN 50 MG: 50 CAPSULE ORAL at 08:08

## 2020-08-04 RX ADMIN — ATORVASTATIN CALCIUM 20 MG: 20 TABLET, FILM COATED ORAL at 08:08

## 2020-08-04 RX ADMIN — PROPOFOL 40 MG: 10 INJECTION, EMULSION INTRAVENOUS at 10:08

## 2020-08-04 RX ADMIN — PANTOPRAZOLE SODIUM 40 MG: 40 INJECTION, POWDER, LYOPHILIZED, FOR SOLUTION INTRAVENOUS at 01:08

## 2020-08-04 RX ADMIN — DOCUSATE SODIUM 100 MG: 100 CAPSULE, LIQUID FILLED ORAL at 11:08

## 2020-08-04 RX ADMIN — PROPOFOL 125 MCG/KG/MIN: 10 INJECTION, EMULSION INTRAVENOUS at 10:08

## 2020-08-04 RX ADMIN — SODIUM CHLORIDE: 0.9 INJECTION, SOLUTION INTRAVENOUS at 10:08

## 2020-08-04 RX ADMIN — LIDOCAINE 1 PATCH: 50 PATCH CUTANEOUS at 04:08

## 2020-08-04 RX ADMIN — BISACODYL 10 MG: 5 TABLET, COATED ORAL at 04:08

## 2020-08-04 RX ADMIN — LIDOCAINE HYDROCHLORIDE 100 MG: 20 INJECTION, SOLUTION INTRAVENOUS at 10:08

## 2020-08-04 RX ADMIN — PREGABALIN 50 MG: 50 CAPSULE ORAL at 11:08

## 2020-08-04 RX ADMIN — LOSARTAN POTASSIUM 100 MG: 50 TABLET, FILM COATED ORAL at 11:08

## 2020-08-04 RX ADMIN — HYDRALAZINE HYDROCHLORIDE 25 MG: 25 TABLET, FILM COATED ORAL at 04:08

## 2020-08-04 RX ADMIN — DOCUSATE SODIUM 100 MG: 100 CAPSULE, LIQUID FILLED ORAL at 08:08

## 2020-08-04 RX ADMIN — FLUTICASONE FUROATE AND VILANTEROL TRIFENATATE 1 PUFF: 100; 25 POWDER RESPIRATORY (INHALATION) at 07:08

## 2020-08-04 RX ADMIN — POLYETHYLENE GLYCOL 3350, SODIUM SULFATE ANHYDROUS, SODIUM BICARBONATE, SODIUM CHLORIDE, POTASSIUM CHLORIDE 4000 ML: 236; 22.74; 6.74; 5.86; 2.97 POWDER, FOR SOLUTION ORAL at 06:08

## 2020-08-04 RX ADMIN — FENTANYL CITRATE 25 MCG: 50 INJECTION INTRAMUSCULAR; INTRAVENOUS at 11:08

## 2020-08-04 NOTE — CONSULTS
[facility]  Orthopedics  Consult Note    Patient Name: Crystal Alvarado  MRN: [id number]  Admission Date: 8/3/2020  Hospital Length of Stay: 0 days  Attending Provider: Srikanth Anthony MD  Primary Care Provider: Shantell Goff MD    Patient information was obtained from patient and ER records.     Consults  Subjective:     Principal Problem:Gastrointestinal hemorrhage    Chief Complaint:   Chief Complaint   Patient presents with    Loss of Consciousness     i had my walker, i think i blacked out for second and fell, pain under L breast and L knee         HPI: Crystal Alvarado is a 79 y.o. female HTN, CAD, CHF, pacemaker, and arthritis. She presents to the ER for an emergent evaluation due to acute dizziness and fall onto L knee. She states that she was using her walker to ambulate until she reached the kitchen, at which point she placed it aside to fix herself a glass of water, then felt dizzy and fell. She did noticed knee pain, but was able to stand again and walk to the car. Denies head trauma or LOC. The patient has mild anteromedial R knee pain at this time. She ambulated with walker at baseline. Denies current AC. Orthopaedics consulted for XR showing possible L tibial plateau fracture.       Past Medical History:   Diagnosis Date    Arthritis     CHF (congestive heart failure)     Coronary artery disease     Hyperlipidemia     Hypertension        Past Surgical History:   Procedure Laterality Date    A-V CARDIAC PACEMAKER INSERTION N/A 7/9/2018    Procedure: INSERTION, CARDIAC PACEMAKER, DUAL CHAMBER;  Surgeon: Archie Montez MD;  Location: [facility] CATH LAB;  Service: Cardiology;  Laterality: N/A;  SVT, RFA, +/- Dual PPM or IGLESIA CROCKER MDT, MAC, [place], 3 Prep    HYSTERECTOMY N/A     INSERTION OF IMPLANTABLE LOOP RECORDER N/A 7/9/2018    Procedure: PLACEMENT-LOOP RECORDER;  Surgeon: Archie Montez MD;  Location: [facility] CATH LAB;  Service: Cardiology;  Laterality: N/A;  SVT, RFA, +/- Dual PPM  or ILR, IGLESIA, MDT, MAC, [place], 3 Prep       Review of patient's allergies indicates:  No Known Allergies    Current Facility-Administered Medications   Medication    0.9%  NaCl infusion (for blood administration)    acetaminophen tablet 650 mg    atorvastatin tablet 20 mg    dextrose 50% injection 12.5 g    dextrose 50% injection 25 g    docusate sodium capsule 100 mg    [START ON 8/4/2020] fluticasone furoate-vilanteroL 100-25 mcg/dose diskus inhaler 1 puff    glucagon (human recombinant) injection 1 mg    glucose chewable tablet 16 g    glucose chewable tablet 24 g    [START ON 8/4/2020] losartan tablet 100 mg    ondansetron injection 4 mg    oxyCODONE immediate release tablet 10 mg    pregabalin capsule 50 mg    sodium chloride 0.9% flush 10 mL    sodium chloride 0.9% flush 10 mL    traMADoL tablet 50 mg     Current Outpatient Medications   Medication Sig    amLODIPine (NORVASC) 5 MG tablet Take 1 tablet (5 mg total) by mouth once daily.    aspirin 81 MG Chew Take 81 mg by mouth once daily.    atorvastatin (LIPITOR) 20 MG tablet Take 20 mg by mouth every evening.    azelastine (ASTELIN) 137 mcg (0.1 %) nasal spray     BREO ELLIPTA 100-25 mcg/dose diskus inhaler INHALE 1 PUFF BY MOUTH ONCE A DAY    docusate sodium (COLACE) 100 MG capsule Take 1 capsule (100 mg total) by mouth 2 (two) times daily.    ergocalciferol (ERGOCALCIFEROL) 50,000 unit Cap Take 50,000 Units by mouth every 7 days.    fluticasone propionate (FLONASE) 50 mcg/actuation nasal spray     HYDROcodone-acetaminophen (NORCO) 5-325 mg per tablet Take 1 tablet by mouth 2 (two) times daily as needed.    losartan (COZAAR) 100 MG tablet Take 1 tablet (100 mg total) by mouth once daily.    pregabalin (LYRICA) 25 MG capsule TAKE 1 CAPSULE BY MOUTH 2 TIMES A DAY FOR PAIN    traMADol (ULTRAM) 50 mg tablet Take 1 tablet (50 mg total) by mouth every 6 (six) hours as needed for Pain.    nitroGLYCERIN (NITROSTAT) 0.4 MG SL tablet Place 1  "tablet (0.4 mg total) under the tongue every 5 (five) minutes as needed for Chest pain (and notify MD).     Family History     None        Tobacco Use    Smoking status: Former Smoker     Packs/day: 0.50     Quit date: 2013     Years since quittin.2    Smokeless tobacco: Never Used   Substance and Sexual Activity    Alcohol use: No    Drug use: No    Sexual activity: Never     ROSper primary  Objective:     Vital Signs (Most Recent):  Temp: 98.8 °F (37.1 °C) (20 1356)  Pulse: 68 (20 1733)  Resp: 18 (20 1356)  BP: (!) 177/72 (20 1733)  SpO2: 100 % (20 1733) Vital Signs (24h Range):  Temp:  [98.5 °F (36.9 °C)-98.8 °F (37.1 °C)] 98.8 °F (37.1 °C)  Pulse:  [61-85] 68  Resp:  [12-18] 18  SpO2:  [99 %-100 %] 100 %  BP: (114-186)/(59-99) 177/72     Weight: 80.7 kg (178 lb)  Height: 5' 3" (160 cm)  Body mass index is 31.53 kg/m².      Intake/Output Summary (Last 24 hours) at 8/3/2020 1913  Last data filed at 8/3/2020 1623  Gross per 24 hour   Intake 609 ml   Output --   Net 609 ml       Ortho/SPM Exam   PE:  Gen:  No acute distress  CV:  Peripherally well-perfused.    Lungs:  Normal respiratory effort.  Head/Neck:  Normocephalic.  Atraumatic.     LLE:  Skin intact  No erythema/signs of infection  MTTP to anteromedial knee with minimal swelling  Compartments soft  Full painless knee ROM with mild pain at knee flexion >90 deg  SILT Sa/Martinez/DP/SP/T  Motor intact EHL/FHL/TA/Gastroc  Palpable DP pulse        Significant Labs:   CBC:   Recent Labs   Lab 20  1017   WBC 7.29   HGB 7.5*   HCT 24.9*        All pertinent labs within the past 24 hours have been reviewed.    Significant Imaging: I have reviewed all pertinent imaging results/findings. XR and CT of L knee showing no acute fracture or dislocations. Mild osteoarthritic changes present.    Assessment/Plan:     Left anterior knee pain  Crystal Alvarado is a 79 y.o. female with mild anteromedial knee pain. Orthopaedics was " consulted due to possible L tibial plateau fracture on XR, however CT scan of L knee did not show any acute fractures or abnormalities, with the exception of mild osteoarthritic changes. The patient is complaining of L anteromedial knee TTP at this time. The patient was able to bear weight after fall. The patient is able to actively and passively range her knee 0-90 with minimal pain.      -WBAT LLE        Thank you for your consult.     Eddie Almanza MD  Orthopedics  [facility]

## 2020-08-04 NOTE — H&P
History and Physical  Hospital Medicine       Patient Name: Crystal Alvarado  MRN:  [id number]  Hospital Medicine Team: [facility]OSP MED V Srikanth Anthony MD  Date of Admission:  8/3/2020     Principal Problem:  Gastrointestinal hemorrhage   Primary Care Physician: Shantell Goff MD      History of Present Illness:     Ms Alvarado is a 79 year old female with history of CHF (preserved EF of 60%), SVT s/p ablation, HTN, HLD, COPD, non-obstructive CAD, and CKD3, perforated appendicitis (8/2019) who is presenting to hospital with concern for syncope and GI bleed.     History is limited by patient recollection. She reports she was feeling well and went to get water in the morning. After getting water she turned and synopsized. She does not recall any events or symptoms preceding syncope. She reports currently she is feeling well. Denies any abdominal pain, nausea, vomitus. No chest pain, dyspnea. No headache or visual complaints.     She denies any history of GI bleeding. Denies any NSAID use. +ve ASA daily. No smoking or drinking. GAY in ED with melena; patient denies any knowledge of GI bleeding or melena.     On admission 122/59, 80/min. Hgb 7.5 (11.2 on 7/29), BUN 39. No Endo in system. Reports prior colonoscopy >10 years ago, no history of EGD.     Review of Systems   Constitutional: Negative for chills, fatigue, fever.   HENT: Negative for sore throat, trouble swallowing.    Eyes: Negative for photophobia, visual disturbance.   Respiratory: Negative for cough, shortness of breath.    Cardiovascular: Negative for chest pain, palpitations, leg swelling.   Gastrointestinal: Negative for abdominal pain, constipation, diarrhea, nausea, vomiting.   Endocrine: Negative for cold intolerance, heat intolerance.   Genitourinary: Negative for dysuria, frequency.   Musculoskeletal: Negative for arthralgias, myalgias.   Skin: Negative for rash, wound, erythema   Neurological: Negative for dizziness, syncope, weakness, light-headedness.    Psychiatric/Behavioral: Negative for confusion, hallucinations, anxiety  All other systems reviewed and are negative.      Past Medical History: Patient has a past medical history of Arthritis, CHF (congestive heart failure), Coronary artery disease, Hyperlipidemia, and Hypertension.    Past Surgical History: Patient has a past surgical history that includes Hysterectomy (N/A); A-V cardiac pacemaker insertion (N/A, 7/9/2018); and Insertion of implantable loop recorder (N/A, 7/9/2018).    Social History: Patient reports that she quit smoking about 7 years ago. She smoked 0.50 packs per day. She has never used smokeless tobacco. She reports that she does not drink alcohol or use drugs.    Family History: family history is not on file.    Medications: Scheduled Meds:   atorvastatin  20 mg Oral QHS    docusate sodium  100 mg Oral BID    fluticasone furoate-vilanteroL  1 puff Inhalation Daily    losartan  100 mg Oral Daily    oxyCODONE  10 mg Oral Once    pantoprazole  40 mg Intravenous BID    pregabalin  50 mg Oral BID     Continuous Infusions:  PRN Meds:.sodium chloride, acetaminophen, dextrose 50%, dextrose 50%, glucagon (human recombinant), glucose, glucose, hydrALAZINE, ondansetron, sodium chloride 0.9%, sodium chloride 0.9%, traMADoL    Allergies: Patient has No Known Allergies.    Physical Exam:     Vital Signs (Most Recent):  Temp: 96.2 °F (35.7 °C) (08/03/20 2354)  Pulse: 66 (08/03/20 2354)  Resp: 15 (08/03/20 2354)  BP: 128/85 (08/03/20 2354)  SpO2: 96 % (08/03/20 2354) Vital Signs Range (Last 24H):  Temp:  [96.2 °F (35.7 °C)-98.8 °F (37.1 °C)]   Pulse:  [61-85]   Resp:  [12-18]   BP: (114-186)/(59-99)   SpO2:  [96 %-100 %]    Body mass index is 29.5 kg/m².     Physical Exam:  Constitutional: appears weak and ill  Head: Normocephalic and atraumatic.   Mouth/Throat: Oropharynx is clear and moist.   Eyes: EOM are normal. Pupils are equal, round, and reactive to light. No scleral icterus.   Neck: Normal  range of motion. Neck supple.   Cardiovascular: Normal rate and regular rhythm.  No murmur heard.  Pulmonary/Chest: Effort normal and breath sounds normal. No respiratory distress. No wheezes, rales, or rhonchi  Abdominal: Soft. Bowel sounds are normal.  No distension or tenderness  Musculoskeletal: Normal range of motion. No edema.   Neurological: Alert and oriented to person, place, and time.   Skin: Skin is warm and dry.   Psychiatric: Normal mood and affect. Behavior is normal.   Vitals reviewed.    Recent Labs   Lab 07/29/20  0752 08/03/20  1017   WBC 4.20 7.29   HGB 11.2* 7.5*   HCT 37.0 24.9*    221       Recent Labs   Lab 07/29/20  0752 08/03/20  1017    142   K 3.2* 4.0    113*   CO2 25 23   BUN 22 39*   CREATININE 1.2 1.2   GLU 95 105   CALCIUM 9.5 8.3*     Recent Labs   Lab 07/29/20  0752 08/03/20  1017 08/03/20  1128   ALKPHOS 77 59  --    ALT 9* 6*  --    AST 13 9*  --    ALBUMIN 3.2* 2.9*  --    PROT 6.8 5.6*  --    BILITOT 0.4 0.3  --    INR  --   --  0.9      No results for input(s): POCTGLUCOSE in the last 168 hours.      Assessment and Plan:     Ms. Crystal Alvarado is a 79 y.o. female who presented to [facility] on 8/3/2020 with     Active Hospital Problems    Diagnosis  POA    *Gastrointestinal hemorrhage [K92.2]  Yes    Acute blood loss anemia [D62]  Yes    Left anterior knee pain [M25.562]  Yes    CKD (chronic kidney disease) stage 3, GFR 30-59 ml/min [N18.3]  Yes    Essential hypertension [I10]  Yes      Resolved Hospital Problems   No resolved problems to display.     # Gastrointestinal hemorrhage with melena  # Acute blood loss anemia  - getting 2 units of PRBC  - GI consulted  - PPI IV BID  - planning for EGD in the AM    # Essential HTN  - holding norvasc, giving losartan     # Left knee pain  - ortho evaluated, no evidence of fracture;  - PT/OT        Diet:  NPO  GI PPx:    DVT PPx:    Goals of Care:  full       Disposition:  Likely two days    Srikanth Anthony  MD  Medical Director [facility] Medicine  Spectra:  [phone]  Pager: [phone]

## 2020-08-04 NOTE — ANESTHESIA POSTPROCEDURE EVALUATION
Anesthesia Post Evaluation    Patient: Crystal Alvarado    Procedure(s) Performed: Procedure(s) (LRB):  EGD (ESOPHAGOGASTRODUODENOSCOPY) (N/A)    Final Anesthesia Type: general    Patient location during evaluation: PACU  Patient participation: Yes- Able to Participate  Level of consciousness: awake  Post-procedure vital signs: reviewed and stable  Pain management: adequate  Airway patency: patent    PONV status at discharge: No PONV  Anesthetic complications: no      Cardiovascular status: blood pressure returned to baseline  Respiratory status: unassisted  Hydration status: euvolemic  Follow-up not needed.          Vitals Value Taken Time   /62 08/04/20 1117   Temp 36.5 °C (97.7 °F) 08/04/20 1100   Pulse 66 08/04/20 1119   Resp 17 08/04/20 1119   SpO2 100 % 08/04/20 1119   Vitals shown include unvalidated device data.      Event Time   Out of Recovery 11:33:00         Pain/Arturo Score: Pain Rating Prior to Med Admin: 7 (8/4/2020 11:06 AM)  Arturo Score: 10 (8/4/2020 10:56 AM)

## 2020-08-04 NOTE — TREATMENT PLAN
GI Treatment Plan    Crystal Alvarado is a 79 y.o. female admitted to hospital 8/3/2020 (Hospital Day: 2) due to Gastrointestinal hemorrhage.     Interval History  - EGD Today  - - Normal oropharynx.                         - Hiatal hernia.                         - Atrophic gastritis.                         - A few gastric polyps. Biopsied.                         - Normal examined duodenum.     Laboratory  Recent Labs   Lab 07/29/20  0752 08/03/20  1017 08/04/20  0517   HGB 11.2* 7.5* 8.5*       Plan  - s/p procedure, see formal note for additional details  - discussed inpatient vs outpatient colonoscopy if bleeding resolved. Patient would like ot proceed with inpatient procedure  - clear liquids, NPO at MN  - colonoscopy tomorrow  - prep tonight  - protonix 40mg daily oral  - Plan of care was discussed with primary team.  - We will continue to follow.    Thank you for involving us in the care of Crystal Alvarado. Please call with any additional questions, concerns or changes in the patient's clinical status.    Jurgen Mc MD  Gastroenterology Fellow

## 2020-08-04 NOTE — CARE UPDATE
Rapid Response Nurse Chart Check     Chart check completed, abnormal VS noted. Bedside RN, Jesús contacted. SBP of 208, PRN hydralazine to be administered. Instructed bedside RN to recheck BP in an hour and to call RRN if Pressure has not decreased.  Instructed to call [phone] for further concerns or assistance.

## 2020-08-04 NOTE — TRANSFER OF CARE
"Anesthesia Transfer of Care Note    Patient: Crystal Alvarado    Procedure(s) Performed: Procedure(s) (LRB):  EGD (ESOPHAGOGASTRODUODENOSCOPY) (N/A)    Patient location: PACU    Anesthesia Type: general    Transport from OR: Transported from OR on 2-3 L/min O2 by NC with adequate spontaneous ventilation    Post pain: adequate analgesia    Post assessment: no apparent anesthetic complications    Post vital signs: stable    Level of consciousness: sedated    Nausea/Vomiting: no nausea/vomiting    Complications: none    Transfer of care protocol was followed      Last vitals:   Visit Vitals  BP (!) 196/81 (BP Location: Left arm, Patient Position: Lying)   Pulse (P) 69   Temp (P) 36.2 °C (97.2 °F) (Temporal)   Resp (P) 20   Ht 5' 6" (1.676 m)   Wt 82.9 kg (182 lb 12.2 oz)   SpO2 (P) 100%   Breastfeeding No   BMI 29.50 kg/m²     "

## 2020-08-04 NOTE — ANESTHESIA PREPROCEDURE EVALUATION
[facility]  Anesthesia Pre-Operative Evaluation     Patient Name: Crystal Alvarado  YOB: 1940  MRN: [id number]  [facility]: [id number]       Admit Date: 8/3/2020   Admit Team: [facility]  Hospital Day: 3  Date of Procedure: 8/5/2020  Anesthesia: General/MAC Procedure: Procedure(s) (LRB):  COLONOSCOPY (N/A)  Pre-Operative Diagnosis: Symptomatic anemia [D64.9]  Gastrointestinal hemorrhage, unspecified gastrointestinal hemorrhage type [K92.2]  Proceduralist:Surgeon(s) and Role:     * Sebas Dickens MD - Primary  Code Status: Full Code   Advanced Directive: Not Received  Isolation Precautions: No active isolations  Capacity: Full capacity     SUBJECTIVE:   Crystal Alvarado is a 79 y.o. female who  has a past medical history of Arthritis, CHF (congestive heart failure), Coronary artery disease, Hyperlipidemia, and Hypertension.  Crystal Alvarado is a 79 y.o. female with history of CHF (preserved EF of 60%), SVT s/p RFA and ILR (7/9/18), HTN, HLD, COPD, non-obstructive CAD, and CKD3, perforated appendicitis (8/2019) who is presenting to hospital with concern for syncope and GI bleed.    On admission 122/59, 80/min. Hgb 7.5 (11.2 on 7/29), BUN 39. No Endo in system. Reports prior colonoscopy >10 years ago.    Prev airway: None documented.    Hospital LOS: 2 days  ICU LOS: Patient does not have an ICU stay during this admission.    Revised cardiac risk index (RCRI) score is 2  she has a current medication list which includes the following long-term medication(s): amlodipine, aspirin, atorvastatin, breo ellipta, losartan, nitroglycerin, and pregabalin.     ALLERGIES:   Review of patient's allergies indicates:  No Known Allergies  LDA:   AIRWAY:     * No LDAs found *      Lines/Drains/Airways     Drain                 Drain/Device  08/13/19 1059 Right lower back 358 days          Peripheral Intravenous Line                 Peripheral IV - Single Lumen 08/03/20 1215 20 G Right Forearm 2 days                Anesthesia Evaluation      Airway   Dental    (+) Periodontal disease, Severe        Pulmonary    (+) shortness of breath,   Cardiovascular   (+) hypertension, CAD, CHF,     Neuro/Psych      GI/Hepatic/Renal    (+) chronic renal disease,     Endo/Other    Abdominal                  MEDICATIONS:     Current Outpatient Medications on File Prior to Encounter   Medication Sig Dispense Refill Last Dose    amLODIPine (NORVASC) 5 MG tablet Take 1 tablet (5 mg total) by mouth once daily. 90 tablet 3 8/3/2020 at Unknown time    aspirin 81 MG Chew Take 81 mg by mouth once daily.  4 8/2/2020 at Unknown time    atorvastatin (LIPITOR) 20 MG tablet Take 20 mg by mouth every evening.  4 8/3/2020 at Unknown time    azelastine (ASTELIN) 137 mcg (0.1 %) nasal spray    8/2/2020 at Unknown time    BREO ELLIPTA 100-25 mcg/dose diskus inhaler INHALE 1 PUFF BY MOUTH ONCE A DAY  3 8/3/2020 at Unknown time    docusate sodium (COLACE) 100 MG capsule Take 1 capsule (100 mg total) by mouth 2 (two) times daily. 60 capsule 0 8/2/2020 at Unknown time    ergocalciferol (ERGOCALCIFEROL) 50,000 unit Cap Take 50,000 Units by mouth every 7 days.   8/2/2020 at Unknown time    fluticasone propionate (FLONASE) 50 mcg/actuation nasal spray    8/2/2020 at Unknown time    HYDROcodone-acetaminophen (NORCO) 5-325 mg per tablet Take 1 tablet by mouth 2 (two) times daily as needed.   8/2/2020 at Unknown time    losartan (COZAAR) 100 MG tablet Take 1 tablet (100 mg total) by mouth once daily. 90 tablet 3 8/3/2020 at Unknown time    pregabalin (LYRICA) 25 MG capsule TAKE 1 CAPSULE BY MOUTH 2 TIMES A DAY FOR PAIN   8/3/2020 at Unknown time    traMADol (ULTRAM) 50 mg tablet Take 1 tablet (50 mg total) by mouth every 6 (six) hours as needed for Pain. 20 tablet 0 8/3/2020 at Unknown time    nitroGLYCERIN (NITROSTAT) 0.4 MG SL tablet Place 1 tablet (0.4 mg total) under the tongue every 5 (five) minutes as needed for Chest pain (and notify MD). 25  tablet 5       Inpatient Medications:  Antibiotics (From admission, onward)    None        VTE Risk Mitigation (From admission, onward)         Ordered     IP VTE LOW RISK PATIENT  Once      08/03/20 1306              Current Facility-Administered Medications   Medication Dose Route Frequency Provider Last Rate Last Dose    0.9%  NaCl infusion (for blood administration)   Intravenous Q24H PRN Srikanth Anthony MD        acetaminophen tablet 650 mg  650 mg Oral Q4H PRN Srikanth Anthony MD   650 mg at 08/03/20 2036    amLODIPine tablet 5 mg  5 mg Oral Daily Srikanth Anthony MD   5 mg at 08/05/20 0832    atorvastatin tablet 20 mg  20 mg Oral QHS Sriaknth Anthony MD   20 mg at 08/04/20 2044    dextrose 50% injection 12.5 g  12.5 g Intravenous PRN Srikanth Anthony MD        dextrose 50% injection 25 g  25 g Intravenous PRN Srikanth Anthony MD        docusate sodium capsule 100 mg  100 mg Oral BID Srikanth Anthony MD   100 mg at 08/05/20 0831    fluticasone furoate-vilanteroL 100-25 mcg/dose diskus inhaler 1 puff  1 puff Inhalation Daily Srikanth Anthony MD   1 puff at 08/05/20 1035    glucagon (human recombinant) injection 1 mg  1 mg Intramuscular PRN Srikanth Anthony MD        glucose chewable tablet 16 g  16 g Oral PRN Srikanth Anthony MD        glucose chewable tablet 24 g  24 g Oral PRN Srikanth Anthony MD        hydrALAZINE tablet 25 mg  25 mg Oral Q6H PRN Lesvia Mark NP   25 mg at 08/04/20 1607    lidocaine 5 % patch 2 patch  2 patch Transdermal Q24H Srikanth Anthony MD   1 patch at 08/04/20 1611    losartan tablet 100 mg  100 mg Oral Daily Srikanth Anthony MD   100 mg at 08/05/20 0831    ondansetron injection 4 mg  4 mg Intravenous Q8H PRN Srikanth Anthony MD        oxyCODONE immediate release tablet 10 mg  10 mg Oral Once Srikanth Anthony MD   Stopped at 08/03/20 1715    oxyCODONE immediate release tablet Tab 10 mg  10 mg Oral Once Srikanth Anthony MD        oxyCODONE immediate release tablet Tab 10 mg  10 mg Oral Q4H  JENNIFER Anthony MD        pantoprazole injection 40 mg  40 mg Intravenous BID Srikanth Anthony MD   40 mg at 08/04/20 2044    polyethylene glycol (GoLYTELY) solution  2,000 mL Oral Once Jurgen Mc MD        pregabalin capsule 50 mg  50 mg Oral BID Srikanth Anthony MD   50 mg at 08/05/20 0831    sodium chloride 0.9% flush 10 mL  10 mL Intravenous JENNIFER Anthony MD        sodium chloride 0.9% flush 10 mL  10 mL Intravenous JENNIFER Anthony MD              History:     Active Hospital Problems    Diagnosis  POA    *Gastrointestinal hemorrhage [K92.2]  Yes    Acute blood loss anemia [D62]  Yes    Left anterior knee pain [M25.562]  Yes    CKD (chronic kidney disease) stage 3, GFR 30-59 ml/min [N18.3]  Yes    Essential hypertension [I10]  Yes      Resolved Hospital Problems   No resolved problems to display.     Surgical History:    has a past surgical history that includes Hysterectomy (N/A); A-V cardiac pacemaker insertion (N/A, 7/9/2018); Insertion of implantable loop recorder (N/A, 7/9/2018); and Esophagogastroduodenoscopy (N/A, 8/4/2020).   Social History:    reports never being sexually active.  reports that she quit smoking about 7 years ago. She smoked 0.50 packs per day. She has never used smokeless tobacco. She reports that she does not drink alcohol or use drugs.      Vitals:    08/05/20 0423 08/05/20 0843 08/05/20 1035 08/05/20 1145   BP:  (!) 145/67  (!) 148/65   BP Location:    Left arm   Patient Position:    Sitting   Pulse: 97 75 77 80   Resp: (!) 25 16 18 18   Temp:  36.7 °C (98.1 °F)  36.5 °C (97.7 °F)   TempSrc:    Oral   SpO2: 98% (!) 78% 96% 100%   Weight:       Height:         Vital Signs Range (Last 24H):  Temp:  [35.9 °C (96.6 °F)-36.7 °C (98.1 °F)]   Pulse:  [73-97]   Resp:  [14-25]   BP: (145-174)/(65-92)   SpO2:  [78 %-100 %]     Body mass index is 29.5 kg/m².  Wt Readings from Last 4 Encounters:   08/03/20 82.9 kg (182 lb 12.2 oz)   07/29/20 80.7 kg (178 lb)   02/10/20 78.5  kg (173 lb 1 oz)   11/04/19 76.6 kg (168 lb 14 oz)        Intake/Output - Last 3 Shifts       08/03 0700 - 08/04 0659 08/04 0700 - 08/05 0659 08/05 0700 - 08/06 0659    I.V. (mL/kg)  100 (1.2)     Blood 609      Total Intake(mL/kg) 609 (7.3) 100 (1.2)     Urine (mL/kg/hr) 400      Total Output 400      Net +209 +100            Urine Occurrence 1 x 1 x     Stool Occurrence  4 x         Lab Results   Component Value Date    WBC 7.75 08/05/2020    HGB 9.1 (L) 08/05/2020    HCT 29.7 (L) 08/05/2020     08/05/2020     08/05/2020    K 3.8 08/05/2020     08/05/2020    CREATININE 1.5 (H) 08/05/2020    BUN 29 (H) 08/05/2020    CO2 23 08/05/2020    GLU 99 08/05/2020    CALCIUM 9.2 08/05/2020    MG 2.0 08/05/2020    PHOS 4.0 08/15/2019    ALKPHOS 59 08/03/2020    ALT 6 (L) 08/03/2020    AST 9 (L) 08/03/2020    ALBUMIN 2.9 (L) 08/03/2020    INR 0.9 08/03/2020    APTT <21.0 08/03/2020    HGBA1C 5.2 11/20/2017    CPK 95 04/06/2018    CPKMB 2.6 06/16/2010    TROPONINI 0.015 08/03/2020    MB 1.7 06/16/2010    BNP 39 08/03/2020     Recent Results (from the past 12 hour(s))   CBC auto differential    Collection Time: 08/05/20  6:26 AM   Result Value Ref Range    WBC 7.75 3.90 - 12.70 K/uL    RBC 3.08 (L) 4.00 - 5.40 M/uL    Hemoglobin 9.1 (L) 12.0 - 16.0 g/dL    Hematocrit 29.7 (L) 37.0 - 48.5 %    Mean Corpuscular Volume 96 82 - 98 fL    Mean Corpuscular Hemoglobin 29.5 27.0 - 31.0 pg    Mean Corpuscular Hemoglobin Conc 30.6 (L) 32.0 - 36.0 g/dL    RDW 17.2 (H) 11.5 - 14.5 %    Platelets 259 150 - 350 K/uL    MPV 10.2 9.2 - 12.9 fL    Immature Granulocytes 0.4 0.0 - 0.5 %    Gran # (ANC) 6.0 1.8 - 7.7 K/uL    Immature Grans (Abs) 0.03 0.00 - 0.04 K/uL    Lymph # 1.2 1.0 - 4.8 K/uL    Mono # 0.4 0.3 - 1.0 K/uL    Eos # 0.1 0.0 - 0.5 K/uL    Baso # 0.04 0.00 - 0.20 K/uL    nRBC 0 0 /100 WBC    Gran% 77.1 (H) 38.0 - 73.0 %    Lymph% 15.1 (L) 18.0 - 48.0 %    Mono% 5.2 4.0 - 15.0 %    Eosinophil% 1.7 0.0 - 8.0 %     Basophil% 0.5 0.0 - 1.9 %    Differential Method Automated    Basic metabolic panel    Collection Time: 08/05/20  6:26 AM   Result Value Ref Range    Sodium 141 136 - 145 mmol/L    Potassium 3.8 3.5 - 5.1 mmol/L    Chloride 109 95 - 110 mmol/L    CO2 23 23 - 29 mmol/L    Glucose 99 70 - 110 mg/dL    BUN, Bld 29 (H) 8 - 23 mg/dL    Creatinine 1.5 (H) 0.5 - 1.4 mg/dL    Calcium 9.2 8.7 - 10.5 mg/dL    Anion Gap 9 8 - 16 mmol/L    eGFR if African American 37.4 (A) >60 mL/min/1.73 m^2    eGFR if non  32.4 (A) >60 mL/min/1.73 m^2   Magnesium    Collection Time: 08/05/20  6:26 AM   Result Value Ref Range    Magnesium 2.0 1.6 - 2.6 mg/dL     Recent Labs   Lab 08/03/20  1017 08/03/20  1128 08/04/20  0517 08/05/20  0626   WBC 7.29  --  6.28 7.75   HGB 7.5*  --  8.5* 9.1*   HCT 24.9*  --  28.5* 29.7*     --  216 259     --  140 141   K 4.0  --  3.9 3.8   CREATININE 1.2  --  1.1 1.5*     --  83 99   INR  --  0.9  --   --      No LMP recorded. Patient has had a hysterectomy.    EKG:   Results for orders placed or performed during the hospital encounter of 08/03/20   EKG 12-lead    Collection Time: 08/03/20  9:56 AM    Narrative    Test Reason : R55,    Vent. Rate : 069 BPM     Atrial Rate : 069 BPM     P-R Int : 168 ms          QRS Dur : 070 ms      QT Int : 390 ms       P-R-T Axes : 111 007 110 degrees     QTc Int : 417 ms    Normal sinus rhythm  Nonspecific T wave abnormality  Abnormal ECG  When compared with ECG of 29-JUL-2020 07:52,  No significant change was found  Confirmed by LUNA YOUNG MD ([id number]) on 8/3/2020 10:13:06 PM    Referred By: [email or web address]   SELF           Confirmed By:LUNA YOUNG MD     TTE:  No results found for this or any previous visit.  Results for orders placed or performed during the hospital encounter of 04/25/18   2D echo with color flow doppler   Result Value Ref Range    QEF 60 55 - 65    Diastolic Dysfunction Yes (A)     Est. PA Systolic Pressure 31.3      Mitral Valve Mobility NORMAL     Tricuspid Valve Regurgitation TRIVIAL      CHINYERE:  No results found for this or any previous visit.  Stress Test:  No results found for this or any previous visit.   LHC:  No results found for this or any previous visit.   PFT:  No results found for: FEV1, FVC, LFJ1TKF, TLC, DLCO       Pre-op Assessment    I have reviewed the Patient Summary Reports.         Review of Systems  Anesthesia Hx:  No problems with previous Anesthesia  History of prior surgery of interest to airway management or planning:  Denies Personal Hx of Anesthesia complications.   Social:  Non-Smoker    EENT/Dental:EENT/Dental Normal   Cardiovascular:   Hypertension CAD   CHF HFpEF, SSS, SVT s/p RFA, ILR 7/9/18   Pulmonary:   Shortness of breath    Renal/:   Chronic Renal Disease    Neurological:  Neurology Normal        Physical Exam  General:  Well nourished, Obesity    Airway/Jaw/Neck:  AIRWAY FINDINGS: Normal      Eyes/Ears/Nose:  EYES/EARS/NOSE FINDINGS: Normal   Dental:  Dental Findings: Periodontal disease, Severe     Heart/Vascular:  Heart Findings: Normal       Mental Status:  Mental Status Findings: Normal        Anesthesia Plan  Type of Anesthesia, risks & benefits discussed:  Anesthesia Type:  general, MAC  Patient's Preference: General/MAC  Intra-op Monitoring Plan: standard ASA monitors  Intra-op Monitoring Plan Comments:   Post Op Pain Control Plan: multimodal analgesia, IV/PO Opioids PRN and per primary service following discharge from PACU  Post Op Pain Control Plan Comments: IV meds as needed  Induction:   IV  Beta Blocker:  Patient is not currently on a Beta-Blocker (No further documentation required).       Informed Consent: Patient understands risks and agrees with Anesthesia plan.  Questions answered. Anesthesia consent signed with patient.  ASA Score: 3     Day of Surgery Review of History & Physical:    H&P update referred to the provider.     Anesthesia Plan Notes: Discussed anesthetic  options, pt understands and agrees with plan        Ready For Surgery From Anesthesia Perspective.

## 2020-08-04 NOTE — PLAN OF CARE
AAOx4, NPO since midnight.  BP was 208/93 and decreased to 254/83 after prn hydralazine was given. Patient reports pain in her knee from when she fell. Patient was oriented to care setting. Safety measures initiated. Will continue jackson monitor.

## 2020-08-04 NOTE — PT/OT/SLP PROGRESS
Occupational Therapy      Patient Name:  Crystal Alvarado   MRN:  [id number]    Patient not seen today secondary to patient off unit this am in EGD. This therapist is unable to return later. Will follow-up for an evaluation when scheduled.    PAUL Cavazos  8/4/2020

## 2020-08-04 NOTE — PROVATION PATIENT INSTRUCTIONS
Discharge Summary/Instructions after an Endoscopic Procedure  Patient Name: Crystal Alvarado  Patient MRN: [id number]  Patient YOB: 1940 Tuesday, August 4, 2020  Fernie Cohen MD  RESTRICTIONS:  During your procedure today, you received medications for sedation.  These   medications may affect your judgment, balance and coordination.  Therefore,   for 24 hours, you have the following restrictions:   - DO NOT drive a car, operate machinery, make legal/financial decisions,   sign important papers or drink alcohol.    ACTIVITY:  Today: no heavy lifting, straining or running due to procedural   sedation/anesthesia.  The following day: return to full activity including work.  DIET:  Eat and drink normally unless instructed otherwise.     TREATMENT FOR COMMON SIDE EFFECTS:  - Mild abdominal pain, nausea, belching, bloating or excessive gas:  rest,   eat lightly and use a heating pad.  - Sore Throat: treat with throat lozenges and/or gargle with warm salt   water.  - Because air was used during the procedure, expelling large amounts of air   from your rectum or belching is normal.  - If a bowel prep was taken, you may not have a bowel movement for 1-3 days.    This is normal.  SYMPTOMS TO WATCH FOR AND REPORT TO YOUR PHYSICIAN:  1. Abdominal pain or bloating, other than gas cramps.  2. Chest pain.  3. Back pain.  4. Signs of infection such as: chills or fever occurring within 24 hours   after the procedure.  5. Rectal bleeding, which would show as bright red, maroon, or black stools.   (A tablespoon of blood from the rectum is not serious, especially if   hemorrhoids are present.)  6. Vomiting.  7. Weakness or dizziness.  GO DIRECTLY TO THE NEAREST EMERGENCY ROOM IF YOU HAVE ANY OF THE FOLLOWING:      Difficulty breathing              Chills and/or fever over 101 F   Persistent vomiting and/or vomiting blood   Severe abdominal pain   Severe chest pain   Black, tarry stools   Bleeding- more than one  tablespoon   Any other symptom or condition that you feel may need urgent attention  Your doctor recommends these additional instructions:  If any biopsies were taken, your doctors clinic will contact you in 1 to 2   weeks with any results.  - Return patient to hospital parikh for ongoing care.   - Await pathology results.   - Further recommendations as per Primary GI team.  For questions, problems or results please call your physician - Fernie Cohen MD at Work:  [phone].  [facility], EMERGENCY ROOM PHONE NUMBER: [phone]  IF A COMPLICATION OR EMERGENCY SITUATION ARISES AND YOU ARE UNABLE TO REACH   YOUR PHYSICIAN - GO DIRECTLY TO THE EMERGENCY ROOM.  Fernie Cohen MD  8/4/2020 10:23:48 AM  This report has been verified and signed electronically.  PROVATION

## 2020-08-04 NOTE — ASSESSMENT & PLAN NOTE
Crystal Alvarado is a 79 y.o. female with mild anteromedial knee pain. Orthopaedics was consulted due to possible L tibial plateau fracture on XR, however CT scan of L knee did not show any acute fractures or abnormalities, with the exception of mild osteoarthritic changes. The patient is complaining of L anteromedial knee TTP at this time. The patient was able to bear weight after fall. The patient is able to actively and passively range her knee 0-90 with minimal pain.      -WBAT LLE

## 2020-08-04 NOTE — HPI
Crystal Alvarado is a 79 y.o. female HTN, CAD, CHF, pacemaker, and arthritis. She presents to the ER for an emergent evaluation due to acute dizziness and fall onto L knee. She states that she was using her walker to ambulate until she reached the kitchen, at which point she placed it aside to fix herself a glass of water, then felt dizzy and fell. She did noticed knee pain, but was able to stand again and walk to the car. Denies head trauma or LOC. The patient has mild anteromedial R knee pain at this time. She ambulated with walker at baseline. Denies current AC. Orthopaedics consulted for XR showing possible L tibial plateau fracture.

## 2020-08-04 NOTE — NURSING TRANSFER
Nursing Transfer Note      8/4/2020     Transfer to: 631    Transfer via: Stretcher    Transfer with: Face Mask in place    Transported by: RN    Medicines sent: N/A    Chart send with patient: Yes    Notified: Report called to TRACY Barrett by QING Laureano RN    Patient reassessed at: 111

## 2020-08-04 NOTE — SUBJECTIVE & OBJECTIVE
Past Medical History:   Diagnosis Date    Arthritis     CHF (congestive heart failure)     Coronary artery disease     Hyperlipidemia     Hypertension        Past Surgical History:   Procedure Laterality Date    A-V CARDIAC PACEMAKER INSERTION N/A 7/9/2018    Procedure: INSERTION, CARDIAC PACEMAKER, DUAL CHAMBER;  Surgeon: Archie Montez MD;  Location: [facility] CATH LAB;  Service: Cardiology;  Laterality: N/A;  SVT, RFA, +/- Dual PPM or ILR, IGLESIA, MDT, MAC, MA, 3 Prep    HYSTERECTOMY N/A     INSERTION OF IMPLANTABLE LOOP RECORDER N/A 7/9/2018    Procedure: PLACEMENT-LOOP RECORDER;  Surgeon: Archie Montez MD;  Location: [facility] CATH LAB;  Service: Cardiology;  Laterality: N/A;  SVT, RFA, +/- Dual PPM or ILR, IGLESIA, MDT, MAC, MA, 3 Prep       Review of patient's allergies indicates:  No Known Allergies    Current Facility-Administered Medications   Medication    0.9%  NaCl infusion (for blood administration)    acetaminophen tablet 650 mg    atorvastatin tablet 20 mg    dextrose 50% injection 12.5 g    dextrose 50% injection 25 g    docusate sodium capsule 100 mg    [START ON 8/4/2020] fluticasone furoate-vilanteroL 100-25 mcg/dose diskus inhaler 1 puff    glucagon (human recombinant) injection 1 mg    glucose chewable tablet 16 g    glucose chewable tablet 24 g    [START ON 8/4/2020] losartan tablet 100 mg    ondansetron injection 4 mg    oxyCODONE immediate release tablet 10 mg    pregabalin capsule 50 mg    sodium chloride 0.9% flush 10 mL    sodium chloride 0.9% flush 10 mL    traMADoL tablet 50 mg     Current Outpatient Medications   Medication Sig    amLODIPine (NORVASC) 5 MG tablet Take 1 tablet (5 mg total) by mouth once daily.    aspirin 81 MG Chew Take 81 mg by mouth once daily.    atorvastatin (LIPITOR) 20 MG tablet Take 20 mg by mouth every evening.    azelastine (ASTELIN) 137 mcg (0.1 %) nasal spray     BREO ELLIPTA 100-25 mcg/dose diskus inhaler INHALE 1 PUFF BY MOUTH ONCE A DAY     "docusate sodium (COLACE) 100 MG capsule Take 1 capsule (100 mg total) by mouth 2 (two) times daily.    ergocalciferol (ERGOCALCIFEROL) 50,000 unit Cap Take 50,000 Units by mouth every 7 days.    fluticasone propionate (FLONASE) 50 mcg/actuation nasal spray     HYDROcodone-acetaminophen (NORCO) 5-325 mg per tablet Take 1 tablet by mouth 2 (two) times daily as needed.    losartan (COZAAR) 100 MG tablet Take 1 tablet (100 mg total) by mouth once daily.    pregabalin (LYRICA) 25 MG capsule TAKE 1 CAPSULE BY MOUTH 2 TIMES A DAY FOR PAIN    traMADol (ULTRAM) 50 mg tablet Take 1 tablet (50 mg total) by mouth every 6 (six) hours as needed for Pain.    nitroGLYCERIN (NITROSTAT) 0.4 MG SL tablet Place 1 tablet (0.4 mg total) under the tongue every 5 (five) minutes as needed for Chest pain (and notify MD).     Family History     None        Tobacco Use    Smoking status: Former Smoker     Packs/day: 0.50     Quit date: 2013     Years since quittin.2    Smokeless tobacco: Never Used   Substance and Sexual Activity    Alcohol use: No    Drug use: No    Sexual activity: Never     ROSper primary  Objective:     Vital Signs (Most Recent):  Temp: 98.8 °F (37.1 °C) (20 1356)  Pulse: 68 (20 1733)  Resp: 18 (20 1356)  BP: (!) 177/72 (20 1733)  SpO2: 100 % (20 1733) Vital Signs (24h Range):  Temp:  [98.5 °F (36.9 °C)-98.8 °F (37.1 °C)] 98.8 °F (37.1 °C)  Pulse:  [61-85] 68  Resp:  [12-18] 18  SpO2:  [99 %-100 %] 100 %  BP: (114-186)/(59-99) 177/72     Weight: 80.7 kg (178 lb)  Height: 5' 3" (160 cm)  Body mass index is 31.53 kg/m².      Intake/Output Summary (Last 24 hours) at 8/3/2020  Last data filed at 8/3/2020 1623  Gross per 24 hour   Intake 609 ml   Output --   Net 609 ml       Ortho/SPM Exam   PE:  Gen:  No acute distress  CV:  Peripherally well-perfused.    Lungs:  Normal respiratory effort.  Head/Neck:  Normocephalic.  Atraumatic.     LLE:  Skin intact  No erythema/signs " of infection  MTTP to anteromedial knee with minimal swelling  Compartments soft  Full painless knee ROM with mild pain at knee flexion >90 deg  SILT Sa/Martinez/DP/SP/T  Motor intact EHL/FHL/TA/Gastroc  Palpable DP pulse        Significant Labs:   CBC:   Recent Labs   Lab 08/03/20  1017   WBC 7.29   HGB 7.5*   HCT 24.9*        All pertinent labs within the past 24 hours have been reviewed.    Significant Imaging: I have reviewed all pertinent imaging results/findings. XR and CT of L knee showing no acute fracture or dislocations. Mild osteoarthritic changes present.

## 2020-08-04 NOTE — PLAN OF CARE
CM assessment complete, patient AAOX4 and participated in interview at bedside. Pt lives with daughter and independently uses a standard walker at home for ambulation. Pt states she fell at home . Has great family support but st at es she could benefit from therapy at home.. Willcont to follow, awaiting therapy recs.     08/04/20 1549   Discharge Assessment   Assessment Type Discharge Planning Assessment   Confirmed/corrected address and phone number on facesheet? Yes   Assessment information obtained from? Patient;Caregiver   Expected Length of Stay (days) 3   Communicated expected length of stay with patient/caregiver yes   Prior to hospitilization cognitive status: Alert/Oriented   Prior to hospitalization functional status: Assistive Equipment;Independent   Current cognitive status: Alert/Oriented   Current Functional Status: Independent;Assistive Equipment   Facility Arrived From: home   Lives With child(brendon), adult   Able to Return to Prior Arrangements yes   Is patient able to care for self after discharge? Yes   Who are your caregiver(s) and their phone number(s)? Dheeraj Hutchins (daughter) [phone]   Patient's perception of discharge disposition home or selfcare   Readmission Within the Last 30 Days current reason for admission unrelated to previous admission   If yes, most recent facility name: [facility]   Patient currently being followed by outpatient case management? No   Patient currently receives any other outside agency services? No   Equipment Currently Used at Home walker, standard   Do you have any problems affording any of your prescribed medications? No   Is the patient taking medications as prescribed? yes   Does the patient have transportation home? Yes   Transportation Anticipated family or friend will provide   Does the patient receive services at the [facility]? No   Discharge Plan A Home   Discharge Plan B Home;Home Health   DME Needed Upon Discharge  none   Patient/Family in  Agreement with Plan yes   Mary Mendiola, MSN  Case Management  Ext [phone]

## 2020-08-05 ENCOUNTER — ANESTHESIA (OUTPATIENT)
Dept: ENDOSCOPY | Facility: HOSPITAL | Age: 80
DRG: 378 | End: 2020-08-05
Payer: MEDICARE

## 2020-08-05 LAB
ANION GAP SERPL CALC-SCNC: 9 MMOL/L (ref 8–16)
BASOPHILS # BLD AUTO: 0.04 K/UL (ref 0–0.2)
BASOPHILS NFR BLD: 0.5 % (ref 0–1.9)
BUN SERPL-MCNC: 29 MG/DL (ref 8–23)
CALCIUM SERPL-MCNC: 9.2 MG/DL (ref 8.7–10.5)
CHLORIDE SERPL-SCNC: 109 MMOL/L (ref 95–110)
CO2 SERPL-SCNC: 23 MMOL/L (ref 23–29)
CREAT SERPL-MCNC: 1.5 MG/DL (ref 0.5–1.4)
DIFFERENTIAL METHOD: ABNORMAL
EOSINOPHIL # BLD AUTO: 0.1 K/UL (ref 0–0.5)
EOSINOPHIL NFR BLD: 1.7 % (ref 0–8)
ERYTHROCYTE [DISTWIDTH] IN BLOOD BY AUTOMATED COUNT: 17.2 % (ref 11.5–14.5)
EST. GFR  (AFRICAN AMERICAN): 37.4 ML/MIN/1.73 M^2
EST. GFR  (NON AFRICAN AMERICAN): 32.4 ML/MIN/1.73 M^2
GLUCOSE SERPL-MCNC: 99 MG/DL (ref 70–110)
HCT VFR BLD AUTO: 29.7 % (ref 37–48.5)
HGB BLD-MCNC: 9.1 G/DL (ref 12–16)
IMM GRANULOCYTES # BLD AUTO: 0.03 K/UL (ref 0–0.04)
IMM GRANULOCYTES NFR BLD AUTO: 0.4 % (ref 0–0.5)
LYMPHOCYTES # BLD AUTO: 1.2 K/UL (ref 1–4.8)
LYMPHOCYTES NFR BLD: 15.1 % (ref 18–48)
MAGNESIUM SERPL-MCNC: 2 MG/DL (ref 1.6–2.6)
MCH RBC QN AUTO: 29.5 PG (ref 27–31)
MCHC RBC AUTO-ENTMCNC: 30.6 G/DL (ref 32–36)
MCV RBC AUTO: 96 FL (ref 82–98)
MONOCYTES # BLD AUTO: 0.4 K/UL (ref 0.3–1)
MONOCYTES NFR BLD: 5.2 % (ref 4–15)
NEUTROPHILS # BLD AUTO: 6 K/UL (ref 1.8–7.7)
NEUTROPHILS NFR BLD: 77.1 % (ref 38–73)
NRBC BLD-RTO: 0 /100 WBC
PLATELET # BLD AUTO: 259 K/UL (ref 150–350)
PMV BLD AUTO: 10.2 FL (ref 9.2–12.9)
POTASSIUM SERPL-SCNC: 3.8 MMOL/L (ref 3.5–5.1)
RBC # BLD AUTO: 3.08 M/UL (ref 4–5.4)
SODIUM SERPL-SCNC: 141 MMOL/L (ref 136–145)
WBC # BLD AUTO: 7.75 K/UL (ref 3.9–12.7)

## 2020-08-05 PROCEDURE — D9220A PRA ANESTHESIA: ICD-10-PCS | Mod: CRNA,,, | Performed by: NURSE ANESTHETIST, CERTIFIED REGISTERED

## 2020-08-05 PROCEDURE — 85025 COMPLETE CBC W/AUTO DIFF WBC: CPT

## 2020-08-05 PROCEDURE — 45380 PR COLONOSCOPY,BIOPSY: ICD-10-PCS | Mod: GC,,, | Performed by: INTERNAL MEDICINE

## 2020-08-05 PROCEDURE — 97530 THERAPEUTIC ACTIVITIES: CPT

## 2020-08-05 PROCEDURE — 25000003 PHARM REV CODE 250: Performed by: HOSPITALIST

## 2020-08-05 PROCEDURE — 37000009 HC ANESTHESIA EA ADD 15 MINS: Performed by: INTERNAL MEDICINE

## 2020-08-05 PROCEDURE — 88305 TISSUE EXAM BY PATHOLOGIST: CPT | Mod: 26,,, | Performed by: PATHOLOGY

## 2020-08-05 PROCEDURE — 83735 ASSAY OF MAGNESIUM: CPT

## 2020-08-05 PROCEDURE — 36415 COLL VENOUS BLD VENIPUNCTURE: CPT

## 2020-08-05 PROCEDURE — 63600175 PHARM REV CODE 636 W HCPCS: Performed by: NURSE ANESTHETIST, CERTIFIED REGISTERED

## 2020-08-05 PROCEDURE — D9220A PRA ANESTHESIA: Mod: ANES,,, | Performed by: ANESTHESIOLOGY

## 2020-08-05 PROCEDURE — 94761 N-INVAS EAR/PLS OXIMETRY MLT: CPT

## 2020-08-05 PROCEDURE — 88305 TISSUE EXAM BY PATHOLOGIST: CPT | Performed by: PATHOLOGY

## 2020-08-05 PROCEDURE — 97535 SELF CARE MNGMENT TRAINING: CPT

## 2020-08-05 PROCEDURE — 94640 AIRWAY INHALATION TREATMENT: CPT

## 2020-08-05 PROCEDURE — 27000221 HC OXYGEN, UP TO 24 HOURS

## 2020-08-05 PROCEDURE — 45380 COLONOSCOPY AND BIOPSY: CPT | Mod: GC,,, | Performed by: INTERNAL MEDICINE

## 2020-08-05 PROCEDURE — C9113 INJ PANTOPRAZOLE SODIUM, VIA: HCPCS | Performed by: HOSPITALIST

## 2020-08-05 PROCEDURE — 37000008 HC ANESTHESIA 1ST 15 MINUTES: Performed by: INTERNAL MEDICINE

## 2020-08-05 PROCEDURE — 27201012 HC FORCEPS, HOT/COLD, DISP: Performed by: INTERNAL MEDICINE

## 2020-08-05 PROCEDURE — 99233 PR SUBSEQUENT HOSPITAL CARE,LEVL III: ICD-10-PCS | Mod: ,,, | Performed by: HOSPITALIST

## 2020-08-05 PROCEDURE — 97161 PT EVAL LOW COMPLEX 20 MIN: CPT

## 2020-08-05 PROCEDURE — 25000003 PHARM REV CODE 250: Performed by: NURSE ANESTHETIST, CERTIFIED REGISTERED

## 2020-08-05 PROCEDURE — 25000003 PHARM REV CODE 250: Performed by: INTERNAL MEDICINE

## 2020-08-05 PROCEDURE — 97165 OT EVAL LOW COMPLEX 30 MIN: CPT

## 2020-08-05 PROCEDURE — 97116 GAIT TRAINING THERAPY: CPT

## 2020-08-05 PROCEDURE — 45380 COLONOSCOPY AND BIOPSY: CPT | Performed by: INTERNAL MEDICINE

## 2020-08-05 PROCEDURE — D9220A PRA ANESTHESIA: Mod: CRNA,,, | Performed by: NURSE ANESTHETIST, CERTIFIED REGISTERED

## 2020-08-05 PROCEDURE — 80048 BASIC METABOLIC PNL TOTAL CA: CPT

## 2020-08-05 PROCEDURE — 63600175 PHARM REV CODE 636 W HCPCS: Performed by: HOSPITALIST

## 2020-08-05 PROCEDURE — 99233 SBSQ HOSP IP/OBS HIGH 50: CPT | Mod: ,,, | Performed by: HOSPITALIST

## 2020-08-05 PROCEDURE — 11000001 HC ACUTE MED/SURG PRIVATE ROOM

## 2020-08-05 PROCEDURE — 88305 TISSUE EXAM BY PATHOLOGIST: ICD-10-PCS | Mod: 26,,, | Performed by: PATHOLOGY

## 2020-08-05 PROCEDURE — D9220A PRA ANESTHESIA: ICD-10-PCS | Mod: ANES,,, | Performed by: ANESTHESIOLOGY

## 2020-08-05 RX ORDER — AMLODIPINE BESYLATE 10 MG/1
10 TABLET ORAL DAILY
Status: DISCONTINUED | OUTPATIENT
Start: 2020-08-06 | End: 2020-08-08 | Stop reason: HOSPADM

## 2020-08-05 RX ORDER — PROPOFOL 10 MG/ML
VIAL (ML) INTRAVENOUS CONTINUOUS PRN
Status: DISCONTINUED | OUTPATIENT
Start: 2020-08-05 | End: 2020-08-05

## 2020-08-05 RX ORDER — GLYCOPYRROLATE 0.2 MG/ML
INJECTION INTRAMUSCULAR; INTRAVENOUS
Status: DISCONTINUED | OUTPATIENT
Start: 2020-08-05 | End: 2020-08-05

## 2020-08-05 RX ORDER — LIDOCAINE HCL/PF 100 MG/5ML
SYRINGE (ML) INTRAVENOUS
Status: DISCONTINUED | OUTPATIENT
Start: 2020-08-05 | End: 2020-08-05

## 2020-08-05 RX ORDER — SODIUM CHLORIDE 0.9 % (FLUSH) 0.9 %
10 SYRINGE (ML) INJECTION
Status: DISCONTINUED | OUTPATIENT
Start: 2020-08-05 | End: 2020-08-08 | Stop reason: HOSPADM

## 2020-08-05 RX ORDER — PROPOFOL 10 MG/ML
VIAL (ML) INTRAVENOUS
Status: DISCONTINUED | OUTPATIENT
Start: 2020-08-05 | End: 2020-08-05

## 2020-08-05 RX ORDER — POLYETHYLENE GLYCOL 3350, SODIUM SULFATE ANHYDROUS, SODIUM BICARBONATE, SODIUM CHLORIDE, POTASSIUM CHLORIDE 236; 22.74; 6.74; 5.86; 2.97 G/4L; G/4L; G/4L; G/4L; G/4L
2000 POWDER, FOR SOLUTION ORAL ONCE
Status: COMPLETED | OUTPATIENT
Start: 2020-08-05 | End: 2020-08-05

## 2020-08-05 RX ORDER — AMLODIPINE BESYLATE 5 MG/1
5 TABLET ORAL DAILY
Status: DISCONTINUED | OUTPATIENT
Start: 2020-08-05 | End: 2020-08-05

## 2020-08-05 RX ADMIN — PROPOFOL 20 MG: 10 INJECTION, EMULSION INTRAVENOUS at 02:08

## 2020-08-05 RX ADMIN — SODIUM CHLORIDE 100 ML: 0.9 INJECTION, SOLUTION INTRAVENOUS at 02:08

## 2020-08-05 RX ADMIN — PREGABALIN 50 MG: 50 CAPSULE ORAL at 10:08

## 2020-08-05 RX ADMIN — Medication 20 MG: at 02:08

## 2020-08-05 RX ADMIN — PANTOPRAZOLE SODIUM 40 MG: 40 INJECTION, POWDER, LYOPHILIZED, FOR SOLUTION INTRAVENOUS at 04:08

## 2020-08-05 RX ADMIN — FLUTICASONE FUROATE AND VILANTEROL TRIFENATATE 1 PUFF: 100; 25 POWDER RESPIRATORY (INHALATION) at 10:08

## 2020-08-05 RX ADMIN — PREGABALIN 50 MG: 50 CAPSULE ORAL at 08:08

## 2020-08-05 RX ADMIN — LIDOCAINE 1 PATCH: 50 PATCH CUTANEOUS at 04:08

## 2020-08-05 RX ADMIN — POLYETHYLENE GLYCOL 3350, SODIUM SULFATE ANHYDROUS, SODIUM BICARBONATE, SODIUM CHLORIDE, POTASSIUM CHLORIDE 2000 ML: 236; 22.74; 6.74; 5.86; 2.97 POWDER, FOR SOLUTION ORAL at 04:08

## 2020-08-05 RX ADMIN — DOCUSATE SODIUM 100 MG: 100 CAPSULE, LIQUID FILLED ORAL at 10:08

## 2020-08-05 RX ADMIN — LOSARTAN POTASSIUM 100 MG: 50 TABLET, FILM COATED ORAL at 08:08

## 2020-08-05 RX ADMIN — ATORVASTATIN CALCIUM 20 MG: 20 TABLET, FILM COATED ORAL at 10:08

## 2020-08-05 RX ADMIN — PANTOPRAZOLE SODIUM 40 MG: 40 INJECTION, POWDER, LYOPHILIZED, FOR SOLUTION INTRAVENOUS at 10:08

## 2020-08-05 RX ADMIN — GLYCOPYRROLATE 0.1 MG: 0.2 INJECTION, SOLUTION INTRAMUSCULAR; INTRAVENOUS at 02:08

## 2020-08-05 RX ADMIN — OXYCODONE HYDROCHLORIDE 10 MG: 10 TABLET ORAL at 10:08

## 2020-08-05 RX ADMIN — PROPOFOL 30 MG: 10 INJECTION, EMULSION INTRAVENOUS at 02:08

## 2020-08-05 RX ADMIN — AMLODIPINE BESYLATE 5 MG: 5 TABLET ORAL at 08:08

## 2020-08-05 RX ADMIN — PROPOFOL 150 MCG/KG/MIN: 10 INJECTION, EMULSION INTRAVENOUS at 02:08

## 2020-08-05 RX ADMIN — SODIUM CHLORIDE 50 ML: 0.9 INJECTION, SOLUTION INTRAVENOUS at 02:08

## 2020-08-05 RX ADMIN — DOCUSATE SODIUM 100 MG: 100 CAPSULE, LIQUID FILLED ORAL at 08:08

## 2020-08-05 NOTE — NURSING TRANSFER
Nursing Transfer Note      8/5/2020     Transfer To: 631    Transfer via stretcher    Transfer with cardiac monitoring    Transported by pct    Medicines sent: n/a    Chart send with patient: Yes    Notified: n/a    Patient reassessed at: 8/5/20 @ 6065

## 2020-08-05 NOTE — DISCHARGE INSTRUCTIONS
Colonoscopy     A camera attached to a flexible tube with a viewing lens is used to take video pictures.     Colonoscopy is a test to view the inside of your lower digestive tract (colon and rectum). Sometimes it can show the last part of the small intestine (ileum). During the test, small pieces of tissue may be removed for testing. This is called a biopsy. Small growths, such as polyps, may also be removed.   Why is colonoscopy done?  The test is done to help look for colon cancer. And it can help find the source of abdominal pain, bleeding, and changes in bowel habits. It may be needed once a year, depending on factors such as your:  · Age  · Health history  · Family health history  · Symptoms  · Results from any prior colonoscopy  Risks and possible complications  These include:  · Bleeding               · A puncture or tear in the colon   · Risks of anesthesia  · A cancer lesion not being seen  Getting ready   To prepare for the test:  · Talk with your healthcare provider about the risks of the test (see below). Also ask your healthcare provider about alternatives to the test.  · Tell your healthcare provider about any medicines you take. Also tell him or her about any health conditions you may have.  · Make sure your rectum and colon are empty for the test. Follow the diet and bowel prep instructions exactly. If you dont, the test may need to be rescheduled.  · Plan for a friend or family member to drive you home after the test.     Colonoscopy provides an inside view of the entire colon.     You may discuss the results with your doctor right away or at a future visit.  During the test   The test is usually done in the hospital on an outpatient basis. This means you go home the same day. The procedure takes about 30 minutes. During that time:  · You are given relaxing (sedating) medicine through an IV line. You may be drowsy, or fully asleep.  · The healthcare provider will first give you a physical exam to  check for anal and rectal problems.  · Then the anus is lubricated and the scope inserted.  · If you are awake, you may have a feeling similar to needing to have a bowel movement. You may also feel pressure as air is pumped into the colon. Its OK to pass gas during the procedure.  · Biopsy, polyp removal, or other treatments may be done during the test.  After the test   You may have gas right after the test. It can help to try to pass it to help prevent later bloating. Your healthcare provider may discuss the results with you right away. Or you may need to schedule a follow-up visit to talk about the results. After the test, you can go back to your normal eating and other activities. You may be tired from the sedation and need to rest for a few hours.  Date Last Reviewed: 11/1/2016 © 2000-2017 [employer]. [address] [place] [address], [place] [address]. All rights reserved. This information is not intended as a substitute for professional medical care. Always follow your healthcare professional's instructions.

## 2020-08-05 NOTE — PT/OT/SLP EVAL
Physical Therapy Evaluation    Patient Name:  Crystal Alvarado   MRN:  [id number]    Recommendations:     Discharge Recommendations:  home health PT   Discharge Equipment Recommendations: walker, rolling, bedside commode   Barriers to discharge: None    Assessment:     Crystal Alvarado is a 79 y.o. female admitted with a medical diagnosis of Gastrointestinal hemorrhage.  She presents with the following impairments/functional limitations:  weakness, gait instability, impaired endurance, impaired balance, decreased lower extremity function, impaired self care skills, impaired functional mobilty. Pt evaluated on this date presenting near functional baseline but limited by reports of 8/10 L knee pain during gait assessment. pt safe to mobilize with nursing utilizing RW ordered to room. Pt would benefit from continued skilled acute PT 3x/wk to improve functional mobility.  Recommending pt receive PT services in [facility] setting following discharge from hospital once medically cleared.     Rehab Prognosis: Fair; patient would benefit from acute skilled PT services to address these deficits and reach maximum level of function.    Recent Surgery: Procedure(s) (LRB):  EGD (ESOPHAGOGASTRODUODENOSCOPY) (N/A) 1 Day Post-Op    Plan:     During this hospitalization, patient to be seen 3 x/week to address the identified rehab impairments via gait training, therapeutic activities, therapeutic exercises, neuromuscular re-education and progress toward the following goals:    · Plan of Care Expires:  09/04/20    Subjective     Chief Complaint: 5/10 abdominal pain   8/10 L Knee pain   Patient/Family Comments/goals: Pt pleasant and willing to participate with therapy on this date.    Pain/Comfort:  · Pain Rating 1: 5/10  · Location 1: abdomen  · Pain Rating 2: 8/10  · Location - Side 2: Left  · Location 2: knee    Patients cultural, spiritual, Mandaeism conflicts given the current situation: no    Living Environment:  Pt lives w/DTR in [facility]  w/4 VANDANA and L handrail.   Prior to admission, patients level of function was amb Mod I limited household distances w/standard walker.  Equipment used at home: walker, standard, shower chair.  DME owned (not currently used): none.  Upon discharge, patient will have assistance from DTR.    Objective:     Communicated with NSG prior to session.  Patient found up in chair with    upon PT entry to room.    General Precautions: Standard, fall   Orthopedic Precautions:N/A   Braces: N/A     Exams:  · Cognitive Exam:  Patient is oriented to Person, Place and Time  · Gross Motor Coordination:  WFL  · RLE ROM: WFL  · RLE Strength: WFL  · LLE ROM: WFL  · LLE Strength: limited by knee pain     Functional Mobility:  · Transfers:     · Sit to Stand:  stand by assistance with no AD  · Gait: 18ft CGA w/RW demonstrating decreased bertha and antalgic gait pattern   · Balance: Sitting: SBA     Therapeutic Activities and Exercises:  - Sit-to-Stand x2 trials SBA without AD   - Pt educated on:   -PT roles, expectations, and POC    -Safety with mobility   -Benefits of OOB activities to increase strength and functional mobility    -Performing ther ex for increasing LE ROM and strength   -Discharge recommendations     AM-PAC 6 CLICK MOBILITY  Total Score:18     Patient left up in chair with call button in reach.    GOALS:   Multidisciplinary Problems     Physical Therapy Goals        Problem: Physical Therapy Goal    Goal Priority Disciplines Outcome Goal Variances Interventions   Physical Therapy Goal     PT, PT/OT Ongoing, Progressing     Description: Goals to be met by: 2020    Patient will increase functional independence with mobility by performin. Supine to sit with Stand-by Assistance  2. Sit to supine with Stand-by Assistance  3. Sit to stand transfer with Supervision  4. Gait  x 30 feet with Stand-by Assistance using LRAD  5. Lower extremity exercise program x15 reps, with independence                      History:      Past Medical History:   Diagnosis Date    Arthritis     CHF (congestive heart failure)     Coronary artery disease     Hyperlipidemia     Hypertension        Past Surgical History:   Procedure Laterality Date    A-V CARDIAC PACEMAKER INSERTION N/A 7/9/2018    Procedure: INSERTION, CARDIAC PACEMAKER, DUAL CHAMBER;  Surgeon: Archie Montez MD;  Location: [facility] CATH LAB;  Service: Cardiology;  Laterality: N/A;  SVT, RFA, +/- Dual PPM or ILR, IGLESIA, MDT, MAC, CO, 3 Prep    ESOPHAGOGASTRODUODENOSCOPY N/A 8/4/2020    Procedure: EGD (ESOPHAGOGASTRODUODENOSCOPY);  Surgeon: Fernie Cohen MD;  Location: [facility] ([address] [place] [address]);  Service: Endoscopy;  Laterality: N/A;    HYSTERECTOMY N/A     INSERTION OF IMPLANTABLE LOOP RECORDER N/A 7/9/2018    Procedure: PLACEMENT-LOOP RECORDER;  Surgeon: Archie Montez MD;  Location: [facility] CATH LAB;  Service: Cardiology;  Laterality: N/A;  SVT, RFA, +/- Dual PPM or ILR, IGLESIA, MDT, MAC, CO, 3 Prep       Time Tracking:     PT Received On: 08/05/20  PT Start Time: 0827     PT Stop Time: 0846  PT Total Time (min): 19 min     Billable Minutes: Evaluation 10 and Gait Training 9      Jonathan Guerra, PT  08/05/2020

## 2020-08-05 NOTE — PLAN OF CARE
Problem: Occupational Therapy Goal  Goal: Occupational Therapy Goal  Description: Goals to be met by: 8/19/2020     Patient will increase functional independence with ADLs by performing:    UE Dressing with Set-up Assistance.  LE Dressing with Supervision.  Grooming while standing with Supervision.  Toileting from toilet with Supervision for hygiene and clothing management.   Supine to sit with Supervision.  Stand pivot transfers with Supervision.  Toilet transfer to toilet with Stand-by Assistance.    Outcome: Ongoing, Progressing   Patient's goals are set.   PAUL Cavazos  8/5/2020

## 2020-08-05 NOTE — PLAN OF CARE
Eval completed and POC established     Skyler Guerra, PT, DPT  2020     Problem: Physical Therapy Goal  Goal: Physical Therapy Goal  Description: Goals to be met by: 2020    Patient will increase functional independence with mobility by performin. Supine to sit with Stand-by Assistance  2. Sit to supine with Stand-by Assistance  3. Sit to stand transfer with Supervision  4. Gait  x 30 feet with Stand-by Assistance using LRAD  5. Lower extremity exercise program x15 reps, with independence     Outcome: Ongoing, Progressing

## 2020-08-05 NOTE — PROVATION PATIENT INSTRUCTIONS
Discharge Summary/Instructions after an Endoscopic Procedure  Patient Name: Crystal Alvarado  Patient MRN: [id number]  Patient YOB: 1940 Wednesday, August 5, 2020  Sebas Dickens MD  RESTRICTIONS:  During your procedure today, you received medications for sedation.  These   medications may affect your judgment, balance and coordination.  Therefore,   for 24 hours, you have the following restrictions:   - DO NOT drive a car, operate machinery, make legal/financial decisions,   sign important papers or drink alcohol.    ACTIVITY:  Today: no heavy lifting, straining or running due to procedural   sedation/anesthesia.  The following day: return to full activity including work.  DIET:  Eat and drink normally unless instructed otherwise.     TREATMENT FOR COMMON SIDE EFFECTS:  - Mild abdominal pain, nausea, belching, bloating or excessive gas:  rest,   eat lightly and use a heating pad.  - Sore Throat: treat with throat lozenges and/or gargle with warm salt   water.  - Because air was used during the procedure, expelling large amounts of air   from your rectum or belching is normal.  - If a bowel prep was taken, you may not have a bowel movement for 1-3 days.    This is normal.  SYMPTOMS TO WATCH FOR AND REPORT TO YOUR PHYSICIAN:  1. Abdominal pain or bloating, other than gas cramps.  2. Chest pain.  3. Back pain.  4. Signs of infection such as: chills or fever occurring within 24 hours   after the procedure.  5. Rectal bleeding, which would show as bright red, maroon, or black stools.   (A tablespoon of blood from the rectum is not serious, especially if   hemorrhoids are present.)  6. Vomiting.  7. Weakness or dizziness.  GO DIRECTLY TO THE NEAREST EMERGENCY ROOM IF YOU HAVE ANY OF THE FOLLOWING:      Difficulty breathing              Chills and/or fever over 101 F   Persistent vomiting and/or vomiting blood   Severe abdominal pain   Severe chest pain   Black, tarry stools   Bleeding- more than one  tablespoon   Any other symptom or condition that you feel may need urgent attention  Your doctor recommends these additional instructions:  If any biopsies were taken, your doctors clinic will contact you in 1 to 2   weeks with any results.  - Return patient to hospital parikh for ongoing care.   - Resume previous diet.   - Continue present medications.   - Await pathology results.   - Return to GI clinic at appointment to be scheduled.   - No repeat colonoscopy due to age.  For questions, problems or results please call your physician - Sebas Dickens MD at Work:  [phone].  [facility], EMERGENCY ROOM PHONE NUMBER: [phone]  IF A COMPLICATION OR EMERGENCY SITUATION ARISES AND YOU ARE UNABLE TO REACH   YOUR PHYSICIAN - GO DIRECTLY TO THE EMERGENCY ROOM.  Sebas Dickens MD  8/5/2020 3:03:08 PM  This report has been verified and signed electronically.  [employer]OVATION

## 2020-08-05 NOTE — PT/OT/SLP EVAL
Occupational Therapy   Evaluation/Treatment    Name: Crystal Alvarado  MRN: [id number]  Admitting Diagnosis:  Gastrointestinal hemorrhage Day of Surgery    Recommendations:     Discharge Recommendations: home health OT  Discharge Equipment Recommendations:  bedside commode, walker, rolling  Barriers to discharge:  None    Assessment:     Crystal Alvarado is a 79 y.o. female with a medical diagnosis of Gastrointestinal hemorrhage. Performance deficits affecting function: weakness, impaired endurance, impaired self care skills, impaired functional mobilty, gait instability, impaired balance, decreased lower extremity function. Patient would benefit from continued skilled acute OT 3x/wk to improve functional mobility, increase independence with ADLs, and address established goals. Recommending HHOT once medically appropriate for discharge to increase maximal independence, reduce burden of care, and ensure safety.     Rehab Prognosis: Fair; patient would benefit from acute skilled OT services to address these deficits and reach maximum level of function.       Plan:     Patient to be seen 3 x/week to address the above listed problems via self-care/home management, therapeutic activities, therapeutic exercises  · Plan of Care Expires: 09/05/20  · Plan of Care Reviewed with: patient    Subjective     Chief Complaint: pain and going for a procedure    Occupational Profile:  Living Environment: Patient lives with daughter in a [facility] with 4 VANDANA and L HR. Patient has a tub shower combo with a shower chair. Patient has a standard walker for functional ambulation. Patient reports she was independent with ADLs PTA.      Pain/Comfort:  · Pain Rating 1: 5/10  · Location - Orientation 1: generalized  · Location 1: rib(s)  · Pain Addressed 1: Reposition, Distraction  · Pain Rating Post-Intervention 1: 5/10  · Pain Rating 2: 8/10  · Location - Side 2: Left  · Location - Orientation 2: generalized  · Location 2: knee  · Pain Addressed 2:  Reposition, Distraction  · Pain Rating Post-Intervention 2: 8/10    Patients cultural, spiritual, [employer] conflicts given the current situation: no    Objective:     Communicated with: NSG prior to session.  Patient found up in chair upon OT entry to room.    General Precautions: Standard, fall   Orthopedic Precautions:N/A   Braces: N/A     Occupational Performance:    Functional Mobility/Transfers:  · Patient completed Sit <> Stand Transfer with stand by assistance  with  no assistive device   · Functional Mobility: Patient performed functional ambulation in room with RW with CGA from bedside chair and returned to bedside chair.     Activities of Daily Living:  · Lower Body Dressing: stand by assistance Donning underwear  · Feeding: [place]: Patient needed to drink prep for procedure    Cognitive/Visual Perceptual:  Cognitive/Psychosocial Skills:     -       Oriented to: Person, Place, Time and Situation   -       Follows Commands/attention:Follows multistep  commands  -       Communication: clear/fluent  -       Memory: No Deficits noted  -       Safety awareness/insight to disability: intact   -       Mood/Affect/Coping skills/emotional control: Appropriate to situation  Visual/Perceptual:      -Intact      Physical Exam:  Upper Extremity Range of Motion:     -       Right Upper Extremity: WFL  -       Left Upper Extremity: WFL  Upper Extremity Strength:    -       Right Upper Extremity: WFL  -       Left Upper Extremity: WFL   Strength:    -       Right Upper Extremity: WFL  -       Left Upper Extremity: WFL  Fine Motor Coordination:    -       Intact  Gross motor coordination:   WFL    AMPAC 6 Click ADL:  AMPAC Total Score: 19    Treatment & Education:  Role of OT and POC  Safety  ADL retraining  Functional mobility training  Discharge planning  Education:    Patient left up in chair with call button in reach and all needs met.     GOALS:   Multidisciplinary Problems     Occupational Therapy Goals         Problem: Occupational Therapy Goal    Goal Priority Disciplines Outcome Interventions   Occupational Therapy Goal     OT, PT/OT Ongoing, Progressing    Description: Goals to be met by: 8/19/2020     Patient will increase functional independence with ADLs by performing:    UE Dressing with Set-up Assistance.  LE Dressing with Supervision.  Grooming while standing with Supervision.  Toileting from toilet with Supervision for hygiene and clothing management.   Supine to sit with Supervision.  Stand pivot transfers with Supervision.  Toilet transfer to toilet with Stand-by Assistance.                     History:     Past Medical History:   Diagnosis Date    Arthritis     CHF (congestive heart failure)     Coronary artery disease     Hyperlipidemia     Hypertension        Past Surgical History:   Procedure Laterality Date    A-V CARDIAC PACEMAKER INSERTION N/A 7/9/2018    Procedure: INSERTION, CARDIAC PACEMAKER, DUAL CHAMBER;  Surgeon: Archie Montez MD;  Location: [facility] CATH LAB;  Service: Cardiology;  Laterality: N/A;  SVT, RFA, +/- Dual PPM or ILR, IGLESIA, MDT, MAC, CT, 3 Prep    ESOPHAGOGASTRODUODENOSCOPY N/A 8/4/2020    Procedure: EGD (ESOPHAGOGASTRODUODENOSCOPY);  Surgeon: Fernie Cohen MD;  [facility]  Service: Endoscopy;  Laterality: N/A;    HYSTERECTOMY N/A     INSERTION OF IMPLANTABLE LOOP RECORDER N/A 7/9/2018    Procedure: PLACEMENT-LOOP RECORDER;  Surgeon: Archie Montez MD;  Location: [facility] CATH LAB;  Service: Cardiology;  Laterality: N/A;  SVT, RFA, +/- Dual PPM or ILR, IGLESIA, MDT, MAC, CT, 3 Prep       Time Tracking:     OT Date of Treatment: 08/05/20  OT Start Time: 0830  OT Stop Time: 0848  OT Total Time (min): 18 min    Billable Minutes:Evaluation 10  Self Care/Home Management 8    PAUL Cavazos  8/5/2020

## 2020-08-05 NOTE — TREATMENT PLAN
Brief GI note    Colonoscopy done today   Multiple diverticula on exam   No blood seen. Small specks of black stool seen   Nodule on ileocecal valve, biopsied.   TI appeared normal    Can resume patient's medications and resume a diet.   We will arrange outpatient clinic follow up to discuss Video capsule endoscopy    GI will sign off, please contact us with further questions or concerns.    Jose Narayanan, PGY-VI  Gastroenterology Fellow   [phone]

## 2020-08-05 NOTE — H&P
Short Stay Endoscopy History and Physical    PCP - Shantell Goff MD  Referring Physician - Srikanth Anthony MD  [address] [place] [address]    Procedure - colonoscopy  ASA - per anesthesia  Mallampati - per anesthesia  History of Anesthesia problems - no  Family history Anesthesia problems -  no   Plan of anesthesia - General    HPI:  This is a 79 y.o. female here for evaluation of: anemia    Reflux - no  Dysphagia - no  Abdominal pain - no  Diarrhea - no    ROS:  Constitutional: No fevers, chills, No weight loss  CV: No chest pain  Pulm: No cough, No shortness of breath  Ophtho: No vision changes  GI: see HPI  Derm: No rash    Medical History:  has a past medical history of Arthritis, CHF (congestive heart failure), Coronary artery disease, Hyperlipidemia, and Hypertension.    Surgical History:  has a past surgical history that includes Hysterectomy (N/A); A-V cardiac pacemaker insertion (N/A, 7/9/2018); Insertion of implantable loop recorder (N/A, 7/9/2018); Esophagogastroduodenoscopy (N/A, 8/4/2020); and Colonoscopy.    Family History: family history is not on file..    Social History:  reports that she quit smoking about 7 years ago. She smoked 0.50 packs per day. She has never used smokeless tobacco. She reports that she does not drink alcohol or use drugs.    Review of patient's allergies indicates:  No Known Allergies    Medications:   Medications Prior to Admission   Medication Sig Dispense Refill Last Dose    amLODIPine (NORVASC) 5 MG tablet Take 1 tablet (5 mg total) by mouth once daily. 90 tablet 3 8/3/2020 at Unknown time    aspirin 81 MG Chew Take 81 mg by mouth once daily.  4 8/2/2020 at Unknown time    atorvastatin (LIPITOR) 20 MG tablet Take 20 mg by mouth every evening.  4 8/3/2020 at Unknown time    azelastine (ASTELIN) 137 mcg (0.1 %) nasal spray    8/2/2020 at Unknown time    BREO ELLIPTA 100-25 mcg/dose diskus inhaler INHALE 1 PUFF BY MOUTH ONCE A DAY  3 8/3/2020 at  Unknown time    docusate sodium (COLACE) 100 MG capsule Take 1 capsule (100 mg total) by mouth 2 (two) times daily. 60 capsule 0 8/2/2020 at Unknown time    ergocalciferol (ERGOCALCIFEROL) 50,000 unit Cap Take 50,000 Units by mouth every 7 days.   8/2/2020 at Unknown time    fluticasone propionate (FLONASE) 50 mcg/actuation nasal spray    8/2/2020 at Unknown time    HYDROcodone-acetaminophen (NORCO) 5-325 mg per tablet Take 1 tablet by mouth 2 (two) times daily as needed.   8/2/2020 at Unknown time    losartan (COZAAR) 100 MG tablet Take 1 tablet (100 mg total) by mouth once daily. 90 tablet 3 8/3/2020 at Unknown time    pregabalin (LYRICA) 25 MG capsule TAKE 1 CAPSULE BY MOUTH 2 TIMES A DAY FOR PAIN   8/3/2020 at Unknown time    traMADol (ULTRAM) 50 mg tablet Take 1 tablet (50 mg total) by mouth every 6 (six) hours as needed for Pain. 20 tablet 0 8/3/2020 at Unknown time    nitroGLYCERIN (NITROSTAT) 0.4 MG SL tablet Place 1 tablet (0.4 mg total) under the tongue every 5 (five) minutes as needed for Chest pain (and notify MD). 25 tablet 5        Physical Exam:    Vital Signs:   Vitals:    08/05/20 1406   BP: (!) 190/78   Pulse: 82   Resp: 18   Temp: 98.1 °F (36.7 °C)       General Appearance: Well appearing in no acute distress    Labs:  Lab Results   Component Value Date    WBC 7.75 08/05/2020    HGB 9.1 (L) 08/05/2020    HCT 29.7 (L) 08/05/2020     08/05/2020    CHOL 147 02/10/2020    TRIG 66 02/10/2020    HDL 53 02/10/2020    ALT 6 (L) 08/03/2020    AST 9 (L) 08/03/2020     08/05/2020    K 3.8 08/05/2020     08/05/2020    CREATININE 1.5 (H) 08/05/2020    BUN 29 (H) 08/05/2020    CO2 23 08/05/2020    TSH 1.050 02/10/2020    INR 0.9 08/03/2020    HGBA1C 5.2 11/20/2017       I have explained the risks and benefits of this endoscopic procedure to the patient including but not limited to bleeding, inflammation, infection, perforation, and death.      Sebas Dickens MD

## 2020-08-05 NOTE — TRANSFER OF CARE
"Anesthesia Transfer of Care Note    Patient: Crystal Alvarado    Procedure(s) Performed: Procedure(s) (LRB):  COLONOSCOPY (N/A)    Patient location: PACU    Anesthesia Type: general    Transport from OR: Transported from OR on 2-3 L/min O2 by NC with adequate spontaneous ventilation    Post pain: adequate analgesia    Post assessment: no apparent anesthetic complications and tolerated procedure well    Post vital signs: stable    Level of consciousness: awake, alert and oriented    Nausea/Vomiting: no nausea/vomiting    Complications: none    Transfer of care protocol was followed      Last vitals:   Visit Vitals  BP (!) 190/78 (BP Location: Left arm, Patient Position: Lying)   Pulse 82   Temp 36.7 °C (98.1 °F) (Oral)   Resp 18   Ht 5' 6" (1.676 m)   Wt 82.9 kg (182 lb 12.2 oz)   SpO2 98%   Breastfeeding No   BMI 29.50 kg/m²     "

## 2020-08-05 NOTE — PLAN OF CARE
Vss stable throughout the night. Patient drank Golytely  Problem: Fall Injury Risk  Goal: Absence of Fall and Fall-Related Injury  Outcome: Ongoing, Progressing     Problem: Adult Inpatient Plan of Care  Goal: Plan of Care Review  Outcome: Ongoing, Progressing  Goal: Patient-Specific Goal (Individualization)  Outcome: Ongoing, Progressing  Goal: Absence of Hospital-Acquired Illness or Injury  Outcome: Ongoing, Progressing  Goal: Optimal Comfort and Wellbeing  Outcome: Ongoing, Progressing  Goal: Readiness for Transition of Care  Outcome: Ongoing, Progressing  Goal: Rounds/Family Conference  Outcome: Ongoing, Progressing    for procedure, free from falls, call light in reach. Safety measures maintained. Will continue to monitor.

## 2020-08-05 NOTE — PROGRESS NOTES
Progress Note   Hospital Medicine         Patient Name: Crystal Alvarado  MRN:  [id number]  [facility] Medicine Team: [facility] MED V Srikanth Anthony MD  Date of Admission:  8/3/2020     Length of Stay:  LOS: 2 days   Expected Discharge Date: 8/7/2020  Principal Problem:  Gastrointestinal hemorrhage       Subjective:     Interval History/Overnight Events:  Patient went for EGD today, with no evidence for cause of bleed, planning for C-scope in the AM; patient having knee pain where she feel and some back pain, prn narcotics and lidoderm patch; PT/OT    Review of Systems   Constitutional: Negative for chills, fatigue, fever.   HENT: Negative for sore throat, trouble swallowing.    Eyes: Negative for photophobia, visual disturbance.   Respiratory: Negative for cough, shortness of breath.    Cardiovascular: Negative for chest pain, palpitations, leg swelling.   Gastrointestinal: Negative for abdominal pain, constipation, diarrhea, nausea, vomiting.   Endocrine: Negative for cold intolerance, heat intolerance.   Genitourinary: Negative for dysuria, frequency.   Musculoskeletal: Negative for arthralgias, myalgias.   Skin: Negative for rash, wound, erythema   Neurological: Negative for dizziness, syncope, weakness, light-headedness.   Psychiatric/Behavioral: Negative for confusion, hallucinations, anxiety  All other systems reviewed and are negative.    Objective:     Temp:  [96.6 °F (35.9 °C)-98.2 °F (36.8 °C)]   Pulse:  [57-97]   Resp:  [14-25]   BP: ()/(46-92)   SpO2:  [96 %-100 %]       Physical Exam:  Constitutional: appears weak and ill  Head: Normocephalic and atraumatic.   Mouth/Throat: Oropharynx is clear and moist.   Eyes: EOM are normal. Pupils are equal, round, and reactive to light. No scleral icterus.   Neck: Normal range of motion. Neck supple.   Cardiovascular: Normal rate and regular rhythm.  No murmur heard.  Pulmonary/Chest: Effort normal and breath sounds normal. No respiratory distress. No wheezes,  rales, or rhonchi  Abdominal: Soft. Bowel sounds are normal.  No distension or tenderness  Musculoskeletal: Normal range of motion. No edema.   Neurological: Alert and oriented to person, place, and time.   Skin: Skin is warm and dry.   Psychiatric: Normal mood and affect. Behavior is normal.     Recent Labs   Lab 07/29/20 0752 08/03/20  1017 08/04/20  0517   WBC 4.20 7.29 6.28   HGB 11.2* 7.5* 8.5*   HCT 37.0 24.9* 28.5*    221 216     Recent Labs   Lab 07/29/20 0752 08/03/20  1017 08/04/20  0517    142 140   K 3.2* 4.0 3.9    113* 111*   CO2 25 23 23   BUN 22 39* 31*   CREATININE 1.2 1.2 1.1   GLU 95 105 83   CALCIUM 9.5 8.3* 8.6*   MG  --   --  2.0     Recent Labs   Lab 07/29/20 0752 08/03/20  1017 08/03/20  1128   ALKPHOS 77 59  --    ALT 9* 6*  --    AST 13 9*  --    ALBUMIN 3.2* 2.9*  --    PROT 6.8 5.6*  --    BILITOT 0.4 0.3  --    INR  --   --  0.9     No results for input(s): POCTGLUCOSE in the last 168 hours.     atorvastatin  20 mg Oral QHS    docusate sodium  100 mg Oral BID    fluticasone furoate-vilanteroL  1 puff Inhalation Daily    lidocaine  2 patch Transdermal Q24H    losartan  100 mg Oral Daily    oxyCODONE  10 mg Oral Once    oxyCODONE  10 mg Oral Once    pantoprazole  40 mg Intravenous BID    pregabalin  50 mg Oral BID       Assessment and Plan     Ms. Crystal Alvarado is a 79 y.o. female who presented to [facility] on 8/3/2020 with     Hospital Course:    Ms. Crystal Alvarado was admitted to Hospital Medicine for management of     Active Hospital Problems    Diagnosis  POA    *Gastrointestinal hemorrhage [K92.2]  Yes    Acute blood loss anemia [D62]  Yes    Left anterior knee pain [M25.562]  Yes    CKD (chronic kidney disease) stage 3, GFR 30-59 ml/min [N18.3]  Yes    Essential hypertension [I10]  Yes      Resolved Hospital Problems   No resolved problems to display.     # Gastrointestinal hemorrhage with melena  # Acute blood loss anemia  - s/p 2 units of  PRBC  - GI consulted  - PPI IV BID  - EGD shows no evidence of GI bleed  - plannning C-scope in the AM     # Essential HTN  - holding norvasc, giving losartan      # Left knee pain  - ortho evaluated, no evidence of fracture;  - PT/OT         Diet:  NPO  GI PPx:    DVT PPx:    Goals of Care:  full        Disposition:  Likely two days     Srikanth Anthony MD  Medical Director [facility]  Spectra:  [phone]  Pager: [phone]

## 2020-08-05 NOTE — H&P
Short Stay Endoscopy History and Physical    PCP - Shantell Goff MD  Referring Physician - Srikanth Anthony MD  [address] [place] [address]    Procedure - colonoscopy  ASA - per anesthesia  Mallampati - per anesthesia  History of Anesthesia problems - no  Family history Anesthesia problems -  no   Plan of anesthesia - General    HPI:  This is a 79 y.o. female here for evaluation of: anemia    Reflux - no  Dysphagia - no  Abdominal pain - no  Diarrhea - no    ROS:  Constitutional: No fevers, chills, No weight loss  CV: No chest pain  Pulm: No cough, No shortness of breath  Ophtho: No vision changes  GI: see HPI  Derm: No rash    Medical History:  has a past medical history of Arthritis, CHF (congestive heart failure), Coronary artery disease, Hyperlipidemia, and Hypertension.    Surgical History:  has a past surgical history that includes Hysterectomy (N/A); A-V cardiac pacemaker insertion (N/A, 7/9/2018); Insertion of implantable loop recorder (N/A, 7/9/2018); Esophagogastroduodenoscopy (N/A, 8/4/2020); and Colonoscopy.    Family History: family history is not on file..    Social History:  reports that she quit smoking about 7 years ago. She smoked 0.50 packs per day. She has never used smokeless tobacco. She reports that she does not drink alcohol or use drugs.    Review of patient's allergies indicates:  No Known Allergies    Medications:   Medications Prior to Admission   Medication Sig Dispense Refill Last Dose    amLODIPine (NORVASC) 5 MG tablet Take 1 tablet (5 mg total) by mouth once daily. 90 tablet 3 8/3/2020 at Unknown time    aspirin 81 MG Chew Take 81 mg by mouth once daily.  4 8/2/2020 at Unknown time    atorvastatin (LIPITOR) 20 MG tablet Take 20 mg by mouth every evening.  4 8/3/2020 at Unknown time    azelastine (ASTELIN) 137 mcg (0.1 %) nasal spray    8/2/2020 at Unknown time    BREO ELLIPTA 100-25 mcg/dose diskus inhaler INHALE 1 PUFF BY MOUTH ONCE A DAY  3 8/3/2020 at  Unknown time    docusate sodium (COLACE) 100 MG capsule Take 1 capsule (100 mg total) by mouth 2 (two) times daily. 60 capsule 0 8/2/2020 at Unknown time    ergocalciferol (ERGOCALCIFEROL) 50,000 unit Cap Take 50,000 Units by mouth every 7 days.   8/2/2020 at Unknown time    fluticasone propionate (FLONASE) 50 mcg/actuation nasal spray    8/2/2020 at Unknown time    HYDROcodone-acetaminophen (NORCO) 5-325 mg per tablet Take 1 tablet by mouth 2 (two) times daily as needed.   8/2/2020 at Unknown time    losartan (COZAAR) 100 MG tablet Take 1 tablet (100 mg total) by mouth once daily. 90 tablet 3 8/3/2020 at Unknown time    pregabalin (LYRICA) 25 MG capsule TAKE 1 CAPSULE BY MOUTH 2 TIMES A DAY FOR PAIN   8/3/2020 at Unknown time    traMADol (ULTRAM) 50 mg tablet Take 1 tablet (50 mg total) by mouth every 6 (six) hours as needed for Pain. 20 tablet 0 8/3/2020 at Unknown time    nitroGLYCERIN (NITROSTAT) 0.4 MG SL tablet Place 1 tablet (0.4 mg total) under the tongue every 5 (five) minutes as needed for Chest pain (and notify MD). 25 tablet 5        Physical Exam:    Vital Signs:   Vitals:    08/05/20 1406   BP: (!) 190/78   Pulse: 82   Resp: 18   Temp: 98.1 °F (36.7 °C)       General Appearance: Well appearing in no acute distress    Labs:  Lab Results   Component Value Date    WBC 7.75 08/05/2020    HGB 9.1 (L) 08/05/2020    HCT 29.7 (L) 08/05/2020     08/05/2020    CHOL 147 02/10/2020    TRIG 66 02/10/2020    HDL 53 02/10/2020    ALT 6 (L) 08/03/2020    AST 9 (L) 08/03/2020     08/05/2020    K 3.8 08/05/2020     08/05/2020    CREATININE 1.5 (H) 08/05/2020    BUN 29 (H) 08/05/2020    CO2 23 08/05/2020    TSH 1.050 02/10/2020    INR 0.9 08/03/2020    HGBA1C 5.2 11/20/2017       I have explained the risks and benefits of this endoscopic procedure to the patient including but not limited to bleeding, inflammation, infection, perforation, and death.      Sebas Dickens MD

## 2020-08-06 ENCOUNTER — TELEPHONE (OUTPATIENT)
Dept: GASTROENTEROLOGY | Facility: CLINIC | Age: 80
End: 2020-08-06

## 2020-08-06 PROBLEM — R79.89 ELEVATED SERUM CREATININE: Status: ACTIVE | Noted: 2020-08-06

## 2020-08-06 LAB
ANION GAP SERPL CALC-SCNC: 7 MMOL/L (ref 8–16)
BASOPHILS # BLD AUTO: 0.04 K/UL (ref 0–0.2)
BASOPHILS NFR BLD: 0.7 % (ref 0–1.9)
BILIRUB UR QL STRIP: NEGATIVE
BUN SERPL-MCNC: 29 MG/DL (ref 8–23)
CALCIUM SERPL-MCNC: 9 MG/DL (ref 8.7–10.5)
CHLORIDE SERPL-SCNC: 111 MMOL/L (ref 95–110)
CHLORIDE UR-SCNC: <20 MMOL/L (ref 25–200)
CLARITY UR REFRACT.AUTO: ABNORMAL
CO2 SERPL-SCNC: 26 MMOL/L (ref 23–29)
COLOR UR AUTO: YELLOW
CREAT SERPL-MCNC: 1.8 MG/DL (ref 0.5–1.4)
CREAT UR-MCNC: 226 MG/DL (ref 15–325)
DIFFERENTIAL METHOD: ABNORMAL
EOSINOPHIL # BLD AUTO: 0.2 K/UL (ref 0–0.5)
EOSINOPHIL NFR BLD: 4.1 % (ref 0–8)
ERYTHROCYTE [DISTWIDTH] IN BLOOD BY AUTOMATED COUNT: 17.1 % (ref 11.5–14.5)
EST. GFR  (AFRICAN AMERICAN): 30 ML/MIN/1.73 M^2
EST. GFR  (NON AFRICAN AMERICAN): 26 ML/MIN/1.73 M^2
GLUCOSE SERPL-MCNC: 79 MG/DL (ref 70–110)
GLUCOSE UR QL STRIP: NEGATIVE
HCT VFR BLD AUTO: 27.9 % (ref 37–48.5)
HGB BLD-MCNC: 8.2 G/DL (ref 12–16)
HGB UR QL STRIP: NEGATIVE
IMM GRANULOCYTES # BLD AUTO: 0.02 K/UL (ref 0–0.04)
IMM GRANULOCYTES NFR BLD AUTO: 0.3 % (ref 0–0.5)
KETONES UR QL STRIP: NEGATIVE
LEUKOCYTE ESTERASE UR QL STRIP: NEGATIVE
LYMPHOCYTES # BLD AUTO: 1.2 K/UL (ref 1–4.8)
LYMPHOCYTES NFR BLD: 20.9 % (ref 18–48)
MAGNESIUM SERPL-MCNC: 2.2 MG/DL (ref 1.6–2.6)
MCH RBC QN AUTO: 29 PG (ref 27–31)
MCHC RBC AUTO-ENTMCNC: 29.4 G/DL (ref 32–36)
MCV RBC AUTO: 99 FL (ref 82–98)
MONOCYTES # BLD AUTO: 0.4 K/UL (ref 0.3–1)
MONOCYTES NFR BLD: 7.1 % (ref 4–15)
NEUTROPHILS # BLD AUTO: 3.9 K/UL (ref 1.8–7.7)
NEUTROPHILS NFR BLD: 66.9 % (ref 38–73)
NITRITE UR QL STRIP: NEGATIVE
NRBC BLD-RTO: 0 /100 WBC
PH UR STRIP: 5 [PH] (ref 5–8)
PLATELET # BLD AUTO: 235 K/UL (ref 150–350)
PMV BLD AUTO: 10.4 FL (ref 9.2–12.9)
POTASSIUM SERPL-SCNC: 4 MMOL/L (ref 3.5–5.1)
PROT UR QL STRIP: NEGATIVE
RBC # BLD AUTO: 2.83 M/UL (ref 4–5.4)
SODIUM SERPL-SCNC: 144 MMOL/L (ref 136–145)
SODIUM UR-SCNC: 50 MMOL/L (ref 20–250)
SP GR UR STRIP: 1.02 (ref 1–1.03)
URN SPEC COLLECT METH UR: ABNORMAL
UUN UR-MCNC: 794 MG/DL (ref 140–1050)
WBC # BLD AUTO: 5.88 K/UL (ref 3.9–12.7)

## 2020-08-06 PROCEDURE — 81003 URINALYSIS AUTO W/O SCOPE: CPT

## 2020-08-06 PROCEDURE — 63600175 PHARM REV CODE 636 W HCPCS: Performed by: HOSPITALIST

## 2020-08-06 PROCEDURE — 85025 COMPLETE CBC W/AUTO DIFF WBC: CPT

## 2020-08-06 PROCEDURE — 25000003 PHARM REV CODE 250: Performed by: NURSE PRACTITIONER

## 2020-08-06 PROCEDURE — 80048 BASIC METABOLIC PNL TOTAL CA: CPT

## 2020-08-06 PROCEDURE — 84300 ASSAY OF URINE SODIUM: CPT

## 2020-08-06 PROCEDURE — 83735 ASSAY OF MAGNESIUM: CPT

## 2020-08-06 PROCEDURE — 94640 AIRWAY INHALATION TREATMENT: CPT

## 2020-08-06 PROCEDURE — 84540 ASSAY OF URINE/UREA-N: CPT

## 2020-08-06 PROCEDURE — 82570 ASSAY OF URINE CREATININE: CPT

## 2020-08-06 PROCEDURE — 99232 PR SUBSEQUENT HOSPITAL CARE,LEVL II: ICD-10-PCS | Mod: ,,, | Performed by: HOSPITALIST

## 2020-08-06 PROCEDURE — 11000001 HC ACUTE MED/SURG PRIVATE ROOM

## 2020-08-06 PROCEDURE — 94761 N-INVAS EAR/PLS OXIMETRY MLT: CPT

## 2020-08-06 PROCEDURE — C9113 INJ PANTOPRAZOLE SODIUM, VIA: HCPCS | Performed by: HOSPITALIST

## 2020-08-06 PROCEDURE — 36415 COLL VENOUS BLD VENIPUNCTURE: CPT

## 2020-08-06 PROCEDURE — 25000003 PHARM REV CODE 250: Performed by: HOSPITALIST

## 2020-08-06 PROCEDURE — 27000221 HC OXYGEN, UP TO 24 HOURS

## 2020-08-06 PROCEDURE — 99232 SBSQ HOSP IP/OBS MODERATE 35: CPT | Mod: ,,, | Performed by: HOSPITALIST

## 2020-08-06 PROCEDURE — 82436 ASSAY OF URINE CHLORIDE: CPT

## 2020-08-06 RX ORDER — SODIUM CHLORIDE, SODIUM LACTATE, POTASSIUM CHLORIDE, CALCIUM CHLORIDE 600; 310; 30; 20 MG/100ML; MG/100ML; MG/100ML; MG/100ML
INJECTION, SOLUTION INTRAVENOUS CONTINUOUS
Status: ACTIVE | OUTPATIENT
Start: 2020-08-06 | End: 2020-08-06

## 2020-08-06 RX ORDER — PANTOPRAZOLE SODIUM 40 MG/1
40 TABLET, DELAYED RELEASE ORAL DAILY
Status: DISCONTINUED | OUTPATIENT
Start: 2020-08-07 | End: 2020-08-08 | Stop reason: HOSPADM

## 2020-08-06 RX ADMIN — LOSARTAN POTASSIUM 100 MG: 50 TABLET, FILM COATED ORAL at 10:08

## 2020-08-06 RX ADMIN — DOCUSATE SODIUM 100 MG: 100 CAPSULE, LIQUID FILLED ORAL at 10:08

## 2020-08-06 RX ADMIN — HYDRALAZINE HYDROCHLORIDE 25 MG: 25 TABLET, FILM COATED ORAL at 05:08

## 2020-08-06 RX ADMIN — LIDOCAINE 2 PATCH: 50 PATCH CUTANEOUS at 04:08

## 2020-08-06 RX ADMIN — DOCUSATE SODIUM 100 MG: 100 CAPSULE, LIQUID FILLED ORAL at 09:08

## 2020-08-06 RX ADMIN — ATORVASTATIN CALCIUM 20 MG: 20 TABLET, FILM COATED ORAL at 09:08

## 2020-08-06 RX ADMIN — OXYCODONE HYDROCHLORIDE 10 MG: 10 TABLET ORAL at 09:08

## 2020-08-06 RX ADMIN — SODIUM CHLORIDE, SODIUM LACTATE, POTASSIUM CHLORIDE, AND CALCIUM CHLORIDE: 600; 310; 30; 20 INJECTION, SOLUTION INTRAVENOUS at 01:08

## 2020-08-06 RX ADMIN — PANTOPRAZOLE SODIUM 40 MG: 40 INJECTION, POWDER, LYOPHILIZED, FOR SOLUTION INTRAVENOUS at 10:08

## 2020-08-06 RX ADMIN — FLUTICASONE FUROATE AND VILANTEROL TRIFENATATE 1 PUFF: 100; 25 POWDER RESPIRATORY (INHALATION) at 09:08

## 2020-08-06 RX ADMIN — AMLODIPINE BESYLATE 10 MG: 10 TABLET ORAL at 10:08

## 2020-08-06 RX ADMIN — PREGABALIN 50 MG: 50 CAPSULE ORAL at 09:08

## 2020-08-06 RX ADMIN — PREGABALIN 50 MG: 50 CAPSULE ORAL at 10:08

## 2020-08-06 NOTE — PROGRESS NOTES
Progress Note   Hospital Medicine         Patient Name: Crystal Alvarado  MRN:  [id number]  [facility] Medicine Team: [facility] HOSP MED N Srikanth Anthony MD  Date of Admission:  8/3/2020     Length of Stay:  LOS: 3 days   Expected Discharge Date: 8/7/2020  Principal Problem:  Gastrointestinal hemorrhage       Subjective:     Interval History/Overnight Events:  Patient doing well today, went for C-scope, no acute process, H/H stable; controlling BP meds; ok to d/c home tomorrow with H/H    Review of Systems   Constitutional: Negative for chills, fatigue, fever.   HENT: Negative for sore throat, trouble swallowing.    Eyes: Negative for photophobia, visual disturbance.   Respiratory: Negative for cough, shortness of breath.    Cardiovascular: Negative for chest pain, palpitations, leg swelling.   Gastrointestinal: Negative for abdominal pain, constipation, diarrhea, nausea, vomiting.   Endocrine: Negative for cold intolerance, heat intolerance.   Genitourinary: Negative for dysuria, frequency.   Musculoskeletal: Negative for arthralgias, myalgias.   Skin: Negative for rash, wound, erythema   Neurological: Negative for dizziness, syncope, weakness, light-headedness.   Psychiatric/Behavioral: Negative for confusion, hallucinations, anxiety  All other systems reviewed and are negative.    Objective:     Temp:  [97.3 °F (36.3 °C)-98.1 °F (36.7 °C)]   Pulse:  [68-97]   Resp:  [12-25]   BP: (145-190)/(65-92)   SpO2:  [78 %-100 %]       Physical Exam:  Constitutional: appears weak and ill  Head: Normocephalic and atraumatic.   Mouth/Throat: Oropharynx is clear and moist.   Eyes: EOM are normal. Pupils are equal, round, and reactive to light. No scleral icterus.   Neck: Normal range of motion. Neck supple.   Cardiovascular: Normal rate and regular rhythm.  No murmur heard.  Pulmonary/Chest: Effort normal and breath sounds normal. No respiratory distress. No wheezes, rales, or rhonchi  Abdominal: Soft. Bowel sounds are normal.  No  distension or tenderness  Musculoskeletal: Normal range of motion. No edema.   Neurological: Alert and oriented to person, place, and time.   Skin: Skin is warm and dry.   Psychiatric: Normal mood and affect. Behavior is normal.     Recent Labs   Lab 08/03/20  1017 08/04/20  0517 08/05/20  0626   WBC 7.29 6.28 7.75   HGB 7.5* 8.5* 9.1*   HCT 24.9* 28.5* 29.7*    216 259     Recent Labs   Lab 08/03/20  1017 08/04/20  0517 08/05/20  0626    140 141   K 4.0 3.9 3.8   * 111* 109   CO2 23 23 23   BUN 39* 31* 29*   CREATININE 1.2 1.1 1.5*    83 99   CALCIUM 8.3* 8.6* 9.2   MG  --  2.0 2.0     Recent Labs   Lab 08/03/20  1017 08/03/20  1128   ALKPHOS 59  --    ALT 6*  --    AST 9*  --    ALBUMIN 2.9*  --    PROT 5.6*  --    BILITOT 0.3  --    INR  --  0.9     No results for input(s): POCTGLUCOSE in the last 168 hours.     amLODIPine  10 mg Oral Daily    atorvastatin  20 mg Oral QHS    docusate sodium  100 mg Oral BID    fluticasone furoate-vilanteroL  1 puff Inhalation Daily    lidocaine  2 patch Transdermal Q24H    losartan  100 mg Oral Daily    oxyCODONE  10 mg Oral Once    oxyCODONE  10 mg Oral Once    pantoprazole  40 mg Intravenous BID    pregabalin  50 mg Oral BID       Assessment and Plan     Ms. Crystal Alvarado is a 79 y.o. female who presented to [facility] on 8/3/2020 with     Hospital Course:    Ms. Crystal Alvarado was admitted to Hospital Medicine for management of     Active Hospital Problems    Diagnosis  POA    *Gastrointestinal hemorrhage [K92.2]  Yes    Acute blood loss anemia [D62]  Yes    Left anterior knee pain [M25.562]  Yes    CKD (chronic kidney disease) stage 3, GFR 30-59 ml/min [N18.3]  Yes    Essential hypertension [I10]  Yes      Resolved Hospital Problems   No resolved problems to display.     # Gastrointestinal hemorrhage with melena  # Acute blood loss anemia  - s/p 2 units of PRBC  - GI consulted  - PPI   - EGD shows no evidence of GI bleed  - C-scope  no acute process; H/H stable     # Essential HTN  - cont norvasc and losartan      # Left knee pain  - ortho evaluated, no evidence of fracture;  - PT/OT         Diet:  low sodium  GI PPx:    DVT PPx:    Goals of Care:  full        Disposition:  tomorrow with HH     Srikanth Anthony MD  Medical Director [facility] Medicine  Spectra:  [phone]  Pager: [phone]

## 2020-08-06 NOTE — PLAN OF CARE
08/06/20 0834   Post-Acute Status   Post-Acute Authorization Home Health   Home Health Status Awaiting Internal Medical Clearance

## 2020-08-06 NOTE — ANESTHESIA POSTPROCEDURE EVALUATION
Anesthesia Post Evaluation    Patient: Crystal Alvarado    Procedure(s) Performed: Procedure(s) (LRB):  COLONOSCOPY (N/A)    Final Anesthesia Type: general    Patient location during evaluation: PACU  Patient participation: Yes- Able to Participate  Level of consciousness: awake and alert  Post-procedure vital signs: reviewed and stable  Pain management: adequate  Airway patency: patent    PONV status at discharge: No PONV  Anesthetic complications: no      Cardiovascular status: blood pressure returned to baseline  Respiratory status: unassisted  Hydration status: euvolemic  Follow-up not needed.          Vitals Value Taken Time   /82 08/06/20 0509   Temp 36.3 °C (97.4 °F) 08/06/20 0407   Pulse 92 08/06/20 0759   Resp 14 08/06/20 0759   SpO2 98 % 08/06/20 0509   Vitals shown include unvalidated device data.      Event Time   Out of Recovery 15:49:19         Pain/Arturo Score: Pain Rating Prior to Med Admin: 7 (8/5/2020 10:34 PM)  Arturo Score: 9 (8/5/2020  3:30 PM)

## 2020-08-06 NOTE — PLAN OF CARE
Problem: Adult Inpatient Plan of Care  Goal: Absence of Hospital-Acquired Illness or Injury  Outcome: Ongoing, Progressing     Problem: Adult Inpatient Plan of Care  Goal: Optimal Comfort and Wellbeing  Outcome: Ongoing, Progressing   Patient systolic pressure above 180, PRN Hydralazine given. Patient received pain medication for knee pain and has no complaints of pain when reassessed. Bed locked in lowest position, call light in reach, will continue to monitor.

## 2020-08-07 ENCOUNTER — TELEPHONE (OUTPATIENT)
Dept: CARDIOLOGY | Facility: CLINIC | Age: 80
End: 2020-08-07

## 2020-08-07 PROBLEM — K92.2 GASTROINTESTINAL HEMORRHAGE: Status: RESOLVED | Noted: 2020-08-03 | Resolved: 2020-08-07

## 2020-08-07 PROBLEM — R79.89 ELEVATED SERUM CREATININE: Status: RESOLVED | Noted: 2020-08-06 | Resolved: 2020-08-07

## 2020-08-07 LAB
ABO + RH BLD: NORMAL
ANION GAP SERPL CALC-SCNC: 12 MMOL/L (ref 8–16)
BASOPHILS # BLD AUTO: 0.02 K/UL (ref 0–0.2)
BASOPHILS NFR BLD: 0.4 % (ref 0–1.9)
BLD GP AB SCN CELLS X3 SERPL QL: NORMAL
BLD PROD TYP BPU: NORMAL
BLOOD UNIT EXPIRATION DATE: NORMAL
BLOOD UNIT TYPE CODE: 6200
BLOOD UNIT TYPE: NORMAL
BUN SERPL-MCNC: 25 MG/DL (ref 8–23)
CALCIUM SERPL-MCNC: 7.9 MG/DL (ref 8.7–10.5)
CHLORIDE SERPL-SCNC: 112 MMOL/L (ref 95–110)
CO2 SERPL-SCNC: 17 MMOL/L (ref 23–29)
CODING SYSTEM: NORMAL
CREAT SERPL-MCNC: 1.4 MG/DL (ref 0.5–1.4)
DIFFERENTIAL METHOD: ABNORMAL
DISPENSE STATUS: NORMAL
EOSINOPHIL # BLD AUTO: 0.3 K/UL (ref 0–0.5)
EOSINOPHIL NFR BLD: 5.2 % (ref 0–8)
ERYTHROCYTE [DISTWIDTH] IN BLOOD BY AUTOMATED COUNT: 16.8 % (ref 11.5–14.5)
EST. GFR  (AFRICAN AMERICAN): 40.6 ML/MIN/1.73 M^2
EST. GFR  (NON AFRICAN AMERICAN): 35.3 ML/MIN/1.73 M^2
GLUCOSE SERPL-MCNC: 84 MG/DL (ref 70–110)
HCT VFR BLD AUTO: 25.1 % (ref 37–48.5)
HGB BLD-MCNC: 7.6 G/DL (ref 12–16)
IMM GRANULOCYTES # BLD AUTO: 0.02 K/UL (ref 0–0.04)
IMM GRANULOCYTES NFR BLD AUTO: 0.4 % (ref 0–0.5)
LYMPHOCYTES # BLD AUTO: 1.1 K/UL (ref 1–4.8)
LYMPHOCYTES NFR BLD: 21.9 % (ref 18–48)
MAGNESIUM SERPL-MCNC: 2 MG/DL (ref 1.6–2.6)
MCH RBC QN AUTO: 29.9 PG (ref 27–31)
MCHC RBC AUTO-ENTMCNC: 30.3 G/DL (ref 32–36)
MCV RBC AUTO: 99 FL (ref 82–98)
MONOCYTES # BLD AUTO: 0.4 K/UL (ref 0.3–1)
MONOCYTES NFR BLD: 7.4 % (ref 4–15)
NEUTROPHILS # BLD AUTO: 3.3 K/UL (ref 1.8–7.7)
NEUTROPHILS NFR BLD: 64.7 % (ref 38–73)
NRBC BLD-RTO: 0 /100 WBC
PLATELET # BLD AUTO: 209 K/UL (ref 150–350)
PMV BLD AUTO: 10.5 FL (ref 9.2–12.9)
POTASSIUM SERPL-SCNC: 4.4 MMOL/L (ref 3.5–5.1)
RBC # BLD AUTO: 2.54 M/UL (ref 4–5.4)
SODIUM SERPL-SCNC: 141 MMOL/L (ref 136–145)
TRANS ERYTHROCYTES VOL PATIENT: NORMAL ML
WBC # BLD AUTO: 5.15 K/UL (ref 3.9–12.7)

## 2020-08-07 PROCEDURE — 86850 RBC ANTIBODY SCREEN: CPT

## 2020-08-07 PROCEDURE — 80048 BASIC METABOLIC PNL TOTAL CA: CPT

## 2020-08-07 PROCEDURE — 99233 SBSQ HOSP IP/OBS HIGH 50: CPT | Mod: ,,, | Performed by: HOSPITALIST

## 2020-08-07 PROCEDURE — 25000003 PHARM REV CODE 250: Performed by: HOSPITALIST

## 2020-08-07 PROCEDURE — 86920 COMPATIBILITY TEST SPIN: CPT

## 2020-08-07 PROCEDURE — P9021 RED BLOOD CELLS UNIT: HCPCS

## 2020-08-07 PROCEDURE — 99900035 HC TECH TIME PER 15 MIN (STAT)

## 2020-08-07 PROCEDURE — 83735 ASSAY OF MAGNESIUM: CPT

## 2020-08-07 PROCEDURE — 85025 COMPLETE CBC W/AUTO DIFF WBC: CPT

## 2020-08-07 PROCEDURE — 36415 COLL VENOUS BLD VENIPUNCTURE: CPT

## 2020-08-07 PROCEDURE — 11000001 HC ACUTE MED/SURG PRIVATE ROOM

## 2020-08-07 PROCEDURE — 99233 PR SUBSEQUENT HOSPITAL CARE,LEVL III: ICD-10-PCS | Mod: ,,, | Performed by: HOSPITALIST

## 2020-08-07 PROCEDURE — 94761 N-INVAS EAR/PLS OXIMETRY MLT: CPT

## 2020-08-07 PROCEDURE — 97530 THERAPEUTIC ACTIVITIES: CPT

## 2020-08-07 RX ORDER — PREGABALIN 50 MG/1
50 CAPSULE ORAL 2 TIMES DAILY
Qty: 60 CAPSULE | Refills: 1 | Status: SHIPPED | OUTPATIENT
Start: 2020-08-07 | End: 2020-12-22

## 2020-08-07 RX ORDER — HYDROCODONE BITARTRATE AND ACETAMINOPHEN 5; 325 MG/1; MG/1
1 TABLET ORAL EVERY 8 HOURS PRN
Qty: 21 TABLET | Refills: 0 | Status: SHIPPED | OUTPATIENT
Start: 2020-08-07 | End: 2020-08-14

## 2020-08-07 RX ORDER — AMLODIPINE BESYLATE 5 MG/1
10 TABLET ORAL DAILY
Qty: 60 TABLET | Refills: 1 | Status: SHIPPED | OUTPATIENT
Start: 2020-08-07 | End: 2020-08-10

## 2020-08-07 RX ORDER — HYDROCODONE BITARTRATE AND ACETAMINOPHEN 500; 5 MG/1; MG/1
TABLET ORAL
Status: DISCONTINUED | OUTPATIENT
Start: 2020-08-07 | End: 2020-08-08 | Stop reason: HOSPADM

## 2020-08-07 RX ORDER — ACETAMINOPHEN 325 MG/1
650 TABLET ORAL EVERY 6 HOURS PRN
Refills: 0 | COMMUNITY
Start: 2020-08-07

## 2020-08-07 RX ADMIN — LIDOCAINE 2 PATCH: 50 PATCH CUTANEOUS at 03:08

## 2020-08-07 RX ADMIN — SODIUM CHLORIDE 500 ML: 0.9 INJECTION, SOLUTION INTRAVENOUS at 11:08

## 2020-08-07 RX ADMIN — PANTOPRAZOLE SODIUM 40 MG: 40 TABLET, DELAYED RELEASE ORAL at 09:08

## 2020-08-07 RX ADMIN — DOCUSATE SODIUM 100 MG: 100 CAPSULE, LIQUID FILLED ORAL at 10:08

## 2020-08-07 RX ADMIN — FLUTICASONE FUROATE AND VILANTEROL TRIFENATATE 1 PUFF: 100; 25 POWDER RESPIRATORY (INHALATION) at 07:08

## 2020-08-07 RX ADMIN — ATORVASTATIN CALCIUM 20 MG: 20 TABLET, FILM COATED ORAL at 10:08

## 2020-08-07 RX ADMIN — PREGABALIN 50 MG: 50 CAPSULE ORAL at 10:08

## 2020-08-07 RX ADMIN — AMLODIPINE BESYLATE 10 MG: 10 TABLET ORAL at 09:08

## 2020-08-07 RX ADMIN — PREGABALIN 50 MG: 50 CAPSULE ORAL at 09:08

## 2020-08-07 RX ADMIN — DOCUSATE SODIUM 100 MG: 100 CAPSULE, LIQUID FILLED ORAL at 09:08

## 2020-08-07 NOTE — PT/OT/SLP PROGRESS
Physical Therapy       [id number]     Pt not treated on this date but remains appropriate for current POC. Therapy will follow up as able.     Skyler Guerra, PT, DPT  8/7/2020

## 2020-08-07 NOTE — PLAN OF CARE
[facility]    HOME HEALTH ORDERS  FACE TO FACE ENCOUNTER    Patient Name: Crystal Alvarado  YOB: 1940    PCP: Shantell Goff MD   PCP Address: [address] / [place]  PCP Phone Number: [phone]  PCP Fax: [phone]    Encounter Date: 08/07/2020    Admit to Home Health    Diagnoses:  Active Hospital Problems    Diagnosis  POA    *Acute blood loss anemia [D62]  Yes     Priority: 1 - High    CKD (chronic kidney disease) stage 3, GFR 30-59 ml/min [N18.3]  Yes     Priority: 2     Essential hypertension [I10]  Yes     Priority: 3     Left anterior knee pain [M25.562]  Yes     Priority: 4       Resolved Hospital Problems    Diagnosis Date Resolved POA    Gastrointestinal hemorrhage [K92.2] 08/07/2020 Yes     Priority: 1 - High    Elevated serum creatinine [R79.89] 08/07/2020 No     Priority: 3        Future Appointments   Date Time Provider Department Center   8/13/2020 11:00 AM HOME MONITOR DEVICE CHECK, [facility] ARRHPRO [facility]   8/17/2020 10:00 AM Cecilio Kline MD [facility] CARDIO [facility]   8/26/2020  4:00 PM Ananya Narayanan MD [facility] GASTRO [facility]     Follow-up Information     Shantell Goff MD.    Specialty: Family Medicine  Contact information:  [address] [place] [address]  [address]  [place] [id number]  [phone]                     I have seen and examined this patient face to face today. My clinical findings that support the need for the home health skilled services and home bound status are the following:  Weakness/numbness causing balance and gait disturbance due to Weakness/Debility and Anemia making it taxing to leave home.  Requiring assistive device to leave home due to unsteady gait caused by  Weakness/Debility and Anemia.    Allergies:Review of patient's allergies indicates:  No Known Allergies    Diet: 2 gram sodium diet    Activities: activity as tolerated    Nursing:   SN to complete comprehensive assessment including routine vital  signs. Instruct on disease process and s/s of complications to report to MD. Review/verify medication list sent home with the patient at time of discharge  and instruct patient/caregiver as needed. Frequency may be adjusted depending on start of care date.    Notify MD if SBP > 160 or < 90; DBP > 90 or < 50; HR > 120 or < 50; Temp > 101; Other:       CONSULTS:    Physical Therapy to evaluate and treat. Evaluate for home safety and equipment needs; Establish/upgrade home exercise program. Perform / instruct on therapeutic exercises, gait training, transfer training, and Range of Motion.  Occupational Therapy to evaluate and treat. Evaluate home environment for safety and equipment needs. Perform/Instruct on transfers, ADL training, ROM, and therapeutic exercises.   to evaluate for community resources/long-range planning.  Aide to provide assistance with personal care, ADLs, and vital signs.    MISCELLANEOUS CARE:  Routine Skin for Bedridden Patients: Instruct patient/caregiver to apply moisture barrier cream to all skin folds and wet areas in perineal area daily and after baths and all bowel movements.    WOUND CARE ORDERS  n/a      Medications: Review discharge medications with patient and family and provide education.      Current Discharge Medication List      START taking these medications    Details   acetaminophen (TYLENOL) 325 MG tablet Take 2 tablets (650 mg total) by mouth every 6 (six) hours as needed for Pain.  Refills: 0         CONTINUE these medications which have CHANGED    Details   amLODIPine (NORVASC) 5 MG tablet Take 2 tablets (10 mg total) by mouth once daily.  Qty: 60 tablet, Refills: 1    Comments: .      HYDROcodone-acetaminophen (NORCO) 5-325 mg per tablet Take 1 tablet by mouth every 8 (eight) hours as needed (Knee Pain).  Qty: 21 tablet, Refills: 0    Comments: Quantity prescribed more than 7 day supply? No      pregabalin (LYRICA) 25 MG capsule Take 2 capsules (50 mg total) by  mouth 2 (two) times daily.  Qty: 120 capsule, Refills: 1         CONTINUE these medications which have NOT CHANGED    Details   aspirin 81 MG Chew Take 81 mg by mouth once daily.  Refills: 4      atorvastatin (LIPITOR) 20 MG tablet Take 20 mg by mouth every evening.  Refills: 4      azelastine (ASTELIN) 137 mcg (0.1 %) nasal spray       BREO ELLIPTA 100-25 mcg/dose diskus inhaler INHALE 1 PUFF BY MOUTH ONCE A DAY  Refills: 3      docusate sodium (COLACE) 100 MG capsule Take 1 capsule (100 mg total) by mouth 2 (two) times daily.  Qty: 60 capsule, Refills: 0      ergocalciferol (ERGOCALCIFEROL) 50,000 unit Cap Take 50,000 Units by mouth every 7 days.      fluticasone propionate (FLONASE) 50 mcg/actuation nasal spray       losartan (COZAAR) 100 MG tablet Take 1 tablet (100 mg total) by mouth once daily.  Qty: 90 tablet, Refills: 3      nitroGLYCERIN (NITROSTAT) 0.4 MG SL tablet Place 1 tablet (0.4 mg total) under the tongue every 5 (five) minutes as needed for Chest pain (and notify MD).  Qty: 25 tablet, Refills: 5    Associated Diagnoses: Chest pain, unspecified type         STOP taking these medications       traMADol (ULTRAM) 50 mg tablet Comments:   Reason for Stopping:               I certify that this patient is confined to her home and needs intermittent skilled nursing care, physical therapy and occupational therapy.          Philippe Ba M.D.  Attending Physician  [facility] Medicine Dept.

## 2020-08-07 NOTE — PROGRESS NOTES
[facility] Hospital Medicine  Progress Note    Patient Name: Crystal Alvarado  MRN: [id number]  Patient Class: IP- Inpatient   Admission Date: 8/3/2020  Length of Stay: 3 days  Attending Physician: Philippe Ba MD  Primary Care Provider: Shantell Goff MD    [facility] Medicine Team: [facility] HOSP MED N Philippe Ba MD    Subjective:     Principal Problem:Gastrointestinal hemorrhage    Interval History: no events overnight     Patient  Status post colonoscopy findings of diverticulosis.  Patient has a steadily rising creatinine since admission creatinine is up to 1.8 today.  This may be due to a potential prerenal etiology secondary to patient being NPO having multiple procedures in undergoing colonoscopy prep.  Will plan to give patient continuous IV fluids and repeat basic metabolic panel in the morning to evaluate.  PT and OT are recommending home health services when patient is stable for discharge.       Patient requesting that I updated her daughter nick Hutchins    Review of Systems   Constitutional: Negative for chills and fever.   Respiratory: Negative for chest tightness and shortness of breath.    Gastrointestinal: Negative for abdominal pain, blood in stool, constipation, diarrhea, nausea and vomiting.   Genitourinary: Negative for dysuria.     Objective:     Vital Signs (Most Recent):  Temp: 98.3 °F (36.8 °C) (08/06/20 1629)  Pulse: 67 (08/06/20 1629)  Resp: 16 (08/06/20 1629)  BP: (!) 159/66 (08/06/20 1629)  SpO2: 99 % (08/06/20 1629) Vital Signs (24h Range):  Temp:  [97.4 °F (36.3 °C)-98.3 °F (36.8 °C)] 98.3 °F (36.8 °C)  Pulse:  [60-80] 67  Resp:  [13-20] 16  SpO2:  [95 %-100 %] 99 %  BP: (149-198)/(65-77) 159/66   Intake/Outake:  This Shift:  No intake/output data recorded.    Net I/O past 24h:   No intake or output data in the 24 hours ending 08/06/20 1919      Weight: 80 kg (176 lb 5.9 oz)  Body mass index is 28.47 kg/m².  Physical Exam  Constitutional:       General: She  is not in acute distress.     Appearance: She is not ill-appearing.   Eyes:      Extraocular Movements: Extraocular movements intact.      Conjunctiva/sclera: Conjunctivae normal.   Cardiovascular:      Rate and Rhythm: Regular rhythm.   Pulmonary:      Effort: Pulmonary effort is normal.      Breath sounds: No wheezing or rhonchi.   Abdominal:      General: Abdomen is flat. Bowel sounds are normal.      Palpations: Abdomen is soft.   Skin:     General: Skin is warm and dry.   Neurological:      General: No focal deficit present.      Mental Status: She is alert and oriented to person, place, and time.           Significant Labs:   CBC:   Recent Labs   Lab 08/05/20 0626 08/06/20  0445   WBC 7.75 5.88   HGB 9.1* 8.2*   HCT 29.7* 27.9*    235     CMP:   Recent Labs   Lab 08/05/20 0626 08/06/20  0445    144   K 3.8 4.0    111*   CO2 23 26   GLU 99 79   BUN 29* 29*   CREATININE 1.5* 1.8*   CALCIUM 9.2 9.0   ANIONGAP 9 7*   EGFRNONAA 32.4* 26.0*       Significant Imaging: I have reviewed all pertinent imaging results/findings within the past 24 hours.    MEDICATIONS  Scheduled Meds:   amLODIPine  10 mg Oral Daily    atorvastatin  20 mg Oral QHS    docusate sodium  100 mg Oral BID    fluticasone furoate-vilanteroL  1 puff Inhalation Daily    lidocaine  2 patch Transdermal Q24H    losartan  100 mg Oral Daily    oxyCODONE  10 mg Oral Once    oxyCODONE  10 mg Oral Once    [START ON 8/7/2020] pantoprazole  40 mg Oral Daily    pregabalin  50 mg Oral BID                             Continuous Infusions:   lactated ringers 200 mL/hr at 08/06/20 1300     PRN Meds:.sodium chloride, acetaminophen, dextrose 50%, dextrose 50%, glucagon (human recombinant), glucose, glucose, hydrALAZINE, ondansetron, oxyCODONE, sodium chloride 0.9%    Assessment/Plan:     Overview/Hospital Course:     # Gastrointestinal hemorrhage with melena  # Acute blood loss anemia  - s/p 2 units of PRBC  - GI consulted and patient  is s/p - EGD shows no evidence of GI bleed, - C-scope no acute process; diverticulosis in the entire colon, and internal hemorrhoids. H/H stable  - change to p.o. Protonix daily    - GI has signed off but indicated they will plan for outpatient clinic follow up to discuss potential capsule endoscopy patient     # Essential HTN  - cont norvasc and losartan      # Left knee pain  - ortho evaluated, no evidence of fracture;  - PT/OT     Elevated Creatinine  -will give IV fluids and check urine studies.   -discontinue losartan pending trends      Diet:  low sodium  Goals of Care:  full  Code Status: Full Code    Active Hospital Problems    Diagnosis  POA    *Gastrointestinal hemorrhage [K92.2]  Yes     Priority: 1 - High    Acute blood loss anemia [D62]  Yes     Priority: 1 - High    CKD (chronic kidney disease) stage 3, GFR 30-59 ml/min [N18.3]  Yes     Priority: 2     Elevated serum creatinine [R79.89]  No     Priority: 3     Essential hypertension [I10]  Yes     Priority: 3     Left anterior knee pain [M25.562]  Yes     Priority: 4       Resolved Hospital Problems   No resolved problems to display.     VTE Risk Mitigation (From admission, onward)         Ordered     IP VTE LOW RISK PATIENT  Once      08/03/20 0430                        Philippe Ba M.D.  Attending Physician  [facility] Medicine Dept.  Pager: [phone]  [facility]  [id number]

## 2020-08-07 NOTE — PT/OT/SLP PROGRESS
Occupational Therapy   Treatment    Name: Crystal Alvarado  MRN: [id number]  Admitting Diagnosis:  Acute blood loss anemia  2 Days Post-Op    Recommendations:     Discharge Recommendations: home health OT  Discharge Equipment Recommendations:  bedside commode, walker, rolling  Barriers to discharge:  None    Assessment:     Crystal Alvarado is a 79 y.o. female with a medical diagnosis of Acute blood loss anemia. Performance deficits affecting function are weakness, impaired endurance, impaired self care skills, impaired functional mobilty, gait instability, impaired balance, decreased lower extremity function. Patient concerned upon this therapist entering room and during OT session for having decreased stability and impaired balance during functional ambulation. Patient did have one loss of balance requiring CGA to self correct with RW as patient ambulated with SBA. Patient is scheduled for discharge on this date. However, patient would benefit from continued skilled acute OT services to improve functional mobility, increase independence with ADLs, and address established goals. Recommending HHOT once medically appropriate for discharge to increase maximal independence, reduce burden of care, and ensure safety.     Rehab Prognosis:  Good; patient would benefit from acute skilled OT services to address these deficits and reach maximum level of function.       Plan:     Patient to be seen 3 x/week to address the above listed problems via self-care/home management, therapeutic activities, therapeutic exercises  · Plan of Care Expires: 09/05/20  · Plan of Care Reviewed with: patient    Subjective     Pain/Comfort:  · Pain Rating 1: 0/10  · Pain Rating Post-Intervention 1: 0/10    Objective:     Communicated with: JOHNNY prior to session.  Patient found sitting EOB with   upon OT entry to room.    General Precautions: Standard, fall   Orthopedic Precautions:N/A   Braces:  N/A    Occupational Performance:     Functional  Mobility/Transfers:  · Patient completed Sit <> Stand Transfer with stand by assistance to and from bed and CGA to and from toilet  with  hand-held assist and rolling walker   · Patient completed a bed<>toilet transfer with functional ambulation technique with SBA<>CGA due to one LOB and needing CGA to correct for safety with RW. Patient needed verbal cues for correct technique and to stay inside RW to maintain upright position/balance. Educated patient to utilize RW when patient obtains it (as recommended) instead of using SW that patient owns.      [facility] 6 Click ADL: 19    Treatment & Education:  Role of OT and POC  Safety  Functional mobility training  Discharge planning    Patient left sitting on EOB with call button in reach, family present and requesting for the nurse (and this therapist notified nurse).Education:      GOALS:   Multidisciplinary Problems     Occupational Therapy Goals        Problem: Occupational Therapy Goal    Goal Priority Disciplines Outcome Interventions   Occupational Therapy Goal     OT, PT/OT Ongoing, Progressing    Description: Goals to be met by: 8/19/2020     Patient will increase functional independence with ADLs by performing:    UE Dressing with Set-up Assistance.  LE Dressing with Supervision.  Grooming while standing with Supervision.  Toileting from toilet with Supervision for hygiene and clothing management.   Supine to sit with Supervision.  Stand pivot transfers with Supervision.  Toilet transfer to toilet with Stand-by Assistance.                     Time Tracking:     OT Date of Treatment: 08/07/20  OT Start Time: 1405  OT Stop Time: 1418  OT Total Time (min): 13 min    Billable Minutes:Therapeutic Activity 13    PAUL Cavazos  8/7/2020

## 2020-08-07 NOTE — PLAN OF CARE
Home Health orders received and faxed to [facility] for authorization. CM spoke with daughter Dheeraj @[phone] with update and ordering of BSC for home use. PCP appointment completed with Dr. Goff 8/24/2020 @1030. Son will provide transportation.     Mary Mendiola, MSN  Case Management  Ext [phone]

## 2020-08-07 NOTE — TELEPHONE ENCOUNTER
Spoke with patient son, patient is in the hospital has concerns regarding his mother falling. Gave him an appointment to see  on Monday in [facility].    ----- Message from Graciela Coyle sent at 8/7/2020  2:53 PM CDT -----  Contact: [place]ilson Preston [phone]  Patient's son is requesting to speak with nurse regarding pt being discharged from hospital today. Please call and advise.

## 2020-08-07 NOTE — PLAN OF CARE
08/07/20 0921   Post-Acute Status   Post-Acute Authorization Home Health   Home Health Status Referrals Sent     Home Health to be setup up today, Sw to fax to [facility] hh orders when completed.

## 2020-08-07 NOTE — PLAN OF CARE
Problem: Occupational Therapy Goal  Goal: Occupational Therapy Goal  Description: Goals to be met by: 8/19/2020     Patient will increase functional independence with ADLs by performing:    UE Dressing with Set-up Assistance.  LE Dressing with Supervision.  Grooming while standing with Supervision.  Toileting from toilet with Supervision for hygiene and clothing management.   Supine to sit with Supervision.  Stand pivot transfers with Supervision.  Toilet transfer to toilet with Stand-by Assistance.    Outcome: Ongoing, Progressing   Patient's goals are appropriate.   PAUL Cavazos  8/7/2020

## 2020-08-07 NOTE — PLAN OF CARE
Problem: Fall Injury Risk  Goal: Absence of Fall and Fall-Related Injury  Outcome: Ongoing, Progressing     Problem: Adult Inpatient Plan of Care  Goal: Optimal Comfort and Wellbeing  Outcome: Ongoing, Progressing   Patient complained of knee  pain 7/10, oral analgesic given and paint was moderately controlled 3/10. There are no sings of distress, call light in reach, bed locked in lowest position, will continue to monitor.

## 2020-08-07 NOTE — PLAN OF CARE
"Pt 's son is concerned about his mom's "unsteady gait". CM informed HH was ordered with skilled nurse and therapy for continued care at home.  He stated, " that still doesn't explain why it happened, why the blood loss and why the fall. Otherwise, she will return with the same problem." I further explained to him this is not a cause for staying here in the hospital that his mom is medically ready for discharge. I encouraged him to maintain follow up appointments with primary physician and gastroenterologist for further medical oversight. Dr. Ba will speak with son when available.    Mary Mendiola, MSN  Case Management  Ext [phone]   "

## 2020-08-08 VITALS
TEMPERATURE: 98 F | OXYGEN SATURATION: 100 % | BODY MASS INDEX: 28.34 KG/M2 | DIASTOLIC BLOOD PRESSURE: 60 MMHG | RESPIRATION RATE: 16 BRPM | SYSTOLIC BLOOD PRESSURE: 132 MMHG | WEIGHT: 176.38 LBS | HEIGHT: 66 IN | HEART RATE: 74 BPM

## 2020-08-08 LAB
ANION GAP SERPL CALC-SCNC: 5 MMOL/L (ref 8–16)
BASOPHILS # BLD AUTO: 0.02 K/UL (ref 0–0.2)
BASOPHILS NFR BLD: 0.4 % (ref 0–1.9)
BUN SERPL-MCNC: 26 MG/DL (ref 8–23)
CALCIUM SERPL-MCNC: 8.6 MG/DL (ref 8.7–10.5)
CHLORIDE SERPL-SCNC: 113 MMOL/L (ref 95–110)
CO2 SERPL-SCNC: 25 MMOL/L (ref 23–29)
CREAT SERPL-MCNC: 1.4 MG/DL (ref 0.5–1.4)
DIFFERENTIAL METHOD: ABNORMAL
EOSINOPHIL # BLD AUTO: 0.2 K/UL (ref 0–0.5)
EOSINOPHIL NFR BLD: 4.7 % (ref 0–8)
ERYTHROCYTE [DISTWIDTH] IN BLOOD BY AUTOMATED COUNT: 16.6 % (ref 11.5–14.5)
EST. GFR  (AFRICAN AMERICAN): 40.6 ML/MIN/1.73 M^2
EST. GFR  (NON AFRICAN AMERICAN): 35.3 ML/MIN/1.73 M^2
FERRITIN SERPL-MCNC: 59 NG/ML (ref 20–300)
GLUCOSE SERPL-MCNC: 91 MG/DL (ref 70–110)
HCT VFR BLD AUTO: 25.2 % (ref 37–48.5)
HGB BLD-MCNC: 7.4 G/DL (ref 12–16)
IMM GRANULOCYTES # BLD AUTO: 0.01 K/UL (ref 0–0.04)
IMM GRANULOCYTES NFR BLD AUTO: 0.2 % (ref 0–0.5)
IRON SERPL-MCNC: 27 UG/DL (ref 30–160)
LYMPHOCYTES # BLD AUTO: 1.1 K/UL (ref 1–4.8)
LYMPHOCYTES NFR BLD: 20.7 % (ref 18–48)
MAGNESIUM SERPL-MCNC: 1.9 MG/DL (ref 1.6–2.6)
MCH RBC QN AUTO: 29.5 PG (ref 27–31)
MCHC RBC AUTO-ENTMCNC: 29.4 G/DL (ref 32–36)
MCV RBC AUTO: 100 FL (ref 82–98)
MONOCYTES # BLD AUTO: 0.4 K/UL (ref 0.3–1)
MONOCYTES NFR BLD: 7.7 % (ref 4–15)
NEUTROPHILS # BLD AUTO: 3.4 K/UL (ref 1.8–7.7)
NEUTROPHILS NFR BLD: 66.3 % (ref 38–73)
NRBC BLD-RTO: 0 /100 WBC
PLATELET # BLD AUTO: 217 K/UL (ref 150–350)
PMV BLD AUTO: 10.5 FL (ref 9.2–12.9)
POTASSIUM SERPL-SCNC: 3.9 MMOL/L (ref 3.5–5.1)
RBC # BLD AUTO: 2.51 M/UL (ref 4–5.4)
SATURATED IRON: 12 % (ref 20–50)
SODIUM SERPL-SCNC: 143 MMOL/L (ref 136–145)
TOTAL IRON BINDING CAPACITY: 228 UG/DL (ref 250–450)
TRANSFERRIN SERPL-MCNC: 154 MG/DL (ref 200–375)
WBC # BLD AUTO: 5.08 K/UL (ref 3.9–12.7)

## 2020-08-08 PROCEDURE — 97110 THERAPEUTIC EXERCISES: CPT | Mod: CQ

## 2020-08-08 PROCEDURE — 36415 COLL VENOUS BLD VENIPUNCTURE: CPT

## 2020-08-08 PROCEDURE — 83540 ASSAY OF IRON: CPT

## 2020-08-08 PROCEDURE — 25000003 PHARM REV CODE 250: Performed by: HOSPITALIST

## 2020-08-08 PROCEDURE — 25000003 PHARM REV CODE 250: Performed by: NURSE PRACTITIONER

## 2020-08-08 PROCEDURE — 85025 COMPLETE CBC W/AUTO DIFF WBC: CPT

## 2020-08-08 PROCEDURE — 83735 ASSAY OF MAGNESIUM: CPT

## 2020-08-08 PROCEDURE — 99239 HOSP IP/OBS DSCHRG MGMT >30: CPT | Mod: ,,, | Performed by: HOSPITALIST

## 2020-08-08 PROCEDURE — 82728 ASSAY OF FERRITIN: CPT

## 2020-08-08 PROCEDURE — 99239 PR HOSPITAL DISCHARGE DAY,>30 MIN: ICD-10-PCS | Mod: ,,, | Performed by: HOSPITALIST

## 2020-08-08 PROCEDURE — 80048 BASIC METABOLIC PNL TOTAL CA: CPT

## 2020-08-08 PROCEDURE — 97116 GAIT TRAINING THERAPY: CPT | Mod: CQ

## 2020-08-08 PROCEDURE — 63600175 PHARM REV CODE 636 W HCPCS: Performed by: HOSPITALIST

## 2020-08-08 RX ORDER — SODIUM FERRIC GLUCONATE COMPLEX IN SUCROSE 12.5 MG/ML
125 INJECTION INTRAVENOUS ONCE
Status: DISCONTINUED | OUTPATIENT
Start: 2020-08-08 | End: 2020-08-08

## 2020-08-08 RX ORDER — LOSARTAN POTASSIUM 50 MG/1
100 TABLET ORAL DAILY
Status: DISCONTINUED | OUTPATIENT
Start: 2020-08-08 | End: 2020-08-08 | Stop reason: HOSPADM

## 2020-08-08 RX ADMIN — FLUTICASONE FUROATE AND VILANTEROL TRIFENATATE 1 PUFF: 100; 25 POWDER RESPIRATORY (INHALATION) at 09:08

## 2020-08-08 RX ADMIN — DOCUSATE SODIUM 100 MG: 100 CAPSULE, LIQUID FILLED ORAL at 09:08

## 2020-08-08 RX ADMIN — SODIUM CHLORIDE 125 MG: 9 INJECTION, SOLUTION INTRAVENOUS at 12:08

## 2020-08-08 RX ADMIN — PANTOPRAZOLE SODIUM 40 MG: 40 TABLET, DELAYED RELEASE ORAL at 09:08

## 2020-08-08 RX ADMIN — AMLODIPINE BESYLATE 10 MG: 10 TABLET ORAL at 09:08

## 2020-08-08 RX ADMIN — PREGABALIN 50 MG: 50 CAPSULE ORAL at 09:08

## 2020-08-08 RX ADMIN — HYDRALAZINE HYDROCHLORIDE 25 MG: 25 TABLET, FILM COATED ORAL at 07:08

## 2020-08-08 RX ADMIN — LOSARTAN POTASSIUM 100 MG: 50 TABLET, FILM COATED ORAL at 09:08

## 2020-08-08 NOTE — NURSING
Patient is ready for discharge. Patient stable alert and oriented. IVs removed. VSS. No complaints of pain. Discussed discharge plan. Reviewed medications and side effects, appointments, and answered questions with patient and family. IV iron dose completed. Pt states son is ride home. Will leave floor via wheelchair.

## 2020-08-08 NOTE — PROGRESS NOTES
[facility] Hospital Medicine  Progress Note    Patient Name: Crystal Alvarado  MRN: [id number]  Patient Class: IP- Inpatient   Admission Date: 8/3/2020  Length of Stay: 4 days  Attending Physician: Philippe Ba MD  Primary Care Provider: Shantell Goff MD    [facility] Medicine Team: [facility] HOSP MED N Philippe Ba MD    Subjective:     Principal Problem:Acute blood loss anemia    Interval History: no events overnight     Patient planned for discharge today orders and home health orders and prescriptions placed and discharge order placed    When patient signed pick patient up she come planed of feeling lightheaded when she stood up and feeling unbalance strand steady I spoke with OT who evaluated patient and recommended contact guard assist and rolling walker with movements and reports that patient had these complaints of feeling unsteady before getting unclear how acute they are.  Requested  to speak with son but ultimately son did not want to take patient home without further explanation regarding patient's complaint of feeling unsteady however on balance described for patient's son hospital course and treatment that was discussed with daughter yesterday    Plan will be to cancel discharge keep patient send a type and screen and give 1 unit packed red blood cell transfusion for symptomatic anemia and evaluated in the morning for plan of discharge if patient continues to have symptoms I suspect they are related to her debility and continued home health therapy and patient may have some mild symptoms as a result of her recovery from this bout of acute blood loss anemia  Review of Systems   Constitutional: Negative for chills and fever.   Respiratory: Negative for chest tightness and shortness of breath.    Gastrointestinal: Negative for abdominal pain, blood in stool, constipation, diarrhea, nausea and vomiting.   Genitourinary: Negative for dysuria.     Objective:     Vital  Signs (Most Recent):  Temp: 98.3 °F (36.8 °C) (08/07/20 2101)  Pulse: 76 (08/07/20 2101)  Resp: 20 (08/07/20 2101)  BP: (!) 178/74 (08/07/20 2101)  SpO2: 98 % (08/07/20 2101) Vital Signs (24h Range):  Temp:  [97.4 °F (36.3 °C)-98.4 °F (36.9 °C)] 98.3 °F (36.8 °C)  Pulse:  [63-76] 76  Resp:  [14-20] 20  SpO2:  [94 %-100 %] 98 %  BP: (138-178)/(64-74) 178/74   Intake/Outake:  This Shift:  No intake/output data recorded.    Net I/O past 24h:   No intake or output data in the 24 hours ending 08/07/20 2109      Weight: 80 kg (176 lb 5.9 oz)  Body mass index is 28.47 kg/m².  Physical Exam  Constitutional:       General: She is not in acute distress.     Appearance: She is not ill-appearing.   Eyes:      Extraocular Movements: Extraocular movements intact.      Conjunctiva/sclera: Conjunctivae normal.   Cardiovascular:      Rate and Rhythm: Regular rhythm.   Pulmonary:      Effort: Pulmonary effort is normal.      Breath sounds: No wheezing or rhonchi.   Abdominal:      General: Abdomen is flat. Bowel sounds are normal.      Palpations: Abdomen is soft.   Skin:     General: Skin is warm and dry.   Neurological:      General: No focal deficit present.      Mental Status: She is alert and oriented to person, place, and time.           Significant Labs:   CBC:   Recent Labs   Lab 08/06/20  0445 08/07/20  0706   WBC 5.88 5.15   HGB 8.2* 7.6*   HCT 27.9* 25.1*    209     CMP:   Recent Labs   Lab 08/06/20  0445 08/07/20  0338    141   K 4.0 4.4   * 112*   CO2 26 17*   GLU 79 84   BUN 29* 25*   CREATININE 1.8* 1.4   CALCIUM 9.0 7.9*   ANIONGAP 7* 12   EGFRNONAA 26.0* 35.3*       Significant Imaging: I have reviewed all pertinent imaging results/findings within the past 24 hours.    MEDICATIONS  Scheduled Meds:   amLODIPine  10 mg Oral Daily    atorvastatin  20 mg Oral QHS    docusate sodium  100 mg Oral BID    fluticasone furoate-vilanteroL  1 puff Inhalation Daily    lidocaine  2 patch Transdermal Q24H     pantoprazole  40 mg Oral Daily    pregabalin  50 mg Oral BID                             Continuous Infusions:    PRN Meds:.sodium chloride, sodium chloride, acetaminophen, dextrose 50%, dextrose 50%, glucagon (human recombinant), glucose, glucose, hydrALAZINE, ondansetron, oxyCODONE, sodium chloride 0.9%    Assessment/Plan:     Overview/Hospital Course:     # Gastrointestinal hemorrhage with melena  # Acute blood loss anemia  - s/p 2 units of PRBC  - GI consulted and patient is s/p - EGD shows no evidence of GI bleed, - C-scope no acute process; diverticulosis in the entire colon, and internal hemorrhoids. H/H stable  - change to p.o. Protonix daily    - GI has signed off but indicated they will plan for outpatient clinic follow up to discuss potential capsule endoscopy patient  - discharge is canceled and have ordered screen 1 unit PRBC transfusion for patient        # Essential HTN  - cont norvasc pt is at a higher dose of amlodipine 10mg that can continue on discharge.   -losartan on hold.      # Left knee pain  - ortho evaluated, no evidence of fracture;  - PT/OT     Elevated Creatinine  -creatinine is downtrending after getting IV fluids      Diet:  low sodium  Goals of Care:  full  Code Status: Full Code    Active Hospital Problems    Diagnosis  POA    *Acute blood loss anemia [D62]  Yes     Priority: 1 - High    CKD (chronic kidney disease) stage 3, GFR 30-59 ml/min [N18.3]  Yes     Priority: 2     Essential hypertension [I10]  Yes     Priority: 3     Left anterior knee pain [M25.562]  Yes     Priority: 4       Resolved Hospital Problems    Diagnosis Date Resolved POA    Gastrointestinal hemorrhage [K92.2] 08/07/2020 Yes     Priority: 1 - High    Elevated serum creatinine [R79.89] 08/07/2020 No     Priority: 3      VTE Risk Mitigation (From admission, onward)         Ordered     IP VTE LOW RISK PATIENT  Once      08/03/20 1306                        Philippe Ba M.D.  Attending  [facility] Medicine Dept.  Pager: [phone] spectralink - [phone]

## 2020-08-08 NOTE — PLAN OF CARE
Problem: Fall Injury Risk  Goal: Absence of Fall and Fall-Related Injury  Outcome: Ongoing, Progressing     Problem: Adult Inpatient Plan of Care  Goal: Optimal Comfort and Wellbeing  Outcome: Ongoing, Progressing   New 20 G IV placed in right ac, left arm IV removed r/t not flushing. She's was complaining of pain in left ac where blood infusion was going, MD notified and blood stopped at patient request. Bed locked in lowest position, call light in reach will continue to monitor.

## 2020-08-08 NOTE — PT/OT/SLP PROGRESS
"Physical Therapy Treatment    Patient Name:  Crystal Alvarado   MRN:  [id number]    Recommendations:     Discharge Recommendations:  home health PT   Discharge Equipment Recommendations: bedside commode, walker, rolling   Barriers to discharge: None    Assessment:     Crystal Alvarado is a 79 y.o. female admitted with a medical diagnosis of Acute blood loss anemia.  She presents with the following impairments/functional limitations:  weakness, impaired endurance, impaired self care skills, impaired functional mobilty, gait instability, impaired balance, decreased lower extremity function Patient tolerated treatment well focusing on bed mobility, transfers, gait and therex. Patient will continue to improve with skilled physical therapy services in order to return to functional baseline.    Rehab Prognosis: Good; patient would benefit from acute skilled PT services to address these deficits and reach maximum level of function.    Recent Surgery: Procedure(s) (LRB):  COLONOSCOPY (N/A) 3 Days Post-Op    Plan:     During this hospitalization, patient to be seen 3 x/week to address the identified rehab impairments via gait training, therapeutic activities, therapeutic exercises, neuromuscular re-education and progress toward the following goals:    · Plan of Care Expires:  09/04/20    Subjective     Chief Complaint: knee pain  Patient/Family Comments/goals: "I want to sit up"  Pain/Comfort:  · Pain Rating 1: 6/10  · Location - Side 1: Left  · Location - Orientation 1: generalized  · Location 1: knee  · Pain Addressed 1: Reposition, Distraction  · Pain Rating Post-Intervention 1: 6/10      Objective:     Communicated with nursing prior to session.  Patient found HOB elevated with   upon PT entry to room.     General Precautions: Standard, fall   Orthopedic Precautions:N/A   Braces:       Functional Mobility:  · Bed Mobility:     · Scooting: supervision  · Supine to Sit: supervision  · Transfers:     · Sit to Stand:  stand by " assistance with rolling walker  · Bed to Chair: stand by assistance with  rolling walker  using  Stand Pivot  · Gait: 23' RW slow bertha no increase in L knee pain SBA  · Balance: seated EOB S, static stand RW S      AM-PAC 6 CLICK MOBILITY  Turning over in bed (including adjusting bedclothes, sheets and blankets)?: 3  Sitting down on and standing up from a chair with arms (e.g., wheelchair, bedside commode, etc.): 3  Moving from lying on back to sitting on the side of the bed?: 3  Moving to and from a bed to a chair (including a wheelchair)?: 3  Need to walk in hospital room?: 3  Climbing 3-5 steps with a railing?: 3  Basic Mobility Total Score: 18       Therapeutic Activities and Exercises:   donned shoes Min A while seated EOB S for balance  2 x 10 AP, LAQ, HF, abd/add, GS rest breaks as needed  Patient educated on importance of increased time out of bed and BERNARD throughout the day  discussed/educated on home environment safety, DME needs and HHPT pt verbalized understanding all questions answered within PTA scope of practice and all other questions directed to appropriate persons    Patient left up in chair with call button in reach..    GOALS:   Multidisciplinary Problems     Physical Therapy Goals        Problem: Physical Therapy Goal    Goal Priority Disciplines Outcome Goal Variances Interventions   Physical Therapy Goal     PT, PT/OT Ongoing, Progressing     Description: Goals to be met by: 2020    Patient will increase functional independence with mobility by performin. Supine to sit with Stand-by Assistance  2. Sit to supine with Stand-by Assistance  3. Sit to stand transfer with Supervision  4. Gait  x 30 feet with Stand-by Assistance using LRAD  5. Lower extremity exercise program x15 reps, with independence                      Time Tracking:     PT Received On: 20  PT Start Time: 853     PT Stop Time: 916  PT Total Time (min): 23 min     Billable Minutes: Gait Training 12 and  Therapeutic Exercise 11    Treatment Type: Treatment  PT/PTA: PTA     PTA Visit Number: 1     Elizabeth Persaud PTA  08/08/2020

## 2020-08-08 NOTE — NURSING
Transfusion stopped,patient c/o pain in arm where IV access is and it's tender to touch. Dr. Charlton notified, no new orders given.

## 2020-08-09 NOTE — DISCHARGE SUMMARY
[facility] Hospital Medicine  Discharge Summary      Patient Name: Crystal Alvarado  MRN: [id number]  Admission Date: 8/3/2020  Hospital Length of Stay: 5 days  Discharge Date and Time: 8/8/2020  3:37 PM  Attending Physician: No att. providers found Philippe Ba MD  Discharging Provider: Philippe Ba MD  Primary Care Provider: Shantell Goff MD    Hospital Medicine Team: [facility] HOSP MED  Philippe Ba MD    HPI:   Ms Alvarado is a 79 year old female with history of CHF (preserved EF of 60%), SVT s/p ablation, HTN, HLD, COPD, non-obstructive CAD, and CKD3, perforated appendicitis (8/2019) who is presenting to hospital with concern for syncope and GI bleed.     History is limited by patient recollection. She reports she was feeling well and went to get water in the morning. After getting water she turned and synopsized. She does not recall any events or symptoms preceding syncope. She reports currently she is feeling well. Denies any abdominal pain, nausea, vomitus. No chest pain, dyspnea. No headache or visual complaints.     She denies any history of GI bleeding. Denies any NSAID use. +ve ASA daily. No smoking or drinking. GAY in ED with melena; patient denies any knowledge of GI bleeding or melena.     On admission 122/59, 80/min. Hgb 7.5 (11.2 on 7/29), BUN 39. No Endo in system. Reports prior colonoscopy >10 years ago, no history of EGD.     Procedure(s) (LRB):  COLONOSCOPY (N/A)      Hospital Course:   Patient admitted for concerns of GI bleed with melena and acute blood loss anemia she was given 2 units packed red blood cells gastroenterology was consulted patient an upper endoscopy without evidence of GI bleeding and also colonoscopy with no acute process, found with diverticulosis in the entire colon and internal hemorrhoids.    Her admission hemoglobin was less than 7 and after initial transfusions hemoglobin remained above 7 patient did comment on initial planned discharge date  August 7 having sensation of lightheadedness patient at that time had been evaluated by PT OT and lab studies were stable any metabolic abnormalities decisional whole discharge may and 1 unit packed red blood cells ordered for concern of symptomatic anemia, but overnight transfusion was started but stopped somewhere after exact amount of blood given is not recorded in the system due to IV access issues.  The next day patient reported she was feeling better hemoglobin was at 7.4 iron studies indicated iron deficiency ferritin was on lower level of give patient dose ferric gluconate and will have an ambulatory referral to hematology patient to receive IV as an outpatient in order to help her recover from this acute blood loss anemia.    Patient has had higher blood pressure baseline is discharged amlodipine 10 mg higher than her dose.  During her hospital stay she also had a rising creatinine through most of prior to discharge additional IV fluids were administered in renal function is trending towards baseline.    One of patient's presenting issues was fall with left knee pain she was evaluated by orthopedic surgery on arrival and had imaging of the knee that ruled out any fracture home health PT OT has been ordered for the patient.    Physical Exam  Constitutional:       General: She is not in acute distress.     Appearance: She is not ill-appearing.   Eyes:      Extraocular Movements: Extraocular movements intact.      Conjunctiva/sclera: Conjunctivae normal.   Cardiovascular:      Rate and Rhythm: Regular rhythm.   Pulmonary:      Effort: Pulmonary effort is normal.      Breath sounds: No wheezing or rhonchi.   Abdominal:      General: Abdomen is flat. Bowel sounds are normal.      Palpations: Abdomen is soft.   Skin:     General: Skin is warm and dry.   Neurological:      General: No focal deficit present. Romberg sign absent, no asterixis     Mental Status: She is alert and oriented to person, place, and time.  "      Consults:   Consults (From admission, onward)        Status Ordering Provider     Inpatient consult to Gastroenterology  Once     Provider:  (Not yet assigned)    Completed TEN SAPP     Inpatient consult to PICC team (NIAS)  Once     Provider:  (Not yet assigned)    Completed THANG GOMES          Final Active Diagnoses:    Diagnosis Date Noted POA    PRINCIPAL PROBLEM:  Acute blood loss anemia [D62] 08/04/2020 Yes    CKD (chronic kidney disease) stage 3, GFR 30-59 ml/min [N18.3] 11/19/2017 Yes    Essential hypertension [I10] 08/11/2015 Yes    Left anterior knee pain [M25.562] 08/03/2020 Yes      Problems Resolved During this Admission:    Diagnosis Date Noted Date Resolved POA    Gastrointestinal hemorrhage [K92.2] 08/03/2020 08/07/2020 Yes    Elevated serum creatinine [R79.89] 08/06/2020 08/07/2020 No      Discharged Condition: fair    [place] [facility]    [place] Up:  Follow-up Information     Shantell Goff MD. Go in 17 days.    Specialty: Family Medicine  Why: Hospital follow up; Time 1030;  PLEASE WEAR FACE MASK AND LIMITIED VISITOR  Contact information:  [address] [facility]  [address] [place] [address] [id number] [phone]                 Patient Instructions:      COMMODE FOR HOME USE     Order Specific Question Answer Comments   Type: Standard    Height: 5' 6" (1.676 m)    Weight: 80 kg (176 lb 5.9 oz)    Does patient have medical equipment at home? walker, standard    Does patient have medical equipment at home? shower chair    Length of need (1-99 months): 99    Vendor: [facility]    Expected Date of Delivery: 8/7/2020      COMMODE FOR HOME USE   Order Comments: Please send to the following address;  [address] [facility] [place] [address]     Order Specific Question Answer Comments   Type: Standard    Height: 5' 6" (1.676 m)    Weight: 80 kg (176 lb 5.9 oz)    Does patient have medical equipment at home? walker, standard    Does patient have medical equipment at " "home? shower chair    Length of need (1-99 months): 99    Vendor: [facility]    Expected Date of Delivery: 8/7/2020      WALKER FOR HOME USE     Order Specific Question Answer Comments   Type of Walker: Adult (5'4"-6'6")    With wheels? Yes    Height: 5' 6" (1.676 m)    Weight: 80 kg (176 lb 5.9 oz)    Length of need (1-99 months): 99    Does patient have medical equipment at home? walker, standard    Does patient have medical equipment at home? shower chair    Please check all that apply: Patient is unable to safely ambulate without equipment.    Vendor: [facility]    Expected Date of Delivery: 8/7/2020      Ambulatory referral/consult to Family Practice   Standing Status: Future   Referral Priority: Routine Referral Type: Consultation   Referral Reason: Specialty Services Required   Requested Specialty: Family Medicine   Number of Visits Requested: 1     Ambulatory referral/consult to Hematology / Oncology   Standing Status: Future   Referral Priority: Routine Referral Type: Consultation   Referral Reason: Specialty Services Required   Requested Specialty: Hematology and Oncology   Number of Visits Requested: 1     Diet Cardiac     Notify your health care provider if you experience any of the following:  temperature >100.4     Notify your health care provider if you experience any of the following:  persistent nausea and vomiting or diarrhea     Notify your health care provider if you experience any of the following:  severe uncontrolled pain     Notify your health care provider if you experience any of the following:  difficulty breathing or increased cough     Notify your health care provider if you experience any of the following:  severe persistent headache     Notify your health care provider if you experience any of the following:  persistent dizziness, light-headedness, or visual disturbances     Notify your health care provider if you experience any of the following:  increased confusion or weakness "     Activity as tolerated     Medications:  Reconciled Home Medications:      Medication List      START taking these medications    acetaminophen 325 MG tablet  Commonly known as: TYLENOL  Take 2 tablets (650 mg total) by mouth every 6 (six) hours as needed for Pain.        CHANGE how you take these medications    amLODIPine 5 MG tablet  Commonly known as: NORVASC  Take 2 tablets (10 mg total) by mouth once daily.  What changed: how much to take     HYDROcodone-acetaminophen 5-325 mg per tablet  Commonly known as: NORCO  Take 1 tablet by mouth every 8 (eight) hours as needed (Knee Pain).  What changed:   · when to take this  · reasons to take this     pregabalin 50 MG capsule  Commonly known as: LYRICA  Take 1 capsule (50 mg total) by mouth 2 (two) times daily.  What changed:   · medication strength  · See the new instructions.        CONTINUE taking these medications    aspirin 81 MG Chew  Take 81 mg by mouth once daily.     atorvastatin 20 MG tablet  Commonly known as: LIPITOR  Take 20 mg by mouth every evening.     azelastine 137 mcg (0.1 %) nasal spray  Commonly known as: ASTELIN     BREO ELLIPTA 100-25 mcg/dose diskus inhaler  Generic drug: fluticasone furoate-vilanteroL  INHALE 1 PUFF BY MOUTH ONCE A DAY     docusate sodium 100 MG capsule  Commonly known as: COLACE  Take 1 capsule (100 mg total) by mouth 2 (two) times daily.     ergocalciferol 50,000 unit Cap  Commonly known as: ERGOCALCIFEROL  Take 50,000 Units by mouth every 7 days.     fluticasone propionate 50 mcg/actuation nasal spray  Commonly known as: FLONASE     losartan 100 MG tablet  Commonly known as: COZAAR  Take 1 tablet (100 mg total) by mouth once daily.     nitroGLYCERIN 0.4 MG SL tablet  Commonly known as: NITROSTAT  Place 1 tablet (0.4 mg total) under the tongue every 5 (five) minutes as needed for Chest pain (and notify MD).        STOP taking these medications    traMADoL 50 mg tablet  Commonly known as: ULTRAM            Significant  Diagnostic Studies: Labs:   Results for DIANA COOPER (MRN [id number]) as of 8/8/2020 19:10   Ref. Range 8/8/2020 04:12   Iron Latest Ref Range: 30 - 160 ug/dL 27 (L)   TIBC Latest Ref Range: 250 - 450 ug/dL 228 (L)   Saturated Iron Latest Ref Range: 20 - 50 % 12 (L)   Transferrin Latest Ref Range: 200 - 375 mg/dL 154 (L)   Ferritin Latest Ref Range: 20.0 - 300.0 ng/mL 59       CMP   Recent Labs   Lab 08/07/20  0338 08/08/20  0412    143   K 4.4 3.9   * 113*   CO2 17* 25   GLU 84 91   BUN 25* 26*   CREATININE 1.4 1.4   CALCIUM 7.9* 8.6*   ANIONGAP 12 5*   ESTGFRAFRICA 40.6* 40.6*   EGFRNONAA 35.3* 35.3*   , CBC   Recent Labs   Lab 08/07/20  0706 08/08/20  0412   WBC 5.15 5.08   HGB 7.6* 7.4*   HCT 25.1* 25.2*    217   , INR   Lab Results   Component Value Date    INR 0.9 08/03/2020    INR 1.0 08/10/2019    INR 0.9 07/06/2018   , Lipid Panel   Lab Results   Component Value Date    CHOL 147 02/10/2020    HDL 53 02/10/2020    LDLCALC 80.8 02/10/2020    TRIG 66 02/10/2020    CHOLHDL 36.1 02/10/2020   , Troponin   Recent Labs   Lab 08/03/20  1017   TROPONINI 0.015    and A1C: No results for input(s): HGBA1C in the last 4320 hours.  Endoscopy:  Findings:        The oropharynx was normal.        A hiatal hernia was present.        Diffuse inflammation characterized by congestion (edema) and        granularity was found in the entire examined stomach.        A few small sessile polyps with no stigmata of recent bleeding were        found in the gastric body. Biopsies were taken with a cold forceps        for histology.        The examined duodenum was normal.   Impression:           - Normal oropharynx.                         - Hiatal hernia.                         - Atrophic gastritis.                         - A few gastric polyps. Biopsied.                         - Normal examined duodenum.   Recommendation:       - Return patient to hospital [facility] for ongoing care.     Colonosocopy  Findings:         The perianal and digital rectal examinations were normal.        The terminal ileum appeared normal.        Multiple small and large-mouthed diverticula were found in the        entire colon.        An area of mildly nodular mucosa was found at the ileocecal valve.        Biopsies were taken with a cold forceps for histology.        Internal hemorrhoids were found during retroflexion.        The exam was otherwise without abnormality on direct and        retroflexion views.   Impression:           - The examined portion of the ileum was normal.                         - Diverticulosis in the entire examined colon.                         - Nodular mucosa at the ileocecal valve. Biopsied.                         - Internal hemorrhoids.                         - The examination was otherwise normal on direct                         and retroflexion views.   Recommendation:       - Return patient to hospital [facility] for ongoing care.                         - Resume previous diet.                         - Continue present medications.                         - Await pathology results.                         - Return to GI clinic at appointment to be                         scheduled.     XR CHEST PA AND LATERAL  CLINICAL HISTORY:  Dizziness and giddiness     TECHNIQUE:  PA and lateral views of the chest were performed.     COMPARISON:  07/29/2020     FINDINGS:  There is mild enlargement of the cardiomediastinal silhouette stable from prior.  Pulmonary vascularity is within range.  There is aortic atherosclerosis.  Lungs show no evidence of significant focal consolidation, large effusion or pneumothorax.  There is a loop recorder.  Bones show degenerative changes.     Impression:No acute radiographic findings in the chest.      XR KNEE 3 VIEW LEFT  CLINICAL HISTORY:  unspecified fall, initial encounter  TECHNIQUE:  AP, lateral, and Merchant views of the left knee were performed.  COMPARISON:  None     FINDINGS:  On the  AP exam, questionable nondepressed left tibial plateau fracture.  This could relate to projectional artifact.  This is not confirmed on the lateral exam.  Clinical correlation.  No other fractures.  No narrowing of the tibiofemoral or patellofemoral joint spaces.  Periarticular spurring more so patellofemoral joint space than tibiofemoral joint space.  Questionable trace suprapatellar bursal effusion with no prepatellar soft tissue swelling.     Impression:  1.  Questionable subtle non depressed lateral tibial plateau fracture versus projectional artifact since it cannot be further documented on the lateral exam.     2.  Osteoarthritic changes and questionable minimal suprapatellar bursal effusion    XR RIBS 2 VIEW LEFT  CLINICAL HISTORY:  Pleurodynia     FINDINGS:  There is a loop recorder.  No pneumothorax pleural fluid or lung contusion seen.  No rib fracture or rib lesion seen.     Impression:     No definite trauma seen.    Pending Diagnostic Studies:     Procedure Component Value Units Date/Time    Specimen to Pathology, Surgery Gastrointestinal tract [[id number]] Collected: 08/05/20 1452    Order Status: Sent Lab Status: In process Updated: 08/05/20 1927    Specimen to Pathology, Surgery Gastrointestinal tract [[id number]] Collected: 08/04/20 1018    Order Status: Sent Lab Status: In process Updated: 08/04/20 1339        Indwelling Lines/Drains at time of discharge:   Lines/Drains/Airways     Drain                 Drain/Device  08/13/19 1059 Right lower back 361 days                Time spent on the discharge of patient:44 minutes  Patient was seen and examined on the date of discharge and determined to be suitable for discharge.         Philippe Ba MD  Department of Hospital Medicine  [facility]

## 2020-08-10 ENCOUNTER — OFFICE VISIT (OUTPATIENT)
Dept: CARDIOLOGY | Facility: CLINIC | Age: 80
End: 2020-08-10
Payer: MEDICARE

## 2020-08-10 ENCOUNTER — PATIENT OUTREACH (OUTPATIENT)
Dept: ADMINISTRATIVE | Facility: CLINIC | Age: 80
End: 2020-08-10

## 2020-08-10 VITALS
HEART RATE: 80 BPM | WEIGHT: 183.06 LBS | SYSTOLIC BLOOD PRESSURE: 191 MMHG | DIASTOLIC BLOOD PRESSURE: 80 MMHG | BODY MASS INDEX: 29.42 KG/M2 | HEIGHT: 66 IN

## 2020-08-10 DIAGNOSIS — R55 POSTURAL DIZZINESS WITH PRESYNCOPE: ICD-10-CM

## 2020-08-10 DIAGNOSIS — I25.10 CORONARY ARTERY DISEASE INVOLVING NATIVE CORONARY ARTERY OF NATIVE HEART WITHOUT ANGINA PECTORIS: ICD-10-CM

## 2020-08-10 DIAGNOSIS — R55 SYNCOPE, UNSPECIFIED SYNCOPE TYPE: ICD-10-CM

## 2020-08-10 DIAGNOSIS — I47.10 SVT (SUPRAVENTRICULAR TACHYCARDIA): ICD-10-CM

## 2020-08-10 DIAGNOSIS — R42 POSTURAL DIZZINESS WITH PRESYNCOPE: ICD-10-CM

## 2020-08-10 DIAGNOSIS — Z98.890 HISTORY OF RADIOFREQUENCY ABLATION (RFA) PROCEDURE FOR CARDIAC ARRHYTHMIA: ICD-10-CM

## 2020-08-10 DIAGNOSIS — E78.00 PURE HYPERCHOLESTEROLEMIA: ICD-10-CM

## 2020-08-10 DIAGNOSIS — Z95.818 STATUS POST PLACEMENT OF IMPLANTABLE LOOP RECORDER: ICD-10-CM

## 2020-08-10 DIAGNOSIS — I95.1 ORTHOSTATIC HYPOTENSION: ICD-10-CM

## 2020-08-10 DIAGNOSIS — E87.6 HYPOKALEMIA: ICD-10-CM

## 2020-08-10 DIAGNOSIS — N18.30 CKD (CHRONIC KIDNEY DISEASE) STAGE 3, GFR 30-59 ML/MIN: ICD-10-CM

## 2020-08-10 DIAGNOSIS — I10 ESSENTIAL HYPERTENSION: Primary | ICD-10-CM

## 2020-08-10 DIAGNOSIS — D62 ACUTE BLOOD LOSS ANEMIA: ICD-10-CM

## 2020-08-10 DIAGNOSIS — I35.0 AORTIC VALVE STENOSIS, ETIOLOGY OF CARDIAC VALVE DISEASE UNSPECIFIED: ICD-10-CM

## 2020-08-10 DIAGNOSIS — E83.42 HYPOMAGNESEMIA: ICD-10-CM

## 2020-08-10 LAB
FINAL PATHOLOGIC DIAGNOSIS: NORMAL
GROSS: NORMAL

## 2020-08-10 PROCEDURE — 93000 ELECTROCARDIOGRAM COMPLETE: CPT | Mod: S$GLB,,, | Performed by: INTERNAL MEDICINE

## 2020-08-10 PROCEDURE — 93000 EKG 12-LEAD: ICD-10-PCS | Mod: S$GLB,,, | Performed by: INTERNAL MEDICINE

## 2020-08-10 PROCEDURE — 3288F FALL RISK ASSESSMENT DOCD: CPT | Mod: CPTII,S$GLB,, | Performed by: INTERNAL MEDICINE

## 2020-08-10 PROCEDURE — 1159F PR MEDICATION LIST DOCUMENTED IN MEDICAL RECORD: ICD-10-PCS | Mod: S$GLB,,, | Performed by: INTERNAL MEDICINE

## 2020-08-10 PROCEDURE — 99999 PR PBB SHADOW E&M-EST. PATIENT-LVL III: CPT | Mod: PBBFAC,,, | Performed by: INTERNAL MEDICINE

## 2020-08-10 PROCEDURE — 99214 PR OFFICE/OUTPT VISIT, EST, LEVL IV, 30-39 MIN: ICD-10-PCS | Mod: 25,S$GLB,, | Performed by: INTERNAL MEDICINE

## 2020-08-10 PROCEDURE — 1159F MED LIST DOCD IN RCRD: CPT | Mod: S$GLB,,, | Performed by: INTERNAL MEDICINE

## 2020-08-10 PROCEDURE — 3077F SYST BP >= 140 MM HG: CPT | Mod: CPTII,S$GLB,, | Performed by: INTERNAL MEDICINE

## 2020-08-10 PROCEDURE — 3288F PR FALLS RISK ASSESSMENT DOCUMENTED: ICD-10-PCS | Mod: CPTII,S$GLB,, | Performed by: INTERNAL MEDICINE

## 2020-08-10 PROCEDURE — 1100F PTFALLS ASSESS-DOCD GE2>/YR: CPT | Mod: CPTII,S$GLB,, | Performed by: INTERNAL MEDICINE

## 2020-08-10 PROCEDURE — 99999 PR PBB SHADOW E&M-EST. PATIENT-LVL III: ICD-10-PCS | Mod: PBBFAC,,, | Performed by: INTERNAL MEDICINE

## 2020-08-10 PROCEDURE — 1100F PR PT FALLS ASSESS DOC 2+ FALLS/FALL W/INJURY/YR: ICD-10-PCS | Mod: CPTII,S$GLB,, | Performed by: INTERNAL MEDICINE

## 2020-08-10 PROCEDURE — 3079F DIAST BP 80-89 MM HG: CPT | Mod: CPTII,S$GLB,, | Performed by: INTERNAL MEDICINE

## 2020-08-10 PROCEDURE — 3079F PR MOST RECENT DIASTOLIC BLOOD PRESSURE 80-89 MM HG: ICD-10-PCS | Mod: CPTII,S$GLB,, | Performed by: INTERNAL MEDICINE

## 2020-08-10 PROCEDURE — 99214 OFFICE O/P EST MOD 30 MIN: CPT | Mod: 25,S$GLB,, | Performed by: INTERNAL MEDICINE

## 2020-08-10 PROCEDURE — 3077F PR MOST RECENT SYSTOLIC BLOOD PRESSURE >= 140 MM HG: ICD-10-PCS | Mod: CPTII,S$GLB,, | Performed by: INTERNAL MEDICINE

## 2020-08-10 RX ORDER — AMLODIPINE BESYLATE 10 MG/1
10 TABLET ORAL DAILY
Qty: 90 TABLET | Refills: 3 | Status: SHIPPED | OUTPATIENT
Start: 2020-08-10 | End: 2021-10-04 | Stop reason: SDUPTHER

## 2020-08-10 NOTE — PROGRESS NOTES
Pts family member answered call and states that pt went to cardiology apt today ad everything was fine. Pt is taking all medications and feels good. Declined [facility] followup phone call at this time.

## 2020-08-10 NOTE — PROGRESS NOTES
Subjective:      Patient ID: Crystal Alvarado is a 79 y.o. female.    Chief Complaint: Hospital Follow Up (Admitted with GI Bleed to [facility]. Dc'd on Saturday.)    HPI:  Went to the [facility] last week after losing balance and falling. Pt was found to have a severe anemia and melena was noted on digital rectal exam.  Pt was tranfused 2  Units of blood and given an iron infusion. Pt had upper and lower endoscopy and is being scheduled for a pill camera.    Lyrica was added to regimen.    Pt has persistent mile LUQ discomfort and feels off balance.      Review of Systems   Cardiovascular: Negative for chest pain, claudication, dyspnea on exertion, irregular heartbeat, leg swelling, near-syncope, orthopnea, palpitations and syncope.      Pt did not notice any melena or blood in stool.      Past Medical History:   Diagnosis Date    Arthritis     CHF (congestive heart failure)     Coronary artery disease     Hyperlipidemia     Hypertension         Past Surgical History:   Procedure Laterality Date    A-V CARDIAC PACEMAKER INSERTION N/A 7/9/2018    Procedure: INSERTION, CARDIAC PACEMAKER, DUAL CHAMBER;  Surgeon: Archie Montez MD;  Location: [facility] CATH LAB;  Service: Cardiology;  Laterality: N/A;  SVT, RFA, +/- Dual PPM or ILR, IGLSEIA, MDT, MAC, OR, 3 Prep    COLONOSCOPY      COLONOSCOPY N/A 8/5/2020    Procedure: COLONOSCOPY;  Surgeon: Sebas Dickens MD;  Location: [facility] ([address] [place] [address]);  Service: Endoscopy;  Laterality: N/A;    ESOPHAGOGASTRODUODENOSCOPY N/A 8/4/2020    Procedure: EGD (ESOPHAGOGASTRODUODENOSCOPY);  Surgeon: Fernie Cohen MD;  Location: [facility] ([address] [place] [address]);  Service: Endoscopy;  Laterality: N/A;    HYSTERECTOMY N/A     INSERTION OF IMPLANTABLE LOOP RECORDER N/A 7/9/2018    Procedure: PLACEMENT-LOOP RECORDER;  Surgeon: Archie Montez MD;  Location: [facility] CATH LAB;  Service: Cardiology;  Laterality: N/A;  SVT, RFA, +/- Dual PPM or ILR, IGLESIA, MDT, MAC, OR, 3 Prep       No family history  on file.    Social History     Socioeconomic History    Marital status: Single     Spouse name: Not on file    Number of children: Not on file    Years of education: Not on file    Highest education level: Not on file   Occupational History    Not on file   Social Needs    Financial resource strain: Not on file    Food insecurity     Worry: Not on file     Inability: Not on file    Transportation needs     Medical: Not on file     Non-medical: Not on file   Tobacco Use    Smoking status: Former Smoker     Packs/day: 0.50     Quit date: 2013     Years since quittin.2    Smokeless tobacco: Never Used   Substance and Sexual Activity    Alcohol use: No    Drug use: No    Sexual activity: Never   Lifestyle    Physical activity     Days per week: Not on file     Minutes per session: Not on file    Stress: Not on file   Relationships    Social connections     Talks on phone: Not on file     Gets together: Not on file     Attends Sikh service: Not on file     Active member of club or organization: Not on file     Attends meetings of clubs or organizations: Not on file     Relationship status: Not on file   Other Topics Concern    Not on file   Social History Narrative    Not on file       Current Outpatient Medications on File Prior to Visit   Medication Sig Dispense Refill    acetaminophen (TYLENOL) 325 MG tablet Take 2 tablets (650 mg total) by mouth every 6 (six) hours as needed for Pain.  0    atorvastatin (LIPITOR) 20 MG tablet Take 20 mg by mouth every evening.  4    azelastine (ASTELIN) 137 mcg (0.1 %) nasal spray       BREO ELLIPTA 100-25 mcg/dose diskus inhaler INHALE 1 PUFF BY MOUTH ONCE A DAY  3    docusate sodium (COLACE) 100 MG capsule Take 1 capsule (100 mg total) by mouth 2 (two) times daily. 60 capsule 0    ergocalciferol (ERGOCALCIFEROL) 50,000 unit Cap Take 50,000 Units by mouth every 7 days.      fluticasone propionate (FLONASE) 50 mcg/actuation nasal spray        "HYDROcodone-acetaminophen (NORCO) 5-325 mg per tablet Take 1 tablet by mouth every 8 (eight) hours as needed (Knee Pain). 21 tablet 0    losartan (COZAAR) 100 MG tablet Take 1 tablet (100 mg total) by mouth once daily. 90 tablet 3    nitroGLYCERIN (NITROSTAT) 0.4 MG SL tablet Place 1 tablet (0.4 mg total) under the tongue every 5 (five) minutes as needed for Chest pain (and notify MD). 25 tablet 5    pregabalin (LYRICA) 50 MG capsule Take 1 capsule (50 mg total) by mouth 2 (two) times daily. 60 capsule 1    [DISCONTINUED] amLODIPine (NORVASC) 5 MG tablet Take 2 tablets (10 mg total) by mouth once daily. 60 tablet 1    [DISCONTINUED] aspirin 81 MG Chew Take 81 mg by mouth once daily.  4     No current facility-administered medications on file prior to visit.        Review of patient's allergies indicates:  No Known Allergies  Objective:     Vitals:    08/10/20 1042   BP: (!) 191/80   BP Location: Right arm   Patient Position: Sitting   BP Method: Large (Automatic)   Pulse: 80   Weight: 83 kg (183 lb 1.5 oz)   Height: 5' 6" (1.676 m)        Physical Exam   Constitutional: She is oriented to person, place, and time. She appears well-developed and well-nourished.   Eyes: No scleral icterus.   Neck: No JVD present. Carotid bruit is not present.   Cardiovascular: Normal rate and regular rhythm. Exam reveals no gallop.   Murmur (ii/vi SYSTOLIC) heard.  Pulmonary/Chest: Breath sounds normal.   Abdominal: Soft. There is abdominal tenderness in the left upper quadrant. There is no rigidity and no guarding.   Musculoskeletal:         General: No edema.   Neurological: She is alert and oriented to person, place, and time.   Skin: Skin is warm and dry.   Psychiatric: She has a normal mood and affect. Her behavior is normal. Judgment and thought content normal.   Vitals reviewed.     BUN 26    Creat 1.4    Note hgb was 11.2 7/29/20 when pt went to ER with left inframammary pain which was diagnosed as a pulled muscle.    hgb " 7.5 on 8/3/20    hgb 7.4 8/8/20    ECG --NSR, WNL    Assessment:     1. Essential hypertension    2. Pure hypercholesterolemia    3. Coronary artery disease involving native coronary artery of native heart without angina pectoris    4. SVT (supraventricular tachycardia)    5. History of radiofrequency ablation (RFA) procedure for cardiac arrhythmia    6. Status post placement of implantable loop recorder    7. Aortic valve stenosis, etiology of cardiac valve disease unspecified    8. CKD (chronic kidney disease) stage 3, GFR 30-59 ml/min    9. Hypokalemia    10. Hypomagnesemia    11. Acute blood loss anemia    12. Postural dizziness with presyncope    13. Syncope, unspecified syncope type    14. Orthostatic hypotension      Plan:   Crystal was seen today for hospital follow up.    Diagnoses and all orders for this visit:    Essential hypertension  -     IN OFFICE EKG 12-LEAD (to Maribel)  -     amLODIPine (NORVASC) 10 MG tablet; Take 1 tablet (10 mg total) by mouth once daily.    Pure hypercholesterolemia    Coronary artery disease involving native coronary artery of native heart without angina pectoris    SVT (supraventricular tachycardia)  -     IN OFFICE EKG 12-LEAD (to Muse)    History of radiofrequency ablation (RFA) procedure for cardiac arrhythmia  -     IN OFFICE EKG 12-LEAD (to Muse)    Status post placement of implantable loop recorder    Aortic valve stenosis, etiology of cardiac valve disease unspecified    CKD (chronic kidney disease) stage 3, GFR 30-59 ml/min    Hypokalemia    Hypomagnesemia    Acute blood loss anemia  -     CBC auto differential; Standing    Postural dizziness with presyncope    Syncope, unspecified syncope type    Orthostatic hypotension     will repeat CBC's weekly until stable    Will increase the amlodipine to 10 mg daily for hypertension    Discontinue ASA since it is unclear where pt is bleeding from.    Proceed with pill camera    RTC one month    Follow up in about 4 weeks  (around 9/7/2020).

## 2020-08-11 ENCOUNTER — TELEPHONE (OUTPATIENT)
Dept: ENDOSCOPY | Facility: HOSPITAL | Age: 80
End: 2020-08-11

## 2020-08-11 LAB
BLD PROD TYP BPU: NORMAL
BLOOD UNIT EXPIRATION DATE: NORMAL
BLOOD UNIT TYPE CODE: 6200
BLOOD UNIT TYPE: NORMAL
CODING SYSTEM: NORMAL
DISPENSE STATUS: NORMAL
TRANS ERYTHROCYTES VOL PATIENT: NORMAL ML

## 2020-08-11 NOTE — TELEPHONE ENCOUNTER
----- Message from Fernie Cohen MD sent at 8/11/2020  9:46 AM CDT -----  Please let the patient know the stomach polyp biopsy showed a hyperplastic polyp with inflammation. No dysplasia or cancer. Follow up with General GI.

## 2020-08-12 ENCOUNTER — TELEPHONE (OUTPATIENT)
Dept: CARDIOLOGY | Facility: CLINIC | Age: 80
End: 2020-08-12

## 2020-08-12 PROCEDURE — G0180 MD CERTIFICATION HHA PATIENT: HCPCS | Mod: ,,, | Performed by: HOSPITALIST

## 2020-08-12 PROCEDURE — G0180 PR HOME HEALTH MD CERTIFICATION: ICD-10-PCS | Mod: ,,, | Performed by: HOSPITALIST

## 2020-08-12 NOTE — TELEPHONE ENCOUNTER
Spoke to Radha @ [facility].  There is a standing order in Epic for a weekly CBC until patient is stable.  Home health will draw weekly labs and report results to Dr Kline.     ----- Message from Lisa Spencer sent at 8/12/2020 12:05 PM CDT -----  Regarding: lab Inquiry  Contact: Skyler/ [phone]  Skyler with [facility] called in to inform that the patient was admitted to home health services and want to know if you can send lab orders to them and they will perform them instead of the patient having to travel to the lab. Please call.

## 2020-08-13 ENCOUNTER — CLINICAL SUPPORT (OUTPATIENT)
Dept: CARDIOLOGY | Facility: HOSPITAL | Age: 80
End: 2020-08-13
Attending: INTERNAL MEDICINE
Payer: MEDICARE

## 2020-08-13 ENCOUNTER — TELEPHONE (OUTPATIENT)
Dept: HEMATOLOGY/ONCOLOGY | Facility: CLINIC | Age: 80
End: 2020-08-13

## 2020-08-13 DIAGNOSIS — Z95.818 STATUS POST PLACEMENT OF IMPLANTABLE LOOP RECORDER: ICD-10-CM

## 2020-08-13 PROCEDURE — G2066 INTER DEVC REMOTE 30D: HCPCS | Performed by: INTERNAL MEDICINE

## 2020-08-13 PROCEDURE — 93298 REM INTERROG DEV EVAL SCRMS: CPT | Mod: ,,, | Performed by: INTERNAL MEDICINE

## 2020-08-13 PROCEDURE — 93298 CARDIAC DEVICE CHECK - REMOTE: ICD-10-PCS | Mod: ,,, | Performed by: INTERNAL MEDICINE

## 2020-08-14 LAB
FINAL PATHOLOGIC DIAGNOSIS: NORMAL
GROSS: NORMAL

## 2020-08-17 ENCOUNTER — TELEPHONE (OUTPATIENT)
Dept: ENDOSCOPY | Facility: HOSPITAL | Age: 80
End: 2020-08-17

## 2020-08-17 NOTE — TELEPHONE ENCOUNTER
----- Message from Fernie Cohen MD sent at 8/14/2020  4:42 PM CDT -----  Please let the patient know the biopsies of the ileocecal valve was normal.

## 2020-08-18 ENCOUNTER — DOCUMENT SCAN (OUTPATIENT)
Dept: HOME HEALTH SERVICES | Facility: HOSPITAL | Age: 80
End: 2020-08-18
Payer: MEDICARE

## 2020-08-24 ENCOUNTER — TELEPHONE (OUTPATIENT)
Dept: CARDIOLOGY | Facility: CLINIC | Age: 80
End: 2020-08-24

## 2020-08-25 ENCOUNTER — EXTERNAL HOME HEALTH (OUTPATIENT)
Dept: HOME HEALTH SERVICES | Facility: HOSPITAL | Age: 80
End: 2020-08-25
Payer: MEDICARE

## 2020-08-26 ENCOUNTER — OFFICE VISIT (OUTPATIENT)
Dept: GASTROENTEROLOGY | Facility: CLINIC | Age: 80
End: 2020-08-26
Payer: MEDICARE

## 2020-08-26 VITALS
SYSTOLIC BLOOD PRESSURE: 163 MMHG | WEIGHT: 172 LBS | HEIGHT: 66 IN | BODY MASS INDEX: 27.64 KG/M2 | DIASTOLIC BLOOD PRESSURE: 76 MMHG | HEART RATE: 61 BPM

## 2020-08-26 DIAGNOSIS — D50.0 IRON DEFICIENCY ANEMIA DUE TO CHRONIC BLOOD LOSS: Primary | ICD-10-CM

## 2020-08-26 DIAGNOSIS — K92.1 MELENA: ICD-10-CM

## 2020-08-26 PROCEDURE — 1101F PR PT FALLS ASSESS DOC 0-1 FALLS W/OUT INJ PAST YR: ICD-10-PCS | Mod: CPTII,GC,S$GLB, | Performed by: STUDENT IN AN ORGANIZED HEALTH CARE EDUCATION/TRAINING PROGRAM

## 2020-08-26 PROCEDURE — 3077F SYST BP >= 140 MM HG: CPT | Mod: CPTII,GC,S$GLB, | Performed by: STUDENT IN AN ORGANIZED HEALTH CARE EDUCATION/TRAINING PROGRAM

## 2020-08-26 PROCEDURE — 3077F PR MOST RECENT SYSTOLIC BLOOD PRESSURE >= 140 MM HG: ICD-10-PCS | Mod: CPTII,GC,S$GLB, | Performed by: STUDENT IN AN ORGANIZED HEALTH CARE EDUCATION/TRAINING PROGRAM

## 2020-08-26 PROCEDURE — 3078F PR MOST RECENT DIASTOLIC BLOOD PRESSURE < 80 MM HG: ICD-10-PCS | Mod: CPTII,GC,S$GLB, | Performed by: STUDENT IN AN ORGANIZED HEALTH CARE EDUCATION/TRAINING PROGRAM

## 2020-08-26 PROCEDURE — 1126F AMNT PAIN NOTED NONE PRSNT: CPT | Mod: GC,S$GLB,, | Performed by: STUDENT IN AN ORGANIZED HEALTH CARE EDUCATION/TRAINING PROGRAM

## 2020-08-26 PROCEDURE — 3078F DIAST BP <80 MM HG: CPT | Mod: CPTII,GC,S$GLB, | Performed by: STUDENT IN AN ORGANIZED HEALTH CARE EDUCATION/TRAINING PROGRAM

## 2020-08-26 PROCEDURE — 1159F PR MEDICATION LIST DOCUMENTED IN MEDICAL RECORD: ICD-10-PCS | Mod: GC,S$GLB,, | Performed by: STUDENT IN AN ORGANIZED HEALTH CARE EDUCATION/TRAINING PROGRAM

## 2020-08-26 PROCEDURE — 99999 PR PBB SHADOW E&M-EST. PATIENT-LVL IV: CPT | Mod: PBBFAC,GC,, | Performed by: STUDENT IN AN ORGANIZED HEALTH CARE EDUCATION/TRAINING PROGRAM

## 2020-08-26 PROCEDURE — 99999 PR PBB SHADOW E&M-EST. PATIENT-LVL IV: ICD-10-PCS | Mod: PBBFAC,GC,, | Performed by: STUDENT IN AN ORGANIZED HEALTH CARE EDUCATION/TRAINING PROGRAM

## 2020-08-26 PROCEDURE — 1101F PT FALLS ASSESS-DOCD LE1/YR: CPT | Mod: CPTII,GC,S$GLB, | Performed by: STUDENT IN AN ORGANIZED HEALTH CARE EDUCATION/TRAINING PROGRAM

## 2020-08-26 PROCEDURE — 1126F PR PAIN SEVERITY QUANTIFIED, NO PAIN PRESENT: ICD-10-PCS | Mod: GC,S$GLB,, | Performed by: STUDENT IN AN ORGANIZED HEALTH CARE EDUCATION/TRAINING PROGRAM

## 2020-08-26 PROCEDURE — 1159F MED LIST DOCD IN RCRD: CPT | Mod: GC,S$GLB,, | Performed by: STUDENT IN AN ORGANIZED HEALTH CARE EDUCATION/TRAINING PROGRAM

## 2020-08-26 PROCEDURE — 99214 OFFICE O/P EST MOD 30 MIN: CPT | Mod: GC,S$GLB,, | Performed by: STUDENT IN AN ORGANIZED HEALTH CARE EDUCATION/TRAINING PROGRAM

## 2020-08-26 PROCEDURE — 99214 PR OFFICE/OUTPT VISIT, EST, LEVL IV, 30-39 MIN: ICD-10-PCS | Mod: GC,S$GLB,, | Performed by: STUDENT IN AN ORGANIZED HEALTH CARE EDUCATION/TRAINING PROGRAM

## 2020-08-26 NOTE — PROGRESS NOTES
[facility]    Reason for visit: The primary encounter diagnosis was Iron deficiency anemia due to chronic blood loss. A diagnosis of Melena was also pertinent to this visit.  Referring Provider/PCP: Shantell Goff MD    History of Present Illness:  Crystal Alvarado is a 79 y.o. female with a history of CHF (preserved EF of 60%), SVT s/p ablation, HTN, HLD, COPD, non-obstructive CAD, and CKD3, perforated appendicitis (8/2019) here for hospitalization follow up for syncope and melena.    Patient was recently admitted to the hospital beginning of Aug 2020 for evaluation of a syncope episodes. She was noted at that time to have a Hb of 7.5 from 11.7 (3 days prior).  She was evaluated by GI on 08/03, and based on notes the patient had melena on digital rectal exam.  Patient underwent an EGD on 08/04 found atrophic gastritis and a polyp.  Polyp was biopsied and consistent with hyperplastic polyps.  She then underwent a colonoscopy on 8/5 with a normal to terminal ileum, ileocecal valve nodularity, and diverticulosis.  Ileocecal valve was biopsied, and was reported as normal.  Given no signs of ongoing GI bleeding and stability of hemoglobin, patient was discharged with outpatient follow-up.    Patient for that she has been drowsy since her discharge.  She denies black stools, hematochezia, or abdominal pain.  She denies history of GI bleeding prior to this episode.  Denies being on anti coagulation.  Her hemoglobin has been checked frequently, and her hemoglobin has been improving last 9.7 on 08/24      Review of Systems:   Constitutional: no fever, chills or change in weight   Eyes: no visual changes   ENT: no sore throat or dysphagia  Respiratory: no cough or shortness of breath   Cardiovascular: no chest pain or palpitations   Gastrointestinal: as per HPI  Hematologic/Lymphatic: no easy bruising or lymphadenopathy   Musculoskeletal: no arthralgias or myalgias   Neurological: no change in  mental status  Behavioral/Psych: no change in mood    Medical History:  Past Medical History:   Diagnosis Date    Arthritis     CHF (congestive heart failure)     Coronary artery disease     Hyperlipidemia     Hypertension        Past Surgical History:   Procedure Laterality Date    A-V CARDIAC PACEMAKER INSERTION N/A 2018    Procedure: INSERTION, CARDIAC PACEMAKER, DUAL CHAMBER;  Surgeon: Archie Montez MD;  Location: [facility] CATH LAB;  Service: Cardiology;  Laterality: N/A;  SVT, RFA, +/- Dual PPM or ILR, IGLESIA, MDT, MAC, [place], 3 Prep    COLONOSCOPY      COLONOSCOPY N/A 2020    Procedure: COLONOSCOPY;  Surgeon: Sebas Dickens MD;  Location: [facility] ENDO (2ND FLR);  Service: Endoscopy;  Laterality: N/A;    ESOPHAGOGASTRODUODENOSCOPY N/A 2020    Procedure: EGD (ESOPHAGOGASTRODUODENOSCOPY);  Surgeon: Fernie Cohen MD;  Location: [facility] ENDO (2ND FLR);  Service: Endoscopy;  Laterality: N/A;    HYSTERECTOMY N/A     INSERTION OF IMPLANTABLE LOOP RECORDER N/A 2018    Procedure: PLACEMENT-LOOP RECORDER;  Surgeon: Archie Montez MD;  Location: [facility] CATH LAB;  Service: Cardiology;  Laterality: N/A;  SVT, RFA, +/- Dual PPM or ILR, IGLESIA, MDT, MAC, [place], 3 Prep       No family history on file.    Social History     Socioeconomic History    Marital status: Single     Spouse name: Not on file    Number of children: Not on file    Years of education: Not on file    Highest education level: Not on file   Occupational History    Not on file   Social Needs    Financial resource strain: Not on file    Food insecurity     Worry: Not on file     Inability: Not on file    Transportation needs     Medical: Not on file     Non-medical: Not on file   Tobacco Use    Smoking status: Former Smoker     Packs/day: 0.50     Quit date: 2013     Years since quittin.3    Smokeless tobacco: Never Used   Substance and Sexual Activity    Alcohol use: No    Drug use: No    Sexual activity: Never   Lifestyle     Physical activity     Days per week: Not on file     Minutes per session: Not on file    Stress: Not on file   Relationships    Social connections     Talks on phone: Not on file     Gets together: Not on file     Attends [employer] service: Not on file     Active member of club or organization: Not on file     Attends meetings of clubs or organizations: Not on file     Relationship status: Not on file   Other Topics Concern    Not on file   Social History Narrative    Not on file       Current Outpatient Medications on File Prior to Visit   Medication Sig Dispense Refill    acetaminophen (TYLENOL) 325 MG tablet Take 2 tablets (650 mg total) by mouth every 6 (six) hours as needed for Pain.  0    amLODIPine (NORVASC) 10 MG tablet Take 1 tablet (10 mg total) by mouth once daily. 90 tablet 3    atorvastatin (LIPITOR) 20 MG tablet Take 20 mg by mouth every evening.  4    azelastine (ASTELIN) 137 mcg (0.1 %) nasal spray       BREO ELLIPTA 100-25 mcg/dose diskus inhaler INHALE 1 PUFF BY MOUTH ONCE A DAY  3    docusate sodium (COLACE) 100 MG capsule Take 1 capsule (100 mg total) by mouth 2 (two) times daily. 60 capsule 0    ergocalciferol (ERGOCALCIFEROL) 50,000 unit Cap Take 50,000 Units by mouth every 7 days.      fluticasone propionate (FLONASE) 50 mcg/actuation nasal spray       losartan (COZAAR) 100 MG tablet Take 1 tablet (100 mg total) by mouth once daily. 90 tablet 3    pregabalin (LYRICA) 50 MG capsule Take 1 capsule (50 mg total) by mouth 2 (two) times daily. 60 capsule 1    nitroGLYCERIN (NITROSTAT) 0.4 MG SL tablet Place 1 tablet (0.4 mg total) under the tongue every 5 (five) minutes as needed for Chest pain (and notify MD). 25 tablet 5     No current facility-administered medications on file prior to visit.        Review of patient's allergies indicates:  No Known Allergies    Physical Exam:  General: Alert and Oriented x3, no distress   Vitals:    08/26/20 1609   BP: (!) 163/76   Pulse: 61      HEENT: Normocephalic, Atraumatic. No scleral icterus.  Lymph: No cervical lymphadenopathy  Resp: Good air entry bilaterally, no adventitious sounds.  Cardiac: S1 and S2 normal.  Abdomen: Normoactive bowel sounds. Non-distended. Normal tympany. Soft. Non-tender. No peritoneal signs.  Extremities: No peripheral edema. Normal bilateral pedal and radial pulses.  Neurologic: No gross neurological Deficits  Psych: Calm, cooperative. Normal mood and affect.    Laboratory:  Lab Results   Component Value Date     08/08/2020    K 3.9 08/08/2020     (H) 08/08/2020    CO2 25 08/08/2020    BUN 26 (H) 08/08/2020    CREATININE 1.4 08/08/2020    CALCIUM 8.6 (L) 08/08/2020    ANIONGAP 5 (L) 08/08/2020    ESTGFRAFRICA 40.6 (A) 08/08/2020    EGFRNONAA 35.3 (A) 08/08/2020       Lab Results   Component Value Date    ALT 6 (L) 08/03/2020    AST 9 (L) 08/03/2020    ALKPHOS 59 08/03/2020    BILITOT 0.3 08/03/2020       Lab Results   Component Value Date    WBC 5.56 08/24/2020    HGB 9.7 (L) 08/24/2020    HCT 31.5 (L) 08/24/2020    MCV 95 08/24/2020     08/24/2020       Microbiology:  No Pertinent Microbiology    Imaging:  As per HPI    Assessment:  Crystal Alvarado is a 79 y.o. female who is presenting for post-hospitalization follow-up of syncope and GI bleeding    Patient having associated bleeding with an unrevealing EGD/colonoscopy evaluation.  She has no recurrent signs of bleeding within improving hemoglobin.  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  PillCam was discussed with the patient, and she agreed to proceed with the procedure.  We will schedule.  All questions answered    Plan:  1. Schedule VCE  2. Monitor CBC and iron studies with iron supplementation per PCP  Follow up if symptoms worsen or fail to improve.    Ananya Narayanan MD  Gastroenterology Fellow  Phone: [phone]    No orders of the defined types were placed in this encounter.

## 2020-08-27 ENCOUNTER — TELEPHONE (OUTPATIENT)
Dept: GASTROENTEROLOGY | Facility: CLINIC | Age: 80
End: 2020-08-27

## 2020-08-27 NOTE — PROGRESS NOTES
This is an elderly lady with multiple medical problems who presented with a major GI bleed.  Manifest as melena.  EGD and colon were negative.  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  The bleeding has now stopped and the blood count is coming up.  We offered them whether not they want to just watch this or proceed with a video capsule to try to define the source of bleeding.  They want to proceed with the capsule.  Discussed in detail and set up for the patient.

## 2020-08-27 NOTE — TELEPHONE ENCOUNTER
Voice message left with [facility] pharmacy for Golytely prep.  Patient is to take as directed the evening prior to her VCE.  Prep instructions reviewed with patient and family members.  Written instructions given to patient.                  Scheduled for 9/9 at 8:30.  Confirmation mailed along with written instructions.   Hiral

## 2020-09-02 ENCOUNTER — OFFICE VISIT (OUTPATIENT)
Dept: HEMATOLOGY/ONCOLOGY | Facility: CLINIC | Age: 80
End: 2020-09-02
Payer: MEDICARE

## 2020-09-02 VITALS
TEMPERATURE: 98 F | WEIGHT: 169.56 LBS | BODY MASS INDEX: 27.36 KG/M2 | OXYGEN SATURATION: 99 % | RESPIRATION RATE: 18 BRPM | HEART RATE: 61 BPM

## 2020-09-02 DIAGNOSIS — N18.30 CKD (CHRONIC KIDNEY DISEASE) STAGE 3, GFR 30-59 ML/MIN: Primary | ICD-10-CM

## 2020-09-02 DIAGNOSIS — D53.8 OTHER SPECIFIED NUTRITIONAL ANEMIAS: ICD-10-CM

## 2020-09-02 DIAGNOSIS — K92.2 GASTROINTESTINAL HEMORRHAGE, UNSPECIFIED GASTROINTESTINAL HEMORRHAGE TYPE: ICD-10-CM

## 2020-09-02 DIAGNOSIS — E55.9 VITAMIN D DEFICIENCY: ICD-10-CM

## 2020-09-02 DIAGNOSIS — D62 ACUTE BLOOD LOSS ANEMIA: ICD-10-CM

## 2020-09-02 DIAGNOSIS — D53.9 NUTRITIONAL ANEMIA, UNSPECIFIED: ICD-10-CM

## 2020-09-02 DIAGNOSIS — I49.5 SSS (SICK SINUS SYNDROME): ICD-10-CM

## 2020-09-02 PROCEDURE — 1126F PR PAIN SEVERITY QUANTIFIED, NO PAIN PRESENT: ICD-10-PCS | Mod: S$GLB,,, | Performed by: INTERNAL MEDICINE

## 2020-09-02 PROCEDURE — 1101F PT FALLS ASSESS-DOCD LE1/YR: CPT | Mod: CPTII,S$GLB,, | Performed by: INTERNAL MEDICINE

## 2020-09-02 PROCEDURE — 99999 PR PBB SHADOW E&M-EST. PATIENT-LVL IV: ICD-10-PCS | Mod: PBBFAC,,, | Performed by: INTERNAL MEDICINE

## 2020-09-02 PROCEDURE — 99204 OFFICE O/P NEW MOD 45 MIN: CPT | Mod: S$GLB,,, | Performed by: INTERNAL MEDICINE

## 2020-09-02 PROCEDURE — 1101F PR PT FALLS ASSESS DOC 0-1 FALLS W/OUT INJ PAST YR: ICD-10-PCS | Mod: CPTII,S$GLB,, | Performed by: INTERNAL MEDICINE

## 2020-09-02 PROCEDURE — 1126F AMNT PAIN NOTED NONE PRSNT: CPT | Mod: S$GLB,,, | Performed by: INTERNAL MEDICINE

## 2020-09-02 PROCEDURE — 1159F MED LIST DOCD IN RCRD: CPT | Mod: S$GLB,,, | Performed by: INTERNAL MEDICINE

## 2020-09-02 PROCEDURE — 99204 PR OFFICE/OUTPT VISIT, NEW, LEVL IV, 45-59 MIN: ICD-10-PCS | Mod: S$GLB,,, | Performed by: INTERNAL MEDICINE

## 2020-09-02 PROCEDURE — 99999 PR PBB SHADOW E&M-EST. PATIENT-LVL IV: CPT | Mod: PBBFAC,,, | Performed by: INTERNAL MEDICINE

## 2020-09-02 PROCEDURE — 1159F PR MEDICATION LIST DOCUMENTED IN MEDICAL RECORD: ICD-10-PCS | Mod: S$GLB,,, | Performed by: INTERNAL MEDICINE

## 2020-09-02 NOTE — PROGRESS NOTES
PATIENT: Crystal Alvarado  MRN: [id number]  DATE: 9/2/2020    Diagnosis:   1. CKD (chronic kidney disease) stage 3, GFR 30-59 ml/min    2. Gastrointestinal hemorrhage, unspecified gastrointestinal hemorrhage type    3. Acute blood loss anemia    4. SSS (sick sinus syndrome)    5. Vitamin D deficiency      Chief Complaint: Anemia    Subjective:     History of Present Illness:     She was admitted to the hospital 08/03/2020 with melena and syncopal episodes, was found have a hemoglobin of 7.5 and was given 2 units of packed red blood cells.    Medical comorbidities include heart failure with preserved ejection fraction, SVT status post ablation, COPD, nonobstructive coronary artery disease and stage III CKD    8/5/2020 - colonoscopy -   The examined portion of the ileum was normal. Diverticulosis in the entire examined colon. Nodular mucosa at the ileocecal valve. Biopsied. Internal hemorrhoids. The examination was otherwise normal on direct and retroflexion views.    8/4/2020 - EGD  Normal oropharynx. Hiatal hernia. Atrophic gastritis. A few gastric polyps. Biopsied. Normal examined duodenum.    She is scheduled for a video capsule endoscopy 09/09/2020.    She is here for an initial consultation, in a wheelchair, accompanied by her daughter.  Complains of tiredness and fatigue.  Denies melena or dark stools or hematochezia.    Component Hemoglobin   Latest Ref Rng & Units 12.0 - 16.0 g/dL   8/8/2020 7.4 (L)   8/7/2020 7.6 (L)   8/6/2020 8.2 (L)   8/5/2020 9.1 (L)   8/4/2020 8.5 (L)   8/3/2020 7.5 (L)   7/29/2020 11.2 (L)   2/10/2020 11.4 (L)   8/15/2019 10.2 (L)   8/14/2019 9.7 (L)   8/13/2019 11.1 (L)   8/12/2019 10.0 (L)   8/11/2019 9.2 (L)   8/10/2019 9.3 (L)   8/9/2019 10.3 (L)   4/26/2019 11.9 (L)   7/6/2018 10.8 (L)   7/4/2018 11.9 (L)       Past Medical History:   Past Medical History:   Diagnosis Date    Arthritis     CHF (congestive heart failure)     Coronary artery disease     Hyperlipidemia      Hypertension        Past Surgical History:   Past Surgical History:   Procedure Laterality Date    A-V CARDIAC PACEMAKER INSERTION N/A 7/9/2018    Procedure: INSERTION, CARDIAC PACEMAKER, DUAL CHAMBER;  Surgeon: Archie Montez MD;  Location: [facility] CATH LAB;  Service: Cardiology;  Laterality: N/A;  SVT, RFA, +/- Dual PPM or ILR, IGLESIA, MDT, MAC, [place], 3 Prep    COLONOSCOPY      COLONOSCOPY N/A 8/5/2020    Procedure: COLONOSCOPY;  Surgeon: Sebas Dickens MD;  Location: [facility] ENDO (2ND FLR);  Service: Endoscopy;  Laterality: N/A;    ESOPHAGOGASTRODUODENOSCOPY N/A 8/4/2020    Procedure: EGD (ESOPHAGOGASTRODUODENOSCOPY);  Surgeon: Fernie Cohen MD;  Location: [facility] ENDO (2ND FLR);  Service: Endoscopy;  Laterality: N/A;    HYSTERECTOMY N/A     INSERTION OF IMPLANTABLE LOOP RECORDER N/A 7/9/2018    Procedure: PLACEMENT-LOOP RECORDER;  Surgeon: Archie Montez MD;  Location: [facility] CATH LAB;  Service: Cardiology;  Laterality: N/A;  SVT, RFA, +/- Dual PPM or ILR, IGLESIA, MDT, MAC, [place], 3 Prep       Family History: History reviewed. No pertinent family history.    Social History:  reports that she quit smoking about 7 years ago. She smoked 0.50 packs per day. She has never used smokeless tobacco. She reports that she does not drink alcohol or use drugs.    Allergies:  Review of patient's allergies indicates:  No Known Allergies    Medications:  Current Outpatient Medications   Medication Sig Dispense Refill    acetaminophen (TYLENOL) 325 MG tablet Take 2 tablets (650 mg total) by mouth every 6 (six) hours as needed for Pain.  0    amLODIPine (NORVASC) 10 MG tablet Take 1 tablet (10 mg total) by mouth once daily. 90 tablet 3    atorvastatin (LIPITOR) 20 MG tablet Take 20 mg by mouth every evening.  4    azelastine (ASTELIN) 137 mcg (0.1 %) nasal spray       BREO ELLIPTA 100-25 mcg/dose diskus inhaler INHALE 1 PUFF BY MOUTH ONCE A DAY  3    docusate sodium (COLACE) 100 MG capsule Take 1 capsule (100 mg total) by mouth 2  (two) times daily. 60 capsule 0    ergocalciferol (ERGOCALCIFEROL) 50,000 unit Cap Take 50,000 Units by mouth every 7 days.      fluticasone propionate (FLONASE) 50 mcg/actuation nasal spray       losartan (COZAAR) 100 MG tablet Take 1 tablet (100 mg total) by mouth once daily. 90 tablet 3    nitroGLYCERIN (NITROSTAT) 0.4 MG SL tablet Place 1 tablet (0.4 mg total) under the tongue every 5 (five) minutes as needed for Chest pain (and notify MD). 25 tablet 5    pregabalin (LYRICA) 50 MG capsule Take 1 capsule (50 mg total) by mouth 2 (two) times daily. 60 capsule 1     No current facility-administered medications for this visit.        Review of Systems   Constitutional: Positive for activity change and fatigue. Negative for fever and unexpected weight change.   HENT: Negative for nosebleeds and sore throat.    Eyes: Negative for pain and redness.   Respiratory: Negative for cough and shortness of breath.    Cardiovascular: Negative for chest pain and palpitations.   Gastrointestinal: Negative for abdominal pain and nausea.   Genitourinary: Negative for dysuria and hematuria.   Musculoskeletal: Negative for arthralgias and back pain.   Skin: Negative for rash and wound.   Neurological: Positive for weakness. Negative for tremors and headaches.   Hematological: Negative for adenopathy. Does not bruise/bleed easily.   Psychiatric/Behavioral: Negative for behavioral problems and sleep disturbance. The patient is nervous/anxious.        Objective:     Vitals:    09/02/20 1026   Pulse: 61   Resp: 18   Temp: 98 °F (36.7 °C)   SpO2: 99%   Weight: 76.9 kg (169 lb 8.5 oz)     BMI: Body mass index is 27.36 kg/m².    Physical Exam  Vitals signs reviewed.   Constitutional:       General: She is not in acute distress.     Appearance: She is not ill-appearing or diaphoretic.   HENT:      Head: No right periorbital erythema, left periorbital erythema or laceration.      Mouth/Throat:      Pharynx: No oropharyngeal exudate.       Tonsils: No tonsillar exudate.   Eyes:      Conjunctiva/sclera: Conjunctivae normal.   Neck:      Musculoskeletal: Neck supple.      Thyroid: No thyroid mass or thyromegaly.   Cardiovascular:      Rate and Rhythm: Normal rate and regular rhythm.      Heart sounds: No friction rub.   Pulmonary:      Effort: No tachypnea, accessory muscle usage or respiratory distress.      Breath sounds: Normal breath sounds. No stridor.   Chest:      Chest wall: No tenderness. There is no dullness to percussion.   Abdominal:      General: There is no distension.      Palpations: Abdomen is soft. There is no mass.   Lymphadenopathy:      Head:      Right side of head: No submental or submandibular adenopathy.      Left side of head: No submental or submandibular adenopathy.      Cervical: No cervical adenopathy.   Skin:     Findings: No bruising, petechiae or rash.   Neurological:      Mental Status: She is alert.      Cranial Nerves: No cranial nerve deficit.   Psychiatric:         Mood and Affect: Mood is not depressed.         Behavior: Behavior normal. Behavior is cooperative.         Thought Content: Thought content normal.         Assessment:       1. CKD (chronic kidney disease) stage 3, GFR 30-59 ml/min    2. Gastrointestinal hemorrhage, unspecified gastrointestinal hemorrhage type    3. Acute blood loss anemia    4. SSS (sick sinus syndrome)    5. Vitamin D deficiency      Plan:   Blood loss anemia  -recent hemoglobin is stable and is over 10  -EGD and colonoscopy unremarkable  -she was transfused 2 units packed red blood cells in the 1st week of August 2020  -scheduled for video capsule endoscopy 09/09/2020  -will check CBC, iron studies, B12 and folate in approximately 4 weeks and see her back for follow-up    Vitamin-D deficiency  -continue vitamin D2, 72929 units weekly    Coronary artery disease and heart failure with preserved ejection fraction  -follow-up with Dr. Kline

## 2020-09-02 NOTE — LETTER
September 2, 2020      Philippe Ba MD  [address]  [facility] [address]           [facility]  [address], [facility] [address]  [facility] [address]  Phone: [phone]          Patient: Crystal Alvarado   MR Number: [id number]   YOB: 1940   Date of Visit: 9/2/2020       Dear Dr. Philippe Ba:    Thank you for referring Crystal Alvarado to me for evaluation. Attached you will find relevant portions of my assessment and plan of care.    If you have questions, please do not hesitate to call me. I look forward to following Crystal Alvarado along with you.    Sincerely,    Maninder Swain MD    Enclosure  CC:  No Recipients    If you would like to receive this communication electronically, please contact [email or web address] or [phone] to request more information on [facility] access.    For providers and/or their staff who would like to refer a patient to [facility], please contact us through our one-stop-shop provider referral line, [facility], at [phone].    If you feel you have received this communication in error or would no longer like to receive these types of communications, please e-mail [email or web address]

## 2020-09-04 ENCOUNTER — TELEPHONE (OUTPATIENT)
Dept: CARDIOLOGY | Facility: CLINIC | Age: 80
End: 2020-09-04

## 2020-09-04 ENCOUNTER — TELEPHONE (OUTPATIENT)
Dept: HEMATOLOGY/ONCOLOGY | Facility: CLINIC | Age: 80
End: 2020-09-04

## 2020-09-04 NOTE — TELEPHONE ENCOUNTER
Returned call to patient.  She is followed by Dr Swain for anemia/iron.  Advised patient to reach out to his office regarding iron studies.  Will also send message to his staff.  Patient verbalized understanding.    ----- Message from Cristina Bloom sent at 9/4/2020  1:47 PM CDT -----  Regarding: pts son Mohan  Pts son is calling to speak with the nurse pt is feeling tired and fatigue they are asking if they can check the pts iron levels were several weeks ago pts blood count has been going up they are asking what the iron levels are doing can you please call pts son at [phone].    KEILY

## 2020-09-08 ENCOUNTER — TELEPHONE (OUTPATIENT)
Dept: CARDIOLOGY | Facility: CLINIC | Age: 80
End: 2020-09-08

## 2020-09-09 ENCOUNTER — TELEPHONE (OUTPATIENT)
Dept: GASTROENTEROLOGY | Facility: CLINIC | Age: 80
End: 2020-09-09

## 2020-09-09 ENCOUNTER — CLINICAL SUPPORT (OUTPATIENT)
Dept: GASTROENTEROLOGY | Facility: CLINIC | Age: 80
End: 2020-09-09
Payer: MEDICARE

## 2020-09-09 DIAGNOSIS — D50.0 IRON DEFICIENCY ANEMIA DUE TO CHRONIC BLOOD LOSS: ICD-10-CM

## 2020-09-09 PROCEDURE — 99999 PR PBB SHADOW E&M-EST. PATIENT-LVL I: CPT | Mod: PBBFAC,,,

## 2020-09-09 PROCEDURE — 91110 PR GI TRACT CAPSULE ENDOSCOPY: ICD-10-PCS | Mod: S$GLB,,, | Performed by: INTERNAL MEDICINE

## 2020-09-09 PROCEDURE — 91110 GI TRC IMG INTRAL ESOPH-ILE: CPT | Mod: S$GLB,,, | Performed by: INTERNAL MEDICINE

## 2020-09-09 PROCEDURE — 99999 PR PBB SHADOW E&M-EST. PATIENT-LVL I: ICD-10-PCS | Mod: PBBFAC,,,

## 2020-09-09 NOTE — TELEPHONE ENCOUNTER
Instructions for the day reviewed with patient and son.  All questions answered to their satisfaction.                              Patient swallowed capsule without incident.    Hiral

## 2020-09-10 ENCOUNTER — DOCUMENT SCAN (OUTPATIENT)
Dept: HOME HEALTH SERVICES | Facility: HOSPITAL | Age: 80
End: 2020-09-10
Payer: MEDICARE

## 2020-09-10 PROCEDURE — 99499 UNLISTED E&M SERVICE: CPT | Mod: ,,, | Performed by: INTERNAL MEDICINE

## 2020-09-10 PROCEDURE — 99499 NO LOS: ICD-10-PCS | Mod: ,,, | Performed by: INTERNAL MEDICINE

## 2020-09-12 ENCOUNTER — CLINICAL SUPPORT (OUTPATIENT)
Dept: CARDIOLOGY | Facility: HOSPITAL | Age: 80
End: 2020-09-12
Payer: MEDICARE

## 2020-09-12 DIAGNOSIS — Z95.818 PRESENCE OF OTHER CARDIAC IMPLANTS AND GRAFTS: ICD-10-CM

## 2020-09-12 PROCEDURE — 93298 CARDIAC DEVICE CHECK - REMOTE: ICD-10-PCS | Mod: ,,, | Performed by: INTERNAL MEDICINE

## 2020-09-12 PROCEDURE — G2066 INTER DEVC REMOTE 30D: HCPCS | Performed by: INTERNAL MEDICINE

## 2020-09-12 PROCEDURE — 93298 REM INTERROG DEV EVAL SCRMS: CPT | Mod: ,,, | Performed by: INTERNAL MEDICINE

## 2020-09-17 ENCOUNTER — TELEPHONE (OUTPATIENT)
Dept: GASTROENTEROLOGY | Facility: CLINIC | Age: 80
End: 2020-09-17

## 2020-09-17 NOTE — [employer] PATIENT INSTRUCTIONS
Discharge Summary/Instructions for after Video Capsule Endoscopy  Patient Name: Crystal Alvarado  Patient MRN: [id number]  Patient YOB: 1940 Wednesday, September 9, 2020  Reinaldo Jorgensen MD  This is an 80 year old female.  Refer to note in patient chart for   documentation of history and physical.  1.  Do Not eat or drink anything for 1 hour.  Try sips of water first.  If   tolerated, resume your regular diet or one recommended by your physician.  2.  Do not drive, operate machinery, make critical decisions, or do   activities that require coordination or balance for 24 hours.  3.   You may experience a sore throat for 24 to 48 hours.  You may use   throat lozenges or gargle with warm salt water to relieve the discomfort.  4.  Because air was put into your stomach during the procedure, you may   experience some belching.  5.  Do not use any medication containing aspirin for 10 days.  6.  Go directly to the emergency room if you notice any of the following:   Chills and/or fever over 101 F   Persistent vomiting or vomiting with blood/nasal regurgitation   Severe abdominal pain, other than gas cramps   Severe chest pain   Black, tarry stools     Your doctor recommends these additional instructions:  You are being discharged to home.   Take an iron supplement.   Your physician has recommended a lower device-assisted enteroscopy as   needed.  If you have any questions or problems, please call your physician.  EMERGENCY PHONE NUMBER: [phone]  LAB RESULTS: [phone]  Reinaldo Jorgensen MD  9/17/2020 8:01:12 AM  This report has been verified and signed electronically.  PROVATION

## 2020-09-18 ENCOUNTER — TELEPHONE (OUTPATIENT)
Dept: CARDIOLOGY | Facility: CLINIC | Age: 80
End: 2020-09-18

## 2020-09-22 ENCOUNTER — DOCUMENT SCAN (OUTPATIENT)
Dept: HOME HEALTH SERVICES | Facility: HOSPITAL | Age: 80
End: 2020-09-22
Payer: MEDICARE

## 2020-10-08 ENCOUNTER — TELEPHONE (OUTPATIENT)
Dept: HEMATOLOGY/ONCOLOGY | Facility: CLINIC | Age: 80
End: 2020-10-08

## 2020-10-12 ENCOUNTER — CLINICAL SUPPORT (OUTPATIENT)
Dept: CARDIOLOGY | Facility: HOSPITAL | Age: 80
End: 2020-10-12
Payer: MEDICARE

## 2020-10-12 DIAGNOSIS — Z95.818 PRESENCE OF OTHER CARDIAC IMPLANTS AND GRAFTS: ICD-10-CM

## 2020-10-12 PROCEDURE — G2066 INTER DEVC REMOTE 30D: HCPCS | Performed by: INTERNAL MEDICINE

## 2020-10-12 PROCEDURE — 93298 CARDIAC DEVICE CHECK - REMOTE: ICD-10-PCS | Mod: ,,, | Performed by: INTERNAL MEDICINE

## 2020-10-12 PROCEDURE — 93298 REM INTERROG DEV EVAL SCRMS: CPT | Mod: ,,, | Performed by: INTERNAL MEDICINE

## 2020-10-15 ENCOUNTER — OFFICE VISIT (OUTPATIENT)
Dept: HEMATOLOGY/ONCOLOGY | Facility: CLINIC | Age: 80
End: 2020-10-15
Payer: MEDICARE

## 2020-10-15 DIAGNOSIS — D50.0 IRON DEFICIENCY ANEMIA DUE TO CHRONIC BLOOD LOSS: ICD-10-CM

## 2020-10-15 DIAGNOSIS — E55.9 VITAMIN D DEFICIENCY: ICD-10-CM

## 2020-10-15 DIAGNOSIS — I25.10 CORONARY ARTERY DISEASE INVOLVING NATIVE CORONARY ARTERY OF NATIVE HEART WITHOUT ANGINA PECTORIS: ICD-10-CM

## 2020-10-15 DIAGNOSIS — D62 ACUTE BLOOD LOSS ANEMIA: Primary | ICD-10-CM

## 2020-10-15 PROCEDURE — 1101F PT FALLS ASSESS-DOCD LE1/YR: CPT | Mod: CPTII,95,, | Performed by: INTERNAL MEDICINE

## 2020-10-15 PROCEDURE — 1159F MED LIST DOCD IN RCRD: CPT | Mod: 95,,, | Performed by: INTERNAL MEDICINE

## 2020-10-15 PROCEDURE — 99442 PR PHYSICIAN TELEPHONE EVALUATION 11-20 MIN: ICD-10-PCS | Mod: 95,,, | Performed by: INTERNAL MEDICINE

## 2020-10-15 PROCEDURE — 1101F PR PT FALLS ASSESS DOC 0-1 FALLS W/OUT INJ PAST YR: ICD-10-PCS | Mod: CPTII,95,, | Performed by: INTERNAL MEDICINE

## 2020-10-15 PROCEDURE — 99442 PR PHYSICIAN TELEPHONE EVALUATION 11-20 MIN: CPT | Mod: 95,,, | Performed by: INTERNAL MEDICINE

## 2020-10-15 PROCEDURE — 1159F PR MEDICATION LIST DOCUMENTED IN MEDICAL RECORD: ICD-10-PCS | Mod: 95,,, | Performed by: INTERNAL MEDICINE

## 2020-10-15 RX ORDER — HEPARIN 100 UNIT/ML
500 SYRINGE INTRAVENOUS
Status: CANCELLED | OUTPATIENT
Start: 2020-10-22

## 2020-10-15 RX ORDER — SODIUM CHLORIDE 0.9 % (FLUSH) 0.9 %
10 SYRINGE (ML) INJECTION
Status: CANCELLED | OUTPATIENT
Start: 2020-10-15

## 2020-10-15 RX ORDER — HEPARIN 100 UNIT/ML
500 SYRINGE INTRAVENOUS
Status: CANCELLED | OUTPATIENT
Start: 2020-10-15

## 2020-10-15 RX ORDER — SODIUM CHLORIDE 0.9 % (FLUSH) 0.9 %
10 SYRINGE (ML) INJECTION
Status: CANCELLED | OUTPATIENT
Start: 2020-10-22

## 2020-10-15 NOTE — PROGRESS NOTES
PATIENT: Crystal Alvarado  MRN: [id number]  DATE: 10/15/2020    Diagnosis:   1. Acute blood loss anemia    2. Vitamin D deficiency    3. Coronary artery disease involving native coronary artery of native heart without angina pectoris    4. Iron deficiency anemia due to chronic blood loss      Chief Complaint:  Blood loss anemia    The patient location is:  home  The chief complaint leading to consultation is:  Anemia    Visit type: audio only    Face to Face time with patient:  10  Fifteen minutes of total time spent on the encounter, which includes face to face time and non-face to face time preparing to see the patient (eg, review of tests), Obtaining and/or reviewing separately obtained history, Documenting clinical information in the electronic or other health record, Independently interpreting results (not separately reported) and communicating results to the patient/family/caregiver, or Care coordination (not separately reported).         Each patient to whom he or she provides medical services by telemedicine is:  (1) informed of the relationship between the physician and patient and the respective role of any other health care provider with respect to management of the patient; and (2) notified that he or she may decline to receive medical services by telemedicine and may withdraw from such care at any time.    Notes:     Subjective:     History of Present Illness:     She was admitted to the hospital 08/03/2020 with melena and syncopal episodes, was found have a hemoglobin of 7.5 and was given 2 units of packed red blood cells.    Medical comorbidities include heart failure with preserved ejection fraction, SVT status post ablation, COPD, nonobstructive coronary artery disease and stage III CKD    8/5/2020 - colonoscopy -   The examined portion of the ileum was normal. Diverticulosis in the entire examined colon. Nodular mucosa at the ileocecal valve. Biopsied. Internal hemorrhoids. The examination was  otherwise normal on direct and retroflexion views.    8/4/2020 - EGD  Normal oropharynx. Hiatal hernia. Atrophic gastritis. A few gastric polyps. Biopsied. Normal examined duodenum.    INTERVAL HISTORY:   -here for virtual follow-up of history of iron deficiency anemia  -somewhat weak but overall better than before  -had video capsule endoscopy September 2020    Past Medical, Surgical, Family and Social History Reviewed.    Medications and Allergies reviewed      Review of Systems   Constitutional: Positive for activity change and fatigue. Negative for fever and unexpected weight change.   HENT: Negative for nosebleeds and sore throat.    Eyes: Negative for pain and redness.   Respiratory: Negative for cough and shortness of breath.    Cardiovascular: Negative for chest pain and palpitations.   Gastrointestinal: Negative for abdominal pain and nausea.   Genitourinary: Negative for dysuria and hematuria.   Musculoskeletal: Negative for arthralgias and back pain.   Skin: Negative for rash and wound.   Neurological: Negative for tremors, weakness and headaches.   Hematological: Negative for adenopathy. Does not bruise/bleed easily.   Psychiatric/Behavioral: Negative for behavioral problems and sleep disturbance. The patient is nervous/anxious.        Objective:     No physical examination was done as this is a virtual visit     Assessment:       1. Acute blood loss anemia    2. Vitamin D deficiency    3. Coronary artery disease involving native coronary artery of native heart without angina pectoris    4. Iron deficiency anemia due to chronic blood loss      Plan:   Blood loss anemia  -recent hemoglobin is stable and is over 10  -EGD and colonoscopy unremarkable  -video capsule endoscopy 09/09/2020 shows small-bowel diverticula, few areas of angiodysplasia without bleeding in the ileum as well as ileal lymph angiectasia  -labs reviewed and reveal hemoglobin 10.6, iron saturation 19% and ferritin 23  -there is a  downward trend in iron studies when compared to last visit  -she still has tiredness and fatigue  -recommended IV iron therapy with Injectafer weekly x2 doses  -patient and daughter agreeable    Vitamin-D deficiency  -continue vitamin D2, 93449 units weekly    Coronary artery disease and heart failure with preserved ejection fraction  -follow-up with Dr. Kline

## 2020-11-11 ENCOUNTER — OFFICE VISIT (OUTPATIENT)
Dept: OPHTHALMOLOGY | Facility: CLINIC | Age: 80
End: 2020-11-11
Payer: MEDICARE

## 2020-11-11 ENCOUNTER — CLINICAL SUPPORT (OUTPATIENT)
Dept: CARDIOLOGY | Facility: HOSPITAL | Age: 80
End: 2020-11-11
Payer: MEDICARE

## 2020-11-11 DIAGNOSIS — Z01.818 PRE-OP TESTING: ICD-10-CM

## 2020-11-11 DIAGNOSIS — H25.89 TOTAL, MATURE AGE-RELATED CATARACT: Primary | ICD-10-CM

## 2020-11-11 DIAGNOSIS — Z95.818 PRESENCE OF OTHER CARDIAC IMPLANTS AND GRAFTS: ICD-10-CM

## 2020-11-11 PROCEDURE — 92136 OPHTHALMIC BIOMETRY: CPT | Mod: LT,S$GLB,, | Performed by: OPHTHALMOLOGY

## 2020-11-11 PROCEDURE — 93298 REM INTERROG DEV EVAL SCRMS: CPT | Mod: ,,, | Performed by: INTERNAL MEDICINE

## 2020-11-11 PROCEDURE — 99999 PR PBB SHADOW E&M-EST. PATIENT-LVL III: CPT | Mod: PBBFAC,,, | Performed by: OPHTHALMOLOGY

## 2020-11-11 PROCEDURE — 92004 PR EYE EXAM, NEW PATIENT,COMPREHESV: ICD-10-PCS | Mod: S$GLB,,, | Performed by: OPHTHALMOLOGY

## 2020-11-11 PROCEDURE — 92136 IOL MASTER - OU - BOTH EYES: ICD-10-PCS | Mod: LT,S$GLB,, | Performed by: OPHTHALMOLOGY

## 2020-11-11 PROCEDURE — G2066 INTER DEVC REMOTE 30D: HCPCS | Performed by: INTERNAL MEDICINE

## 2020-11-11 PROCEDURE — 93298 CARDIAC DEVICE CHECK - REMOTE: ICD-10-PCS | Mod: ,,, | Performed by: INTERNAL MEDICINE

## 2020-11-11 PROCEDURE — 92004 COMPRE OPH EXAM NEW PT 1/>: CPT | Mod: S$GLB,,, | Performed by: OPHTHALMOLOGY

## 2020-11-11 PROCEDURE — 99999 PR PBB SHADOW E&M-EST. PATIENT-LVL III: ICD-10-PCS | Mod: PBBFAC,,, | Performed by: OPHTHALMOLOGY

## 2020-11-11 RX ORDER — POLYETHYLENE GLYCOL-3350 AND ELECTROLYTES 236; 6.74; 5.86; 2.97; 22.74 G/274.31G; G/274.31G; G/274.31G; G/274.31G; G/274.31G
POWDER, FOR SOLUTION ORAL
COMMUNITY
Start: 2020-08-27 | End: 2020-11-11 | Stop reason: SDUPTHER

## 2020-11-11 RX ORDER — PHENYLEPHRINE HYDROCHLORIDE 25 MG/ML
1 SOLUTION/ DROPS OPHTHALMIC
Status: CANCELLED | OUTPATIENT
Start: 2020-11-11

## 2020-11-11 RX ORDER — HYDROCODONE BITARTRATE AND ACETAMINOPHEN 5; 325 MG/1; MG/1
1 TABLET ORAL 2 TIMES DAILY PRN
COMMUNITY
Start: 2020-10-22 | End: 2021-11-03

## 2020-11-11 RX ORDER — TROPICAMIDE 10 MG/ML
1 SOLUTION/ DROPS OPHTHALMIC
Status: CANCELLED | OUTPATIENT
Start: 2020-11-11

## 2020-11-11 RX ORDER — PREDNISOLONE ACETATE-GATIFLOXACIN-BROMFENAC .75; 5; 1 MG/ML; MG/ML; MG/ML
1 SUSPENSION/ DROPS OPHTHALMIC 3 TIMES DAILY
Qty: 5 ML | Refills: 3 | Status: ON HOLD | OUTPATIENT
Start: 2020-11-11 | End: 2022-12-27 | Stop reason: HOSPADM

## 2020-11-11 RX ORDER — TETRACAINE HYDROCHLORIDE 5 MG/ML
1 SOLUTION OPHTHALMIC
Status: CANCELLED | OUTPATIENT
Start: 2020-11-11

## 2020-11-11 RX ORDER — SODIUM CHLORIDE 0.9 % (FLUSH) 0.9 %
10 SYRINGE (ML) INJECTION
Status: DISCONTINUED | OUTPATIENT
Start: 2020-11-11 | End: 2023-01-12

## 2020-11-11 RX ORDER — MOXIFLOXACIN 5 MG/ML
1 SOLUTION/ DROPS OPHTHALMIC
Status: CANCELLED | OUTPATIENT
Start: 2020-11-11

## 2020-11-11 NOTE — PROGRESS NOTES
HPI     Patient rteports that she is here for a cataract evaluation in left, she   reports that she is having blurred vision OS that is grdaullty getting   worse over time.     PCIOL OD   Cat OS     Drops: None     Last edited by Roslyn Napier MD on 11/11/2020 10:12 AM. (History)            Assessment /Plan     For exam results, see Encounter Report.    Total, mature age-related cataract      Visually Significant Cataract: Patient reports decreased vision consistent with the clinical amount of lenticular opacity, which reaches the level of visual significance and affects activities of daily living. Risks, benefits, and alternatives to cataract surgery were discussed and the consent reviewed. IOL options were discussed, including ATIOLs and the associated side effects and additional patient cost associated with them.   IOL Selections:   Right eye  IOL: IOL present      Left eye  IOL: dcboo 18.0    Pt wishes to have left eye done first.  4+ brunescent, white cat. T BLUE

## 2020-11-12 DIAGNOSIS — H25.12 NUCLEAR SCLEROTIC CATARACT OF LEFT EYE: Primary | ICD-10-CM

## 2020-11-19 ENCOUNTER — TELEPHONE (OUTPATIENT)
Dept: OPHTHALMOLOGY | Facility: CLINIC | Age: 80
End: 2020-11-19

## 2020-11-19 NOTE — TELEPHONE ENCOUNTER
Patient given arrival time for surgery as 8 am.  Nothing to eat or drink after 9 pm night before.  May have water/gatorade/powerade from 9 pm until leaves house morning of surgery.

## 2020-11-20 ENCOUNTER — LAB VISIT (OUTPATIENT)
Dept: FAMILY MEDICINE | Facility: CLINIC | Age: 80
End: 2020-11-20
Payer: MEDICARE

## 2020-11-20 DIAGNOSIS — Z01.818 PRE-OP TESTING: ICD-10-CM

## 2020-11-20 PROCEDURE — U0003 INFECTIOUS AGENT DETECTION BY NUCLEIC ACID (DNA OR RNA); SEVERE ACUTE RESPIRATORY SYNDROME CORONAVIRUS 2 (SARS-COV-2) (CORONAVIRUS DISEASE [COVID-19]), AMPLIFIED PROBE TECHNIQUE, MAKING USE OF HIGH THROUGHPUT TECHNOLOGIES AS DESCRIBED BY CMS-2020-01-R: HCPCS

## 2020-11-21 LAB — SARS-COV-2 RNA RESP QL NAA+PROBE: NOT DETECTED

## 2020-11-23 ENCOUNTER — ANESTHESIA EVENT (OUTPATIENT)
Dept: SURGERY | Facility: OTHER | Age: 80
End: 2020-11-23
Payer: MEDICARE

## 2020-11-23 ENCOUNTER — ANESTHESIA (OUTPATIENT)
Dept: SURGERY | Facility: OTHER | Age: 80
End: 2020-11-23
Payer: MEDICARE

## 2020-11-23 ENCOUNTER — HOSPITAL ENCOUNTER (OUTPATIENT)
Facility: OTHER | Age: 80
Discharge: HOME | End: 2020-11-23
Attending: OPHTHALMOLOGY | Admitting: OPHTHALMOLOGY
Payer: MEDICARE

## 2020-11-23 VITALS
WEIGHT: 178 LBS | HEIGHT: 66 IN | DIASTOLIC BLOOD PRESSURE: 70 MMHG | HEART RATE: 55 BPM | TEMPERATURE: 98 F | RESPIRATION RATE: 16 BRPM | SYSTOLIC BLOOD PRESSURE: 169 MMHG | BODY MASS INDEX: 28.61 KG/M2 | OXYGEN SATURATION: 100 %

## 2020-11-23 DIAGNOSIS — H25.89 TOTAL, MATURE AGE-RELATED CATARACT: ICD-10-CM

## 2020-11-23 PROCEDURE — 63600175 PHARM REV CODE 636 W HCPCS: Performed by: REGISTERED NURSE

## 2020-11-23 PROCEDURE — 37000008 HC ANESTHESIA 1ST 15 MINUTES: Performed by: OPHTHALMOLOGY

## 2020-11-23 PROCEDURE — 66982 XCAPSL CTRC RMVL CPLX WO ECP: CPT | Mod: LT,,, | Performed by: OPHTHALMOLOGY

## 2020-11-23 PROCEDURE — 37000009 HC ANESTHESIA EA ADD 15 MINS: Performed by: OPHTHALMOLOGY

## 2020-11-23 PROCEDURE — 71000015 HC POSTOP RECOV 1ST HR: Performed by: OPHTHALMOLOGY

## 2020-11-23 PROCEDURE — V2632 POST CHMBR INTRAOCULAR LENS: HCPCS | Performed by: OPHTHALMOLOGY

## 2020-11-23 PROCEDURE — 25000003 PHARM REV CODE 250: Performed by: ANESTHESIOLOGY

## 2020-11-23 PROCEDURE — 25000003 PHARM REV CODE 250: Performed by: OPHTHALMOLOGY

## 2020-11-23 PROCEDURE — 66982 PR REMOVAL, CATARACT, W/INSRT INTRAOC LENS, W/O ENDO CYCLO, CMPLX: ICD-10-PCS | Mod: LT,,, | Performed by: OPHTHALMOLOGY

## 2020-11-23 PROCEDURE — 36000706: Performed by: OPHTHALMOLOGY

## 2020-11-23 PROCEDURE — 36000707: Performed by: OPHTHALMOLOGY

## 2020-11-23 DEVICE — IMPLANTABLE DEVICE: Type: IMPLANTABLE DEVICE | Site: EYE | Status: FUNCTIONAL

## 2020-11-23 RX ORDER — PHENYLEPHRINE HYDROCHLORIDE 25 MG/ML
1 SOLUTION/ DROPS OPHTHALMIC
Status: COMPLETED | OUTPATIENT
Start: 2020-11-23 | End: 2020-11-23

## 2020-11-23 RX ORDER — LIDOCAINE HYDROCHLORIDE 40 MG/ML
INJECTION, SOLUTION RETROBULBAR
Status: DISCONTINUED | OUTPATIENT
Start: 2020-11-23 | End: 2020-11-23 | Stop reason: HOSPADM

## 2020-11-23 RX ORDER — MOXIFLOXACIN 5 MG/ML
SOLUTION/ DROPS OPHTHALMIC
Status: DISCONTINUED | OUTPATIENT
Start: 2020-11-23 | End: 2020-11-23 | Stop reason: HOSPADM

## 2020-11-23 RX ORDER — TROPICAMIDE 10 MG/ML
1 SOLUTION/ DROPS OPHTHALMIC
Status: COMPLETED | OUTPATIENT
Start: 2020-11-23 | End: 2020-11-23

## 2020-11-23 RX ORDER — TETRACAINE HYDROCHLORIDE 5 MG/ML
SOLUTION OPHTHALMIC
Status: DISCONTINUED | OUTPATIENT
Start: 2020-11-23 | End: 2020-11-23 | Stop reason: HOSPADM

## 2020-11-23 RX ORDER — PROPARACAINE HYDROCHLORIDE 5 MG/ML
1 SOLUTION/ DROPS OPHTHALMIC
Status: DISCONTINUED | OUTPATIENT
Start: 2020-11-23 | End: 2020-11-23 | Stop reason: HOSPADM

## 2020-11-23 RX ORDER — MIDAZOLAM HYDROCHLORIDE 1 MG/ML
INJECTION INTRAMUSCULAR; INTRAVENOUS
Status: DISCONTINUED | OUTPATIENT
Start: 2020-11-23 | End: 2020-11-23

## 2020-11-23 RX ORDER — TETRACAINE HYDROCHLORIDE 5 MG/ML
1 SOLUTION OPHTHALMIC
Status: COMPLETED | OUTPATIENT
Start: 2020-11-23 | End: 2020-11-23

## 2020-11-23 RX ORDER — ACETAMINOPHEN 325 MG/1
650 TABLET ORAL EVERY 4 HOURS PRN
Status: DISCONTINUED | OUTPATIENT
Start: 2020-11-23 | End: 2020-11-23 | Stop reason: HOSPADM

## 2020-11-23 RX ORDER — MOXIFLOXACIN 5 MG/ML
1 SOLUTION/ DROPS OPHTHALMIC
Status: COMPLETED | OUTPATIENT
Start: 2020-11-23 | End: 2020-11-23

## 2020-11-23 RX ORDER — AMLODIPINE BESYLATE 5 MG/1
10 TABLET ORAL ONCE
Status: COMPLETED | OUTPATIENT
Start: 2020-11-23 | End: 2020-11-23

## 2020-11-23 RX ADMIN — PHENYLEPHRINE HYDROCHLORIDE 1 DROP: 25 SOLUTION/ DROPS OPHTHALMIC at 08:11

## 2020-11-23 RX ADMIN — TETRACAINE HYDROCHLORIDE 1 DROP: 5 SOLUTION OPHTHALMIC at 08:11

## 2020-11-23 RX ADMIN — MOXIFLOXACIN 1 DROP: 5 SOLUTION/ DROPS OPHTHALMIC at 10:11

## 2020-11-23 RX ADMIN — TROPICAMIDE 1 DROP: 10 SOLUTION/ DROPS OPHTHALMIC at 08:11

## 2020-11-23 RX ADMIN — MOXIFLOXACIN 1 DROP: 5 SOLUTION/ DROPS OPHTHALMIC at 08:11

## 2020-11-23 RX ADMIN — AMLODIPINE BESYLATE 10 MG: 5 TABLET ORAL at 08:11

## 2020-11-23 RX ADMIN — MIDAZOLAM HYDROCHLORIDE 1 MG: 1 INJECTION, SOLUTION INTRAMUSCULAR; INTRAVENOUS at 09:11

## 2020-11-23 RX ADMIN — MIDAZOLAM HYDROCHLORIDE 1 MG: 1 INJECTION, SOLUTION INTRAMUSCULAR; INTRAVENOUS at 10:11

## 2020-11-23 NOTE — PLAN OF CARE
Crystal Cuong Alvarado has met all discharge criteria from Phase II. Vital Signs are stable, ambulating  without difficulty. Discharge instructions given, patient verbalized understanding. Discharged from facility via wheelchair in stable condition.

## 2020-11-23 NOTE — DISCHARGE SUMMARY
Outcome: Successful outpatient ophthalmic surgical procedure  Preprinted Instructions given to patient.  Regular diet.  Activity: No restrictions  Meds: see Med Rec  Condition: stable  Follow up: 1 day with Dr Napier  Disposition: Home  Diagnosis: s/p eye surgery

## 2020-11-23 NOTE — ANESTHESIA POSTPROCEDURE EVALUATION
Anesthesia Post Evaluation    Patient: Crystal Alvarado    Procedure(s) Performed: Procedure(s) (LRB):  EXTRACTION, CATARACT, WITH IOL INSERTION (Left)    Final Anesthesia Type: MAC    Patient location during evaluation: [facility]  Patient participation: Yes- Able to Participate  Level of consciousness: awake and alert  Post-procedure vital signs: reviewed and stable  Pain management: adequate  Airway patency: patent    PONV status at discharge: No PONV  Anesthetic complications: no      Cardiovascular status: blood pressure returned to baseline  Respiratory status: unassisted, room air and spontaneous ventilation  Hydration status: euvolemic  Follow-up not needed.          Vitals Value Taken Time   /80 11/23/20 0834   Temp 36.4 °C (97.5 °F) 11/23/20 0834   Pulse 63 11/23/20 0834   Resp 20 11/23/20 0834   SpO2 100 % 11/23/20 0834         No case tracking events are documented in the log.      Pain/Arturo Score: No data recorded

## 2020-11-23 NOTE — DISCHARGE INSTRUCTIONS
Anesthesia: Monitored Anesthesia Care (MAC)  Anesthesia safety  Tips for anesthesia safety include the following:   · Follow all instructions you are given for how long not to eat or drink before your procedure.  · Be sure your healthcare provider knows what medicines you take, especially any anti-inflammatory medicine or blood thinners. This includes aspirin and any other over-the-counter medicines, herbs, and supplements.  · Have an adult family member or friend drive you home after the procedure.  · For the first 24 hours after your surgery:  ¨ Do not drive or use heavy equipment.  ¨ Do not make important decisions or sign documents.  ¨ Avoid alcohol.  ¨ Have someone stay with you, if possible. They can watch for problems and help keep you safe.  Date Last Reviewed: 12/1/2016 © 2000-2017 [employer]. [address] [place] [address], [place] [address]. All rights reserved. This information is not intended as a substitute for professional medical care. Always follow your healthcare professional's instructions.

## 2020-11-23 NOTE — ANESTHESIA PREPROCEDURE EVALUATION
11/23/2020  Crystal Alvarado is a 80 y.o., female.    Anesthesia Evaluation    I have reviewed the Patient Summary Reports.    I have reviewed the Nursing Notes. I have reviewed the NPO Status.   I have reviewed the Medications.     Review of Systems  Anesthesia Hx:  No problems with previous Anesthesia  Denies Family Hx of Anesthesia complications.   Denies Personal Hx of Anesthesia complications.   Social:  Non-Smoker    Hematology/Oncology:     Oncology Normal    -- Anemia:   EENT/Dental:EENT/Dental Normal   Cardiovascular:   Exercise tolerance: poor Hypertension CAD  Dysrhythmias  CHF SSS--pacer  SVT   Pulmonary:  Pulmonary Normal    Renal/:   Chronic Renal Disease, CRI    Musculoskeletal:  Musculoskeletal Normal    Neurological:  Neurology Normal    Endocrine:  Endocrine Normal    Dermatological:  Skin Normal    Psych:  Psychiatric Normal           Physical Exam  General:  Well nourished    Airway/Jaw/Neck:  Airway Findings: Mouth Opening: Normal Tongue: Normal  General Airway Assessment: Adult  Mallampati: I  TM Distance: Normal, at least 6 cm  Jaw/Neck Findings:  Neck ROM: Normal ROM      Dental:  Dental Findings: In tact             Anesthesia Plan  Type of Anesthesia, risks & benefits discussed:  Anesthesia Type:  MAC  Patient's Preference:   Intra-op Monitoring Plan: standard ASA monitors  Intra-op Monitoring Plan Comments:   Post Op Pain Control Plan: per primary service following discharge from PACU  Post Op Pain Control Plan Comments:   Induction:    Beta Blocker:         Informed Consent: Patient understands risks and agrees with Anesthesia plan.  Questions answered. Anesthesia consent signed with patient.  ASA Score: 3     Day of Surgery Review of History & Physical:    H&P update referred to the surgeon.         Ready For Surgery From Anesthesia Perspective.

## 2020-11-23 NOTE — OP NOTE
SURGEON:  Roslyn Napier M.D.    PREOPERATIVE DIAGNOSIS:    Nuclear Sclerotic Cataract    POSTOPERATIVE DIAGNOSIS:    Nuclear Sclerotic Cataract    PROCEDURES:    Complex Phacoemulsification with  intraocular lens, left eye (58762)    DATE OF SURGERY: 11/23/2020    IMPLANT: pcboo 18.0    ANESTHESIA:  MAC with topical Lidocaine    COMPLICATIONS:  None    ESTIMATED BLOOD LOSS: None    SPECIMENS: None    INDICATIONS:    The patient has a history of painless progressive visual loss and  difficulty with activities of daily living secondary to cataract formation.  After a thorough discussion of the risks, benefits, and alternatives to cataract surgery, including, but not limited to, the rare risks of infection, retinal detachment, hemorrhage, need for additional surgery, loss of vision, and even loss of the eye, the patient voices understanding and desires to proceed.    DESCRIPTION OF PROCEDURE:    The patients IOL calculations were reviewed, and the lens selection confirmed.   After verification and marking of the proper eye in the preop holding area, the patient was brought to the operating room in supine position where the eye was prepped and draped in standard sterile fashion with 5% Betadine and a lid speculum placed in the eye.   Topical 4% Lidocaine was used in addition to the preoperative anesthesia and the procedure was begun by the creation of a paracentesis incision through which viscoelastic was used to fill the anterior chamber.  Next, a keratome blade was used to create a triplanar temporal clear corneal incision and a cystotome and Utrata forceps used to fashion a continuous curvilinear capsulorrhexis.  Hydrodissection was carried out using the Guo hydrodissection cannula and the nucleus was found to be mobile.  Phacoemulsification of the nucleus was carried out using a quick chop technique, and all remaining epinuclear and cortical material was removed.  The eye was then reformed with Viscoelastic and the   intraocular lens was implanted into the capsular bag.  All remaining viscoelastics were removed from the eye and at the end of the case the pupil was round, the lens was well-centered within the capsular bag and all wounds were found to be water tight.  Drops of Vigamox and Pred Forte were instilled and a shield was placed over the eye. The patient will follow up with Dr. Napier in the morning.    ADDENDUM: Because the patient had a dense cataract and loss of red reflex, trypan blue was used to stain the anterior capsule.

## 2020-11-23 NOTE — OR NURSING
Dr Doss notified of BP of 197/80; states for pt to take the BP med she usually takes in am; will give pt Amlodipine 10 mg po per md orders; pt understands to take her Losartan once she is at home;

## 2020-11-24 ENCOUNTER — OFFICE VISIT (OUTPATIENT)
Dept: OPHTHALMOLOGY | Facility: CLINIC | Age: 80
End: 2020-11-24
Attending: OPHTHALMOLOGY
Payer: MEDICARE

## 2020-11-24 DIAGNOSIS — Z98.890 POSTOPERATIVE STATE: Primary | ICD-10-CM

## 2020-11-24 PROCEDURE — 99024 POSTOP FOLLOW-UP VISIT: CPT | Mod: S$GLB,,, | Performed by: OPHTHALMOLOGY

## 2020-11-24 PROCEDURE — 1126F AMNT PAIN NOTED NONE PRSNT: CPT | Mod: S$GLB,,, | Performed by: OPHTHALMOLOGY

## 2020-11-24 PROCEDURE — 1101F PT FALLS ASSESS-DOCD LE1/YR: CPT | Mod: CPTII,S$GLB,, | Performed by: OPHTHALMOLOGY

## 2020-11-24 PROCEDURE — 1101F PR PT FALLS ASSESS DOC 0-1 FALLS W/OUT INJ PAST YR: ICD-10-PCS | Mod: CPTII,S$GLB,, | Performed by: OPHTHALMOLOGY

## 2020-11-24 PROCEDURE — 1126F PR PAIN SEVERITY QUANTIFIED, NO PAIN PRESENT: ICD-10-PCS | Mod: S$GLB,,, | Performed by: OPHTHALMOLOGY

## 2020-11-24 PROCEDURE — 99999 PR PBB SHADOW E&M-EST. PATIENT-LVL III: ICD-10-PCS | Mod: PBBFAC,,, | Performed by: OPHTHALMOLOGY

## 2020-11-24 PROCEDURE — 99024 PR POST-OP FOLLOW-UP VISIT: ICD-10-PCS | Mod: S$GLB,,, | Performed by: OPHTHALMOLOGY

## 2020-11-24 PROCEDURE — 3288F FALL RISK ASSESSMENT DOCD: CPT | Mod: CPTII,S$GLB,, | Performed by: OPHTHALMOLOGY

## 2020-11-24 PROCEDURE — 3288F PR FALLS RISK ASSESSMENT DOCUMENTED: ICD-10-PCS | Mod: CPTII,S$GLB,, | Performed by: OPHTHALMOLOGY

## 2020-11-24 PROCEDURE — 99999 PR PBB SHADOW E&M-EST. PATIENT-LVL III: CPT | Mod: PBBFAC,,, | Performed by: OPHTHALMOLOGY

## 2020-11-24 NOTE — PROGRESS NOTES
HPI     POD 1 Phaco with IOL OS     Pt reports had some burning in the left eye   No eye pain       PC IOL OS 11/23/2020    Gtts PGB TID OS     Last edited by Kerry Damon on 11/24/2020  9:41 AM. (History)            Assessment /Plan     For exam results, see Encounter Report.    Postoperative state      Slit lamp exam:  L/L: nl  K: clear, wound sealed  AC: 1+ cell  Lens: IOL centered and stable    POD1 s/p Phaco/IOL  Appropriate precautions and post op medications reviewed.  Patient instructed to call or come in if symptoms of redness, decreased vision, or pain are experienced.

## 2020-12-02 ENCOUNTER — OFFICE VISIT (OUTPATIENT)
Dept: OPHTHALMOLOGY | Facility: CLINIC | Age: 80
End: 2020-12-02
Attending: OPHTHALMOLOGY
Payer: MEDICARE

## 2020-12-02 DIAGNOSIS — Z98.890 POST-OPERATIVE STATE: Primary | ICD-10-CM

## 2020-12-02 DIAGNOSIS — H25.13 NUCLEAR SCLEROSIS, BILATERAL: ICD-10-CM

## 2020-12-02 PROCEDURE — 99024 POSTOP FOLLOW-UP VISIT: CPT | Mod: S$GLB,,, | Performed by: OPHTHALMOLOGY

## 2020-12-02 PROCEDURE — 99999 PR PBB SHADOW E&M-EST. PATIENT-LVL III: ICD-10-PCS | Mod: PBBFAC,,, | Performed by: OPHTHALMOLOGY

## 2020-12-02 PROCEDURE — 99024 PR POST-OP FOLLOW-UP VISIT: ICD-10-PCS | Mod: S$GLB,,, | Performed by: OPHTHALMOLOGY

## 2020-12-02 PROCEDURE — 99999 PR PBB SHADOW E&M-EST. PATIENT-LVL III: CPT | Mod: PBBFAC,,, | Performed by: OPHTHALMOLOGY

## 2020-12-02 NOTE — PROGRESS NOTES
HPI     Pt is here for 1 week post op, s/p Phaco with IOL OS     Pt states that her vision has improved. The eye just feels a little   irritated.      PC IOL OS 11/23/2020    Gtts PGB TID OS     Last edited by Rip Mojica on 12/2/2020 10:07 AM. (History)            Assessment /Plan     For exam results, see Encounter Report.    Post-operative state    Nuclear sclerosis, bilateral      Slit lamp exam:  L/L: nl  K: clear, wound sealed  AC: trace cell  Iris/Lens: IOL centered and stable    POW1 s/p phaco: Surgery healing well with no signs of infection or abnormal inflammation.

## 2020-12-11 ENCOUNTER — CLINICAL SUPPORT (OUTPATIENT)
Dept: CARDIOLOGY | Facility: HOSPITAL | Age: 80
End: 2020-12-11
Payer: MEDICARE

## 2020-12-11 DIAGNOSIS — Z95.818 PRESENCE OF OTHER CARDIAC IMPLANTS AND GRAFTS: ICD-10-CM

## 2020-12-11 PROCEDURE — 93298 CARDIAC DEVICE CHECK - REMOTE: ICD-10-PCS | Mod: ,,, | Performed by: INTERNAL MEDICINE

## 2020-12-11 PROCEDURE — 93298 REM INTERROG DEV EVAL SCRMS: CPT | Mod: ,,, | Performed by: INTERNAL MEDICINE

## 2020-12-11 PROCEDURE — G2066 INTER DEVC REMOTE 30D: HCPCS | Performed by: INTERNAL MEDICINE

## 2020-12-18 ENCOUNTER — OFFICE VISIT (OUTPATIENT)
Dept: URGENT CARE | Facility: CLINIC | Age: 80
End: 2020-12-18
Payer: MEDICARE

## 2020-12-18 VITALS
RESPIRATION RATE: 16 BRPM | TEMPERATURE: 98 F | HEIGHT: 65 IN | OXYGEN SATURATION: 99 % | WEIGHT: 168 LBS | SYSTOLIC BLOOD PRESSURE: 138 MMHG | DIASTOLIC BLOOD PRESSURE: 65 MMHG | HEART RATE: 74 BPM | BODY MASS INDEX: 27.99 KG/M2

## 2020-12-18 DIAGNOSIS — S60.221A CONTUSION OF RIGHT HAND, INITIAL ENCOUNTER: Primary | ICD-10-CM

## 2020-12-18 PROCEDURE — 99214 OFFICE O/P EST MOD 30 MIN: CPT | Mod: S$GLB,,, | Performed by: NURSE PRACTITIONER

## 2020-12-18 PROCEDURE — 99214 PR OFFICE/OUTPT VISIT, EST, LEVL IV, 30-39 MIN: ICD-10-PCS | Mod: S$GLB,,, | Performed by: NURSE PRACTITIONER

## 2020-12-18 NOTE — PATIENT INSTRUCTIONS
Please follow up with your Primary care provider within 2-5 days if your signs and symptoms have not resolved or worsen.     If your condition worsens or fails to improve we recommend that you receive another evaluation at the emergency room immediately or contact your primary medical clinic to discuss your concerns.    You must understand that you have received an Urgent Care treatment only and that you may be released before all of your medical problems are known or treated.   You, the patient, will arrange for follow up care as instructed.       Hand Contusion  You have a contusion. This is also called a bruise. There is swelling and some bleeding under the skin, but no broken bones. This injury generally takes a few days to a few weeks to heal.  During that time, the bruise will typically change in color from reddish, to purple-blue, to greenish-yellow, then to yellow-brown.  Home care  · Elevate the hand to reduce pain and swelling. As much as possible, sit or lie down with the hand raised about the level of your heart. This is especially important during the first 48 hours.  · Ice the hand to help reduce pain and swelling. Wrap a cold source (ice pack or ice cubes in a plastic bag) in a thin towel. Apply to the bruised area for 20 minutes every 1 to 2 hours the first day. Continue this 3 to 4 times a day until the pain and swelling goes away.  · Unless another medicine was prescribed, you can take acetaminophen, ibuprofen, or naproxen to control pain. (If you have chronic liver or kidney disease or ever had a stomach ulcer or gastrointestinal bleeding, talk with your doctor before using these medicines.)  Follow up  Follow up with your healthcare provider or our staff as advised. Call if you are not improving within 1 to 2 weeks.  When to seek medical advice   Call your healthcare provider right away if you have any of the following:  · Increased pain or swelling  · Arm becomes cold, blue, numb or  tingly  · Signs of infection: Warmth, drainage, or increased redness or pain around the bruise  · Inability to move the injured hand   · Frequent bruising for unknown reasons  Date Last Reviewed: 2/1/2017  © 6876-5590 [employer]. [address] [place] [address]. All rights reserved. This information is not intended as a substitute for professional medical care. Always follow your healthcare professional's instructions.

## 2020-12-18 NOTE — PROGRESS NOTES
"Subjective:       Patient ID: Crystal Alvarado is a 80 y.o. female.    Vitals:  height is 5' 5" (1.651 m) and weight is 76.2 kg (168 lb). Her temporal temperature is 98.3 °F (36.8 °C). Her blood pressure is 138/65 and her pulse is 74. Her respiration is 16 and oxygen saturation is 99%.     Chief Complaint: Skin discoloration    Patient complains of skin on both hands and both arm darkening and wrinkling.     Other  This is a new problem. Episode onset: 4 days. The problem occurs constantly. The problem has been gradually worsening. Pertinent negatives include no arthralgias, chest pain, chills, congestion, coughing, fatigue, fever, headaches, joint swelling, myalgias, nausea, rash, sore throat, vertigo, vomiting or weakness. Nothing aggravates the symptoms. She has tried nothing for the symptoms.       Constitution: Negative for chills, fatigue and fever.   HENT: Negative for congestion and sore throat.    Neck: Negative for painful lymph nodes.   Cardiovascular: Negative for chest pain and leg swelling.   Eyes: Negative for double vision and blurred vision.   Respiratory: Negative for cough and shortness of breath.    Gastrointestinal: Negative for nausea, vomiting and diarrhea.   Genitourinary: Negative for dysuria, frequency, urgency and history of kidney stones.   Musculoskeletal: Negative for joint pain, joint swelling, muscle cramps and muscle ache.   Skin: Positive for skin thickening/induration. Negative for color change, pale, rash, erythema and bruising.        Skin getting darker   Allergic/Immunologic: Negative for seasonal allergies.   Neurological: Negative for dizziness, history of vertigo, light-headedness, passing out and headaches.   Hematologic/Lymphatic: Negative for swollen lymph nodes.   Psychiatric/Behavioral: Negative for nervous/anxious, sleep disturbance and depression. The patient is not nervous/anxious.        Objective:      Physical Exam   Constitutional: She is oriented to person, " place, and time. She appears well-developed.   HENT:   Head: Normocephalic and atraumatic. Head is without abrasion, without contusion and without laceration.   Ears:   Right Ear: External ear normal.   Left Ear: External ear normal.   Nose: Nose normal.   Mouth/Throat: Oropharynx is clear and moist and mucous membranes are normal.   Eyes: Pupils are equal, round, and reactive to light. Conjunctivae, EOM and lids are normal.   Neck: Trachea normal, full passive range of motion without pain and phonation normal. Neck supple.   Cardiovascular: Normal rate, regular rhythm and normal heart sounds.   Pulmonary/Chest: Effort normal and breath sounds normal. No stridor. No respiratory distress.   Musculoskeletal: Normal range of motion.      Right hand: She exhibits normal range of motion, no tenderness, no bony tenderness, normal two-point discrimination, normal capillary refill, no deformity, no laceration and no swelling. Normal sensation noted.        Hands:    Neurological: She is alert and oriented to person, place, and time.   Skin: Skin is warm, dry, intact and no rash. Capillary refill takes less than 2 seconds. abrasion, burn, bruising, erythema and ecchymosisPsychiatric: Her speech is normal and behavior is normal. Judgment and thought content normal.   Nursing note and vitals reviewed.        Assessment:       1. Contusion of right hand, initial encounter        Plan:     Patient and family refuses x-ray of digit.  Unable to obtain a chemistry panel after multiple attempts.  Advised to follow up with her PCP for blood work.  Verbalized understanding.      Contusion of right hand, initial encounter  -     Cancel: POCT Chemistry Panel      Patient Instructions   Please follow up with your Primary care provider within 2-5 days if your signs and symptoms have not resolved or worsen.     If your condition worsens or fails to improve we recommend that you receive another evaluation at the emergency room immediately  or contact your primary medical clinic to discuss your concerns.    You must understand that you have received an Urgent Care treatment only and that you may be released before all of your medical problems are known or treated.   You, the patient, will arrange for follow up care as instructed.       Hand Contusion  You have a contusion. This is also called a bruise. There is swelling and some bleeding under the skin, but no broken bones. This injury generally takes a few days to a few weeks to heal.  During that time, the bruise will typically change in color from reddish, to purple-blue, to greenish-yellow, then to yellow-brown.  Home care  · Elevate the hand to reduce pain and swelling. As much as possible, sit or lie down with the hand raised about the level of your heart. This is especially important during the first 48 hours.  · Ice the hand to help reduce pain and swelling. Wrap a cold source (ice pack or ice cubes in a plastic bag) in a thin towel. Apply to the bruised area for 20 minutes every 1 to 2 hours the first day. Continue this 3 to 4 times a day until the pain and swelling goes away.  · Unless another medicine was prescribed, you can take acetaminophen, ibuprofen, or naproxen to control pain. (If you have chronic liver or kidney disease or ever had a stomach ulcer or gastrointestinal bleeding, talk with your doctor before using these medicines.)  Follow up  Follow up with your healthcare provider or our staff as advised. Call if you are not improving within 1 to 2 weeks.  When to seek medical advice   Call your healthcare provider right away if you have any of the following:  · Increased pain or swelling  · Arm becomes cold, blue, numb or tingly  · Signs of infection: Warmth, drainage, or increased redness or pain around the bruise  · Inability to move the injured hand   · Frequent bruising for unknown reasons  Date Last Reviewed: 2/1/2017  © 7574-9615 [employer]. [address] [place] [address]  Road, [place] [address]. All rights reserved. This information is not intended as a substitute for professional medical care. Always follow your healthcare professional's instructions.

## 2020-12-22 ENCOUNTER — OFFICE VISIT (OUTPATIENT)
Dept: CARDIOLOGY | Facility: CLINIC | Age: 80
End: 2020-12-22
Payer: MEDICARE

## 2020-12-22 VITALS
HEART RATE: 66 BPM | WEIGHT: 170.5 LBS | SYSTOLIC BLOOD PRESSURE: 138 MMHG | BODY MASS INDEX: 28.41 KG/M2 | DIASTOLIC BLOOD PRESSURE: 72 MMHG | HEIGHT: 65 IN

## 2020-12-22 DIAGNOSIS — I25.10 CORONARY ARTERY DISEASE INVOLVING NATIVE CORONARY ARTERY OF NATIVE HEART WITHOUT ANGINA PECTORIS: ICD-10-CM

## 2020-12-22 DIAGNOSIS — E78.5 HYPERLIPIDEMIA, UNSPECIFIED HYPERLIPIDEMIA TYPE: ICD-10-CM

## 2020-12-22 DIAGNOSIS — I47.10 PAROXYSMAL SVT (SUPRAVENTRICULAR TACHYCARDIA): ICD-10-CM

## 2020-12-22 DIAGNOSIS — Z95.818 STATUS POST PLACEMENT OF IMPLANTABLE LOOP RECORDER: ICD-10-CM

## 2020-12-22 DIAGNOSIS — Z98.890 HISTORY OF RADIOFREQUENCY ABLATION (RFA) PROCEDURE FOR CARDIAC ARRHYTHMIA: ICD-10-CM

## 2020-12-22 DIAGNOSIS — I10 ESSENTIAL HYPERTENSION: ICD-10-CM

## 2020-12-22 DIAGNOSIS — I47.10 SVT (SUPRAVENTRICULAR TACHYCARDIA): Primary | ICD-10-CM

## 2020-12-22 DIAGNOSIS — I50.30 HEART FAILURE WITH PRESERVED LEFT VENTRICULAR FUNCTION (HFPEF): ICD-10-CM

## 2020-12-22 DIAGNOSIS — I95.1 ORTHOSTATIC HYPOTENSION: ICD-10-CM

## 2020-12-22 DIAGNOSIS — I35.0 AORTIC VALVE STENOSIS, ETIOLOGY OF CARDIAC VALVE DISEASE UNSPECIFIED: ICD-10-CM

## 2020-12-22 PROCEDURE — 1101F PR PT FALLS ASSESS DOC 0-1 FALLS W/OUT INJ PAST YR: ICD-10-PCS | Mod: CPTII,S$GLB,, | Performed by: INTERNAL MEDICINE

## 2020-12-22 PROCEDURE — 1159F MED LIST DOCD IN RCRD: CPT | Mod: S$GLB,,, | Performed by: INTERNAL MEDICINE

## 2020-12-22 PROCEDURE — 99214 OFFICE O/P EST MOD 30 MIN: CPT | Mod: 25,S$GLB,, | Performed by: INTERNAL MEDICINE

## 2020-12-22 PROCEDURE — 93000 EKG 12-LEAD: ICD-10-PCS | Mod: S$GLB,,, | Performed by: INTERNAL MEDICINE

## 2020-12-22 PROCEDURE — 3075F PR MOST RECENT SYSTOLIC BLOOD PRESS GE 130-139MM HG: ICD-10-PCS | Mod: CPTII,S$GLB,, | Performed by: INTERNAL MEDICINE

## 2020-12-22 PROCEDURE — 99214 PR OFFICE/OUTPT VISIT, EST, LEVL IV, 30-39 MIN: ICD-10-PCS | Mod: 25,S$GLB,, | Performed by: INTERNAL MEDICINE

## 2020-12-22 PROCEDURE — 3078F DIAST BP <80 MM HG: CPT | Mod: CPTII,S$GLB,, | Performed by: INTERNAL MEDICINE

## 2020-12-22 PROCEDURE — 1159F PR MEDICATION LIST DOCUMENTED IN MEDICAL RECORD: ICD-10-PCS | Mod: S$GLB,,, | Performed by: INTERNAL MEDICINE

## 2020-12-22 PROCEDURE — 99999 PR PBB SHADOW E&M-EST. PATIENT-LVL II: ICD-10-PCS | Mod: PBBFAC,,, | Performed by: INTERNAL MEDICINE

## 2020-12-22 PROCEDURE — 93000 ELECTROCARDIOGRAM COMPLETE: CPT | Mod: S$GLB,,, | Performed by: INTERNAL MEDICINE

## 2020-12-22 PROCEDURE — 3288F PR FALLS RISK ASSESSMENT DOCUMENTED: ICD-10-PCS | Mod: CPTII,S$GLB,, | Performed by: INTERNAL MEDICINE

## 2020-12-22 PROCEDURE — 3078F PR MOST RECENT DIASTOLIC BLOOD PRESSURE < 80 MM HG: ICD-10-PCS | Mod: CPTII,S$GLB,, | Performed by: INTERNAL MEDICINE

## 2020-12-22 PROCEDURE — 3075F SYST BP GE 130 - 139MM HG: CPT | Mod: CPTII,S$GLB,, | Performed by: INTERNAL MEDICINE

## 2020-12-22 PROCEDURE — 1101F PT FALLS ASSESS-DOCD LE1/YR: CPT | Mod: CPTII,S$GLB,, | Performed by: INTERNAL MEDICINE

## 2020-12-22 PROCEDURE — 3288F FALL RISK ASSESSMENT DOCD: CPT | Mod: CPTII,S$GLB,, | Performed by: INTERNAL MEDICINE

## 2020-12-22 PROCEDURE — 99999 PR PBB SHADOW E&M-EST. PATIENT-LVL II: CPT | Mod: PBBFAC,,, | Performed by: INTERNAL MEDICINE

## 2020-12-22 NOTE — PROGRESS NOTES
Subjective:      Patient ID: Crystal Alvarado is a 80 y.o. female.    Chief Complaint: Follow-up    HPI:  Feels well.    No blood in stool    Feels tired at times.    Review of Systems   Cardiovascular: Negative for chest pain, claudication, dyspnea on exertion, irregular heartbeat, leg swelling, near-syncope, orthopnea, palpitations and syncope.      C/o no energy.    Walks in the house only.    Does not walk outside or go shopping.    Takes hydrocodone for knee pain    Past Medical History:   Diagnosis Date    Arthritis     CHF (congestive heart failure)     Coronary artery disease     Hyperlipidemia     Hypertension         Past Surgical History:   Procedure Laterality Date    A-V CARDIAC PACEMAKER INSERTION N/A 7/9/2018    Procedure: INSERTION, CARDIAC PACEMAKER, DUAL CHAMBER;  Surgeon: Archie Montez MD;  Location: [facility] CATH LAB;  Service: Cardiology;  Laterality: N/A;  SVT, RFA, +/- Dual PPM or ILR, IGLESIA, MDT, MAC, MO, 3 Prep    CATARACT EXTRACTION W/  INTRAOCULAR LENS IMPLANT Left 11/23/2020    Procedure: EXTRACTION, CATARACT, WITH IOL INSERTION;  Surgeon: Roslyn Napier MD;  Location: [facility];  Service: Ophthalmology;  Laterality: Left;    COLONOSCOPY      COLONOSCOPY N/A 8/5/2020    Procedure: COLONOSCOPY;  Surgeon: Sebas Dickens MD;  [facility]  Service: Endoscopy;  Laterality: N/A;    ESOPHAGOGASTRODUODENOSCOPY N/A 8/4/2020    Procedure: EGD (ESOPHAGOGASTRODUODENOSCOPY);  Surgeon: Fernie Cohen MD;  Location: [facility] (2ND FLR);  Service: Endoscopy;  Laterality: N/A;    HYSTERECTOMY N/A     INSERTION OF IMPLANTABLE LOOP RECORDER N/A 7/9/2018    Procedure: PLACEMENT-LOOP RECORDER;  Surgeon: Archie Montez MD;  Location: [facility] CATH LAB;  Service: Cardiology;  Laterality: N/A;  SVT, RFA, +/- Dual PPM or ILR, IGLESIA, MDT, MAC, MO, 3 Prep       No family history on file.    Social History     Socioeconomic History    Marital status: Single     Spouse name: Not on file     Number of children: Not on file    Years of education: Not on file    Highest education level: Not on file   Occupational History    Not on file   Social Needs    Financial resource strain: Not on file    Food insecurity     Worry: Not on file     Inability: Not on file    Transportation needs     Medical: Not on file     Non-medical: Not on file   Tobacco Use    Smoking status: Former Smoker     Packs/day: 0.50     Quit date: 2013     Years since quittin.6    Smokeless tobacco: Never Used   Substance and Sexual Activity    Alcohol use: No    Drug use: No    Sexual activity: Never   Lifestyle    Physical activity     Days per week: Not on file     Minutes per session: Not on file    Stress: Not on file   Relationships    Social connections     Talks on phone: Not on file     Gets together: Not on file     Attends [employer] service: Not on file     Active member of club or organization: Not on file     Attends meetings of clubs or organizations: Not on file     Relationship status: Not on file   Other Topics Concern    Not on file   Social History Narrative    Not on file       Current Outpatient Medications on File Prior to Visit   Medication Sig Dispense Refill    acetaminophen (TYLENOL) 325 MG tablet Take 2 tablets (650 mg total) by mouth every 6 (six) hours as needed for Pain.  0    amLODIPine (NORVASC) 10 MG tablet Take 1 tablet (10 mg total) by mouth once daily. 90 tablet 3    atorvastatin (LIPITOR) 20 MG tablet Take 20 mg by mouth every evening.  4    BREO ELLIPTA 100-25 mcg/dose diskus inhaler INHALE 1 PUFF BY MOUTH ONCE A DAY  3    docusate sodium (COLACE) 100 MG capsule Take 1 capsule (100 mg total) by mouth 2 (two) times daily. 60 capsule 0    fluticasone propionate (FLONASE) 50 mcg/actuation nasal spray       HYDROcodone-acetaminophen (NORCO) 5-325 mg per tablet Take 1 tablet by mouth 2 (two) times daily as needed.      losartan (COZAAR) 100 MG tablet Take 1 tablet  "(100 mg total) by mouth once daily. 90 tablet 3    nitroGLYCERIN (NITROSTAT) 0.4 MG SL tablet Place 1 tablet (0.4 mg total) under the tongue every 5 (five) minutes as needed for Chest pain (and notify MD). 25 tablet 5    prednisolon/gatiflox/bromfenac (PREDNISOL ACE-GATIFLOX-BROMFEN) 1-0.5-0.075 % DrpS Apply 1 drop to eye 3 (three) times daily. in operative eye for 1 month after surgery 5 mL 3    [DISCONTINUED] azelastine (ASTELIN) 137 mcg (0.1 %) nasal spray       ergocalciferol (ERGOCALCIFEROL) 50,000 unit Cap Take 50,000 Units by mouth every 7 days.      [DISCONTINUED] pregabalin (LYRICA) 50 MG capsule Take 1 capsule (50 mg total) by mouth 2 (two) times daily. 60 capsule 1     Current Facility-Administered Medications on File Prior to Visit   Medication Dose Route Frequency Provider Last Rate Last Dose    sodium chloride 0.9% flush 10 mL  10 mL Intravenous PRN Roslyn Napier MD           Review of patient's allergies indicates:  No Known Allergies  Objective:     Vitals:    12/22/20 1534   BP: 138/72   BP Location: Left arm   Patient Position: Sitting   BP Method: Large (Automatic)   Pulse: 66   Weight: 77.3 kg (170 lb 8.4 oz)   Height: 5' 5" (1.651 m)        Physical Exam   Constitutional: She is oriented to person, place, and time. She appears well-developed and well-nourished.   Eyes: No scleral icterus.   Neck: No JVD present. Carotid bruit is not present.   Cardiovascular: Normal rate and regular rhythm. Exam reveals no gallop.   Murmur (II/VI systolic) heard.  Pulmonary/Chest: Breath sounds normal.   Musculoskeletal:         General: Edema (trace) present.   Neurological: She is alert and oriented to person, place, and time.   Skin: Skin is warm and dry.   Psychiatric: She has a normal mood and affect. Her behavior is normal. Judgment and thought content normal.   Vitals reviewed.     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    ECG today reviewed by me: NSR, WNL, not significantly changed    Lab " Visit on 11/20/2020   Component Date Value Ref Range Status    SARS-CoV2 (COVID-19) Qualitative P* 11/20/2020 Not Detected  Not Detected Final   Lab Visit on 10/14/2020   Component Date Value Ref Range Status    WBC 10/14/2020 4.27  3.90 - 12.70 K/uL Final    RBC 10/14/2020 3.64* 4.00 - 5.40 M/uL Final    Hemoglobin 10/14/2020 10.6* 12.0 - 16.0 g/dL Final    Hematocrit 10/14/2020 34.6* 37.0 - 48.5 % Final    MCV 10/14/2020 95  82 - 98 fL Final    MCH 10/14/2020 29.1  27.0 - 31.0 pg Final    MCHC 10/14/2020 30.6* 32.0 - 36.0 g/dL Final    RDW 10/14/2020 15.0* 11.5 - 14.5 % Final    Platelets 10/14/2020 227  150 - 350 K/uL Final    MPV 10/14/2020 10.3  9.2 - 12.9 fL Final    Immature Granulocytes 10/14/2020 0.2  0.0 - 0.5 % Final    Gran # (ANC) 10/14/2020 3.0  1.8 - 7.7 K/uL Final    Immature Grans (Abs) 10/14/2020 0.01  0.00 - 0.04 K/uL Final    Lymph # 10/14/2020 1.0  1.0 - 4.8 K/uL Final    Mono # 10/14/2020 0.2* 0.3 - 1.0 K/uL Final    Eos # 10/14/2020 0.0  0.0 - 0.5 K/uL Final    Baso # 10/14/2020 0.03  0.00 - 0.20 K/uL Final    nRBC 10/14/2020 0  0 /100 WBC Final    Gran % 10/14/2020 69.6  38.0 - 73.0 % Final    Lymph % 10/14/2020 23.0  18.0 - 48.0 % Final    Mono % 10/14/2020 5.6  4.0 - 15.0 % Final    Eosinophil % 10/14/2020 0.9  0.0 - 8.0 % Final    Basophil % 10/14/2020 0.7  0.0 - 1.9 % Final    Differential Method 10/14/2020 Automated   Final    Sodium 10/14/2020 142  136 - 145 mmol/L Final    Potassium 10/14/2020 3.7  3.5 - 5.1 mmol/L Final    Chloride 10/14/2020 107  95 - 110 mmol/L Final    CO2 10/14/2020 28  23 - 29 mmol/L Final    Glucose 10/14/2020 103  70 - 110 mg/dL Final    BUN 10/14/2020 22* 7 - 17 mg/dL Final    Creatinine 10/14/2020 1.36  0.50 - 1.40 mg/dL Final    Calcium 10/14/2020 9.5  8.7 - 10.5 mg/dL Final    Total Protein 10/14/2020 7.3  6.0 - 8.4 g/dL Final    Albumin 10/14/2020 4.1  3.5 - 5.2 g/dL Final    Total Bilirubin 10/14/2020 0.8  0.1 - 1.0  mg/dL Final    Alkaline Phosphatase 10/14/2020 77  38 - 126 U/L Final    AST 10/14/2020 15  15 - 46 U/L Final    ALT 10/14/2020 7* 10 - 44 U/L Final    Anion Gap 10/14/2020 7* 8 - 16 mmol/L Final    eGFR if  10/14/2020 41.8* >60 mL/min/1.73 m^2 Final    eGFR if non  10/14/2020 36.3* >60 mL/min/1.73 m^2 Final    Vit D, 25-Hydroxy 10/14/2020 35  30 - 96 ng/mL Final    Ferritin 10/14/2020 23  20.0 - 300.0 ng/mL Final    Iron 10/14/2020 62  30 - 160 ug/dL Final    Transferrin 10/14/2020 225  200 - 375 mg/dL Final    TIBC 10/14/2020 333  250 - 450 ug/dL Final    Saturated Iron 10/14/2020 19* 20 - 50 % Final    Vitamin B-12 10/14/2020 247  210 - 950 pg/mL Final    Folate 10/14/2020 5.9  4.0 - 24.0 ng/mL Final    Sed Rate 10/14/2020 17  0 - 20 mm/Hr Final   Lab Visit on 09/17/2020   Component Date Value Ref Range Status    WBC 09/17/2020 4.59  3.90 - 12.70 K/uL Final    RBC 09/17/2020 3.59* 4.00 - 5.40 M/uL Final    Hemoglobin 09/17/2020 10.6* 12.0 - 16.0 g/dL Final    Hematocrit 09/17/2020 35.0* 37.0 - 48.5 % Final    MCV 09/17/2020 98  82 - 98 fL Final    MCH 09/17/2020 29.5  27.0 - 31.0 pg Final    MCHC 09/17/2020 30.3* 32.0 - 36.0 g/dL Final    RDW 09/17/2020 14.6* 11.5 - 14.5 % Final    Platelets 09/17/2020 234  150 - 350 K/uL Final    MPV 09/17/2020 10.8  9.2 - 12.9 fL Final    Immature Granulocytes 09/17/2020 0.2  0.0 - 0.5 % Final    Gran # (ANC) 09/17/2020 3.2  1.8 - 7.7 K/uL Final    Immature Grans (Abs) 09/17/2020 0.01  0.00 - 0.04 K/uL Final    Lymph # 09/17/2020 1.0  1.0 - 4.8 K/uL Final    Mono # 09/17/2020 0.2* 0.3 - 1.0 K/uL Final    Eos # 09/17/2020 0.1  0.0 - 0.5 K/uL Final    Baso # 09/17/2020 0.03  0.00 - 0.20 K/uL Final    nRBC 09/17/2020 0  0 /100 WBC Final    Gran % 09/17/2020 70.4  38.0 - 73.0 % Final    Lymph % 09/17/2020 22.2  18.0 - 48.0 % Final    Mono % 09/17/2020 3.9* 4.0 - 15.0 % Final    Eosinophil % 09/17/2020 2.6  0.0 -  8.0 % Final    Basophil % 09/17/2020 0.7  0.0 - 1.9 % Final    Differential Method 09/17/2020 Automated   Final   Lab Visit on 09/08/2020   Component Date Value Ref Range Status    WBC 09/08/2020 4.90  3.90 - 12.70 K/uL Final    RBC 09/08/2020 3.43* 4.00 - 5.40 M/uL Final    Hemoglobin 09/08/2020 9.9* 12.0 - 16.0 g/dL Final    Hematocrit 09/08/2020 32.5* 37.0 - 48.5 % Final    MCV 09/08/2020 95  82 - 98 fL Final    MCH 09/08/2020 28.9  27.0 - 31.0 pg Final    MCHC 09/08/2020 30.5* 32.0 - 36.0 g/dL Final    RDW 09/08/2020 14.9* 11.5 - 14.5 % Final    Platelets 09/08/2020 230  150 - 350 K/uL Final    MPV 09/08/2020 11.2  9.2 - 12.9 fL Final    Immature Granulocytes 09/08/2020 0.2  0.0 - 0.5 % Final    Gran # (ANC) 09/08/2020 3.3  1.8 - 7.7 K/uL Final    Immature Grans (Abs) 09/08/2020 0.01  0.00 - 0.04 K/uL Final    Lymph # 09/08/2020 1.2  1.0 - 4.8 K/uL Final    Mono # 09/08/2020 0.3  0.3 - 1.0 K/uL Final    Eos # 09/08/2020 0.1  0.0 - 0.5 K/uL Final    Baso # 09/08/2020 0.04  0.00 - 0.20 K/uL Final    nRBC 09/08/2020 0  0 /100 WBC Final    Gran % 09/08/2020 68.2  38.0 - 73.0 % Final    Lymph % 09/08/2020 23.7  18.0 - 48.0 % Final    Mono % 09/08/2020 5.7  4.0 - 15.0 % Final    Eosinophil % 09/08/2020 1.4  0.0 - 8.0 % Final    Basophil % 09/08/2020 0.8  0.0 - 1.9 % Final    Differential Method 09/08/2020 Automated   Final   Lab Visit on 09/01/2020   Component Date Value Ref Range Status    WBC 09/01/2020 4.44  3.90 - 12.70 K/uL Final    RBC 09/01/2020 3.41* 4.00 - 5.40 M/uL Final    Hemoglobin 09/01/2020 10.1* 12.0 - 16.0 g/dL Final    Hematocrit 09/01/2020 32.6* 37.0 - 48.5 % Final    MCV 09/01/2020 96  82 - 98 fL Final    MCH 09/01/2020 29.6  27.0 - 31.0 pg Final    MCHC 09/01/2020 31.0* 32.0 - 36.0 g/dL Final    RDW 09/01/2020 15.3* 11.5 - 14.5 % Final    Platelets 09/01/2020 251  150 - 350 K/uL Final    MPV 09/01/2020 11.0  9.2 - 12.9 fL Final    Immature Granulocytes  09/01/2020 0.2  0.0 - 0.5 % Final    Gran # (ANC) 09/01/2020 3.1  1.8 - 7.7 K/uL Final    Immature Grans (Abs) 09/01/2020 0.01  0.00 - 0.04 K/uL Final    Lymph # 09/01/2020 1.0  1.0 - 4.8 K/uL Final    Mono # 09/01/2020 0.2* 0.3 - 1.0 K/uL Final    Eos # 09/01/2020 0.1  0.0 - 0.5 K/uL Final    Baso # 09/01/2020 0.05  0.00 - 0.20 K/uL Final    nRBC 09/01/2020 0  0 /100 WBC Final    Gran % 09/01/2020 69.6  38.0 - 73.0 % Final    Lymph % 09/01/2020 22.1  18.0 - 48.0 % Final    Mono % 09/01/2020 5.4  4.0 - 15.0 % Final    Eosinophil % 09/01/2020 1.6  0.0 - 8.0 % Final    Basophil % 09/01/2020 1.1  0.0 - 1.9 % Final    Differential Method 09/01/2020 Automated   Final   Lab Visit on 08/24/2020   Component Date Value Ref Range Status    WBC 08/24/2020 5.56  3.90 - 12.70 K/uL Final    RBC 08/24/2020 3.31* 4.00 - 5.40 M/uL Final    Hemoglobin 08/24/2020 9.7* 12.0 - 16.0 g/dL Final    Hematocrit 08/24/2020 31.5* 37.0 - 48.5 % Final    MCV 08/24/2020 95  82 - 98 fL Final    MCH 08/24/2020 29.3  27.0 - 31.0 pg Final    MCHC 08/24/2020 30.8* 32.0 - 36.0 g/dL Final    RDW 08/24/2020 15.9* 11.5 - 14.5 % Final    Platelets 08/24/2020 280  150 - 350 K/uL Final    MPV 08/24/2020 10.0  9.2 - 12.9 fL Final    Immature Granulocytes 08/24/2020 0.2  0.0 - 0.5 % Final    Gran # (ANC) 08/24/2020 4.2  1.8 - 7.7 K/uL Final    Immature Grans (Abs) 08/24/2020 0.01  0.00 - 0.04 K/uL Final    Lymph # 08/24/2020 1.0  1.0 - 4.8 K/uL Final    Mono # 08/24/2020 0.3  0.3 - 1.0 K/uL Final    Eos # 08/24/2020 0.1  0.0 - 0.5 K/uL Final    Baso # 08/24/2020 0.04  0.00 - 0.20 K/uL Final    nRBC 08/24/2020 0  0 /100 WBC Final    Gran % 08/24/2020 74.7* 38.0 - 73.0 % Final    Lymph % 08/24/2020 17.6* 18.0 - 48.0 % Final    Mono % 08/24/2020 5.9  4.0 - 15.0 % Final    Eosinophil % 08/24/2020 0.9  0.0 - 8.0 % Final    Basophil % 08/24/2020 0.7  0.0 - 1.9 % Final    Differential Method 08/24/2020 Automated   Final   Lab  Visit on 08/18/2020   Component Date Value Ref Range Status    WBC 08/18/2020 4.99  3.90 - 12.70 K/uL Final    RBC 08/18/2020 3.08* 4.00 - 5.40 M/uL Final    Hemoglobin 08/18/2020 9.1* 12.0 - 16.0 g/dL Final    Hematocrit 08/18/2020 29.7* 37.0 - 48.5 % Final    MCV 08/18/2020 96  82 - 98 fL Final    MCH 08/18/2020 29.5  27.0 - 31.0 pg Final    MCHC 08/18/2020 30.6* 32.0 - 36.0 g/dL Final    RDW 08/18/2020 16.3* 11.5 - 14.5 % Final    Platelets 08/18/2020 255  150 - 350 K/uL Final    MPV 08/18/2020 10.8  9.2 - 12.9 fL Final    Immature Granulocytes 08/18/2020 0.2  0.0 - 0.5 % Final    Gran # (ANC) 08/18/2020 3.8  1.8 - 7.7 K/uL Final    Immature Grans (Abs) 08/18/2020 0.01  0.00 - 0.04 K/uL Final    Lymph # 08/18/2020 0.8* 1.0 - 4.8 K/uL Final    Mono # 08/18/2020 0.3  0.3 - 1.0 K/uL Final    Eos # 08/18/2020 0.1  0.0 - 0.5 K/uL Final    Baso # 08/18/2020 0.04  0.00 - 0.20 K/uL Final    nRBC 08/18/2020 0  0 /100 WBC Final    Gran % 08/18/2020 76.2* 38.0 - 73.0 % Final    Lymph % 08/18/2020 16.0* 18.0 - 48.0 % Final    Mono % 08/18/2020 5.8  4.0 - 15.0 % Final    Eosinophil % 08/18/2020 1.0  0.0 - 8.0 % Final    Basophil % 08/18/2020 0.8  0.0 - 1.9 % Final    Differential Method 08/18/2020 Automated   Final   Lab Visit on 08/12/2020   Component Date Value Ref Range Status    WBC 08/12/2020 6.37  3.90 - 12.70 K/uL Final    RBC 08/12/2020 2.88* 4.00 - 5.40 M/uL Final    Hemoglobin 08/12/2020 8.4* 12.0 - 16.0 g/dL Final    Hematocrit 08/12/2020 27.7* 37.0 - 48.5 % Final    MCV 08/12/2020 96  82 - 98 fL Final    MCH 08/12/2020 29.2  27.0 - 31.0 pg Final    MCHC 08/12/2020 30.3* 32.0 - 36.0 g/dL Final    RDW 08/12/2020 15.9* 11.5 - 14.5 % Final    Platelets 08/12/2020 247  150 - 350 K/uL Final    MPV 08/12/2020 10.4  9.2 - 12.9 fL Final    Immature Granulocytes 08/12/2020 0.3  0.0 - 0.5 % Final    Gran # (ANC) 08/12/2020 4.9  1.8 - 7.7 K/uL Final    Immature Grans (Abs) 08/12/2020 0.02   0.00 - 0.04 K/uL Final    Lymph # 08/12/2020 1.0  1.0 - 4.8 K/uL Final    Mono # 08/12/2020 0.4  0.3 - 1.0 K/uL Final    Eos # 08/12/2020 0.1  0.0 - 0.5 K/uL Final    Baso # 08/12/2020 0.04  0.00 - 0.20 K/uL Final    nRBC 08/12/2020 0  0 /100 WBC Final    Gran % 08/12/2020 77.4* 38.0 - 73.0 % Final    Lymph % 08/12/2020 15.1* 18.0 - 48.0 % Final    Mono % 08/12/2020 5.7  4.0 - 15.0 % Final    Eosinophil % 08/12/2020 0.9  0.0 - 8.0 % Final    Basophil % 08/12/2020 0.6  0.0 - 1.9 % Final    Differential Method 08/12/2020 Automated   Final   No results displayed because visit has over 200 results.      Admission on 07/29/2020, Discharged on 07/29/2020   Component Date Value Ref Range Status    WBC 07/29/2020 4.20  3.90 - 12.70 K/uL Final    RBC 07/29/2020 3.81* 4.00 - 5.40 M/uL Final    Hemoglobin 07/29/2020 11.2* 12.0 - 16.0 g/dL Final    Hematocrit 07/29/2020 37.0  37.0 - 48.5 % Final    MCV 07/29/2020 97  82 - 98 fL Final    MCH 07/29/2020 29.4  27.0 - 31.0 pg Final    MCHC 07/29/2020 30.3* 32.0 - 36.0 g/dL Final    RDW 07/29/2020 14.1  11.5 - 14.5 % Final    Platelets 07/29/2020 322  150 - 350 K/uL Final    MPV 07/29/2020 9.6  9.2 - 12.9 fL Final    Immature Granulocytes 07/29/2020 0.5  0.0 - 0.5 % Final    Gran # (ANC) 07/29/2020 2.9  1.8 - 7.7 K/uL Final    Immature Grans (Abs) 07/29/2020 0.02  0.00 - 0.04 K/uL Final    Lymph # 07/29/2020 0.9* 1.0 - 4.8 K/uL Final    Mono # 07/29/2020 0.3  0.3 - 1.0 K/uL Final    Eos # 07/29/2020 0.1  0.0 - 0.5 K/uL Final    Baso # 07/29/2020 0.03  0.00 - 0.20 K/uL Final    nRBC 07/29/2020 0  0 /100 WBC Final    Gran % 07/29/2020 67.9  38.0 - 73.0 % Final    Lymph % 07/29/2020 21.4  18.0 - 48.0 % Final    Mono % 07/29/2020 6.4  4.0 - 15.0 % Final    Eosinophil % 07/29/2020 3.1  0.0 - 8.0 % Final    Basophil % 07/29/2020 0.7  0.0 - 1.9 % Final    Differential Method 07/29/2020 Automated   Final    Sodium 07/29/2020 142  136 - 145 mmol/L Final     Potassium 07/29/2020 3.2* 3.5 - 5.1 mmol/L Final    Chloride 07/29/2020 108  95 - 110 mmol/L Final    CO2 07/29/2020 25  23 - 29 mmol/L Final    Glucose 07/29/2020 95  70 - 110 mg/dL Final    BUN 07/29/2020 22  8 - 23 mg/dL Final    Creatinine 07/29/2020 1.2  0.5 - 1.4 mg/dL Final    Calcium 07/29/2020 9.5  8.7 - 10.5 mg/dL Final    Total Protein 07/29/2020 6.8  6.0 - 8.4 g/dL Final    Albumin 07/29/2020 3.2* 3.5 - 5.2 g/dL Final    Total Bilirubin 07/29/2020 0.4  0.1 - 1.0 mg/dL Final    Alkaline Phosphatase 07/29/2020 77  55 - 135 U/L Final    AST 07/29/2020 13  10 - 40 U/L Final    ALT 07/29/2020 9* 10 - 44 U/L Final    Anion Gap 07/29/2020 9  8 - 16 mmol/L Final    eGFR if African American 07/29/2020 49.0* >60 mL/min/1.73 m^2 Final    eGFR if non African American 07/29/2020 42.5* >60 mL/min/1.73 m^2 Final    Troponin I 07/29/2020 0.018  0.000 - 0.026 ng/mL Final    BNP 07/29/2020 53  0 - 99 pg/mL Final    SARS-CoV-2 RNA, Amplification, Qual 07/29/2020 Negative  Negative Final    Specimen UA 07/29/2020 Urine, Clean Catch   Final    Color, UA 07/29/2020 Yellow  Yellow, Straw, Lana Final    Appearance, UA 07/29/2020 Hazy* Clear Final    pH, UA 07/29/2020 5.0  5.0 - 8.0 Final    Specific [place], UA 07/29/2020 1.020  1.005 - 1.030 Final    Protein, UA 07/29/2020 Negative  Negative Final    Glucose, UA 07/29/2020 Negative  Negative Final    Ketones, UA 07/29/2020 Negative  Negative Final    Bilirubin (UA) 07/29/2020 Negative  Negative Final    Occult Blood UA 07/29/2020 Negative  Negative Final    Nitrite, UA 07/29/2020 Negative  Negative Final    Leukocytes, UA 07/29/2020 Negative  Negative Final    Troponin I 07/29/2020 <0.006  0.000 - 0.026 ng/mL Final   There may be more visits with results that are not included.   (  Note echo 2018 showed trace AS, LVH, normal LVEF    Note loop recorder remote interrogation shows NSR      Assessment:     1. SVT (supraventricular tachycardia)     2. Status post placement of implantable loop recorder    3. Orthostatic hypotension    4. Hyperlipidemia, unspecified hyperlipidemia type    5. History of radiofrequency ablation (RFA) procedure for cardiac arrhythmia    6. Heart failure with preserved left ventricular function (HFpEF)    7. Essential hypertension    8. Coronary artery disease involving native coronary artery of native heart without angina pectoris    9. Aortic valve stenosis, etiology of cardiac valve disease unspecified      Plan:   Crystal was seen today for follow-up.    Diagnoses and all orders for this visit:    SVT (supraventricular tachycardia)    Status post placement of implantable loop recorder    Orthostatic hypotension    Hyperlipidemia, unspecified hyperlipidemia type    History of radiofrequency ablation (RFA) procedure for cardiac arrhythmia    Heart failure with preserved left ventricular function (HFpEF)    Essential hypertension    Coronary artery disease involving native coronary artery of native heart without angina pectoris    Aortic valve stenosis, etiology of cardiac valve disease unspecified    Other orders  -     IN OFFICE EKG 12-LEAD (to Muse)         Will discontinue the Astelin due to fatigue    Pt instructed to wean off of hydrocodone and used plain Tylenol for knee pain    Rest of meds the same    F/u with Dr Swain    RTC 6 months with lab    Walk more    Trace edema due to amlodipine    Follow up in about 6 months (around 6/22/2021).

## 2021-01-10 ENCOUNTER — CLINICAL SUPPORT (OUTPATIENT)
Dept: CARDIOLOGY | Facility: HOSPITAL | Age: 81
End: 2021-01-10
Payer: MEDICARE

## 2021-01-10 DIAGNOSIS — Z95.818 PRESENCE OF OTHER CARDIAC IMPLANTS AND GRAFTS: ICD-10-CM

## 2021-01-10 PROCEDURE — 93298 REM INTERROG DEV EVAL SCRMS: CPT | Mod: ,,, | Performed by: INTERNAL MEDICINE

## 2021-01-10 PROCEDURE — G2066 INTER DEVC REMOTE 30D: HCPCS | Performed by: INTERNAL MEDICINE

## 2021-01-10 PROCEDURE — 93298 CARDIAC DEVICE CHECK - REMOTE: ICD-10-PCS | Mod: ,,, | Performed by: INTERNAL MEDICINE

## 2021-01-20 ENCOUNTER — OFFICE VISIT (OUTPATIENT)
Dept: OPTOMETRY | Facility: CLINIC | Age: 81
End: 2021-01-20
Payer: MEDICARE

## 2021-01-20 DIAGNOSIS — Z98.42 STATUS POST LEFT CATARACT EXTRACTION: Primary | ICD-10-CM

## 2021-01-20 PROCEDURE — 99024 POSTOP FOLLOW-UP VISIT: CPT | Mod: S$GLB,,, | Performed by: OPTOMETRIST

## 2021-01-20 PROCEDURE — 99999 PR PBB SHADOW E&M-EST. PATIENT-LVL III: CPT | Mod: PBBFAC,,, | Performed by: OPTOMETRIST

## 2021-01-20 PROCEDURE — 3288F FALL RISK ASSESSMENT DOCD: CPT | Mod: CPTII,S$GLB,, | Performed by: OPTOMETRIST

## 2021-01-20 PROCEDURE — 1101F PT FALLS ASSESS-DOCD LE1/YR: CPT | Mod: CPTII,S$GLB,, | Performed by: OPTOMETRIST

## 2021-01-20 PROCEDURE — 1126F PR PAIN SEVERITY QUANTIFIED, NO PAIN PRESENT: ICD-10-PCS | Mod: S$GLB,,, | Performed by: OPTOMETRIST

## 2021-01-20 PROCEDURE — 99024 PR POST-OP FOLLOW-UP VISIT: ICD-10-PCS | Mod: S$GLB,,, | Performed by: OPTOMETRIST

## 2021-01-20 PROCEDURE — 1126F AMNT PAIN NOTED NONE PRSNT: CPT | Mod: S$GLB,,, | Performed by: OPTOMETRIST

## 2021-01-20 PROCEDURE — 99999 PR PBB SHADOW E&M-EST. PATIENT-LVL III: ICD-10-PCS | Mod: PBBFAC,,, | Performed by: OPTOMETRIST

## 2021-01-20 PROCEDURE — 3288F PR FALLS RISK ASSESSMENT DOCUMENTED: ICD-10-PCS | Mod: CPTII,S$GLB,, | Performed by: OPTOMETRIST

## 2021-01-20 PROCEDURE — 1101F PR PT FALLS ASSESS DOC 0-1 FALLS W/OUT INJ PAST YR: ICD-10-PCS | Mod: CPTII,S$GLB,, | Performed by: OPTOMETRIST

## 2021-02-09 ENCOUNTER — CLINICAL SUPPORT (OUTPATIENT)
Dept: CARDIOLOGY | Facility: HOSPITAL | Age: 81
End: 2021-02-09
Payer: MEDICARE

## 2021-02-09 DIAGNOSIS — Z95.818 PRESENCE OF OTHER CARDIAC IMPLANTS AND GRAFTS: ICD-10-CM

## 2021-02-09 PROCEDURE — 93298 CARDIAC DEVICE CHECK - REMOTE: ICD-10-PCS | Mod: ,,, | Performed by: INTERNAL MEDICINE

## 2021-02-09 PROCEDURE — G2066 INTER DEVC REMOTE 30D: HCPCS | Performed by: INTERNAL MEDICINE

## 2021-02-09 PROCEDURE — 93298 REM INTERROG DEV EVAL SCRMS: CPT | Mod: ,,, | Performed by: INTERNAL MEDICINE

## 2021-03-01 NOTE — PLAN OF CARE
Problem: Patient Care Overview  Goal: Plan of Care Review  Outcome: Ongoing (interventions implemented as appropriate)  AAO x 4. NAD. Sinus sue on tele. No c/o SOB, CP. Patient resting comfortable in bed. Bed alarm set, bed in low and locked position, side rails up x 3, call light within reach. Continue to monitor       Breath sounds clear and equal bilaterally.

## 2021-03-11 ENCOUNTER — CLINICAL SUPPORT (OUTPATIENT)
Dept: CARDIOLOGY | Facility: HOSPITAL | Age: 81
End: 2021-03-11
Payer: MEDICARE

## 2021-03-11 DIAGNOSIS — Z95.818 PRESENCE OF OTHER CARDIAC IMPLANTS AND GRAFTS: ICD-10-CM

## 2021-03-11 PROCEDURE — G2066 INTER DEVC REMOTE 30D: HCPCS | Performed by: INTERNAL MEDICINE

## 2021-03-11 PROCEDURE — 93298 CARDIAC DEVICE CHECK - REMOTE: ICD-10-PCS | Mod: ,,, | Performed by: INTERNAL MEDICINE

## 2021-03-11 PROCEDURE — 93298 REM INTERROG DEV EVAL SCRMS: CPT | Mod: ,,, | Performed by: INTERNAL MEDICINE

## 2021-04-10 ENCOUNTER — CLINICAL SUPPORT (OUTPATIENT)
Dept: CARDIOLOGY | Facility: HOSPITAL | Age: 81
End: 2021-04-10
Payer: MEDICARE

## 2021-04-10 DIAGNOSIS — Z95.818 PRESENCE OF OTHER CARDIAC IMPLANTS AND GRAFTS: ICD-10-CM

## 2021-04-10 PROCEDURE — 93298 CARDIAC DEVICE CHECK - REMOTE: ICD-10-PCS | Mod: ,,, | Performed by: INTERNAL MEDICINE

## 2021-04-10 PROCEDURE — G2066 INTER DEVC REMOTE 30D: HCPCS | Performed by: INTERNAL MEDICINE

## 2021-04-10 PROCEDURE — 93298 REM INTERROG DEV EVAL SCRMS: CPT | Mod: ,,, | Performed by: INTERNAL MEDICINE

## 2021-05-04 ENCOUNTER — PATIENT MESSAGE (OUTPATIENT)
Dept: RESEARCH | Facility: HOSPITAL | Age: 81
End: 2021-05-04

## 2021-05-10 ENCOUNTER — CLINICAL SUPPORT (OUTPATIENT)
Dept: CARDIOLOGY | Facility: HOSPITAL | Age: 81
End: 2021-05-10
Attending: FAMILY MEDICINE
Payer: MEDICARE

## 2021-05-10 ENCOUNTER — PATIENT MESSAGE (OUTPATIENT)
Dept: RESEARCH | Facility: HOSPITAL | Age: 81
End: 2021-05-10

## 2021-05-10 DIAGNOSIS — Z95.818 PRESENCE OF OTHER CARDIAC IMPLANTS AND GRAFTS: ICD-10-CM

## 2021-05-10 PROCEDURE — G2066 INTER DEVC REMOTE 30D: HCPCS | Performed by: INTERNAL MEDICINE

## 2021-05-10 PROCEDURE — 93298 CARDIAC DEVICE CHECK - REMOTE: ICD-10-PCS | Mod: ,,, | Performed by: INTERNAL MEDICINE

## 2021-05-10 PROCEDURE — 93298 REM INTERROG DEV EVAL SCRMS: CPT | Mod: ,,, | Performed by: INTERNAL MEDICINE

## 2021-06-09 ENCOUNTER — CLINICAL SUPPORT (OUTPATIENT)
Dept: CARDIOLOGY | Facility: HOSPITAL | Age: 81
End: 2021-06-09
Payer: MEDICARE

## 2021-06-09 DIAGNOSIS — Z95.818 PRESENCE OF OTHER CARDIAC IMPLANTS AND GRAFTS: ICD-10-CM

## 2021-06-09 PROCEDURE — 93298 CARDIAC DEVICE CHECK - REMOTE: ICD-10-PCS | Mod: ,,, | Performed by: INTERNAL MEDICINE

## 2021-06-09 PROCEDURE — 93298 REM INTERROG DEV EVAL SCRMS: CPT | Mod: ,,, | Performed by: INTERNAL MEDICINE

## 2021-06-09 PROCEDURE — G2066 INTER DEVC REMOTE 30D: HCPCS | Performed by: INTERNAL MEDICINE

## 2021-08-09 ENCOUNTER — CLINICAL SUPPORT (OUTPATIENT)
Dept: CARDIOLOGY | Facility: HOSPITAL | Age: 81
End: 2021-08-09
Payer: MEDICARE

## 2021-08-09 DIAGNOSIS — Z95.818 PRESENCE OF OTHER CARDIAC IMPLANTS AND GRAFTS: ICD-10-CM

## 2021-08-09 PROCEDURE — 93298 REM INTERROG DEV EVAL SCRMS: CPT | Mod: ,,, | Performed by: INTERNAL MEDICINE

## 2021-08-09 PROCEDURE — G2066 INTER DEVC REMOTE 30D: HCPCS | Performed by: INTERNAL MEDICINE

## 2021-08-09 PROCEDURE — 93298 CARDIAC DEVICE CHECK - REMOTE: ICD-10-PCS | Mod: ,,, | Performed by: INTERNAL MEDICINE

## 2021-10-04 DIAGNOSIS — I10 ESSENTIAL HYPERTENSION: ICD-10-CM

## 2021-10-04 RX ORDER — AMLODIPINE BESYLATE 10 MG/1
10 TABLET ORAL DAILY
Qty: 90 TABLET | Refills: 3 | Status: SHIPPED | OUTPATIENT
Start: 2021-10-04 | End: 2022-08-08

## 2021-11-12 ENCOUNTER — TELEPHONE (OUTPATIENT)
Dept: ELECTROPHYSIOLOGY | Facility: CLINIC | Age: 81
End: 2021-11-12
Payer: MEDICARE

## 2021-11-12 ENCOUNTER — CLINICAL SUPPORT (OUTPATIENT)
Dept: CARDIOLOGY | Facility: HOSPITAL | Age: 81
End: 2021-11-12
Payer: MEDICARE

## 2021-11-12 DIAGNOSIS — R55 SYNCOPE AND COLLAPSE: ICD-10-CM

## 2021-11-12 DIAGNOSIS — Z95.818 PRESENCE OF OTHER CARDIAC IMPLANTS AND GRAFTS: ICD-10-CM

## 2021-11-12 PROCEDURE — 93298 REM INTERROG DEV EVAL SCRMS: CPT | Mod: ,,, | Performed by: INTERNAL MEDICINE

## 2021-11-12 PROCEDURE — 93298 CARDIAC DEVICE CHECK - REMOTE: ICD-10-PCS | Mod: ,,, | Performed by: INTERNAL MEDICINE

## 2021-11-12 PROCEDURE — G2066 INTER DEVC REMOTE 30D: HCPCS | Performed by: INTERNAL MEDICINE

## 2021-11-12 NOTE — H&P
Pre-Procedure H and P Addendum    Patient seen and examined.  History and exam unchanged from prior history and physical.      Procedure: EGD  Indication: Anemia, GI bleed  ASA Class: III  Mallampatti score: per anesthesia  Anesthesia Plan: MAC    Consent obtained by the GI Fellow on chart.     OBAntePartum Assessment Completed on: 12-Nov-2021 17:52

## 2021-12-12 ENCOUNTER — CLINICAL SUPPORT (OUTPATIENT)
Dept: CARDIOLOGY | Facility: HOSPITAL | Age: 81
End: 2021-12-12
Payer: MEDICARE

## 2021-12-12 DIAGNOSIS — Z95.818 PRESENCE OF OTHER CARDIAC IMPLANTS AND GRAFTS: ICD-10-CM

## 2021-12-12 PROCEDURE — G2066 INTER DEVC REMOTE 30D: HCPCS | Performed by: INTERNAL MEDICINE

## 2021-12-12 PROCEDURE — 93298 REM INTERROG DEV EVAL SCRMS: CPT | Mod: ,,, | Performed by: INTERNAL MEDICINE

## 2021-12-12 PROCEDURE — 93298 CARDIAC DEVICE CHECK - REMOTE: ICD-10-PCS | Mod: ,,, | Performed by: INTERNAL MEDICINE

## 2022-01-11 ENCOUNTER — CLINICAL SUPPORT (OUTPATIENT)
Dept: CARDIOLOGY | Facility: HOSPITAL | Age: 82
End: 2022-01-11
Payer: MEDICARE

## 2022-01-11 DIAGNOSIS — Z95.818 PRESENCE OF OTHER CARDIAC IMPLANTS AND GRAFTS: ICD-10-CM

## 2022-01-11 PROCEDURE — 93298 CARDIAC DEVICE CHECK - REMOTE: ICD-10-PCS | Mod: ,,, | Performed by: INTERNAL MEDICINE

## 2022-01-11 PROCEDURE — 93298 REM INTERROG DEV EVAL SCRMS: CPT | Mod: ,,, | Performed by: INTERNAL MEDICINE

## 2022-01-11 PROCEDURE — G2066 INTER DEVC REMOTE 30D: HCPCS | Performed by: INTERNAL MEDICINE

## 2022-02-10 ENCOUNTER — CLINICAL SUPPORT (OUTPATIENT)
Dept: CARDIOLOGY | Facility: HOSPITAL | Age: 82
End: 2022-02-10
Payer: MEDICARE

## 2022-02-10 DIAGNOSIS — Z95.818 PRESENCE OF OTHER CARDIAC IMPLANTS AND GRAFTS: ICD-10-CM

## 2022-02-10 PROCEDURE — G2066 INTER DEVC REMOTE 30D: HCPCS | Performed by: INTERNAL MEDICINE

## 2022-03-03 ENCOUNTER — TELEPHONE (OUTPATIENT)
Dept: ELECTROPHYSIOLOGY | Facility: CLINIC | Age: 82
End: 2022-03-03
Payer: MEDICARE

## 2022-03-03 DIAGNOSIS — I48.91 ATRIAL FIBRILLATION, UNSPECIFIED TYPE: Primary | ICD-10-CM

## 2022-03-10 ENCOUNTER — TELEPHONE (OUTPATIENT)
Dept: ELECTROPHYSIOLOGY | Facility: CLINIC | Age: 82
End: 2022-03-10
Payer: MEDICARE

## 2022-03-10 NOTE — TELEPHONE ENCOUNTER
Alert received from LOOP recorder  RRT reached on 3/7/22    Pt has in clinic appt 4/26/22 with Dr. Montez            Will notify him of RRT status.

## 2022-03-12 ENCOUNTER — CLINICAL SUPPORT (OUTPATIENT)
Dept: CARDIOLOGY | Facility: HOSPITAL | Age: 82
End: 2022-03-12
Payer: MEDICARE

## 2022-03-12 DIAGNOSIS — Z95.818 PRESENCE OF OTHER CARDIAC IMPLANTS AND GRAFTS: ICD-10-CM

## 2022-03-12 PROCEDURE — 93298 CARDIAC DEVICE CHECK - REMOTE: ICD-10-PCS | Mod: ,,, | Performed by: INTERNAL MEDICINE

## 2022-03-12 PROCEDURE — 93298 REM INTERROG DEV EVAL SCRMS: CPT | Mod: ,,, | Performed by: INTERNAL MEDICINE

## 2022-03-12 PROCEDURE — G2066 INTER DEVC REMOTE 30D: HCPCS | Performed by: INTERNAL MEDICINE

## 2022-04-11 ENCOUNTER — CLINICAL SUPPORT (OUTPATIENT)
Dept: CARDIOLOGY | Facility: HOSPITAL | Age: 82
End: 2022-04-11
Payer: MEDICARE

## 2022-04-11 DIAGNOSIS — Z95.818 PRESENCE OF OTHER CARDIAC IMPLANTS AND GRAFTS: ICD-10-CM

## 2022-04-11 PROCEDURE — G2066 INTER DEVC REMOTE 30D: HCPCS | Performed by: INTERNAL MEDICINE

## 2022-04-25 ENCOUNTER — TELEPHONE (OUTPATIENT)
Dept: ELECTROPHYSIOLOGY | Facility: CLINIC | Age: 82
End: 2022-04-25
Payer: MEDICARE

## 2022-04-26 ENCOUNTER — HOSPITAL ENCOUNTER (OUTPATIENT)
Dept: CARDIOLOGY | Facility: CLINIC | Age: 82
Discharge: HOME | End: 2022-04-26
Payer: MEDICARE

## 2022-04-26 ENCOUNTER — DOCUMENTATION ONLY (OUTPATIENT)
Dept: ELECTROPHYSIOLOGY | Facility: CLINIC | Age: 82
End: 2022-04-26

## 2022-04-26 ENCOUNTER — OFFICE VISIT (OUTPATIENT)
Dept: ELECTROPHYSIOLOGY | Facility: CLINIC | Age: 82
End: 2022-04-26
Payer: MEDICARE

## 2022-04-26 VITALS
WEIGHT: 184.75 LBS | HEART RATE: 61 BPM | DIASTOLIC BLOOD PRESSURE: 68 MMHG | BODY MASS INDEX: 29.69 KG/M2 | HEIGHT: 66 IN | SYSTOLIC BLOOD PRESSURE: 160 MMHG

## 2022-04-26 DIAGNOSIS — R55 SYNCOPE, UNSPECIFIED SYNCOPE TYPE: ICD-10-CM

## 2022-04-26 DIAGNOSIS — Z98.890 HISTORY OF RADIOFREQUENCY ABLATION (RFA) PROCEDURE FOR CARDIAC ARRHYTHMIA: ICD-10-CM

## 2022-04-26 DIAGNOSIS — I48.91 ATRIAL FIBRILLATION, UNSPECIFIED TYPE: ICD-10-CM

## 2022-04-26 DIAGNOSIS — Z95.818 STATUS POST PLACEMENT OF IMPLANTABLE LOOP RECORDER: ICD-10-CM

## 2022-04-26 DIAGNOSIS — I10 ESSENTIAL HYPERTENSION: Primary | ICD-10-CM

## 2022-04-26 DIAGNOSIS — R06.83 SNORING: ICD-10-CM

## 2022-04-26 PROBLEM — I47.10 SUPRAVENTRICULAR TACHYCARDIA: Status: RESOLVED | Noted: 2018-04-25 | Resolved: 2022-04-26

## 2022-04-26 PROBLEM — I47.10 SVT (SUPRAVENTRICULAR TACHYCARDIA): Status: RESOLVED | Noted: 2018-07-09 | Resolved: 2022-04-26

## 2022-04-26 PROCEDURE — 99999 PR PBB SHADOW E&M-EST. PATIENT-LVL III: CPT | Mod: PBBFAC,,, | Performed by: INTERNAL MEDICINE

## 2022-04-26 PROCEDURE — 3078F DIAST BP <80 MM HG: CPT | Mod: CPTII,S$GLB,, | Performed by: INTERNAL MEDICINE

## 2022-04-26 PROCEDURE — 1126F AMNT PAIN NOTED NONE PRSNT: CPT | Mod: CPTII,S$GLB,, | Performed by: INTERNAL MEDICINE

## 2022-04-26 PROCEDURE — 1159F MED LIST DOCD IN RCRD: CPT | Mod: CPTII,S$GLB,, | Performed by: INTERNAL MEDICINE

## 2022-04-26 PROCEDURE — 1126F PR PAIN SEVERITY QUANTIFIED, NO PAIN PRESENT: ICD-10-PCS | Mod: CPTII,S$GLB,, | Performed by: INTERNAL MEDICINE

## 2022-04-26 PROCEDURE — 1159F PR MEDICATION LIST DOCUMENTED IN MEDICAL RECORD: ICD-10-PCS | Mod: CPTII,S$GLB,, | Performed by: INTERNAL MEDICINE

## 2022-04-26 PROCEDURE — 99214 PR OFFICE/OUTPT VISIT, EST, LEVL IV, 30-39 MIN: ICD-10-PCS | Mod: S$GLB,,, | Performed by: INTERNAL MEDICINE

## 2022-04-26 PROCEDURE — 93010 RHYTHM STRIP: ICD-10-PCS | Mod: S$GLB,,, | Performed by: INTERNAL MEDICINE

## 2022-04-26 PROCEDURE — 93005 RHYTHM STRIP: ICD-10-PCS | Mod: S$GLB,,, | Performed by: INTERNAL MEDICINE

## 2022-04-26 PROCEDURE — 93005 ELECTROCARDIOGRAM TRACING: CPT | Mod: S$GLB,,, | Performed by: INTERNAL MEDICINE

## 2022-04-26 PROCEDURE — 3078F PR MOST RECENT DIASTOLIC BLOOD PRESSURE < 80 MM HG: ICD-10-PCS | Mod: CPTII,S$GLB,, | Performed by: INTERNAL MEDICINE

## 2022-04-26 PROCEDURE — 93010 ELECTROCARDIOGRAM REPORT: CPT | Mod: S$GLB,,, | Performed by: INTERNAL MEDICINE

## 2022-04-26 PROCEDURE — 3077F SYST BP >= 140 MM HG: CPT | Mod: CPTII,S$GLB,, | Performed by: INTERNAL MEDICINE

## 2022-04-26 PROCEDURE — 99214 OFFICE O/P EST MOD 30 MIN: CPT | Mod: S$GLB,,, | Performed by: INTERNAL MEDICINE

## 2022-04-26 PROCEDURE — 3077F PR MOST RECENT SYSTOLIC BLOOD PRESSURE >= 140 MM HG: ICD-10-PCS | Mod: CPTII,S$GLB,, | Performed by: INTERNAL MEDICINE

## 2022-04-26 PROCEDURE — 99999 PR PBB SHADOW E&M-EST. PATIENT-LVL III: ICD-10-PCS | Mod: PBBFAC,,, | Performed by: INTERNAL MEDICINE

## 2022-04-26 PROCEDURE — 1160F PR REVIEW ALL MEDS BY PRESCRIBER/CLIN PHARMACIST DOCUMENTED: ICD-10-PCS | Mod: CPTII,S$GLB,, | Performed by: INTERNAL MEDICINE

## 2022-04-26 PROCEDURE — 1160F RVW MEDS BY RX/DR IN RCRD: CPT | Mod: CPTII,S$GLB,, | Performed by: INTERNAL MEDICINE

## 2022-04-26 NOTE — PROGRESS NOTES
Subjective:    Patient ID:  Crystal Alvarado is a 81 y.o. female who presents for evaluation of ILR follow-up    Referring Cardiologist: Chris Kline MD    HPI     Prior Hx:  I had the pleasure of seeing Ms. Alvarado today in our electrophysiology clinic for her SVT and bradycardia. As you are aware she is a pleasant 81 year-old woman with hypertension, non-obstructive CAD and CKD stage III and chronic bradycardia. She presented to the ER 4/25/2018 with complaint of dizziness. She was in a narrow complex short RP tachycardia at a rate of 156 bpm that terminated with a vagal maneuver. During her stay she had sinus rhythm with rates of 40s-60s off AV kamar agents. She felt better. She had a holter monitor 12/2017 that noted sinus rhythm with rate range of 43-97 with average of 66. There were also rare PVCs and PACs with 2 episodes of NSAT.     Since her hospitalization she had several falls at home. She believes the falls were from losing her balance and not from light-headedness. She notes intermittent similar symptoms that she presented to the ER with. Episodes may last several minutes. One fall 7/2018 resulted in left clavicular fracture.    CONCLUSIONS     1 - Normal left ventricular systolic function (EF 60-65%).     2 - Impaired LV relaxation, increased LVEDP.     3 - Normal right ventricular systolic function .     4 - The estimated PA systolic pressure is 31 mmHg.    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 She underwent slow pathway modification and then underwent ILR implantation for rhythm monitoring for her syncope/falls.     Ms. Alvarado returned for follow-up 8/2019. She went to the ER April 26, 2019 after standing up suddenly then fell. She broke her right wrist. She is unable to tell me if she lost consciousness. Only says she stood up and fell. ILR interrogation noted no corresponding arrhythmias. She did have bradycardia with a 3-4 second pause on April 1st at 4AM during sleep. She  "reports she normally wakes up after 6 AM. Other "Pauses" noted on interrogation actually represented undersensing. She was felt to be dehydrated. Her creatinine was elevated. She reports she only drinks 1 glass of water a day. She is known to have orthostatic hypotension.    Interim Hx:  Mrs. Alvarado returns for overdue follow-up. She reports feeling "badly" all the time. Reviewed recent monitor interrogations. She has had a few nocturnal episodes of sudden sinus bradycardia with concomitant AV block consistent with nocturnal increased vagal tone. She has never been evaluated for sleep apnea. There are some alerts for AF however review of EMGs with an in-clinic interrogation noted this was for intermittent T-wave oversensing. She reports daytime fatigue and daughter reports her mother snores. ILR battery has reached end of service.    My interpretation of today's in clinic ECG is sinus rhythm at a rate of 61 bpm with normal intervals.    Review of Systems   Constitutional: Positive for malaise/fatigue.   HENT: Negative for congestion and sore throat.    Eyes: Negative for blurred vision and visual disturbance.   Cardiovascular: Negative for chest pain, leg swelling, near-syncope, palpitations, paroxysmal nocturnal dyspnea and syncope.   Respiratory: Negative for cough and shortness of breath.    Hematologic/Lymphatic: Negative for bleeding problem. Does not bruise/bleed easily.   Musculoskeletal: Positive for arthritis.   Gastrointestinal: Negative for bloating and abdominal pain.   Neurological: Negative for light-headedness, loss of balance and weakness.        Objective:    Physical Exam  Vitals reviewed.   Constitutional:       General: She is not in acute distress.     Appearance: She is well-developed. She is not diaphoretic.   HENT:      Head: Normocephalic and atraumatic.   Eyes:      General:         Right eye: No discharge.         Left eye: No discharge.      Conjunctiva/sclera: Conjunctivae normal.   Neck: "      Vascular: No JVD.   Cardiovascular:      Rate and Rhythm: Normal rate and regular rhythm.      Heart sounds: No murmur heard.    No friction rub. No gallop.   Pulmonary:      Effort: Pulmonary effort is normal. No respiratory distress.      Breath sounds: Normal breath sounds. No wheezing or rales.   Abdominal:      General: Bowel sounds are normal. There is no distension.      Palpations: Abdomen is soft.      Tenderness: There is no abdominal tenderness. There is no rebound.   Musculoskeletal:      Cervical back: Neck supple.   Skin:     General: Skin is warm and dry.   Neurological:      Mental Status: She is alert and oriented to person, place, and time.   Psychiatric:         Behavior: Behavior normal.         Thought Content: Thought content normal.         Judgment: Judgment normal.           Assessment:       1. Essential hypertension    2. History of radiofrequency ablation (RFA) procedure for cardiac arrhythmia    3. Status post placement of implantable loop recorder    4. Syncope, unspecified syncope type    5. Snoring         Plan:       In summary, Ms. Alvarado is a pleasant 81 year-old woman with hypertension, non-obstructive CAD and CKD stage III and chronic bradycardia presenting for evaluation of SVT and bradycardia. 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 No daytime bradyarrhythmias have been identified. She has nocturnal sinus pauses with concomitant AV block, suspect from undiagnosed sleep apnea. She is agreeable with sleep clinic referral. No true AF observed on her monitoring. Discussed that I do not see a medical necessity for ILR removal/reimplant. Discussed abandoning versus removal. She will think about it.    RTC prn  Referral to sleep clinic    Thank you for allowing me to participate in the care of this patient. Please do not hesitate to call me with any questions or concerns.    Archie Montez MD, PhD  Cardiac  Electrophysiology

## 2022-04-26 NOTE — PROGRESS NOTES
Limited Interrogation of Loop done in clinic  Reached EOS April 4,2022  One AF episode noted, avail egram not consistent with AF, T wave oversensing noted.

## 2022-05-11 ENCOUNTER — CLINICAL SUPPORT (OUTPATIENT)
Dept: CARDIOLOGY | Facility: HOSPITAL | Age: 82
End: 2022-05-11
Payer: MEDICARE

## 2022-05-11 DIAGNOSIS — Z95.818 PRESENCE OF OTHER CARDIAC IMPLANTS AND GRAFTS: ICD-10-CM

## 2022-05-11 PROCEDURE — G2066 INTER DEVC REMOTE 30D: HCPCS | Performed by: INTERNAL MEDICINE

## 2022-05-11 PROCEDURE — 93298 REM INTERROG DEV EVAL SCRMS: CPT | Mod: ,,, | Performed by: INTERNAL MEDICINE

## 2022-05-11 PROCEDURE — 93298 CARDIAC DEVICE CHECK - REMOTE: ICD-10-PCS | Mod: ,,, | Performed by: INTERNAL MEDICINE

## 2022-06-29 ENCOUNTER — OFFICE VISIT (OUTPATIENT)
Dept: SLEEP MEDICINE | Facility: CLINIC | Age: 82
End: 2022-06-29
Payer: MEDICARE

## 2022-06-29 VITALS
HEART RATE: 70 BPM | BODY MASS INDEX: 29.7 KG/M2 | WEIGHT: 184.81 LBS | DIASTOLIC BLOOD PRESSURE: 75 MMHG | HEIGHT: 66 IN | SYSTOLIC BLOOD PRESSURE: 170 MMHG

## 2022-06-29 DIAGNOSIS — R06.83 SNORING: ICD-10-CM

## 2022-06-29 DIAGNOSIS — G47.30 SLEEP APNEA, UNSPECIFIED TYPE: Primary | ICD-10-CM

## 2022-06-29 PROCEDURE — 3077F PR MOST RECENT SYSTOLIC BLOOD PRESSURE >= 140 MM HG: ICD-10-PCS | Mod: CPTII,S$GLB,, | Performed by: PSYCHIATRY & NEUROLOGY

## 2022-06-29 PROCEDURE — 99204 OFFICE O/P NEW MOD 45 MIN: CPT | Mod: S$GLB,,, | Performed by: PSYCHIATRY & NEUROLOGY

## 2022-06-29 PROCEDURE — 1101F PR PT FALLS ASSESS DOC 0-1 FALLS W/OUT INJ PAST YR: ICD-10-PCS | Mod: CPTII,S$GLB,, | Performed by: PSYCHIATRY & NEUROLOGY

## 2022-06-29 PROCEDURE — 1159F MED LIST DOCD IN RCRD: CPT | Mod: CPTII,S$GLB,, | Performed by: PSYCHIATRY & NEUROLOGY

## 2022-06-29 PROCEDURE — 1159F PR MEDICATION LIST DOCUMENTED IN MEDICAL RECORD: ICD-10-PCS | Mod: CPTII,S$GLB,, | Performed by: PSYCHIATRY & NEUROLOGY

## 2022-06-29 PROCEDURE — 1125F PR PAIN SEVERITY QUANTIFIED, PAIN PRESENT: ICD-10-PCS | Mod: CPTII,S$GLB,, | Performed by: PSYCHIATRY & NEUROLOGY

## 2022-06-29 PROCEDURE — 3078F DIAST BP <80 MM HG: CPT | Mod: CPTII,S$GLB,, | Performed by: PSYCHIATRY & NEUROLOGY

## 2022-06-29 PROCEDURE — 3288F PR FALLS RISK ASSESSMENT DOCUMENTED: ICD-10-PCS | Mod: CPTII,S$GLB,, | Performed by: PSYCHIATRY & NEUROLOGY

## 2022-06-29 PROCEDURE — 3077F SYST BP >= 140 MM HG: CPT | Mod: CPTII,S$GLB,, | Performed by: PSYCHIATRY & NEUROLOGY

## 2022-06-29 PROCEDURE — 99999 PR PBB SHADOW E&M-EST. PATIENT-LVL IV: ICD-10-PCS | Mod: PBBFAC,,, | Performed by: PSYCHIATRY & NEUROLOGY

## 2022-06-29 PROCEDURE — 1160F PR REVIEW ALL MEDS BY PRESCRIBER/CLIN PHARMACIST DOCUMENTED: ICD-10-PCS | Mod: CPTII,S$GLB,, | Performed by: PSYCHIATRY & NEUROLOGY

## 2022-06-29 PROCEDURE — 99999 PR PBB SHADOW E&M-EST. PATIENT-LVL IV: CPT | Mod: PBBFAC,,, | Performed by: PSYCHIATRY & NEUROLOGY

## 2022-06-29 PROCEDURE — 1125F AMNT PAIN NOTED PAIN PRSNT: CPT | Mod: CPTII,S$GLB,, | Performed by: PSYCHIATRY & NEUROLOGY

## 2022-06-29 PROCEDURE — 1160F RVW MEDS BY RX/DR IN RCRD: CPT | Mod: CPTII,S$GLB,, | Performed by: PSYCHIATRY & NEUROLOGY

## 2022-06-29 PROCEDURE — 99204 PR OFFICE/OUTPT VISIT, NEW, LEVL IV, 45-59 MIN: ICD-10-PCS | Mod: S$GLB,,, | Performed by: PSYCHIATRY & NEUROLOGY

## 2022-06-29 PROCEDURE — 3078F PR MOST RECENT DIASTOLIC BLOOD PRESSURE < 80 MM HG: ICD-10-PCS | Mod: CPTII,S$GLB,, | Performed by: PSYCHIATRY & NEUROLOGY

## 2022-06-29 PROCEDURE — 3288F FALL RISK ASSESSMENT DOCD: CPT | Mod: CPTII,S$GLB,, | Performed by: PSYCHIATRY & NEUROLOGY

## 2022-06-29 PROCEDURE — 1101F PT FALLS ASSESS-DOCD LE1/YR: CPT | Mod: CPTII,S$GLB,, | Performed by: PSYCHIATRY & NEUROLOGY

## 2022-06-29 NOTE — PATIENT INSTRUCTIONS
SLEEP LAB ([facility]) will contact you to schedulethe sleep study. Their number is [phone] (ext 2). Please call them if you do not hear from them in 2 weeks from now.  The [facility] is located on 7th floor of the [facility]; [place] lab is located in [facility].    SLEEP CLINIC (my assistant) will call you when the sleep study results are ready - if you have not heard from us by 2 weeks from the date of the study, please call [phone] (ext 1) or you can use [facility] to contact me.    You are advised to abstain from driving should you feel sleepy or drowsy.            Sleep Hygiene Practices     1. Try going to bed only when you are drowsy.  ?   2. If you are unable to fall asleep or stay asleep, leave your bedroom and engage in a quiet activity elsewhere. Do not permit yourself to fall asleep outside the bedroom. Return to bed when and only when you are sleepy. Repeat this process as often as necessary throughout night.   3. Maintain regular wake-up time, even on days off work & weekends   4. Use your bedroom for sleep and sex   5. Do not watch TV in bed  6. Avoid napping during the daytime. If daytime sleepiness becomes overwhelming, limit nap time to a single nap of less than 1hr, no later than 3pm.   7. Distract your mind. Avoid clock watching. Lying in bed unable to sleep and frustrated needs to be avoided. Try reading or watching a videotape or listening to books on tape. It may be necessary to go into another room to do these.   8. Avoid caffeine within 4-6hrs of bedtime   9. Avoid use of nicotine close to bedtime   10. do not drink alcoholic beverages within 4-6hrs of bedtime   11. While a light snack before bedtime can help promote sound sleep, avoid large meals.   12. Obtain regular exercise, but avoid strenuous exercise within 4hrs of bedtime   13. Minimize light, noise, and extremes in temperature in the bedroom.   14. Precautions: The patient was advised to abstain from  driving should they feel sleepy or drowsy.

## 2022-06-29 NOTE — PROGRESS NOTES
Crystal Alvarado is a 81 y.o. female is here to be evaluated for a sleep disorder; referred by Archie Montez MD.    The patient reports excessive daytime sleepiness, excessive daytime fatigue, snoring and interrupted sleep since  Several years afo.   Crystal Alvarado denied   witnessed breathing pauses and  gasping for air in sleep.      + reports difficulty staying asleep - watching TV in bed    The patient feels rested upon awakening. Takes occasional naps.     The patient  denies morning headaches and reports occasional  dry mouth on awakening.   Crystal Alvarado denies  nasal congestion.      Crystal Alvarado  denies symptoms concerning for parasomnia except for occasional somniloquy.  The patient  denies auxiliary symptoms of narcolepsy including sleep onset paralysis, hypnagogic hallucinations, sleep attacks and cataplexy.    Crystal Alvarado denied symptoms concerning for RLS; nocturnal leg movements have not been noticed.   The patient does not experience sleep related leg cramps.       Medications pertinent to sleep  disorders taken currently: no  Previous  medications taken  for sleep disorders:  no    Sleep studies  no    Occupation:retuired  Bed partner: no  Exercise routine: no  Caffeine:  2 11 AM  beverages per day  Alcohol: no  Smoking:no  EPWORTH SLEEPINESS SCALE TOTAL SCORE  6/29/2022   Total score 11         EPWORTH SLEEPINESS SCALE 6/29/2022   Sitting and reading 1   Watching TV 2   Sitting, inactive in a public place (e.g. a theatre or a meeting) 1   As a passenger in a car for an hour without a break 0   Lying down to rest in the afternoon when circumstances permit 2   Sitting and talking to someone 3   Sitting quietly after a lunch without alcohol 2   In a car, while stopped for a few minutes in traffic 0   Total score 11       EPWORTH SLEEPINESS SCALE 6/29/2022   Sitting and reading 1   Watching TV 2   Sitting, inactive in a public place (e.g. a theatre or a  meeting) 1   As a passenger in a car for an hour without a break 0   Lying down to rest in the afternoon when circumstances permit 2   Sitting and talking to someone 3   Sitting quietly after a lunch without alcohol 2   In a car, while stopped for a few minutes in traffic 0   Total score 11     Sleep Clinic New Patient 6/29/2022   What time do you go to bed on a week day? (Give a range) 8:30pm   What time do you go to bed on a day off? (Give a range) n/a   How long does it take you to fall asleep? (Give a range) about 20 mins   On average, how many times per night do you wake up? every  2 hours   How long does it take you to fall back into sleep? (Give a range) about a hour   What time do you wake up to start your day on a week day? (Give a range) 6am   What time do you wake up to start your day on a day off? (Give a range) n/a   What time do you get out of bed? (Give a range) 8:30am   On average, how many hours do you sleep? about 4 to 5 hours   On average, how many naps do you take per day? 2 naps   Rate your sleep quality from 0 to 5 (0-poor, 5-great). 3   Have you experienced:  N/a   How much weight have you lost or gained (in lbs.) in the last year? 0   On average, how many times per night do you go to the bathroom?  about every 2 hours   Have you ever had a sleep study/CPAP machine/surgery for sleep apnea? No   Have you ever had a CPAP machine for sleep apnea? No   Have you ever had surgery for sleep apnea? No       Sleep Clinic ROS  6/29/2022   Difficulty breathing through the nose?  Sometimes   Sore throat or dry mouth in the morning? Sometimes   Irregular or very fast heart beat?  No   Shortness of breath?  Sometimes   Acid reflux? No   Body aches and pains?  Yes   Morning headaches? No   Dizziness? No   Mood changes?  No   Do you exercise?  No   Do you feel like moving your legs a lot?  No       DME:         PAST MEDICAL HISTORY:    Active Ambulatory Problems     Diagnosis Date Noted    Syncope 08/09/2015     Tobacco abuse 08/09/2015    Pain in the chest 08/09/2015    SSS (sick sinus syndrome) 08/10/2015    Dizziness 08/10/2015    Light headedness 08/10/2015    Essential hypertension 08/11/2015    Hyperlipidemia     Coronary artery disease involving native coronary artery of native heart without angina pectoris 02/16/2016    CKD (chronic kidney disease) stage 3, GFR 30-59 ml/min 11/19/2017    Normocytic anemia 11/19/2017    Postural dizziness with presyncope     Hypokalemia     Physical deconditioning     Heart failure with preserved left ventricular function (HFpEF)     Orthostatic hypotension 04/27/2018    Pre-syncope 07/05/2018    Concussion with no loss of consciousness 07/05/2018    Closed fracture of acromial end of clavicle 07/06/2018    History of radiofrequency ablation (RFA) procedure for cardiac arrhythmia 08/14/2018    Status post placement of implantable loop recorder 08/14/2018    Closed fracture of right wrist 05/27/2019    Wrist fracture 07/31/2019    Appendicitis with perforation 08/09/2019    Hypomagnesemia 08/12/2019    Aortic stenosis 11/04/2019    Left anterior knee pain 08/03/2020    Acute blood loss anemia 08/04/2020    Vitamin D deficiency 09/02/2020    Iron deficiency anemia due to chronic blood loss 10/15/2020    Total, mature age-related cataract 11/23/2020     Resolved Ambulatory Problems     Diagnosis Date Noted    Chest pain 08/09/2015    Sick sinus syndrome 08/10/2015    Cardiac ischemia 08/12/2015    Anginal equivalent 11/19/2017    Hypertensive emergency without congestive heart failure 11/22/2017    SOB (shortness of breath)     Hypertensive urgency     Supraventricular tachycardia 04/25/2018    Sinus bradycardia 04/26/2018    SVT (supraventricular tachycardia) 07/09/2018    Gastrointestinal hemorrhage 08/03/2020    Elevated serum creatinine 08/06/2020     Past Medical History:   Diagnosis Date    Arthritis     CHF (congestive heart failure)      Coronary artery disease     Hypertension                 PAST SURGICAL HISTORY:    Past Surgical History:   Procedure Laterality Date    A-V CARDIAC PACEMAKER INSERTION N/A 2018    Procedure: INSERTION, CARDIAC PACEMAKER, DUAL CHAMBER;  Surgeon: Archie Montez MD;  Location: [facility] CATH LAB;  Service: Cardiology;  Laterality: N/A;  SVT, RFA, +/- Dual PPM or ILR, IGLESIA, MDT, MAC, TN, 3 Prep    CATARACT EXTRACTION W/  INTRAOCULAR LENS IMPLANT Left 2020    Procedure: EXTRACTION, CATARACT, WITH IOL INSERTION;  Surgeon: Roslyn Napier MD;  Location: [facility] OR;  Service: Ophthalmology;  Laterality: Left;    cataract removal Right     [facility]    COLONOSCOPY      COLONOSCOPY N/A 2020    Procedure: COLONOSCOPY;  Surgeon: Sebas Dickens MD;  Location: [facility] (2ND FLR);  Service: Endoscopy;  Laterality: N/A;    ESOPHAGOGASTRODUODENOSCOPY N/A 2020    Procedure: EGD (ESOPHAGOGASTRODUODENOSCOPY);  Surgeon: Fernie Cohen MD;  [facility]  Service: Endoscopy;  Laterality: N/A;    HYSTERECTOMY N/A     INSERTION OF IMPLANTABLE LOOP RECORDER N/A 2018    Procedure: PLACEMENT-LOOP RECORDER;  Surgeon: Archie Montez MD;  Location: [facility] CATH LAB;  Service: Cardiology;  Laterality: N/A;  SVT, RFA, +/- Dual PPM or ILR, IGLESIA, MDT, MAC, TN, 3 Prep         FAMILY HISTORY:                No family history on file.    SOCIAL HISTORY:          Tobacco:   Social History     Tobacco Use   Smoking Status Former Smoker    Packs/day: 0.50    Quit date: 2013    Years since quittin.1   Smokeless Tobacco Never Used       alcohol use:    Social History     Substance and Sexual Activity   Alcohol Use No                   ALLERGIES:  Review of patient's allergies indicates:  No Known Allergies    CURRENT MEDICATIONS:    Current Outpatient Medications   Medication Sig Dispense Refill    acetaminophen (TYLENOL) 325 MG tablet Take 2 tablets (650 mg total) by mouth every 6 (six)  "hours as needed for Pain.  0    amLODIPine (NORVASC) 10 MG tablet Take 1 tablet (10 mg total) by mouth once daily. 90 tablet 3    atorvastatin (LIPITOR) 20 MG tablet Take 20 mg by mouth every evening.  4    BREO ELLIPTA 100-25 mcg/dose diskus inhaler INHALE 1 PUFF BY MOUTH ONCE A DAY  3    docusate sodium (COLACE) 100 MG capsule Take 1 capsule (100 mg total) by mouth 2 (two) times daily. 60 capsule 0    ergocalciferol (ERGOCALCIFEROL) 50,000 unit Cap Take 50,000 Units by mouth every 7 days.      fluticasone propionate (FLONASE) 50 mcg/actuation nasal spray       HYDROcodone-acetaminophen (NORCO) 5-325 mg per tablet Take 1 tablet by mouth every 4 (four) hours as needed for Pain. 10 tablet 0    LIDOcaine (LIDODERM) 5 % Place 1 patch onto the skin once daily. Remove & Discard patch within 12 hours or as directed by MD 15 patch 0    losartan (COZAAR) 100 MG tablet TAKE 1 TABLET BY MOUTH EVERY DAY 90 tablet 0    prednisolon/gatiflox/bromfenac (PREDNISOL ACE-GATIFLOX-BROMFEN) 1-0.5-0.075 % DrpS Apply 1 drop to eye 3 (three) times daily. in operative eye for 1 month after surgery 5 mL 3    nitroGLYCERIN (NITROSTAT) 0.4 MG SL tablet Place 1 tablet (0.4 mg total) under the tongue every 5 (five) minutes as needed for Chest pain (and notify MD). 25 tablet 5     Current Facility-Administered Medications   Medication Dose Route Frequency Provider Last Rate Last Admin    sodium chloride 0.9% flush 10 mL  10 mL Intravenous PRN Roslyn Napier MD                        PHYSICAL EXAM:  BP (!) 170/75   Pulse 70   Ht 5' 6" (1.676 m)   Wt 83.8 kg (184 lb 12.8 oz)   BMI 29.83 kg/m²   GENERAL: Normal development, well groomed.  HEENT:   HEENT:  Conjunctivae are non-erythematous; Pupils equal, round, and reactive to light; Nose is symmetrical; Nasal mucosa is pink and moist; Septum is midline; Inferior turbinates are normal; Nasal airflow is normal; Posterior pharynx is pink; Modified Mallampati:III-IV; Posterior palate is " "low; Tonsils not visualized; Uvula is normal and pink;Tongue is normal; Dentition is fair; No TMJ tenderness; Jaw opening and protrusion without click and without discomfort.  NECK: Supple. Neck circumference is 15 inches. No thyromegaly. No palpable nodes.     SKIN: On face and neck: No abrasions, no rashes, no lesions.  No subcutaneous nodules are palpable.  RESPIRATORY: Chest is clear to auscultation.  Normal chest expansion and non-labored breathing at rest.  CARDIOVASCULAR: Normal S1, S2.  No murmurs, gallops or rubs. No carotid bruits bilaterally.  No edema. No clubbing. No cyanosis.    NEURO: Oriented to time, place and person. Normal attention span and concentration. Gait normal.    PSYCH: Affect is full. Mood is normal  MUSCULOSKELETAL: Moves 4 extremities. Gait normal.           ASSESSMENT:    1. Sleep Apnea NEC. The patient symptomatically has  excessive daytime sleepiness, snoring, excessive daytime fatigue, interrupted sleep and nocturia  with exam findings of "a crowded oral airway and elevated body mass index. The patient has medical co-morbidities of CAD, CHF, hyperlipidemia and hypertension,  which can be worsened by ORLIN. This warrants further investigation for possible obstructive sleep apnea.      2. Insomnia NEC. Multi-factorial -  excess time in bed, poor sleep hygiene, and likely paradoxical insomnia play a role            PLAN:    Diagnostic:PSG. The nature of this procedure and its indication was discussed with the patient. We will notify the patient about sleep study resuts via My Chart.      Will try Melatonin '      Sleep hygiene recommendations were provided        During our discussion today, we talked about the etiology of  ORLIN as well as the potential ramifications of untreated sleep apnea, which could include daytime sleepiness, hypertension, heart disease and/or stroke.  We discussed potential treatment options, which could include weight loss, body positioning, continuous positive " airway pressure (CPAP), or referral for surgical consideration. Meanwhile, she  is urged to avoid supine sleep, weight gain and alcoholic beverages since all of these can worsen ORLIN.     The patient was given open opportunity to ask questions and/or express concerns about treatment plan. Two point patient identifier confirmed.       Precautions: The patient was advised to abstain from driving should he feel sleepy or drowsy.    Follow up: MD after the sleep study has been completed.     31-minute visit. >50% spent counseling patient and coordination of care.  The patient was  cautioned against drowsy driving.

## 2022-07-07 ENCOUNTER — TELEPHONE (OUTPATIENT)
Dept: SLEEP MEDICINE | Facility: OTHER | Age: 82
End: 2022-07-07
Payer: MEDICARE

## 2022-07-08 ENCOUNTER — TELEPHONE (OUTPATIENT)
Dept: SLEEP MEDICINE | Facility: OTHER | Age: 82
End: 2022-07-08
Payer: MEDICARE

## 2022-08-08 ENCOUNTER — TELEPHONE (OUTPATIENT)
Dept: SLEEP MEDICINE | Facility: OTHER | Age: 82
End: 2022-08-08
Payer: MEDICARE

## 2022-08-09 ENCOUNTER — HOSPITAL ENCOUNTER (OUTPATIENT)
Dept: SLEEP MEDICINE | Facility: HOSPITAL | Age: 82
Discharge: HOME | End: 2022-08-09
Attending: PSYCHIATRY & NEUROLOGY
Payer: MEDICARE

## 2022-08-09 DIAGNOSIS — G47.33 OSA (OBSTRUCTIVE SLEEP APNEA): ICD-10-CM

## 2022-08-09 DIAGNOSIS — G47.30 SLEEP APNEA, UNSPECIFIED TYPE: ICD-10-CM

## 2022-08-09 PROCEDURE — 95811 POLYSOM 6/>YRS CPAP 4/> PARM: CPT

## 2022-08-09 PROCEDURE — 95811 POLYSOM 6/>YRS CPAP 4/> PARM: CPT | Mod: 26,,, | Performed by: PSYCHIATRY & NEUROLOGY

## 2022-08-09 PROCEDURE — 95811 PR POLYSOMNOGRAPHY W/CPAP: ICD-10-PCS | Mod: 26,,, | Performed by: PSYCHIATRY & NEUROLOGY

## 2022-08-10 NOTE — PROGRESS NOTES
Patient was educated about the purpose of the wires and what data was being collected.  Patient was informed that the technician may need to enter the room during the night to fix leads or make adjustments to equipment.

## 2022-08-21 ENCOUNTER — PATIENT MESSAGE (OUTPATIENT)
Dept: SLEEP MEDICINE | Facility: CLINIC | Age: 82
End: 2022-08-21
Payer: MEDICARE

## 2022-08-21 ENCOUNTER — TELEPHONE (OUTPATIENT)
Dept: SLEEP MEDICINE | Facility: CLINIC | Age: 82
End: 2022-08-21
Payer: MEDICARE

## 2022-08-21 DIAGNOSIS — G47.33 OSA (OBSTRUCTIVE SLEEP APNEA): Primary | ICD-10-CM

## 2022-08-21 NOTE — PROCEDURES
"Split-Night Report  [facility] [place]  [address], [place] [address]  Phone: [phone]  Fax: [phone]       Patient Name: DIANA COOPER Study Date: 8/9/2022   YOB: 1940 Patient MRN: [id number]   Age:  81 year Hospital #: [id number] / [facility]   Sex: Female Referring Physician: SUNNY ANGUIANO MD   Height: 5' 6" Recording Tech: Jamel Ríos RPSGT   Weight: 184.0 lbs Scoring Tech: Sebas Hollis RPSGT   BMI:  29.9 AASM  1B   Diagnostic AHI: 31.4  Interpreting Physician    Zoe Anguiano MD   RERA index: 18.9 Diagnostic Low O2 sat. 86.0%   Diagnostic RDI: 50.3         Sleep architecture: This was a split night study. During the diagnostic portion of the study, the patient fell asleep in 1.9 minutes. Sleep efficiency was 80.9%. Total sleep time (TST) during the diagnostic portion was 158.5 minutes. REM latency was - minutes. Sleep architecture in the diagnostic part was significantly disrupted due to underlying sleep apnea.     Respiratory: Mild to moderate snoring was present. The overall AHI was 31.4 with an oxygen josefina of 86.0%.RDI (Respiratory Disturbance Index) was 50.  Motor movement / Parasomnia: There were no significant limb movements of sleep noted. The total limb movement index In the diagnostic portion of the study was 9.5 (1.9 with arousal).  The total limb movement index in the treatment portion of the study was 7.2 (3 with arousal).    Cardiac: Cardiac rhythm monitoring revealed a sinus rhythm. with occasional PAC's and PVC's.    CPAP  titration: The patient qualified for split night. During the treatment portion of the study, CPAP  (continuous positive airway pressure) was explored from 5 to 7 cm of water using a  Eson F&P nasal  Medium mask, chin strap, CFlex at 3, and heated humidification. Initial improvement was observed on 5 cm H2O controlling respiratory events in supine NREM sleep. Effective control of sleep disordered breathing  was not " achieved - pressure 7 tested during REM sleep improved Sleep Disordered breathing down to AHI of 6.8.     IMPRESSION:  1. Obstructive Sleep Apnea (G47.33), severe based on RDI criteria; Met AHI Medicare criteria for [facility]. The patient qualified for a split night study.  2.  Best control of sleep disordered breathing was achieved with CPAP  at 7 cm of water with residual hypopneas in REM  supine sleep.    RECOMMENDATION:     Consider auto-titrating CPAP at 5-10 cm H2O, mask of patients choice, chin strap, and heated humidification. The patient will need close follow up for symptomatic improvement and APAP download to ensure optimal treatment.        Study Overview    DIAGNOSTIC TREATMENT   First Lights Off: 09:12:50 PM First Lights Off: 12:28:44 AM   Last Lights On: 12:28:44 AM Last Lights On: 05:11:39 AM   Time in Bed: 195.9 Time in Bed: 282.9   Total Sleep Time: 158.5 Total Sleep Time: 193.0   Sleep Efficiency: 80.9% Sleep Efficiency: 68.2%   Sleep Period Time: 182.5 Sleep Period Time: 281.0   Sleep Maintenance Efficiency: 86.8% Sleep Maintenance Efficiency: 68.7%   Sleep Latency: 1.9 Sleep Latency: 1.0   REM Latency from Sleep Onset: - REM Latency from Sleep Onset: 155.0     DIAGNOSTIC TREATMENT    COUNT INDEX  COUNT INDEX   Awakenings: 24 9.1 Awakenings: 35 10.9   Arousals: 106 40.1 Arousals: 52 16.2   Apneas & Hypopneas: 83 31.4 Apneas & Hypopneas: 8 2.5   Limb Movements: 25 9.5 Limb Movements: 23 7.2   Snores: - - Snores: - -   Desaturations 62  Desaturations 8    Average Oxygen Saturation:  97.4%  Average Oxygen Saturation:  98.2%        Sleep Architecture                      DIAGNOSTIC TREATMENT ENTIRE NIGHT   Stages TIME (mins) % SLEEP TIME TIME (mins) % SLEEP TIME TIME (mins) % SLEEP TIME   WAKE 37.5  90.0  127.5    Stage N1 42.5 26.8% 53.0 27.5% 95.5 27.2%   Stage N2 116.0 73.2% 114.0 59.1% 230.0 65.4%   Stage N3 - 0.0% - 0.0% - 0.0%   REM 0.0 0.0% 26.0 13.5% 26.0 7.4%         Arousal Summary      DIAGNOSTIC TREATMENT    NREM REM Total Sleep Time NREM REM Total Sleep Time   Apnea & Hypopnea Arousals 32 - 32 3 - 3   PLM Arousals 5 - 5 3 - 3   Isolated Limb Movement Arousals - - - - - -   Spontaneous Arousals 23 - 23 32 5 37   RERAs  50 - 50 8 1 9   Total 106 - 106 46 6 52   Arousal Index 40.1 - 40.1 16.5 13.8 16.2     Respiratory Summary    RESPIRATORY EVENTS BY SLEEP STAGE   DIAGNOSTIC TREATMENT    NREM REM TOTAL NREM REM TOTAL   Sleep Time (min) 158.5 - 158.5 167.0 26.0 193.0            Obstructive Apnea 4 - 4 1 - 1   Mixed Apnea - - - - - -   Central Apnea 2 - 2 3 - 3   Central Apnea Index   0.8     0.9     Total Apneas 23 - 23 4 - 4   Total Apnea Index 8.7 - 8.7 1.4 - 1.2   AHI   31.4                         RERAs 50 - 50 8 1 9   RERA Index 18.9 - 18.9 2.9 2.3 2.8     RESPIRATORY EVENTS BY BODY POSITION   DIAGNOSTIC TREATMENT    SUPINE NON-SUPINE TOTAL SUPINE NON-SUPINE TOTAL   Sleep Time (min) 158.5 - 158.5 193.0 - 193.0            Obstructive Apnea 4 - 4 1 - 1   Mixed Apnea - - - - - -   Central Apnea 2 - 2 3 - 3   Total Apneas 23 - 23 4 - 4   Total Apnea Index 8.7 - 8.7 1.2 - 1.2   AHI   31.4                         RERAs 50 - 50 9 - 9   RERA Index 18.9 - 18.9 2.8 - 2.8       Respiratory Event Durations     DIAGNOSTIC TREATMENT    Apnea Apnea    NREM REM NREM REM   Average (seconds) 16.9 - 14.0 -   Maximum (seconds) 25.6 - 15.6 -      DIAGNOSTIC TREATMENT    Hypopnea Hypopnea    NREM REM NREM REM   Average (seconds) 21.3 - 17.6 15.7   Maximum (seconds) 32.5 - 23.5 15.9     Oxygen Saturation Summary     DIAGNOSTIC TREATMENT    WAKE NREM REM WAKE NREM REM   Average OSat (%) 97.4% 97.4% - 98.1% 98.1% 98.4%   Minimum OSat (%) 87.0% 86.0% - 92.0% 94.0% 89.0%   Maximum OSat (%) 100.0% 100.0% - 100.0% 100.0% 100.0%     DIAGNOSTIC                            Oxygen Saturation Distribution    Range(%) Time in range (min) Time in range (%)    90.0 - 100.0 188.5 99.6%   80.0 - 90.0 0.7 0.4%   70.0 - 80.0 - -   60.0  - 70.0 - -   50.0 - 60.0 - -   0.0 - 50.0 - -                Time Spent Less than 88% OSat    Range(%) Time in range (min) Time in range (%)   0.0 - 88.0 0.1 0.1%     # of Desaturations 62   Minimum Oxygen Saturation During Desaturation 86.0%      SUPINE LEFT RIGHT PRONE   Minimum OSat Associated with Longest Apnea 94.0% - - -   Minimum OSat Associated with Longest Hypopnea 90.0% - - -     TREATMENT                            Oxygen Saturation Distribution    Range(%) Time in range (min) Time in range (%)    90.0 - 100.0 283.0 100.0%   80.0 - 90.0 0.0 0.0%   70.0 - 80.0 - -   60.0 - 70.0 - -   50.0 - 60.0 - -   0.0 - 50.0 - -                Time Spent Less than 88% OSat    Range(%) Time in range (min) Time in range (%)   0.0 - 88.0 - -     # of Desaturations 8   Minimum Oxygen Saturation During Desaturation 93.0%      SUPINE LEFT RIGHT PRONE   Minimum OSat Associated with Longest Apnea 98.0% - - -   Minimum OSat Associated with Longest Hypopnea 97.0% - - -       Limb Movement Summary          DIAGNOSTIC TREATMENT    COUNT INDEX COUNT INDEX   Isolated Limb Movements - - - -   Periodic Limb Movements (PLMs) 25 9.5 23 7.2   Total Limb Movements 25 9.5 23 7.2     Cardiac Summary     DIAGNOSTIC TREATMENT    WAKE NREM REM TOTAL WAKE NREM REM TOTAL    Average Pulse Rate (BPM) 61.1 54.4 - 55.5 57.2 51.0 53.3 53.2   Minimum Pulse Rate (BPM) 43.0 40.0 - 40.0 44.0 42.0 43.0 42.0   Maximum Pulse Rate (BPM) 85.0 78.0 - 85.0 77.0 71.0 70.0 77.0       DIAGNOSTIC     Pulse Rate Distribution    Range(bpm) Time in range (min) Time in range (%)   0.0 - 40.0 0.0 0.0%   40.0 - 60.0 148.7 78.6%   60.0 - 80.0 40.1 21.2%   80.0 - 100.0 0.3 0.2%   100.0 - 120.0 - -   120.0 - 140.0 - -   140.0 - 200.0 - -       TREATMENT     Pulse Rate Distribution    Range(bpm) Time in range (min) Time in range (%)   0.0 - 40.0 - -   40.0 - 60.0 259.0 91.5%   60.0 - 80.0 24.0 8.5%   80.0 - 100.0 - -   100.0 - 120.0 - -   120.0 - 140.0 - -   140.0 - 200.0 -  -               Titration Summary      PAP Device PAP Level Time (min) Wake (min) NREM (min) REM (min) Sleep Eff% OA# CA# MA# Hyp# AHI RDI Min OSat LM# LM Index AR# AR Index O2 Level   - Off 196.0 37.5 158.5 0.0 80.9% 4 2 - 60 31.4 50.3 86.0 25 9.5 106 40.1 -   CPAP 5 252.0 85.5 140.5 26.0 66.1% 1 - - 4 1.8 5.0 89.0 23 8.3 43 15.5 -   CPAP 7 31.0 4.5 26.5 0.0 85.5% - 3 - - 6.8 6.8 96.0 - - 9 20.4 -

## 2022-11-11 NOTE — PLAN OF CARE
Pt will d/c to home with son. Walker and BSC will be delivered to home as requested by pt. Son will provide transportation    Future Appointments   Date Time Provider Department Center   8/13/2020 11:00 AM HOME MONITOR DEVICE CHECK, TERRI MURRAY ARRKELBY Killian [facility]   8/17/2020 10:00 AM Cecilio Kline MD [facility] [place] [facility]   8/26/2020  4:00 PM MD LAILA Troncoso GASTRO Constantin [facility]        08/07/20 1434   Final Note   Assessment Type Final Discharge Note   Anticipated Discharge Disposition Home-Health   Post-Acute Status   Post-Acute Authorization Home Health;HME   HME Status Pending Payor Review   Home Health Status Pending Payor Review   Discharge Delays None known at this time   Mary Mendiola, MSN  Case Management  Ext [phone]    glasses

## 2022-12-12 ENCOUNTER — TELEPHONE (OUTPATIENT)
Dept: CARDIOLOGY | Facility: CLINIC | Age: 82
End: 2022-12-12
Payer: MEDICARE

## 2022-12-12 NOTE — TELEPHONE ENCOUNTER
Spoke with patient.  Appointment scheduled and confirmed date, time and location with patient.    ----- Message from Valente Garcia sent at 12/12/2022  3:27 PM CST -----  Contact: [phone]  the patient is calling to get scheduled for a appt.  Pt access tried but no appts are available.  the patient can be reached at.  [phone]

## 2022-12-25 ENCOUNTER — HOSPITAL ENCOUNTER (OUTPATIENT)
Facility: HOSPITAL | Age: 82
Discharge: HOME | End: 2022-12-27
Attending: EMERGENCY MEDICINE | Admitting: STUDENT IN AN ORGANIZED HEALTH CARE EDUCATION/TRAINING PROGRAM
Payer: MEDICARE

## 2022-12-25 DIAGNOSIS — M25.561 RIGHT KNEE PAIN: ICD-10-CM

## 2022-12-25 DIAGNOSIS — R53.1 WEAKNESS GENERALIZED: ICD-10-CM

## 2022-12-25 DIAGNOSIS — I95.1 ORTHOSTATIC HYPOTENSION: Primary | ICD-10-CM

## 2022-12-25 DIAGNOSIS — N17.9 AKI (ACUTE KIDNEY INJURY): ICD-10-CM

## 2022-12-25 DIAGNOSIS — R42 DIZZINESS: ICD-10-CM

## 2022-12-25 DIAGNOSIS — R55 POSTURAL DIZZINESS WITH PRESYNCOPE: ICD-10-CM

## 2022-12-25 DIAGNOSIS — R42 POSTURAL DIZZINESS WITH PRESYNCOPE: ICD-10-CM

## 2022-12-25 DIAGNOSIS — R06.02 SOB (SHORTNESS OF BREATH): ICD-10-CM

## 2022-12-25 DIAGNOSIS — R07.9 CHEST PAIN: ICD-10-CM

## 2022-12-25 LAB
ALBUMIN SERPL BCP-MCNC: 3.9 G/DL (ref 3.5–5.2)
ALP SERPL-CCNC: 74 U/L (ref 55–135)
ALT SERPL W/O P-5'-P-CCNC: 8 U/L (ref 10–44)
ANION GAP SERPL CALC-SCNC: 11 MMOL/L (ref 8–16)
AST SERPL-CCNC: 12 U/L (ref 10–40)
BASOPHILS # BLD AUTO: 0.04 K/UL (ref 0–0.2)
BASOPHILS NFR BLD: 1.1 % (ref 0–1.9)
BILIRUB SERPL-MCNC: 0.4 MG/DL (ref 0.1–1)
BILIRUB UR QL STRIP: NEGATIVE
BNP SERPL-MCNC: 55 PG/ML (ref 0–99)
BUN SERPL-MCNC: 34 MG/DL (ref 8–23)
CALCIUM SERPL-MCNC: 9.5 MG/DL (ref 8.7–10.5)
CHLORIDE SERPL-SCNC: 108 MMOL/L (ref 95–110)
CLARITY UR REFRACT.AUTO: ABNORMAL
CO2 SERPL-SCNC: 21 MMOL/L (ref 23–29)
COLOR UR AUTO: YELLOW
CREAT SERPL-MCNC: 1.9 MG/DL (ref 0.5–1.4)
DIFFERENTIAL METHOD: ABNORMAL
EOSINOPHIL # BLD AUTO: 0 K/UL (ref 0–0.5)
EOSINOPHIL NFR BLD: 1.1 % (ref 0–8)
ERYTHROCYTE [DISTWIDTH] IN BLOOD BY AUTOMATED COUNT: 14.4 % (ref 11.5–14.5)
EST. GFR  (NO RACE VARIABLE): 25.7 ML/MIN/1.73 M^2
GLUCOSE SERPL-MCNC: 98 MG/DL (ref 70–110)
GLUCOSE UR QL STRIP: NEGATIVE
HCT VFR BLD AUTO: 38 % (ref 37–48.5)
HGB BLD-MCNC: 11.7 G/DL (ref 12–16)
HGB UR QL STRIP: NEGATIVE
IMM GRANULOCYTES # BLD AUTO: 0.01 K/UL (ref 0–0.04)
IMM GRANULOCYTES NFR BLD AUTO: 0.3 % (ref 0–0.5)
INFLUENZA A, MOLECULAR: NOT DETECTED
INFLUENZA B, MOLECULAR: NOT DETECTED
KETONES UR QL STRIP: NEGATIVE
LEUKOCYTE ESTERASE UR QL STRIP: NEGATIVE
LYMPHOCYTES # BLD AUTO: 0.7 K/UL (ref 1–4.8)
LYMPHOCYTES NFR BLD: 17.9 % (ref 18–48)
MAGNESIUM SERPL-MCNC: 1.9 MG/DL (ref 1.6–2.6)
MCH RBC QN AUTO: 30.1 PG (ref 27–31)
MCHC RBC AUTO-ENTMCNC: 30.8 G/DL (ref 32–36)
MCV RBC AUTO: 98 FL (ref 82–98)
MONOCYTES # BLD AUTO: 0.4 K/UL (ref 0.3–1)
MONOCYTES NFR BLD: 10.5 % (ref 4–15)
NEUTROPHILS # BLD AUTO: 2.5 K/UL (ref 1.8–7.7)
NEUTROPHILS NFR BLD: 69.1 % (ref 38–73)
NITRITE UR QL STRIP: NEGATIVE
NRBC BLD-RTO: 0 /100 WBC
PH UR STRIP: 5 [PH] (ref 5–8)
PLATELET # BLD AUTO: 241 K/UL (ref 150–450)
PMV BLD AUTO: 11 FL (ref 9.2–12.9)
POTASSIUM SERPL-SCNC: 4 MMOL/L (ref 3.5–5.1)
PROT SERPL-MCNC: 7.6 G/DL (ref 6–8.4)
PROT UR QL STRIP: ABNORMAL
RBC # BLD AUTO: 3.89 M/UL (ref 4–5.4)
RSV AG BY MOLECULAR METHOD: NOT DETECTED
SARS-COV-2 RNA RESP QL NAA+PROBE: NOT DETECTED
SODIUM SERPL-SCNC: 140 MMOL/L (ref 136–145)
SP GR UR STRIP: 1.02 (ref 1–1.03)
TROPONIN I SERPL DL<=0.01 NG/ML-MCNC: 0.01 NG/ML (ref 0–0.03)
TSH SERPL DL<=0.005 MIU/L-ACNC: 1.28 UIU/ML (ref 0.4–4)
URN SPEC COLLECT METH UR: ABNORMAL
WBC # BLD AUTO: 3.63 K/UL (ref 3.9–12.7)

## 2022-12-25 PROCEDURE — 81003 URINALYSIS AUTO W/O SCOPE: CPT | Performed by: PHYSICIAN ASSISTANT

## 2022-12-25 PROCEDURE — 83735 ASSAY OF MAGNESIUM: CPT | Performed by: PHYSICIAN ASSISTANT

## 2022-12-25 PROCEDURE — 83880 ASSAY OF NATRIURETIC PEPTIDE: CPT | Performed by: PHYSICIAN ASSISTANT

## 2022-12-25 PROCEDURE — 85025 COMPLETE CBC W/AUTO DIFF WBC: CPT | Performed by: PHYSICIAN ASSISTANT

## 2022-12-25 PROCEDURE — 96360 HYDRATION IV INFUSION INIT: CPT

## 2022-12-25 PROCEDURE — 93010 ELECTROCARDIOGRAM REPORT: CPT | Mod: ,,, | Performed by: INTERNAL MEDICINE

## 2022-12-25 PROCEDURE — 0241U SARS-COV2 (COVID) WITH FLU/RSV BY PCR: CPT | Performed by: PHYSICIAN ASSISTANT

## 2022-12-25 PROCEDURE — 96361 HYDRATE IV INFUSION ADD-ON: CPT

## 2022-12-25 PROCEDURE — 99285 EMERGENCY DEPT VISIT HI MDM: CPT | Mod: 25

## 2022-12-25 PROCEDURE — 93010 EKG 12-LEAD: ICD-10-PCS | Mod: ,,, | Performed by: INTERNAL MEDICINE

## 2022-12-25 PROCEDURE — 93005 ELECTROCARDIOGRAM TRACING: CPT

## 2022-12-25 PROCEDURE — 99285 EMERGENCY DEPT VISIT HI MDM: CPT | Mod: ,,, | Performed by: EMERGENCY MEDICINE

## 2022-12-25 PROCEDURE — 84443 ASSAY THYROID STIM HORMONE: CPT | Performed by: PHYSICIAN ASSISTANT

## 2022-12-25 PROCEDURE — 99285 PR EMERGENCY DEPT VISIT,LEVEL V: ICD-10-PCS | Mod: ,,, | Performed by: EMERGENCY MEDICINE

## 2022-12-25 PROCEDURE — 84484 ASSAY OF TROPONIN QUANT: CPT | Performed by: PHYSICIAN ASSISTANT

## 2022-12-25 PROCEDURE — 25000003 PHARM REV CODE 250: Performed by: PHYSICIAN ASSISTANT

## 2022-12-25 PROCEDURE — 80053 COMPREHEN METABOLIC PANEL: CPT | Performed by: PHYSICIAN ASSISTANT

## 2022-12-25 RX ADMIN — SODIUM CHLORIDE 500 ML: 0.9 INJECTION, SOLUTION INTRAVENOUS at 05:12

## 2022-12-25 NOTE — Clinical Note
Diagnosis: Orthostatic hypotension [458.0.ICD-9-CM]   Future Attending Provider: GUERA MCKEON [[id number]]   Admitting Provider:: GUERA MCKEON [[id number]]

## 2022-12-25 NOTE — ED TRIAGE NOTES
The pt is an 82-year-old female who presents to the ED from home via personal transportation. The pt reports that last Saturday, she had a fall out of her bed and reports subsequent lightheadedness and dizziness. Pt also reports shortness of breath and a cough. States she was recently discharged from [facility] in [facility].

## 2022-12-25 NOTE — ED PROVIDER NOTES
"Encounter Date: 12/25/2022       History     Chief Complaint   Patient presents with    Shortness of Breath     100% room air     Dizziness     "Feeling off balance" x 2 days      The history is provided by the patient and medical records. No  was used.     Crystal Alvarado is a 82 y.o. 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     Reports ongoing dizziness for the past few days. Describes dizziness as feeling lightheaded, spinning sensation, and off balanced. Episodes occur with standing up and moving around and improve with rest. Reports having SOB that worsens when she exerts herself. No fever, chest pain, cough, abdominal pain, vomiting, urinary or bowel movement changes. No hematochezia or melena. Notes experiencing an unwitnessed fall out of bed 1 week ago. She was seen for similar symptoms at [place] ED 3 days ago and had no significant findings on laboratory work or CT head. Reports persistent symptoms since returning home. Notes having chronic R knee pain that has worsened and uses a walker for ambulation assistance. Additionally notes BL shoulder pain for several months that she has not been evaluated for previously. Reports drinking Coke and Sprite throughout the day and does not drink much water.    Review of patient's allergies indicates:  No Known Allergies  Past Medical History:   Diagnosis Date    Arthritis     CHF (congestive heart failure)     Coronary artery disease     Hyperlipidemia     Hypertension      Past Surgical History:   Procedure Laterality Date    A-V CARDIAC PACEMAKER INSERTION N/A 7/9/2018    Procedure: INSERTION, CARDIAC PACEMAKER, DUAL CHAMBER;  Surgeon: Archie Montez MD;  Location: [facility] CATH LAB;  Service: Cardiology;  Laterality: N/A;  SVT, RFA, +/- Dual PPM or ILR, IGLESIA, MDT, MAC, NE, 3 " Prep    CATARACT EXTRACTION W/  INTRAOCULAR LENS IMPLANT Left 2020    Procedure: EXTRACTION, CATARACT, WITH IOL INSERTION;  Surgeon: Roslyn Napier MD;  Location: [facility] OR;  Service: Ophthalmology;  Laterality: Left;    cataract removal Right     [facility]    COLONOSCOPY      COLONOSCOPY N/A 2020    Procedure: COLONOSCOPY;  Surgeon: Sebas Dickens MD;  Location: [facility] ENDO (2ND FLR);  Service: Endoscopy;  Laterality: N/A;    ESOPHAGOGASTRODUODENOSCOPY N/A 2020    Procedure: EGD (ESOPHAGOGASTRODUODENOSCOPY);  Surgeon: Fernie Cohen MD;  Location: [facility] ENDO (2ND FLR);  Service: Endoscopy;  Laterality: N/A;    HYSTERECTOMY N/A     INSERTION OF IMPLANTABLE LOOP RECORDER N/A 2018    Procedure: PLACEMENT-LOOP RECORDER;  Surgeon: Archie Montez MD;  Location: [facility] CATH LAB;  Service: Cardiology;  Laterality: N/A;  SVT, RFA, +/- Dual PPM or ILR, IGLESIA, MDT, MAC, PA, 3 Prep     No family history on file.  Social History     Tobacco Use    Smoking status: Former     Packs/day: 0.50     Types: Cigarettes     Quit date: 2013     Years since quittin.6    Smokeless tobacco: Never   Substance Use Topics    Alcohol use: No    Drug use: No     Review of Systems   Constitutional:  Negative for fever.   HENT:  Negative for sore throat.    Eyes:  Negative for pain.   Respiratory:  Positive for shortness of breath.    Cardiovascular:  Negative for chest pain and leg swelling.   Gastrointestinal:  Negative for nausea.   Genitourinary:  Negative for dysuria.   Musculoskeletal:  Positive for arthralgias. Negative for back pain.   Skin:  Negative for rash.   Neurological:  Positive for dizziness, weakness (generalized) and light-headedness.   Hematological:  Does not bruise/bleed easily.     Physical Exam     Initial Vitals [22 1531]   BP Pulse Resp Temp SpO2   (!) 151/69 68 18 98.5 °F (36.9 °C) 100 %      MAP       --         Physical Exam    Constitutional: She appears well-developed and  well-nourished. She is not diaphoretic. No distress.   HENT:   Head: Normocephalic and atraumatic.   Mouth/Throat: Oropharynx is clear and moist. No oropharyngeal exudate.   Eyes: Conjunctivae and EOM are normal. Pupils are equal, round, and reactive to light. No scleral icterus.   Neck: Neck supple.   Normal range of motion.  Cardiovascular:  Normal rate and regular rhythm.           Cardiac device implant L middle chest  No lower extremity swelling   Pulmonary/Chest: Breath sounds normal. No respiratory distress. She has no wheezes.   Abdominal: Abdomen is soft. She exhibits no distension. There is no abdominal tenderness. There is no rebound.   Musculoskeletal:         General: No tenderness or edema. Normal range of motion.      Cervical back: Normal range of motion and neck supple.      Comments: Limited ROM of BL shoulders 2/2 pain. No erythema, rash, induration, gross deformity.   Mild edema to R knee.   No midline spinal tenderness.     Neurological: She is alert and oriented to person, place, and time. She has normal strength. No sensory deficit.   No focal weakness or sensory deficit to extremities   Skin: Skin is warm and dry. No rash noted. No erythema.   Psychiatric: She has a normal mood and affect.     Sinus rhythm 80 bpm with PACs  ED Course   Procedures  Labs Reviewed   CBC W/ AUTO DIFFERENTIAL - Abnormal; Notable for the following components:       Result Value    WBC 3.63 (*)     RBC 3.89 (*)     Hemoglobin 11.7 (*)     MCHC 30.8 (*)     Lymph # 0.7 (*)     Lymph % 17.9 (*)     All other components within normal limits   COMPREHENSIVE METABOLIC PANEL - Abnormal; Notable for the following components:    CO2 21 (*)     BUN 34 (*)     Creatinine 1.9 (*)     ALT 8 (*)     eGFR 25.7 (*)     All other components within normal limits   URINALYSIS, REFLEX TO URINE CULTURE - Abnormal; Notable for the following components:    Appearance, UA Hazy (*)     Protein, UA Trace (*)     All other components within  normal limits    Narrative:     Specimen Source->Urine   B-TYPE NATRIURETIC PEPTIDE   TROPONIN I   TSH   MAGNESIUM   SARS-COV2 (COVID) WITH FLU/RSV BY PCR   BASIC METABOLIC PANEL   MAGNESIUM   PHOSPHORUS   CBC W/ AUTO DIFFERENTIAL     EKG Readings: (Independently Interpreted)   Initial Reading: No STEMI. Previous EKG: Compared with most recent EKG Rhythm: Normal Sinus Rhythm. Heart Rate: 80. Ectopy: PACs. Clinical Impression: Movement Artifact     Imaging Results              MRI Brain Without Contrast (Final result)  Result time 12/25/22 23:48:53      Final result by Anton Bean MD (12/25/22 23:48:53)                   Impression:      No acute intracranial process.  Specifically, no evidence of acute infarction.    Age-related changes.      Electronically signed by: Anton Bean MD  Date:    12/25/2022  Time:    23:48               Narrative:    EXAMINATION:  MRI BRAIN WITHOUT CONTRAST    CLINICAL HISTORY:  Dizziness, persistent/recurrent, cardiac or vascular cause suspected;    TECHNIQUE:  Multiplanar multisequence MR imaging of the brain was performed without contrast.    COMPARISON:  CT scan of the head dated 12/22/2022.    FINDINGS:  The craniocervical junction is intact.  The sellar and parasellar structures are within normal limits.  The intracranial flow voids are within normal limits.    No diffusion-weighted signal abnormality is present.  The ventricles and sulci are prominent, consistent cerebral volume loss.  There are T2/FLAIR signal hyperintense within the periventricular and subcortical white matter.  There are no extra-axial fluid collections.  There is no evidence of intracranial hemorrhage.  There is no evidence of mass effect.    There are postoperative changes in the globes. The paranasal sinuses are unremarkable.  The mastoid air cells are clear.  The calvarium is intact.                                       X-Ray Chest PA And Lateral (Final result)  Result time 12/25/22 17:40:51      Final  result by Lily Orellana MD (12/25/22 17:40:51)                   Impression:      No acute intrathoracic abnormality.      Electronically signed by: Lily Orellana  Date:    12/25/2022  Time:    17:40               Narrative:    EXAMINATION:  CHEST PA AND LATERAL    CLINICAL HISTORY:  Shortness of breath    TECHNIQUE:  PA and lateral chest radiograph    COMPARISON:  12/22/2022    FINDINGS:  There is a left cardiac loop recorder.  The cardiac silhouette is within normal limits. Vascular calcification is seen at the aortic knob.  There is no focal consolidation, pneumothorax, or pleural effusion. The bones are diffusely osteopenic.  There is mild dextroscoliosis.                                       X-Ray Knee 1 or 2 View Right (Final result)  Result time 12/25/22 17:43:15      Final result by Lul Kendall MD (12/25/22 17:43:15)                   Impression:      1. No convincing acute displaced fracture or dislocation of the knee noting progressive degenerative changes and anterior edema.      Electronically signed by: Lul Kendall MD  Date:    12/25/2022  Time:    17:43               Narrative:    EXAMINATION:  XR KNEE 1 OR 2 VIEW RIGHT    CLINICAL HISTORY:  Pain in right knee    TECHNIQUE:  AP and lateral views of the right knee were performed.    COMPARISON:  04/30/2009    FINDINGS:  Two views right knee.    There is osteopenia.  There are degenerative changes of the knee noting significant medial compartmental narrowing.  There is bilateral marginal osteophytosis.  No convincing acute displaced fracture or dislocation of the knee.  There are degenerative changes involving the patella, particularly the upper pole.  No large knee joint effusion.  No radiopaque foreign body.  There is edema overlying the anterior aspect of the knee.                                       Medications   sodium chloride 0.9% flush 5 mL (has no administration in time range)   albuterol-ipratropium 2.5 mg-0.5 mg/3 mL  nebulizer solution 3 mL (has no administration in time range)   melatonin tablet 6 mg (has no administration in time range)   ondansetron disintegrating tablet 8 mg (has no administration in time range)   prochlorperazine injection Soln 5 mg (has no administration in time range)   polyethylene glycol packet 17 g (has no administration in time range)   bisacodyL suppository 10 mg (has no administration in time range)   simethicone chewable tablet 80 mg (has no administration in time range)   aluminum-magnesium hydroxide-simethicone 200-200-20 mg/5 mL suspension 30 mL (has no administration in time range)   acetaminophen tablet 650 mg (has no administration in time range)   acetaminophen tablet 1,000 mg (has no administration in time range)   naloxone 0.4 mg/mL injection 0.02 mg (has no administration in time range)   insulin aspart U-100 pen 0-5 Units (has no administration in time range)   glucose chewable tablet 16 g (has no administration in time range)   glucose chewable tablet 24 g (has no administration in time range)   glucagon (human recombinant) injection 1 mg (has no administration in time range)   dextrose 10% bolus 125 mL 125 mL (has no administration in time range)   dextrose 10% bolus 250 mL 250 mL (has no administration in time range)   heparin (porcine) injection 5,000 Units (has no administration in time range)   sodium chloride 0.9% bolus 500 mL 500 mL (0 mLs Intravenous Stopped 12/25/22 2005)     Medical Decision Making:   History:   Old Medical Records: I decided to obtain old medical records.  Old Records Summarized: records from clinic visits and records from previous admission(s).  Independently Interpreted Test(s):   I have ordered and independently interpreted EKG Reading(s) - see summary below       <> Summary of EKG Reading(s): Sinus rhythm 80 bpm with PACs  Clinical Tests:   Lab Tests: Ordered and Reviewed  Radiological Study: Ordered and Reviewed  Medical Tests: Ordered and  Reviewed  Other:   I have discussed this case with another health care provider.       <> Summary of the Discussion: Hospital Medicine     APC / Resident Notes:   82 y.o. 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 Previously seen at [facility] ED 3 days ago for similar symptoms. DDx broad and includes but not limited to orthostatic hypotension, cardiac arrhythmia, dehydration, KINGS, symptomatic anemia, thyroid disease, ACS, CHF, pneumonia, viral syndrome, UTI, BPPV, vestibular neuritis, Meniere's disease. CT head 3 days ago negative and denies injuries since and lower suspicion for acute traumatic brain injury. Symptoms are intermittent and resolve while sitting down and lower suspicion for CVA.     Work-up reviewed. Orthostatics positive. Crt elevated 1.9 consistent with mild KINGS. Cardiac markers negative. COVID/Influenza/RSV negative. UA negative for UTI. Continues to have dizziness after small IVF bolus. MRI brain obtained without evidence of infarction or acute findings. Discussed with , placed in observation for ongoing management. Patient expresses understanding and agreeable to the plan. I have discussed the care of this patient with my supervising physician.      Attending Attestation:     Physician Attestation Statement for NP/PA:   I have conducted a face to face encounter with this patient in addition to the NP/PA, due to NP/PA Request    Other NP/PA Attestation Additions:      Medical Decision Makin yo female presenting with exertional SOB, dizziness and feeling off balance.  Denies chest pain or extremity numbness.  Global weakness, no focal neurologic findings. RRR, JVD exam difficult. No respiratory distress and lungs clear.  Right knee w/o erythema or warmth to suggest septic arthritis.  Orthostatics positive.  Presenting may be 2/2 volume depletion, but will  r/o ACS and perform MRI to r/o ischemia given ongoing sx. Considered PE, but lower clinical suspicion, no tachycardia or hypoxia. D/w cardiology for device check.  Signed out at  to oncoming attending with results pending, low threshold for admission given ongoing sx and recent multiple ED visits.                         Clinical Impression:   Final diagnoses:  [R53.1] Weakness generalized  [R06.02] SOB (shortness of breath)  [M25.561] Right knee pain  [R42] Dizziness  [I95.1] Orthostatic hypotension (Primary)  [N17.9] KINGS (acute kidney injury)        ED Disposition Condition    Observation Stable                Aleksandar Brito PA-C  12/26/22 0133       Edison Washington MD  12/26/22 0758

## 2022-12-25 NOTE — ED NOTES
LOC: Patient is awake, alert, and aware of environment with an appropriate affect. Patient is oriented x 4 and speaking appropriately.  APPEARANCE: Patient resting comfortably and in no acute distress. Patient is clean and well groomed, patient's clothing is properly fastened.  HEENT: Pt presents with surgical mask on.   SKIN: The skin is warm and dry. Patient has sluggish skin turgor and dry mucus membranes.   MUSKULOSKELETAL: Patient is moving all extremities well, generalized weakness, no obvious deformities noted. Pt has limited range of motion in her bilateral shoulders d/t arthritis. Pulses intact.   RESPIRATORY: Airway is open and patent. Respirations are spontaneous and non-labored with normal effort and rate. Pt reports shortness of breath and cough.   CARDIAC: Patient has a normal rate and rhythm. 68 on cardiac monitor. No peripheral edema noted. Pt is hypertensive  ABDOMEN: No distention noted. Soft and non-tender upon palpation.  NEUROLOGICAL: Facial expression is symmetrical. Hand grasps are equal bilaterally. Normal sensation in all extremities when touched with finger.

## 2022-12-26 PROBLEM — N18.9 ACUTE KIDNEY INJURY SUPERIMPOSED ON CHRONIC KIDNEY DISEASE: Status: ACTIVE | Noted: 2017-11-19

## 2022-12-26 PROBLEM — N17.9 ACUTE KIDNEY INJURY SUPERIMPOSED ON CHRONIC KIDNEY DISEASE: Status: ACTIVE | Noted: 2017-11-19

## 2022-12-26 LAB
ALBUMIN SERPL BCP-MCNC: 3.4 G/DL (ref 3.5–5.2)
ANION GAP SERPL CALC-SCNC: 13 MMOL/L (ref 8–16)
ANION GAP SERPL CALC-SCNC: 6 MMOL/L (ref 8–16)
BASOPHILS # BLD AUTO: 0.03 K/UL (ref 0–0.2)
BASOPHILS NFR BLD: 0.8 % (ref 0–1.9)
BUN SERPL-MCNC: 35 MG/DL (ref 8–23)
BUN SERPL-MCNC: 35 MG/DL (ref 8–23)
CALCIUM SERPL-MCNC: 9.1 MG/DL (ref 8.7–10.5)
CALCIUM SERPL-MCNC: 9.2 MG/DL (ref 8.7–10.5)
CHLORIDE SERPL-SCNC: 108 MMOL/L (ref 95–110)
CHLORIDE SERPL-SCNC: 109 MMOL/L (ref 95–110)
CO2 SERPL-SCNC: 14 MMOL/L (ref 23–29)
CO2 SERPL-SCNC: 22 MMOL/L (ref 23–29)
CREAT SERPL-MCNC: 1.5 MG/DL (ref 0.5–1.4)
CREAT SERPL-MCNC: 1.5 MG/DL (ref 0.5–1.4)
DIFFERENTIAL METHOD: ABNORMAL
EOSINOPHIL # BLD AUTO: 0.1 K/UL (ref 0–0.5)
EOSINOPHIL NFR BLD: 1.5 % (ref 0–8)
ERYTHROCYTE [DISTWIDTH] IN BLOOD BY AUTOMATED COUNT: 14.7 % (ref 11.5–14.5)
EST. GFR  (NO RACE VARIABLE): 34.2 ML/MIN/1.73 M^2
EST. GFR  (NO RACE VARIABLE): 34.2 ML/MIN/1.73 M^2
GLUCOSE SERPL-MCNC: 80 MG/DL (ref 70–110)
GLUCOSE SERPL-MCNC: 87 MG/DL (ref 70–110)
HCT VFR BLD AUTO: 35.6 % (ref 37–48.5)
HGB BLD-MCNC: 11 G/DL (ref 12–16)
IMM GRANULOCYTES # BLD AUTO: 0.09 K/UL (ref 0–0.04)
IMM GRANULOCYTES NFR BLD AUTO: 2.3 % (ref 0–0.5)
LYMPHOCYTES # BLD AUTO: 0.6 K/UL (ref 1–4.8)
LYMPHOCYTES NFR BLD: 14.4 % (ref 18–48)
MAGNESIUM SERPL-MCNC: 1.9 MG/DL (ref 1.6–2.6)
MCH RBC QN AUTO: 31.1 PG (ref 27–31)
MCHC RBC AUTO-ENTMCNC: 30.9 G/DL (ref 32–36)
MCV RBC AUTO: 101 FL (ref 82–98)
MONOCYTES # BLD AUTO: 0.3 K/UL (ref 0.3–1)
MONOCYTES NFR BLD: 6.8 % (ref 4–15)
NEUTROPHILS # BLD AUTO: 2.9 K/UL (ref 1.8–7.7)
NEUTROPHILS NFR BLD: 74.2 % (ref 38–73)
NRBC BLD-RTO: 0 /100 WBC
PHOSPHATE SERPL-MCNC: 3.6 MG/DL (ref 2.7–4.5)
PHOSPHATE SERPL-MCNC: 3.7 MG/DL (ref 2.7–4.5)
PLATELET # BLD AUTO: 162 K/UL (ref 150–450)
PMV BLD AUTO: 11 FL (ref 9.2–12.9)
POTASSIUM SERPL-SCNC: 4 MMOL/L (ref 3.5–5.1)
POTASSIUM SERPL-SCNC: 4.3 MMOL/L (ref 3.5–5.1)
RBC # BLD AUTO: 3.54 M/UL (ref 4–5.4)
SODIUM SERPL-SCNC: 136 MMOL/L (ref 136–145)
SODIUM SERPL-SCNC: 136 MMOL/L (ref 136–145)
WBC # BLD AUTO: 3.95 K/UL (ref 3.9–12.7)

## 2022-12-26 PROCEDURE — 25000003 PHARM REV CODE 250

## 2022-12-26 PROCEDURE — 83735 ASSAY OF MAGNESIUM: CPT

## 2022-12-26 PROCEDURE — 99220 PR INITIAL OBSERVATION CARE,LEVL III: ICD-10-PCS | Mod: ,,,

## 2022-12-26 PROCEDURE — 85025 COMPLETE CBC W/AUTO DIFF WBC: CPT

## 2022-12-26 PROCEDURE — 63600175 PHARM REV CODE 636 W HCPCS

## 2022-12-26 PROCEDURE — 36415 COLL VENOUS BLD VENIPUNCTURE: CPT

## 2022-12-26 PROCEDURE — G0378 HOSPITAL OBSERVATION PER HR: HCPCS

## 2022-12-26 PROCEDURE — 80048 BASIC METABOLIC PNL TOTAL CA: CPT

## 2022-12-26 PROCEDURE — 36415 COLL VENOUS BLD VENIPUNCTURE: CPT | Performed by: STUDENT IN AN ORGANIZED HEALTH CARE EDUCATION/TRAINING PROGRAM

## 2022-12-26 PROCEDURE — 96372 THER/PROPH/DIAG INJ SC/IM: CPT

## 2022-12-26 PROCEDURE — 84100 ASSAY OF PHOSPHORUS: CPT

## 2022-12-26 PROCEDURE — 80069 RENAL FUNCTION PANEL: CPT | Performed by: STUDENT IN AN ORGANIZED HEALTH CARE EDUCATION/TRAINING PROGRAM

## 2022-12-26 PROCEDURE — 99220 PR INITIAL OBSERVATION CARE,LEVL III: CPT | Mod: ,,,

## 2022-12-26 RX ORDER — PROCHLORPERAZINE EDISYLATE 5 MG/ML
5 INJECTION INTRAMUSCULAR; INTRAVENOUS EVERY 6 HOURS PRN
Status: DISCONTINUED | OUTPATIENT
Start: 2022-12-26 | End: 2022-12-27 | Stop reason: HOSPADM

## 2022-12-26 RX ORDER — TALC
6 POWDER (GRAM) TOPICAL NIGHTLY PRN
Status: DISCONTINUED | OUTPATIENT
Start: 2022-12-26 | End: 2022-12-27 | Stop reason: HOSPADM

## 2022-12-26 RX ORDER — ATORVASTATIN CALCIUM 20 MG/1
20 TABLET, FILM COATED ORAL NIGHTLY
Status: DISCONTINUED | OUTPATIENT
Start: 2022-12-26 | End: 2022-12-27 | Stop reason: HOSPADM

## 2022-12-26 RX ORDER — SIMETHICONE 80 MG
1 TABLET,CHEWABLE ORAL 4 TIMES DAILY PRN
Status: DISCONTINUED | OUTPATIENT
Start: 2022-12-26 | End: 2022-12-27 | Stop reason: HOSPADM

## 2022-12-26 RX ORDER — GLUCAGON 1 MG
1 KIT INJECTION
Status: DISCONTINUED | OUTPATIENT
Start: 2022-12-26 | End: 2022-12-27 | Stop reason: HOSPADM

## 2022-12-26 RX ORDER — IPRATROPIUM BROMIDE AND ALBUTEROL SULFATE 2.5; .5 MG/3ML; MG/3ML
3 SOLUTION RESPIRATORY (INHALATION) EVERY 4 HOURS PRN
Status: DISCONTINUED | OUTPATIENT
Start: 2022-12-26 | End: 2022-12-27 | Stop reason: HOSPADM

## 2022-12-26 RX ORDER — ONDANSETRON 8 MG/1
8 TABLET, ORALLY DISINTEGRATING ORAL EVERY 8 HOURS PRN
Status: DISCONTINUED | OUTPATIENT
Start: 2022-12-26 | End: 2022-12-27 | Stop reason: HOSPADM

## 2022-12-26 RX ORDER — BISACODYL 10 MG
10 SUPPOSITORY, RECTAL RECTAL DAILY PRN
Status: DISCONTINUED | OUTPATIENT
Start: 2022-12-26 | End: 2022-12-27 | Stop reason: HOSPADM

## 2022-12-26 RX ORDER — HYDRALAZINE HYDROCHLORIDE 25 MG/1
25 TABLET, FILM COATED ORAL EVERY 8 HOURS PRN
Status: DISCONTINUED | OUTPATIENT
Start: 2022-12-26 | End: 2022-12-27 | Stop reason: HOSPADM

## 2022-12-26 RX ORDER — AMLODIPINE BESYLATE 10 MG/1
10 TABLET ORAL NIGHTLY
Status: DISCONTINUED | OUTPATIENT
Start: 2022-12-26 | End: 2022-12-27 | Stop reason: HOSPADM

## 2022-12-26 RX ORDER — MAG HYDROX/ALUMINUM HYD/SIMETH 200-200-20
30 SUSPENSION, ORAL (FINAL DOSE FORM) ORAL 4 TIMES DAILY PRN
Status: DISCONTINUED | OUTPATIENT
Start: 2022-12-26 | End: 2022-12-27 | Stop reason: HOSPADM

## 2022-12-26 RX ORDER — ACETAMINOPHEN 325 MG/1
650 TABLET ORAL EVERY 4 HOURS PRN
Status: DISCONTINUED | OUTPATIENT
Start: 2022-12-26 | End: 2022-12-27 | Stop reason: HOSPADM

## 2022-12-26 RX ORDER — ACETAMINOPHEN 500 MG
1000 TABLET ORAL EVERY 8 HOURS PRN
Status: DISCONTINUED | OUTPATIENT
Start: 2022-12-26 | End: 2022-12-27 | Stop reason: HOSPADM

## 2022-12-26 RX ORDER — SODIUM CHLORIDE 0.9 % (FLUSH) 0.9 %
5 SYRINGE (ML) INJECTION
Status: DISCONTINUED | OUTPATIENT
Start: 2022-12-26 | End: 2022-12-27 | Stop reason: HOSPADM

## 2022-12-26 RX ORDER — INSULIN ASPART 100 [IU]/ML
0-5 INJECTION, SOLUTION INTRAVENOUS; SUBCUTANEOUS
Status: DISCONTINUED | OUTPATIENT
Start: 2022-12-26 | End: 2022-12-27 | Stop reason: HOSPADM

## 2022-12-26 RX ORDER — POLYETHYLENE GLYCOL 3350 17 G/17G
17 POWDER, FOR SOLUTION ORAL 2 TIMES DAILY PRN
Status: DISCONTINUED | OUTPATIENT
Start: 2022-12-26 | End: 2022-12-27 | Stop reason: HOSPADM

## 2022-12-26 RX ORDER — NALOXONE HCL 0.4 MG/ML
0.02 VIAL (ML) INJECTION
Status: DISCONTINUED | OUTPATIENT
Start: 2022-12-26 | End: 2022-12-27 | Stop reason: HOSPADM

## 2022-12-26 RX ORDER — IBUPROFEN 200 MG
16 TABLET ORAL
Status: DISCONTINUED | OUTPATIENT
Start: 2022-12-26 | End: 2022-12-27 | Stop reason: HOSPADM

## 2022-12-26 RX ORDER — HEPARIN SODIUM 5000 [USP'U]/ML
5000 INJECTION, SOLUTION INTRAVENOUS; SUBCUTANEOUS EVERY 8 HOURS
Status: DISCONTINUED | OUTPATIENT
Start: 2022-12-26 | End: 2022-12-27 | Stop reason: HOSPADM

## 2022-12-26 RX ORDER — IBUPROFEN 200 MG
24 TABLET ORAL
Status: DISCONTINUED | OUTPATIENT
Start: 2022-12-26 | End: 2022-12-27 | Stop reason: HOSPADM

## 2022-12-26 RX ADMIN — ATORVASTATIN CALCIUM 20 MG: 20 TABLET, FILM COATED ORAL at 08:12

## 2022-12-26 RX ADMIN — ACETAMINOPHEN 1000 MG: 500 TABLET ORAL at 03:12

## 2022-12-26 RX ADMIN — HEPARIN SODIUM 5000 UNITS: 5000 INJECTION INTRAVENOUS; SUBCUTANEOUS at 03:12

## 2022-12-26 RX ADMIN — HEPARIN SODIUM 5000 UNITS: 5000 INJECTION INTRAVENOUS; SUBCUTANEOUS at 06:12

## 2022-12-26 RX ADMIN — AMLODIPINE BESYLATE 10 MG: 10 TABLET ORAL at 06:12

## 2022-12-26 RX ADMIN — HEPARIN SODIUM 5000 UNITS: 5000 INJECTION INTRAVENOUS; SUBCUTANEOUS at 08:12

## 2022-12-26 RX ADMIN — AMLODIPINE BESYLATE 10 MG: 10 TABLET ORAL at 08:12

## 2022-12-26 NOTE — H&P
"[facility] [address] [place] [address] Medicine  History & Physical    Patient Name: Crystal Alvarado  MRN: [id number]  Patient Class: OP- Observation  Admission Date: 12/25/2022  Attending Physician: Carolynn Obrien MD   Primary Care Provider: Shantell Goff MD         Patient information was obtained from patient, past medical records and ER records.     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 She states she feels fine when sitting; however, immediately on standing she becomes symptomatic. Symptoms immediately resolve upon sitting. She has noticed this for the past few weeks and it has not been getting better or worse. She ambulated with a walker at baseline, but endorses feeling more off balance now. While she has chronic knee pain, her activity has been limited due to these new symptoms. She endorses poor intake of water/fluids. She lives at home with her daughter. She denies vertigo/room spinning sensation. Denies recent illness, fever or chills. Denies nausea, vomiting, visual changes. Denies chest pain or palpitations. Denies SOB at baseline, wheezing, cough. Endorses falling out of bed about one week ago. Denies LOC/syncope.  Has a slight headache. She was seen for similar symptoms at [facility] 3 days ago and had no significant findings on laboratory work or CT head.     In the ED, AFVSS. Orthostatics positive in the ED. BP 150s/60s sitting, 109/55 standing. CBC with WBC 3.6. H/H 11.7/38.0. CMP with BUN/Cr 34/1.9 (baseline 1.4). BNP 55. " Troponin 0.007. TSH 1.284. F;u/RSV/Covid negative. UA negative for infection. EKG sinus rhythm with PACs. CXR no acute intrathoracic abnormality. MRI brain negative for acute infarction. XR right knee no convincing acute displaced fracture or dislocation of the knee. Given 500 cc NS bolus.       Past Medical History:   Diagnosis Date    Arthritis     CHF (congestive heart failure)     Coronary artery disease     Hyperlipidemia     Hypertension        Past Surgical History:   Procedure Laterality Date    A-V CARDIAC PACEMAKER INSERTION N/A 7/9/2018    Procedure: INSERTION, CARDIAC PACEMAKER, DUAL CHAMBER;  Surgeon: Archie Montez MD;  Location: [facility] CATH LAB;  Service: Cardiology;  Laterality: N/A;  SVT, RFA, +/- Dual PPM or ILR, IGLESIA, MDT, MAC, W[place], 3 Prep    CATARACT EXTRACTION W/  INTRAOCULAR LENS IMPLANT Left 11/23/2020    Procedure: EXTRACTION, CATARACT, WITH IOL INSERTION;  Surgeon: Roslyn Napier MD;  Location: [facility];  Service: Ophthalmology;  Laterality: Left;    cataract removal Right 2018    [facility]    COLONOSCOPY      COLONOSCOPY N/A 8/5/2020    Procedure: COLONOSCOPY;  Surgeon: Sebas Dickens MD;  [facility]  Service: Endoscopy;  Laterality: N/A;    ESOPHAGOGASTRODUODENOSCOPY N/A 8/4/2020    Procedure: EGD (ESOPHAGOGASTRODUODENOSCOPY);  Surgeon: Fernie Cohen MD;  [facility]  Service: Endoscopy;  Laterality: N/A;    HYSTERECTOMY N/A     INSERTION OF IMPLANTABLE LOOP RECORDER N/A 7/9/2018    Procedure: PLACEMENT-LOOP RECORDER;  Surgeon: Archie Montez MD;  Location: [facility] CATH LAB;  Service: Cardiology;  Laterality: N/A;  SVT, RFA, +/- Dual PPM or ILR, IGLESIA, MDT, MAC, [place], 3 Prep       Review of patient's allergies indicates:  No Known Allergies    Current Facility-Administered Medications on File Prior to Encounter   Medication    sodium chloride 0.9% flush 10 mL     Current Outpatient Medications on File Prior to Encounter   Medication Sig     acetaminophen (TYLENOL) 325 MG tablet Take 2 tablets (650 mg total) by mouth every 6 (six) hours as needed for Pain.    amLODIPine (NORVASC) 10 MG tablet TAKE 1 TABLET BY MOUTH EVERY DAY    atorvastatin (LIPITOR) 20 MG tablet Take 20 mg by mouth every evening.    BREO ELLIPTA 100-25 mcg/dose diskus inhaler INHALE 1 PUFF BY MOUTH ONCE A DAY    docusate sodium (COLACE) 100 MG capsule Take 1 capsule (100 mg total) by mouth 2 (two) times daily.    ergocalciferol (ERGOCALCIFEROL) 50,000 unit Cap Take 50,000 Units by mouth every 7 days.    fluticasone propionate (FLONASE) 50 mcg/actuation nasal spray     HYDROcodone-acetaminophen (NORCO) 5-325 mg per tablet Take 1 tablet by mouth every 4 (four) hours as needed for Pain.    LIDOcaine (LIDODERM) 5 % Place 1 patch onto the skin once daily. Remove & Discard patch within 12 hours or as directed by MD    losartan (COZAAR) 100 MG tablet TAKE 1 TABLET BY MOUTH EVERY DAY    nitroGLYCERIN (NITROSTAT) 0.4 MG SL tablet Place 1 tablet (0.4 mg total) under the tongue every 5 (five) minutes as needed for Chest pain (and notify MD).    prednisolon/gatiflox/bromfenac (PREDNISOL ACE-GATIFLOX-BROMFEN) 1-0.5-0.075 % DrpS Apply 1 drop to eye 3 (three) times daily. in operative eye for 1 month after surgery     Family History    None       Tobacco Use    Smoking status: Former     Packs/day: 0.50     Types: Cigarettes     Quit date: 2013     Years since quittin.6    Smokeless tobacco: Never   Substance and Sexual Activity    Alcohol use: No    Drug use: No    Sexual activity: Never     Review of Systems   Constitutional:  Positive for activity change. Negative for chills and fever.   HENT:  Negative for tinnitus and trouble swallowing.    Eyes:  Negative for photophobia and visual disturbance.   Respiratory:  Positive for shortness of breath. Negative for cough and chest tightness.    Cardiovascular:  Negative for chest pain, palpitations and leg swelling.    Gastrointestinal:  Negative for abdominal pain, constipation, diarrhea, nausea and vomiting.   Genitourinary:  Negative for dysuria, frequency and hematuria.   Musculoskeletal:  Positive for arthralgias and gait problem. Negative for back pain and neck pain.   Skin:  Negative for rash and wound.   Neurological:  Positive for dizziness, weakness, light-headedness and headaches. Negative for syncope, speech difficulty and numbness.   Psychiatric/Behavioral:  Negative for agitation and confusion. The patient is not nervous/anxious.    Objective:     Vital Signs (Most Recent):  Temp: 97.7 °F (36.5 °C) (12/26/22 0125)  Pulse: 68 (12/26/22 0125)  Resp: 16 (12/26/22 0125)  BP: (!) 152/67 (12/26/22 0125)  SpO2: 99 % (12/26/22 0125)   Vital Signs (24h Range):  Temp:  [97.7 °F (36.5 °C)-98.5 °F (36.9 °C)] 97.7 °F (36.5 °C)  Pulse:  [61-76] 68  Resp:  [16-18] 16  SpO2:  [95 %-100 %] 99 %  BP: (109-196)/(55-89) 152/67     Weight: 90.7 kg (200 lb)  Body mass index is 33.28 kg/m².    Physical Exam  Vitals and nursing note reviewed.   Constitutional:       General: She is not in acute distress.     Appearance: She is well-developed. She is obese.   HENT:      Head: Normocephalic and atraumatic.      Mouth/Throat:      Pharynx: No oropharyngeal exudate.   Eyes:      Extraocular Movements: Extraocular movements intact.      Conjunctiva/sclera: Conjunctivae normal.      Pupils: Pupils are equal, round, and reactive to light.   Cardiovascular:      Rate and Rhythm: Normal rate and regular rhythm.      Heart sounds: Normal heart sounds.   Pulmonary:      Effort: Pulmonary effort is normal. No respiratory distress.      Breath sounds: Normal breath sounds. No wheezing.   Abdominal:      General: Bowel sounds are normal. There is no distension.      Palpations: Abdomen is soft.      Tenderness: There is no abdominal tenderness.   Musculoskeletal:         General: No swelling or tenderness.      Cervical back: Normal range of motion and  neck supple. No rigidity.      Comments: Limited actively ROM bilateral shoulders    Skin:     General: Skin is warm and dry.      Capillary Refill: Capillary refill takes less than 2 seconds.      Findings: No rash.   Neurological:      General: No focal deficit present.      Mental Status: She is alert and oriented to person, place, and time.      Cranial Nerves: No cranial nerve deficit.      Sensory: No sensory deficit.      Motor: Weakness (slight generalizedweakness, nonfocal) present.   Psychiatric:         Behavior: Behavior normal.         Thought Content: Thought content normal.         Judgment: Judgment normal.         CRANIAL NERVES     CN III, IV, VI   Pupils are equal, round, and reactive to light.     Significant Labs: All pertinent labs within the past 24 hours have been reviewed.    CBC:   Recent Labs   Lab 12/25/22  1643   WBC 3.63*   HGB 11.7*   HCT 38.0        CMP:   Recent Labs   Lab 12/25/22  1643      K 4.0      CO2 21*   GLU 98   BUN 34*   CREATININE 1.9*   CALCIUM 9.5   PROT 7.6   ALBUMIN 3.9   BILITOT 0.4   ALKPHOS 74   AST 12   ALT 8*   ANIONGAP 11     Cardiac Markers:   Recent Labs   Lab 12/25/22  1643   BNP 55     Magnesium:   Recent Labs   Lab 12/25/22  1643   MG 1.9       Troponin:   Recent Labs   Lab 12/25/22  1643   TROPONINI 0.007     TSH:   Recent Labs   Lab 12/25/22  1643   TSH 1.284     Urine Studies:   Recent Labs   Lab 12/25/22  1657   COLORU Yellow   APPEARANCEUA Hazy*   PHUR 5.0   SPECGRAV 1.025   PROTEINUA Trace*   GLUCUA Negative   KETONESU Negative   BILIRUBINUA Negative   OCCULTUA Negative   NITRITE Negative   LEUKOCYTESUR Negative       Significant Imaging: I have reviewed all pertinent imaging results/findings within the past 24 hours.  MRI Brain Without Contrast  Narrative: EXAMINATION:  MRI BRAIN WITHOUT CONTRAST    CLINICAL HISTORY:  Dizziness, persistent/recurrent, cardiac or vascular cause suspected;    TECHNIQUE:  Multiplanar multisequence MR  imaging of the brain was performed without contrast.    COMPARISON:  CT scan of the head dated 12/22/2022.    FINDINGS:  The craniocervical junction is intact.  The sellar and parasellar structures are within normal limits.  The intracranial flow voids are within normal limits.    No diffusion-weighted signal abnormality is present.  The ventricles and sulci are prominent, consistent cerebral volume loss.  There are T2/FLAIR signal hyperintense within the periventricular and subcortical white matter.  There are no extra-axial fluid collections.  There is no evidence of intracranial hemorrhage.  There is no evidence of mass effect.    There are postoperative changes in the globes. The paranasal sinuses are unremarkable.  The mastoid air cells are clear.  The calvarium is intact.  Impression: No acute intracranial process.  Specifically, no evidence of acute infarction.    Age-related changes.    Electronically signed by: Anton Bean MD  Date:    12/25/2022  Time:    23:48  X-Ray Knee 1 or 2 View Right  Narrative: EXAMINATION:  XR KNEE 1 OR 2 VIEW RIGHT    CLINICAL HISTORY:  Pain in right knee    TECHNIQUE:  AP and lateral views of the right knee were performed.    COMPARISON:  04/30/2009    FINDINGS:  Two views right knee.    There is osteopenia.  There are degenerative changes of the knee noting significant medial compartmental narrowing.  There is bilateral marginal osteophytosis.  No convincing acute displaced fracture or dislocation of the knee.  There are degenerative changes involving the patella, particularly the upper pole.  No large knee joint effusion.  No radiopaque foreign body.  There is edema overlying the anterior aspect of the knee.  Impression: 1. No convincing acute displaced fracture or dislocation of the knee noting progressive degenerative changes and anterior edema.    Electronically signed by: Lul Kendall MD  Date:    12/25/2022  Time:    17:43  X-Ray Chest PA And Lateral  Narrative:  EXAMINATION:  CHEST PA AND LATERAL    CLINICAL HISTORY:  Shortness of breath    TECHNIQUE:  PA and lateral chest radiograph    COMPARISON:  12/22/2022    FINDINGS:  There is a left cardiac loop recorder.  The cardiac silhouette is within normal limits. Vascular calcification is seen at the aortic knob.  There is no focal consolidation, pneumothorax, or pleural effusion. The bones are diffusely osteopenic.  There is mild dextroscoliosis.  Impression: No acute intrathoracic abnormality.    Electronically signed by: Lily Orellana  Date:    12/25/2022  Time:    17:40       Assessment/Plan:     * Postural dizziness with presyncope  Orthostatic hypotension  Dyspnea  Light headedness  - 82F presenting with postural dizziness, lightheadedness, imbalance, and dyspnea for the past few weeks. Denies vertigo, syncope, chest pain, nausea, tinnitus. Symptoms start immediately on standing and resolve with sitting. Fell out of bed one week ago. Seen in [facility] 3 days ago for similar symptoms with negative CT.  - AFVSS, no leukocytosis or electrolyte abnormaility  - MRI brain without acute intracranial process   - orthostatic vitals positive in ED  - EKG with sinus rhythm, PACs, rate 80  - s/p 500 cc bolus in ED  - suspect orthostatic hypotension is major contributor to current symotoms  - recommend compression stalking/MARIANN hose for now  - discussed careful position changes for fall/fainting prevention  - on amlodipine and losartan for HTN- will hold losartan for now given KINGS/presenting symptoms  - ILR device interrogation ordered  - Telemetry when box available  - Last echo 4/26/2018 reviewed  - TTE ordered   - Consider PT/OT evaluation if no improvement in weakness/gait imbalance    ORLIN (obstructive sleep apnea)  - CPAP qhs    Status post placement of implantable loop recorder  - device interrogation ordered    Heart failure with preserved left ventricular function (HFpEF)  - History noted  - appears hypovolemia on exam  and CXR  - hold losartan for KINGS  - not on BB (for bradycardia) or diuretic   - Last TTE reviewed  - Repeat ordered  - Cardiac diet  - strict I/Os  - daily weights    Normocytic anemia  - Current CBC reviewed-   Lab Results   Component Value Date    HGB 11.7 (L) 12/25/2022    HCT 38.0 12/25/2022     - Patient's anemia is currently controlled. S/p 0 units of PRBCs.   - Monitor serial CBC and transfuse if patient becomes hemodynamically unstable, symptomatic or H/H drops below 7/21.     Acute kidney injury superimposed on chronic kidney disease  Suspect etiology pre-renal/hypovolemia.  Cr 1.9.  Baseline Cr 1.4  - No signs or symptoms of uremia  - UA noninfectious   - S/p 500 cc bolus in ED  - Renal US if no improvement in 24-48 hrs   - Hold losartan  - Monitor UOP and follow serial BMP   - Adjust drugs to GFR/CrCL; avoid nephrotoxic drugs   -  Estimated Creatinine Clearance: 22.9 mL/min (A) (based on SCr of 1.9 mg/dL (H)).   - Monitor electrolytes, phos levels daily  - Urine studies ordered    Coronary artery disease involving native coronary artery of native heart without angina pectoris  nonobstructive per [facility] 2015  -troponin negative; no concern for ACS  -continue statin  -hold ARB for kings  - keep mag>2, K >4  - STAT ekg for chest pain    Hyperlipidemia  - last lipid panel reviewed (11/16/2022)   - continue home Lipitor     Essential hypertension  - BP 150s/60s on admission  - Orthostatic hypotension documented  - Hold losartan for KINGS  - Continue amlodipine- trial taking at night rather than morning  - monitor      VTE Risk Mitigation (From admission, onward)         Ordered     heparin (porcine) injection 5,000 Units  Every 8 hours         12/26/22 0132     Place MARIANN hose  Until discontinued         12/26/22 0358     IP VTE HIGH RISK PATIENT  Once         12/26/22 0132     Place sequential compression device  Until discontinued         12/26/22 0132                   Gladys Green PA-C  Department of  [facility] Medicine   [facility]

## 2022-12-26 NOTE — SUBJECTIVE & OBJECTIVE
Past Medical History:   Diagnosis Date    Arthritis     CHF (congestive heart failure)     Coronary artery disease     Hyperlipidemia     Hypertension        Past Surgical History:   Procedure Laterality Date    A-V CARDIAC PACEMAKER INSERTION N/A 7/9/2018    Procedure: INSERTION, CARDIAC PACEMAKER, DUAL CHAMBER;  Surgeon: Archie Montez MD;  Location: [facility] CATH LAB;  Service: Cardiology;  Laterality: N/A;  SVT, RFA, +/- Dual PPM or ILR, IGLESIA, MDT, MAC, MI, 3 Prep    CATARACT EXTRACTION W/  INTRAOCULAR LENS IMPLANT Left 11/23/2020    Procedure: EXTRACTION, CATARACT, WITH IOL INSERTION;  Surgeon: Roslyn Napier MD;  Location: [facility] [place] OR;  Service: Ophthalmology;  Laterality: Left;    cataract removal Right 2018    [facility]    COLONOSCOPY      COLONOSCOPY N/A 8/5/2020    Procedure: COLONOSCOPY;  Surgeon: Sebas Dickens MD;  Location: [facility] (2ND FLR);  Service: Endoscopy;  Laterality: N/A;    ESOPHAGOGASTRODUODENOSCOPY N/A 8/4/2020    Procedure: EGD (ESOPHAGOGASTRODUODENOSCOPY);  Surgeon: Fernie Cohen MD;  Location: [facility] ENDO (2ND FLR);  Service: Endoscopy;  Laterality: N/A;    HYSTERECTOMY N/A     INSERTION OF IMPLANTABLE LOOP RECORDER N/A 7/9/2018    Procedure: PLACEMENT-LOOP RECORDER;  Surgeon: Archie Montez MD;  Location: [facility] CATH LAB;  Service: Cardiology;  Laterality: N/A;  SVT, RFA, +/- Dual PPM or ILR, IGLESIA, MDT, MAC, MI, 3 Prep       Review of patient's allergies indicates:  No Known Allergies    Current Facility-Administered Medications on File Prior to Encounter   Medication    sodium chloride 0.9% flush 10 mL     Current Outpatient Medications on File Prior to Encounter   Medication Sig    acetaminophen (TYLENOL) 325 MG tablet Take 2 tablets (650 mg total) by mouth every 6 (six) hours as needed for Pain.    amLODIPine (NORVASC) 10 MG tablet TAKE 1 TABLET BY MOUTH EVERY DAY    atorvastatin (LIPITOR) 20 MG tablet Take 20 mg by mouth every evening.    BREO ELLIPTA 100-25 mcg/dose diskus inhaler  INHALE 1 PUFF BY MOUTH ONCE A DAY    docusate sodium (COLACE) 100 MG capsule Take 1 capsule (100 mg total) by mouth 2 (two) times daily.    ergocalciferol (ERGOCALCIFEROL) 50,000 unit Cap Take 50,000 Units by mouth every 7 days.    fluticasone propionate (FLONASE) 50 mcg/actuation nasal spray     HYDROcodone-acetaminophen (NORCO) 5-325 mg per tablet Take 1 tablet by mouth every 4 (four) hours as needed for Pain.    LIDOcaine (LIDODERM) 5 % Place 1 patch onto the skin once daily. Remove & Discard patch within 12 hours or as directed by MD    losartan (COZAAR) 100 MG tablet TAKE 1 TABLET BY MOUTH EVERY DAY    nitroGLYCERIN (NITROSTAT) 0.4 MG SL tablet Place 1 tablet (0.4 mg total) under the tongue every 5 (five) minutes as needed for Chest pain (and notify MD).    prednisolon/gatiflox/bromfenac (PREDNISOL ACE-GATIFLOX-BROMFEN) 1-0.5-0.075 % DrpS Apply 1 drop to eye 3 (three) times daily. in operative eye for 1 month after surgery     Family History    None       Tobacco Use    Smoking status: Former     Packs/day: 0.50     Types: Cigarettes     Quit date: 2013     Years since quittin.6    Smokeless tobacco: Never   Substance and Sexual Activity    Alcohol use: No    Drug use: No    Sexual activity: Never     Review of Systems   Constitutional:  Positive for activity change. Negative for chills and fever.   HENT:  Negative for tinnitus and trouble swallowing.    Eyes:  Negative for photophobia and visual disturbance.   Respiratory:  Positive for shortness of breath. Negative for cough and chest tightness.    Cardiovascular:  Negative for chest pain, palpitations and leg swelling.   Gastrointestinal:  Negative for abdominal pain, constipation, diarrhea, nausea and vomiting.   Genitourinary:  Negative for dysuria, frequency and hematuria.   Musculoskeletal:  Positive for arthralgias and gait problem. Negative for back pain and neck pain.   Skin:  Negative for rash and wound.   Neurological:  Positive for  dizziness, weakness, light-headedness and headaches. Negative for syncope, speech difficulty and numbness.   Psychiatric/Behavioral:  Negative for agitation and confusion. The patient is not nervous/anxious.    Objective:     Vital Signs (Most Recent):  Temp: 97.7 °F (36.5 °C) (12/26/22 0125)  Pulse: 68 (12/26/22 0125)  Resp: 16 (12/26/22 0125)  BP: (!) 152/67 (12/26/22 0125)  SpO2: 99 % (12/26/22 0125)   Vital Signs (24h Range):  Temp:  [97.7 °F (36.5 °C)-98.5 °F (36.9 °C)] 97.7 °F (36.5 °C)  Pulse:  [61-76] 68  Resp:  [16-18] 16  SpO2:  [95 %-100 %] 99 %  BP: (109-196)/(55-89) 152/67     Weight: 90.7 kg (200 lb)  Body mass index is 33.28 kg/m².    Physical Exam  Vitals and nursing note reviewed.   Constitutional:       General: She is not in acute distress.     Appearance: She is well-developed. She is obese.   HENT:      Head: Normocephalic and atraumatic.      Mouth/Throat:      Pharynx: No oropharyngeal exudate.   Eyes:      Extraocular Movements: Extraocular movements intact.      Conjunctiva/sclera: Conjunctivae normal.      Pupils: Pupils are equal, round, and reactive to light.   Cardiovascular:      Rate and Rhythm: Normal rate and regular rhythm.      Heart sounds: Normal heart sounds.   Pulmonary:      Effort: Pulmonary effort is normal. No respiratory distress.      Breath sounds: Normal breath sounds. No wheezing.   Abdominal:      General: Bowel sounds are normal. There is no distension.      Palpations: Abdomen is soft.      Tenderness: There is no abdominal tenderness.   Musculoskeletal:         General: No swelling or tenderness.      Cervical back: Normal range of motion and neck supple. No rigidity.      Comments: Limited actively ROM bilateral shoulders    Skin:     General: Skin is warm and dry.      Capillary Refill: Capillary refill takes less than 2 seconds.      Findings: No rash.   Neurological:      General: No focal deficit present.      Mental Status: She is alert and oriented to  person, place, and time.      Cranial Nerves: No cranial nerve deficit.      Sensory: No sensory deficit.      Motor: Weakness (slight generalizedweakness, nonfocal) present.   Psychiatric:         Behavior: Behavior normal.         Thought Content: Thought content normal.         Judgment: Judgment normal.         CRANIAL NERVES     CN III, IV, VI   Pupils are equal, round, and reactive to light.     Significant Labs: All pertinent labs within the past 24 hours have been reviewed.    CBC:   Recent Labs   Lab 12/25/22  1643   WBC 3.63*   HGB 11.7*   HCT 38.0        CMP:   Recent Labs   Lab 12/25/22  1643      K 4.0      CO2 21*   GLU 98   BUN 34*   CREATININE 1.9*   CALCIUM 9.5   PROT 7.6   ALBUMIN 3.9   BILITOT 0.4   ALKPHOS 74   AST 12   ALT 8*   ANIONGAP 11     Cardiac Markers:   Recent Labs   Lab 12/25/22  1643   BNP 55     Magnesium:   Recent Labs   Lab 12/25/22  1643   MG 1.9       Troponin:   Recent Labs   Lab 12/25/22  1643   TROPONINI 0.007     TSH:   Recent Labs   Lab 12/25/22  1643   TSH 1.284     Urine Studies:   Recent Labs   Lab 12/25/22  1657   COLORU Yellow   APPEARANCEUA Hazy*   PHUR 5.0   SPECGRAV 1.025   PROTEINUA Trace*   GLUCUA Negative   KETONESU Negative   BILIRUBINUA Negative   OCCULTUA Negative   NITRITE Negative   LEUKOCYTESUR Negative       Significant Imaging: I have reviewed all pertinent imaging results/findings within the past 24 hours.  MRI Brain Without Contrast  Narrative: EXAMINATION:  MRI BRAIN WITHOUT CONTRAST    CLINICAL HISTORY:  Dizziness, persistent/recurrent, cardiac or vascular cause suspected;    TECHNIQUE:  Multiplanar multisequence MR imaging of the brain was performed without contrast.    COMPARISON:  CT scan of the head dated 12/22/2022.    FINDINGS:  The craniocervical junction is intact.  The sellar and parasellar structures are within normal limits.  The intracranial flow voids are within normal limits.    No diffusion-weighted signal abnormality  is present.  The ventricles and sulci are prominent, consistent cerebral volume loss.  There are T2/FLAIR signal hyperintense within the periventricular and subcortical white matter.  There are no extra-axial fluid collections.  There is no evidence of intracranial hemorrhage.  There is no evidence of mass effect.    There are postoperative changes in the globes. The paranasal sinuses are unremarkable.  The mastoid air cells are clear.  The calvarium is intact.  Impression: No acute intracranial process.  Specifically, no evidence of acute infarction.    Age-related changes.    Electronically signed by: Anton Bean MD  Date:    12/25/2022  Time:    23:48  X-Ray Knee 1 or 2 View Right  Narrative: EXAMINATION:  XR KNEE 1 OR 2 VIEW RIGHT    CLINICAL HISTORY:  Pain in right knee    TECHNIQUE:  AP and lateral views of the right knee were performed.    COMPARISON:  04/30/2009    FINDINGS:  Two views right knee.    There is osteopenia.  There are degenerative changes of the knee noting significant medial compartmental narrowing.  There is bilateral marginal osteophytosis.  No convincing acute displaced fracture or dislocation of the knee.  There are degenerative changes involving the patella, particularly the upper pole.  No large knee joint effusion.  No radiopaque foreign body.  There is edema overlying the anterior aspect of the knee.  Impression: 1. No convincing acute displaced fracture or dislocation of the knee noting progressive degenerative changes and anterior edema.    Electronically signed by: Lul Kendall MD  Date:    12/25/2022  Time:    17:43  X-Ray Chest PA And Lateral  Narrative: EXAMINATION:  CHEST PA AND LATERAL    CLINICAL HISTORY:  Shortness of breath    TECHNIQUE:  PA and lateral chest radiograph    COMPARISON:  12/22/2022    FINDINGS:  There is a left cardiac loop recorder.  The cardiac silhouette is within normal limits. Vascular calcification is seen at the aortic knob.  There is no focal  consolidation, pneumothorax, or pleural effusion. The bones are diffusely osteopenic.  There is mild dextroscoliosis.  Impression: No acute intrathoracic abnormality.    Electronically signed by: Lily Orellana  Date:    12/25/2022  Time:    17:40

## 2022-12-26 NOTE — ASSESSMENT & PLAN NOTE
nonobstructive per [facility] 2015  -troponin negative; no concern for ACS  -continue statin  -hold ARB for constance  - keep mag>2, K >4  - STAT ekg for chest pain

## 2022-12-26 NOTE — ASSESSMENT & PLAN NOTE
- BP 150s/60s on admission  - Orthostatic hypotension documented  - Hold losartan for KINGS  - Continue amlodipine- trial taking at night rather than morning  - monitor

## 2022-12-26 NOTE — ASSESSMENT & PLAN NOTE
Orthostatic hypotension  Dyspnea  Light headedness  - 82F presenting with postural dizziness, lightheadedness, imbalance, and dyspnea for the past few weeks. Denies vertigo, syncope, chest pain, nausea, tinnitus. Symptoms start immediately on standing and resolve with sitting. Fell out of bed one week ago. Seen in [facility] ED 3 days ago for similar symptoms with negative CT.  - AFVSS, no leukocytosis or electrolyte abnormaility  - MRI brain without acute intracranial process   - orthostatic vitals positive in ED  - EKG with sinus rhythm, PACs, rate 80  - s/p 500 cc bolus in ED  - suspect orthostatic hypotension is major contributor to current symotoms  - recommend compression stalking/MARIANN hose for now  - discussed careful position changes for fall/fainting prevention  - on amlodipine and losartan for HTN- will hold losartan for now given KINGS/presenting symptoms  - ILR device interrogation ordered  - Telemetry when box available  - Last echo 4/26/2018 reviewed  - TTE ordered   - Consider PT/OT evaluation if no improvement in weakness/gait imbalance

## 2022-12-26 NOTE — ASSESSMENT & PLAN NOTE
- History noted  - appears hypovolemia on exam and CXR  - hold losartan for KINGS  - not on BB (for bradycardia) or diuretic   - Last TTE reviewed  - Repeat ordered  - Cardiac diet  - strict I/Os  - daily weights

## 2022-12-26 NOTE — ASSESSMENT & PLAN NOTE
Suspect etiology pre-renal/hypovolemia.  Cr 1.9.  Baseline Cr 1.4  - No signs or symptoms of uremia  - UA noninfectious   - S/p 500 cc bolus in ED  - Renal US if no improvement in 24-48 hrs   - Hold losartan  - Monitor UOP and follow serial BMP   - Adjust drugs to GFR/CrCL; avoid nephrotoxic drugs   -  Estimated Creatinine Clearance: 22.9 mL/min (A) (based on SCr of 1.9 mg/dL (H)).   - Monitor electrolytes, phos levels daily  - Urine studies ordered

## 2022-12-26 NOTE — ASSESSMENT & PLAN NOTE
- Current CBC reviewed-   Lab Results   Component Value Date    HGB 11.7 (L) 12/25/2022    HCT 38.0 12/25/2022     - Patient's anemia is currently controlled. S/p 0 units of PRBCs.   - Monitor serial CBC and transfuse if patient becomes hemodynamically unstable, symptomatic or H/H drops below 7/21.

## 2022-12-27 ENCOUNTER — DOCUMENTATION ONLY (OUTPATIENT)
Dept: ELECTROPHYSIOLOGY | Facility: CLINIC | Age: 82
End: 2022-12-27
Payer: MEDICARE

## 2022-12-27 VITALS
HEIGHT: 65 IN | DIASTOLIC BLOOD PRESSURE: 78 MMHG | SYSTOLIC BLOOD PRESSURE: 141 MMHG | WEIGHT: 174.19 LBS | TEMPERATURE: 98 F | BODY MASS INDEX: 29.02 KG/M2 | HEART RATE: 71 BPM | OXYGEN SATURATION: 97 % | RESPIRATION RATE: 17 BRPM

## 2022-12-27 LAB
ANION GAP SERPL CALC-SCNC: 7 MMOL/L (ref 8–16)
ASCENDING AORTA: 2.74 CM
AV INDEX (PROSTH): 0.56
AV MEAN GRADIENT: 9 MMHG
AV PEAK GRADIENT: 18 MMHG
AV VALVE AREA: 2.13 CM2
AV VELOCITY RATIO: 0.55
BASOPHILS # BLD AUTO: 0.02 K/UL (ref 0–0.2)
BASOPHILS NFR BLD: 0.6 % (ref 0–1.9)
BSA FOR ECHO PROCEDURE: 1.9 M2
BUN SERPL-MCNC: 30 MG/DL (ref 8–23)
CALCIUM SERPL-MCNC: 9.1 MG/DL (ref 8.7–10.5)
CHLORIDE SERPL-SCNC: 112 MMOL/L (ref 95–110)
CO2 SERPL-SCNC: 22 MMOL/L (ref 23–29)
CREAT SERPL-MCNC: 1.4 MG/DL (ref 0.5–1.4)
CV ECHO LV RWT: 0.43 CM
DIFFERENTIAL METHOD: ABNORMAL
DOP CALC AO PEAK VEL: 2.14 M/S
DOP CALC AO VTI: 48.15 CM
DOP CALC LVOT AREA: 3.8 CM2
DOP CALC LVOT DIAMETER: 2.2 CM
DOP CALC LVOT PEAK VEL: 1.18 M/S
DOP CALC LVOT STROKE VOLUME: 102.62 CM3
DOP CALCLVOT PEAK VEL VTI: 27.01 CM
E WAVE DECELERATION TIME: 301.11 MSEC
E/A RATIO: 0.77
E/E' RATIO: 13.67 M/S
ECHO LV POSTERIOR WALL: 0.87 CM (ref 0.6–1.1)
EJECTION FRACTION: 65 %
EOSINOPHIL # BLD AUTO: 0.1 K/UL (ref 0–0.5)
EOSINOPHIL NFR BLD: 2.5 % (ref 0–8)
ERYTHROCYTE [DISTWIDTH] IN BLOOD BY AUTOMATED COUNT: 14.7 % (ref 11.5–14.5)
EST. GFR  (NO RACE VARIABLE): 37.1 ML/MIN/1.73 M^2
FRACTIONAL SHORTENING: 29 % (ref 28–44)
GLUCOSE SERPL-MCNC: 82 MG/DL (ref 70–110)
HCT VFR BLD AUTO: 35.8 % (ref 37–48.5)
HGB BLD-MCNC: 10.5 G/DL (ref 12–16)
IMM GRANULOCYTES # BLD AUTO: 0.01 K/UL (ref 0–0.04)
IMM GRANULOCYTES NFR BLD AUTO: 0.3 % (ref 0–0.5)
INTERVENTRICULAR SEPTUM: 0.9 CM (ref 0.6–1.1)
LA MAJOR: 4.28 CM
LA MINOR: 4.11 CM
LA WIDTH: 3.31 CM
LEFT ATRIUM SIZE: 3.46 CM
LEFT ATRIUM VOLUME INDEX MOD: 18.5 ML/M2
LEFT ATRIUM VOLUME INDEX: 21.8 ML/M2
LEFT ATRIUM VOLUME MOD: 34.61 CM3
LEFT ATRIUM VOLUME: 40.82 CM3
LEFT INTERNAL DIMENSION IN SYSTOLE: 2.91 CM (ref 2.1–4)
LEFT VENTRICLE DIASTOLIC VOLUME INDEX: 39.03 ML/M2
LEFT VENTRICLE DIASTOLIC VOLUME: 72.98 ML
LEFT VENTRICLE MASS INDEX: 59 G/M2
LEFT VENTRICLE SYSTOLIC VOLUME INDEX: 17.4 ML/M2
LEFT VENTRICLE SYSTOLIC VOLUME: 32.49 ML
LEFT VENTRICULAR INTERNAL DIMENSION IN DIASTOLE: 4.07 CM (ref 3.5–6)
LEFT VENTRICULAR MASS: 110.22 G
LV LATERAL E/E' RATIO: 13.67 M/S
LV SEPTAL E/E' RATIO: 13.67 M/S
LYMPHOCYTES # BLD AUTO: 0.6 K/UL (ref 1–4.8)
LYMPHOCYTES NFR BLD: 17.9 % (ref 18–48)
MAGNESIUM SERPL-MCNC: 1.9 MG/DL (ref 1.6–2.6)
MCH RBC QN AUTO: 31.1 PG (ref 27–31)
MCHC RBC AUTO-ENTMCNC: 29.3 G/DL (ref 32–36)
MCV RBC AUTO: 106 FL (ref 82–98)
MONOCYTES # BLD AUTO: 0.4 K/UL (ref 0.3–1)
MONOCYTES NFR BLD: 11.5 % (ref 4–15)
MV A" WAVE DURATION": 14.84 MSEC
MV PEAK A VEL: 1.06 M/S
MV PEAK E VEL: 0.82 M/S
MV STENOSIS PRESSURE HALF TIME: 87.32 MS
MV VALVE AREA P 1/2 METHOD: 2.52 CM2
NEUTROPHILS # BLD AUTO: 2.4 K/UL (ref 1.8–7.7)
NEUTROPHILS NFR BLD: 67.2 % (ref 38–73)
NRBC BLD-RTO: 0 /100 WBC
PHOSPHATE SERPL-MCNC: 3.4 MG/DL (ref 2.7–4.5)
PISA TR MAX VEL: 2.69 M/S
PLATELET # BLD AUTO: 206 K/UL (ref 150–450)
PMV BLD AUTO: 10.9 FL (ref 9.2–12.9)
POTASSIUM SERPL-SCNC: 4.5 MMOL/L (ref 3.5–5.1)
PULM VEIN S/D RATIO: 1.69
PV PEAK D VEL: 0.32 M/S
PV PEAK S VEL: 0.54 M/S
RA MAJOR: 4.22 CM
RA PRESSURE: 8 MMHG
RA WIDTH: 2.77 CM
RBC # BLD AUTO: 3.38 M/UL (ref 4–5.4)
RIGHT VENTRICULAR END-DIASTOLIC DIMENSION: 2.9 CM
RV TISSUE DOPPLER FREE WALL SYSTOLIC VELOCITY 1 (APICAL 4 CHAMBER VIEW): 13.01 CM/S
SINUS: 2.66 CM
SODIUM SERPL-SCNC: 141 MMOL/L (ref 136–145)
STJ: 2.63 CM
TDI LATERAL: 0.06 M/S
TDI SEPTAL: 0.06 M/S
TDI: 0.06 M/S
TR MAX PG: 29 MMHG
TRICUSPID ANNULAR PLANE SYSTOLIC EXCURSION: 2.17 CM
TV REST PULMONARY ARTERY PRESSURE: 37 MMHG
WBC # BLD AUTO: 3.58 K/UL (ref 3.9–12.7)

## 2022-12-27 PROCEDURE — 99217 PR OBSERVATION CARE DISCHARGE: CPT | Mod: ,,, | Performed by: STUDENT IN AN ORGANIZED HEALTH CARE EDUCATION/TRAINING PROGRAM

## 2022-12-27 PROCEDURE — 85025 COMPLETE CBC W/AUTO DIFF WBC: CPT

## 2022-12-27 PROCEDURE — 80048 BASIC METABOLIC PNL TOTAL CA: CPT

## 2022-12-27 PROCEDURE — 96372 THER/PROPH/DIAG INJ SC/IM: CPT

## 2022-12-27 PROCEDURE — 84100 ASSAY OF PHOSPHORUS: CPT

## 2022-12-27 PROCEDURE — 25000003 PHARM REV CODE 250

## 2022-12-27 PROCEDURE — 25000003 PHARM REV CODE 250: Performed by: STUDENT IN AN ORGANIZED HEALTH CARE EDUCATION/TRAINING PROGRAM

## 2022-12-27 PROCEDURE — 36415 COLL VENOUS BLD VENIPUNCTURE: CPT

## 2022-12-27 PROCEDURE — 63600175 PHARM REV CODE 636 W HCPCS

## 2022-12-27 PROCEDURE — G0378 HOSPITAL OBSERVATION PER HR: HCPCS

## 2022-12-27 PROCEDURE — 83735 ASSAY OF MAGNESIUM: CPT

## 2022-12-27 PROCEDURE — 99217 PR OBSERVATION CARE DISCHARGE: ICD-10-PCS | Mod: ,,, | Performed by: STUDENT IN AN ORGANIZED HEALTH CARE EDUCATION/TRAINING PROGRAM

## 2022-12-27 RX ORDER — LOSARTAN POTASSIUM 50 MG/1
50 TABLET ORAL DAILY
Status: DISCONTINUED | OUTPATIENT
Start: 2022-12-27 | End: 2022-12-27 | Stop reason: HOSPADM

## 2022-12-27 RX ADMIN — HEPARIN SODIUM 5000 UNITS: 5000 INJECTION INTRAVENOUS; SUBCUTANEOUS at 01:12

## 2022-12-27 RX ADMIN — ACETAMINOPHEN 1000 MG: 500 TABLET ORAL at 01:12

## 2022-12-27 RX ADMIN — LOSARTAN POTASSIUM 50 MG: 50 TABLET, FILM COATED ORAL at 10:12

## 2022-12-27 RX ADMIN — HEPARIN SODIUM 5000 UNITS: 5000 INJECTION INTRAVENOUS; SUBCUTANEOUS at 06:12

## 2022-12-27 RX ADMIN — ACETAMINOPHEN 1000 MG: 500 TABLET ORAL at 10:12

## 2022-12-27 RX ADMIN — HYDRALAZINE HYDROCHLORIDE 25 MG: 25 TABLET, FILM COATED ORAL at 01:12

## 2022-12-27 NOTE — PLAN OF CARE
[facility]  Discharge Final Note    Primary Care Provider: Shantell Goff MD    Expected Discharge Date: 12/27/2022    Future Appointments   Date Time Provider Department Center   4/11/2023  3:00 PM Cecilio Kline MD [facility] C[facility] [place]     Pt discharged home with HH referral sent to N.   Sebas Cruz RN,BSN        Final Discharge Note (most recent)       Final Note - 12/27/22 1651          Final Note    Assessment Type Final Discharge Note     Anticipated Discharge Disposition Home-Health Care [facility]     Hospital Resources/Appts/Education Provided Provided patient/caregiver with written discharge plan information;Appointments scheduled and added to AVS        Post-Acute Status    Home Health Status Referrals Sent     Discharge Delays None known at this time                     Important Message from Medicare             Contact Info       Shantell Goff MD   Specialty: Family Medicine   Relationship: PCP - [facility] [address]  [address] [place] [address]   Phone: [phone]       Next Steps: Follow up    [facility]    [phone]       Next Steps: Follow up    Instructions: A referral for Home health was sent to [facility] and they will pick your agancy. If you have not heard from a Home health agency, please call the number above to follow up on your referral.

## 2022-12-27 NOTE — PROGRESS NOTES
Inpatient device interrogation and/or reprogramming orders received; the industry representative was contacted and provided with patient's name and room number.       [employer] Rep unable to interrogate pt's ILR as to the battery is dead.
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
## 2022-12-27 NOTE — PLAN OF CARE
[facility]      HOME HEALTH ORDERS  FACE TO FACE ENCOUNTER    Patient Name: Crystal Alvarado  YOB: 1940    PCP: Shantell Goff MD   PCP Address: [address] / [place]  PCP Phone Number: [phone]  PCP Fax: [phone]    Encounter Date: 12/25/22    Admit to Home Health    Diagnoses:  Active Hospital Problems    Diagnosis  POA    *Postural dizziness with presyncope [R42, R55]  Yes    ORLIN (obstructive sleep apnea) [G47.33]  Yes    Status post placement of implantable loop recorder [Z95.818]  Yes    Orthostatic hypotension [I95.1]  Yes    Heart failure with preserved left ventricular function (HFpEF) [I50.30]  Yes    Dyspnea [R06.00]  Yes    Acute kidney injury superimposed on chronic kidney disease [N17.9, N18.9]  Yes    Normocytic anemia [D64.9]  Yes    Coronary artery disease involving native coronary artery of native heart without angina pectoris [I25.10]  Yes    Hyperlipidemia [E78.5]  Yes    Essential hypertension [I10]  Yes    Light headedness [R42]  Yes      Resolved Hospital Problems   No resolved problems to display.       Follow Up Appointments:  Future Appointments   Date Time Provider Department Center   4/11/2023  3:00 PM Cecilio Kline MD [facility] CARD[facility]       Allergies:Review of patient's allergies indicates:  No Known Allergies    Medications: Review discharge medications with patient and family and provide education.    Current Facility-Administered Medications   Medication Dose Route Frequency Provider Last Rate Last Admin    acetaminophen tablet 1,000 mg  1,000 mg Oral Q8H PRN Gladys Green PA-C   1,000 mg at 12/27/22 1030    acetaminophen tablet 650 mg  650 mg Oral Q4H PRN Gladys Green PA-C        albuterol-ipratropium 2.5 mg-0.5 mg/3 mL nebulizer solution 3 mL  3 mL Nebulization Q4H PRN Gladys Green PA-C        aluminum-magnesium hydroxide-simethicone 200-200-20 mg/5 mL suspension 30 mL  30 mL Oral  QID PRN Gladys Green PA-C        amLODIPine tablet 10 mg  10 mg Oral QHS Gladys Green PA-C   10 mg at 12/26/22 2010    atorvastatin tablet 20 mg  20 mg Oral QHS Gladys Green PA-C   20 mg at 12/26/22 2010    bisacodyL suppository 10 mg  10 mg Rectal Daily PRN Gladys Green PA-C        dextrose 10% bolus 125 mL 125 mL  12.5 g Intravenous PRN Gladys Green PA-C        dextrose 10% bolus 250 mL 250 mL  25 g Intravenous PRN Gladys Green PA-C        glucagon (human recombinant) injection 1 mg  1 mg Intramuscular PRN Gladys Green PA-C        glucose chewable tablet 16 g  16 g Oral PRN Gladys Green PA-C        glucose chewable tablet 24 g  24 g Oral PRN Gladys Green PA-C        heparin (porcine) injection 5,000 Units  5,000 Units Subcutaneous Q8H Gladys Green PA-C   5,000 Units at 12/27/22 0602    hydrALAZINE tablet 25 mg  25 mg Oral Q8H PRN Carolynn Obrien MD   25 mg at 12/27/22 0102    insulin aspart U-100 pen 0-5 Units  0-5 Units Subcutaneous QID (AC + HS) PRN Gladys Green PA-C        losartan tablet 50 mg  50 mg Oral Daily May MARLEY Obrien MD   50 mg at 12/27/22 1024    melatonin tablet 6 mg  6 mg Oral Nightly PRN Gladys Green PA-C        naloxone 0.4 mg/mL injection 0.02 mg  0.02 mg Intravenous PRN Gladys Green PA-C        ondansetron disintegrating tablet 8 mg  8 mg Oral Q8H PRN Gladys Green PA-C        polyethylene glycol packet 17 g  17 g Oral BID PRN Gladys Green PA-C        prochlorperazine injection Soln 5 mg  5 mg Intravenous Q6H PRN Gladys Green PA-C        simethicone chewable tablet 80 mg  1 tablet Oral QID PRN Gladys Green PA-C        sodium chloride 0.9% flush 5 mL  5 mL Intravenous PRN Gladys Green PA-C         Current Discharge Medication List        CONTINUE these medications which have NOT CHANGED    Details   acetaminophen (TYLENOL) 325 MG tablet Take 2 tablets (650 mg total) by mouth every 6  (six) hours as needed for Pain.  Refills: 0      amLODIPine (NORVASC) 10 MG tablet TAKE 1 TABLET BY MOUTH EVERY DAY  Qty: 90 tablet, Refills: 0    Associated Diagnoses: Essential hypertension      atorvastatin (LIPITOR) 20 MG tablet Take 20 mg by mouth every evening.  Refills: 4      BREO ELLIPTA 100-25 mcg/dose diskus inhaler INHALE 1 PUFF BY MOUTH ONCE A DAY  Refills: 3      docusate sodium (COLACE) 100 MG capsule Take 1 capsule (100 mg total) by mouth 2 (two) times daily.  Qty: 60 capsule, Refills: 0      fluticasone propionate (FLONASE) 50 mcg/actuation nasal spray       LIDOcaine (LIDODERM) 5 % Place 1 patch onto the skin once daily. Remove & Discard patch within 12 hours or as directed by MD  Qty: 15 patch, Refills: 0      losartan (COZAAR) 100 MG tablet TAKE 1 TABLET BY MOUTH EVERY DAY  Qty: 90 tablet, Refills: 0      nitroGLYCERIN (NITROSTAT) 0.4 MG SL tablet Place 1 tablet (0.4 mg total) under the tongue every 5 (five) minutes as needed for Chest pain (and notify MD).  Qty: 25 tablet, Refills: 5    Associated Diagnoses: Chest pain, unspecified type           STOP taking these medications       ergocalciferol (ERGOCALCIFEROL) 50,000 unit Cap Comments:   Reason for Stopping:         HYDROcodone-acetaminophen (NORCO) 5-325 mg per tablet Comments:   Reason for Stopping:         prednisolon/gatiflox/bromfenac (PREDNISOL ACE-GATIFLOX-BROMFEN) 1-0.5-0.075 % DrpS Comments:   Reason for Stopping:                 I have seen and examined this patient within the last 30 days. My clinical findings that support the need for the home health skilled services and home bound status are the following:no   Weakness/numbness causing balance and gait disturbance due to Weakness/Debility making it taxing to leave home.     Diet:   cardiac diet and diabetic diet 2000 calorie    Labs:  NA    Referrals/ Consults  Physical Therapy to evaluate and treat. Evaluate for home safety and equipment needs; Establish/upgrade home exercise  program. Perform / instruct on therapeutic exercises, gait training, transfer training, and Range of Motion.  Occupational Therapy to evaluate and treat. Evaluate home environment for safety and equipment needs. Perform/Instruct on transfers, ADL training, ROM, and therapeutic exercises.   to evaluate for community resources/long-range planning.  Aide to provide assistance with personal care, ADLs, and vital signs.    Activities:   activity as tolerated    Nursing:   Agency to admit patient within 24 hours of hospital discharge unless specified on physician order or at patient request    SN to complete comprehensive assessment including routine vital signs. Instruct on disease process and s/s of complications to report to MD. Review/verify medication list sent home with the patient at time of discharge  and instruct patient/caregiver as needed. Frequency may be adjusted depending on start of care date.     Skilled nurse to perform up to 3 visits PRN for symptoms related to diagnosis    Notify MD if SBP > 160 or < 90; DBP > 90 or < 50; HR > 120 or < 50; Temp > 101; O2 < 88%; Other:   NA    Ok to schedule additional visits based on staff availability and patient request on consecutive days within the home health episode.    When multiple disciplines ordered:    Start of Care occurs on Sunday - Wednesday schedule remaining discipline evaluations as ordered on separate consecutive days following the start of care.    Thursday SOC -schedule subsequent evaluations Friday and Monday the following week.     Friday - Saturday SOC - schedule subsequent discipline evaluations on consecutive days starting Monday of the following week.    For all post-discharge communication and subsequent orders please contact patient's primary care physician. If unable to reach primary care physician or do not receive response within 30 minutes, please contact Primary Care Provider for clinical staff order  clarification    Miscellaneous   NA    Home Health Aide:  Physical Therapy Three times weekly, Occupational Therapy Three times weekly, Medical Social Work Three times weekly, Home Health Aide Three times weekly, and Nutrition    Wound Care Orders  no    I certify that this patient is confined to her home and needs intermittent skilled nursing care, physical therapy, and occupational therapy.

## 2022-12-27 NOTE — PLAN OF CARE
[facility]erral for [facility] faxed to [facility].      12/27/22 1133   Post-Acute Status   Post-Acute Authorization Home Health   Home Health Status Referrals Sent   Discharge Plan   Discharge Plan A Home Health   Discharge Plan B Home Health       Sebas Cruz RN,BSN

## 2022-12-27 NOTE — NURSING
Care provided as ordered during shift. No acute incidents noted. Safety and fall precautions maintained

## 2022-12-27 NOTE — PLAN OF CARE
Problem: Adult Inpatient Plan of Care  Goal: Plan of Care Review  Outcome: Ongoing, Progressing  Goal: Patient-Specific Goal (Individualized)  Outcome: Ongoing, Progressing  Goal: Optimal Comfort and Wellbeing  Outcome: Ongoing, Progressing     Patient remains free from falls and injury this shift. Bed in low, locked position with call light in reach. Plan of care reviewed with pt. VSS. Headache pain resolved with tylenol. Diet tolerated. Echo completed. Patient encouraged to call for assistance when getting out of bed. Patient verbalized understanding. All belongings within reach.   Oxygen: RA  Ambulates: 1 assist w/walker  Discontinued: tele, 20g L FA  Tolerating: cardiac diet  Elimination: continent  ADLs: with assist  Teaching: An informational handout was provided to the patient which included instructions that addressed activity level, diet, discharge medications, follow-up appointments, and what to do if symptoms worsen.  Discussed all discharge instructions and medication education with the patient. Patient verbalizes understanding of all information given. Patient states she has no further questions. Patient leaving with family via wheelchair.

## 2022-12-27 NOTE — DISCHARGE INSTRUCTIONS
Follow up closely with your primary care provider at discharge. You will have home health come out to the home for physical therapy, nursing, and checkups. They will call you when they will be coming out to the home. Please seek medical care with chest pain, shortness of breath, changes in strength or feeling, nausea, vomiting, diarrhea, fevers,. Heart racing, confusion, lethargy, or any other symptoms that concern you.

## 2022-12-28 NOTE — HOSPITAL COURSE
Admitted with orthostatic hypotension that improved with IVF. MRI brain without acute intracranial process. EKG with sinus rhythm, PACs, rate 80. Recommended compression stocking. Attempted ILR device interrogation, but battery dead. TTE reassuring. Discussed with family that this is likely more of an acute on chronic deconditioning. Referrals sent for home health and care at home. Encouraged close follow up with the PCP. Discharged with family with strict return precautions.

## 2022-12-28 NOTE — DISCHARGE SUMMARY
[facility] [address] [place] [address] Medicine  Discharge Summary      Patient Name: Crystal Alvarado  MRN: [id number]  BEBE: [id number]  Patient Class: OP- Observation  Admission Date: 12/25/2022  Hospital Length of Stay: 0 days  Discharge Date and Time:  12/27/2022 8:02 PM  Attending Physician: Mariam att. 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 She states she feels fine when sitting; however, immediately on standing she becomes symptomatic. Symptoms immediately resolve upon sitting. She has noticed this for the past few weeks and it has not been getting better or worse. She ambulated with a walker at baseline, but endorses feeling more off balance now. While she has chronic knee pain, her activity has been limited due to these new symptoms. She endorses poor intake of water/fluids. She lives at home with her daughter. She denies vertigo/room spinning sensation. Denies recent illness, fever or chills. Denies nausea, vomiting, visual changes. Denies chest pain or palpitations. Denies SOB at baseline, wheezing, cough. Endorses falling out of bed about one week ago. Denies LOC/syncope.  Has a slight headache. She was seen for similar symptoms at [facility] 3 days ago and had no significant findings on laboratory work or CT head.     In the ED, AFVSS. Orthostatics positive in the ED. BP 150s/60s sitting, 109/55 standing. CBC with WBC 3.6. H/H 11.7/38.0. CMP with BUN/Cr 34/1.9 (baseline 1.4). BNP 55. Troponin 0.007. TSH 1.284. F;u/RSV/Covid negative. UA negative for infection. EKG  sinus rhythm with PACs. CXR no acute intrathoracic abnormality. MRI brain negative for acute infarction. XR right knee no convincing acute displaced fracture or dislocation of the knee. Given 500 cc NS bolus.       * No surgery found *      Hospital Course:   Admitted with orthostatic hypotension that improved with IVF. MRI brain without acute intracranial process. EKG with sinus rhythm, PACs, rate 80. Recommended compression stocking. Attempted ILR device interrogation, but battery dead. TTE reassuring. Discussed with family that this is likely more of an acute on chronic deconditioning. Referrals sent for home health and care at home. Encouraged close follow up with the PCP. Discharged with family with strict return precautions.        Goals of Care Treatment Preferences:  Code Status: Full Code      Consults:   Consults (From admission, onward)        Status Ordering Provider     Inpatient consult to PICC team (MILDRED)  Once        Provider:  (Not yet assigned)    Completed MIKE MAY M.          No new Assessment & Plan notes have been filed under this hospital service since the last note was generated.  Service: Hospital Medicine    Final Active Diagnoses:    Diagnosis Date Noted POA    PRINCIPAL PROBLEM:  Postural dizziness with presyncope [R42, R55]  Yes    ORLIN (obstructive sleep apnea) [G47.33]  Yes    Status post placement of implantable loop recorder [Z95.818] 08/14/2018 Yes    Orthostatic hypotension [I95.1] 04/27/2018 Yes    Heart failure with preserved left ventricular function (HFpEF) [I50.30]  Yes    Dyspnea [R06.00]  Yes    Acute kidney injury superimposed on chronic kidney disease [N17.9, N18.9] 11/19/2017 Yes    Normocytic anemia [D64.9] 11/19/2017 Yes    Coronary artery disease involving native coronary artery of native heart without angina pectoris [I25.10] 02/16/2016 Yes    Hyperlipidemia [E78.5]  Yes    Essential hypertension [I10] 08/11/2015 Yes    Light headedness [R42]  "08/10/2015 Yes      Problems Resolved During this Admission:       Discharged Condition: good    Disposition: Home or Self Care    Follow Up:   Follow-up Information     Shantell Goff MD Follow up.    Specialty: Family Medicine  Contact information:  [address]  [address]  [place] [address]  [phone]             [facility] Follow up.    Why: A referral for Home health was sent to [facility] and they will pick your agancy. If you have not heard from a Home health agency, please call the number above to follow up on your referral.  Contact information:  [phone]                     Patient Instructions:      BATH/SHOWER CHAIR FOR HOME USE     Order Specific Question Answer Comments   Height: 5' 5" (1.651 m)    Weight: 79 kg (174 lb 2.6 oz)    Length of need (1-99 months): 99    Type: With back      COMMODE FOR HOME USE     Order Specific Question Answer Comments   Type: Standard    Height: 5' 5" (1.651 m)    Weight: 79 kg (174 lb 2.6 oz)    Length of need (1-99 months): 99      Ambulatory referral/consult to [facility]   Standing Status: Future   Referral Priority: Routine Referral Type: Consultation   Referral Reason: Specialty Services Required   Number of Visits Requested: 1     Diet Cardiac     Notify your health care provider if you experience any of the following:  temperature >100.4     Notify your health care provider if you experience any of the following:  persistent nausea and vomiting or diarrhea     Notify your health care provider if you experience any of the following:  severe uncontrolled pain     Notify your health care provider if you experience any of the following:  difficulty breathing or increased cough     Notify your health care provider if you experience any of the following:  severe persistent headache     Notify your health care provider if you experience any of the following:  worsening rash     Notify your health " care provider if you experience any of the following:  persistent dizziness, light-headedness, or visual disturbances     Notify your health care provider if you experience any of the following:  increased confusion or weakness     Activity as tolerated       Significant Diagnostic Studies: Labs: All labs within the past 24 hours have been reviewed    Pending Diagnostic Studies:     None         Medications:  Reconciled Home Medications:      Medication List      CONTINUE taking these medications    acetaminophen 325 MG tablet  Commonly known as: TYLENOL  Take 2 tablets (650 mg total) by mouth every 6 (six) hours as needed for Pain.     amLODIPine 10 MG tablet  Commonly known as: NORVASC  TAKE 1 TABLET BY MOUTH EVERY DAY     atorvastatin 20 MG tablet  Commonly known as: LIPITOR  Take 20 mg by mouth every evening.     BREO ELLIPTA 100-25 mcg/dose diskus inhaler  Generic drug: fluticasone furoate-vilanteroL  INHALE 1 PUFF BY MOUTH ONCE A DAY     docusate sodium 100 MG capsule  Commonly known as: COLACE  Take 1 capsule (100 mg total) by mouth 2 (two) times daily.     fluticasone propionate 50 mcg/actuation nasal spray  Commonly known as: FLONASE     LIDOcaine 5 %  Commonly known as: LIDODERM  Place 1 patch onto the skin once daily. Remove & Discard patch within 12 hours or as directed by MD     losartan 100 MG tablet  Commonly known as: COZAAR  TAKE 1 TABLET BY MOUTH EVERY DAY     nitroGLYCERIN 0.4 MG SL tablet  Commonly known as: NITROSTAT  Place 1 tablet (0.4 mg total) under the tongue every 5 (five) minutes as needed for Chest pain (and notify MD).        STOP taking these medications    ergocalciferol 50,000 unit Cap  Commonly known as: ERGOCALCIFEROL     HYDROcodone-acetaminophen 5-325 mg per tablet  Commonly known as: NORCO     prednisol ace-gatiflox-bromfen 1-0.5-0.075 % Drps            Indwelling Lines/Drains at time of discharge:   Lines/Drains/Airways     None                 Time spent on the discharge of  patient: 30 minutes         May MARLEY Obrien MD  Department of Hospital Medicine  [facility] - Observation 11H

## 2023-01-03 ENCOUNTER — PES CALL (OUTPATIENT)
Dept: ADMINISTRATIVE | Facility: CLINIC | Age: 83
End: 2023-01-03
Payer: MEDICARE

## 2023-01-12 ENCOUNTER — CARE AT HOME (OUTPATIENT)
Dept: HOME HEALTH SERVICES | Facility: CLINIC | Age: 83
End: 2023-01-12
Payer: MEDICARE

## 2023-01-12 DIAGNOSIS — I10 ESSENTIAL HYPERTENSION: ICD-10-CM

## 2023-01-12 DIAGNOSIS — I95.1 ORTHOSTATIC HYPOTENSION: Primary | ICD-10-CM

## 2023-01-12 DIAGNOSIS — E78.5 HYPERLIPIDEMIA, UNSPECIFIED HYPERLIPIDEMIA TYPE: ICD-10-CM

## 2023-01-12 DIAGNOSIS — R53.81 PHYSICAL DEBILITY: ICD-10-CM

## 2023-01-12 PROCEDURE — 99350 HOME/RES VST EST HIGH MDM 60: CPT | Mod: S$GLB,,, | Performed by: NURSE PRACTITIONER

## 2023-01-12 PROCEDURE — 99350 PR HOME VISIT,ESTAB PATIENT,LEVEL IV: ICD-10-PCS | Mod: S$GLB,,, | Performed by: NURSE PRACTITIONER

## 2023-01-12 NOTE — PROGRESS NOTES
[facility]  Transition of Care Home Visit    Visit Date: 1/14/2023  Encounter Provider: Rowan Rodriguez   PCP:  Shantell Goff MD    PRESENTING HISTORY      Patient ID: Crystal Alvarado is a 82 y.o. female.    Consult Requested By:  Dr. Carolynn Obrien  Reason for Consult:  Hospital Follow Up.    Crystal is being seen at home due to being seen at home due to physical debility that presents a taxing effort to leave the home, to mitigate high risk of hospital readmission and/or due to the limited availability of reliable or safe options for transportation to the point of access to the provider. Prior to treatment on this visit the chart was reviewed and patient verbal consent was obtained.      Chief Complaint: Transitional Care/Orthostatic Hypotension    Admit: 12/25/22  Discharge: 12/27/22    History of Present Illness: Ms. Crystal Alvarado is a 82 y.o. female who was recently admitted to the hospital from 12/25/22-12/27/22.  She was admitted with orthostatic hypotension that improved with IV fluid administration. MRI brain without acute intracranial process. EKG with sinus rhythm, PACs, rate 80. Recommended compression stocking. Attempted ILR device interrogation, but battery dead. TTE reassuring. Discussed with family that this is likely more of an acute on chronic deconditioning. Referrals sent for home health and care at home. Encouraged close follow up with the PCP. Discharged with family with strict return precautions.   ___________________________________________________________________    Today:    HPI:  Ms. Alvarado is seen in her home today with son present. She denies feeling dizzy when ambulating since recent hospital discharge. She utilizes her walker when ambulating. She denies falls since hospital discharge. She reports generalized weakness and decreased range of motion to upper extremities. She is requesting Home health and PT services. She is compliant with BP medications and  manages her own meds. She took her BP meds this morning. She denies chest pain or shortness of breath. She feels she is hydrating adequately.          Review of Systems   Constitutional:  Negative for activity change, fatigue and fever.   HENT: Negative.     Eyes: Negative.    Respiratory:  Negative for chest tightness and shortness of breath.    Cardiovascular: Negative.  Negative for leg swelling.   Gastrointestinal: Negative.    Endocrine: Negative.    Genitourinary: Negative.    Musculoskeletal: Negative.    Skin: Negative.    Allergic/Immunologic: Negative.    Neurological:  Positive for weakness. Negative for dizziness and light-headedness.   Hematological: Negative.    Psychiatric/Behavioral: Negative.  Negative for agitation.    All other systems reviewed and are negative.    Assessments:  Environmental: lives in single story home with 4 steps, adequate lighting  Functional Status: ambulatory with walker  Safety: safe environment, uncluttered  Nutritional: Adequate food available in home  Home Health/DME/Supplies: Walker in home    PAST HISTORY:     Past Medical History:   Diagnosis Date    Arthritis     CHF (congestive heart failure)     Coronary artery disease     Hyperlipidemia     Hypertension        Past Surgical History:   Procedure Laterality Date    A-V CARDIAC PACEMAKER INSERTION N/A 7/9/2018    Procedure: INSERTION, CARDIAC PACEMAKER, DUAL CHAMBER;  Surgeon: Archie Montez MD;  Location: [facility] CATH LAB;  Service: Cardiology;  Laterality: N/A;  SVT, RFA, +/- Dual PPM or ILR, IGLESIA, MDT, MAC, [place], 3 Prep    CATARACT EXTRACTION W/  INTRAOCULAR LENS IMPLANT Left 11/23/2020    Procedure: EXTRACTION, CATARACT, WITH IOL INSERTION;  Surgeon: Roslyn Napier MD;  Location: [facility];  Service: Ophthalmology;  Laterality: Left;    cataract removal Right 2018    [facility]    COLONOSCOPY      COLONOSCOPY N/A 8/5/2020    Procedure: COLONOSCOPY;  Surgeon: Sebas Dickens MD;  [facility]   Service: Endoscopy;  Laterality: N/A;    ESOPHAGOGASTRODUODENOSCOPY N/A 2020    Procedure: EGD (ESOPHAGOGASTRODUODENOSCOPY);  Surgeon: Fernie Cohen MD;  Location: [facility] ([address] [place] [address]);  Service: Endoscopy;  Laterality: N/A;    HYSTERECTOMY N/A     INSERTION OF IMPLANTABLE LOOP RECORDER N/A 2018    Procedure: PLACEMENT-LOOP RECORDER;  Surgeon: Archie Montez MD;  Location: [facility] CATH LAB;  Service: Cardiology;  Laterality: N/A;  SVT, RFA, +/- Dual PPM or ILR, IGLESIA, MDT, MAC, [place], 3 Prep       History reviewed. No pertinent family history.    Social History     Socioeconomic History    Marital status: Single   Tobacco Use    Smoking status: Former     Packs/day: 0.50     Types: Cigarettes     Quit date: 2013     Years since quittin.7    Smokeless tobacco: Never   Substance and Sexual Activity    Alcohol use: No    Drug use: No    Sexual activity: Never       MEDICATIONS & ALLERGIES:     Current Outpatient Medications on File Prior to Visit   Medication Sig Dispense Refill    amLODIPine (NORVASC) 10 MG tablet TAKE 1 TABLET BY MOUTH EVERY DAY 90 tablet 0    atorvastatin (LIPITOR) 20 MG tablet Take 20 mg by mouth every evening.  4    losartan (COZAAR) 100 MG tablet TAKE 1 TABLET BY MOUTH EVERY DAY 90 tablet 0    acetaminophen (TYLENOL) 325 MG tablet Take 2 tablets (650 mg total) by mouth every 6 (six) hours as needed for Pain.  0    nitroGLYCERIN (NITROSTAT) 0.4 MG SL tablet Place 1 tablet (0.4 mg total) under the tongue every 5 (five) minutes as needed for Chest pain (and notify MD). 25 tablet 5     No current facility-administered medications on file prior to visit.        Review of patient's allergies indicates:  No Known Allergies    OBJECTIVE:     Vital Signs:  Vitals:    23 0859   BP: (!) 160/80   Pulse: 78   Resp: 18     There is no height or weight on file to calculate BMI.     Physical Exam:  Physical Exam  Vitals reviewed.   Constitutional:       General: She is not in acute distress.      Appearance: She is well-developed. She is obese.   HENT:      Head: Normocephalic and atraumatic.      Nose: Nose normal.      Mouth/Throat:      Mouth: Mucous membranes are dry.   Eyes:      Pupils: Pupils are equal, round, and reactive to light.   Cardiovascular:      Rate and Rhythm: Normal rate and regular rhythm.      Heart sounds: Normal heart sounds.   Pulmonary:      Effort: Pulmonary effort is normal.      Breath sounds: Normal breath sounds.   Abdominal:      General: Bowel sounds are normal.      Palpations: Abdomen is soft.   Musculoskeletal:      Cervical back: Normal range of motion and neck supple.      Comments: Decreased ROM to upper extremities   Skin:     General: Skin is warm and dry.   Neurological:      Mental Status: She is alert and oriented to person, place, and time.      Cranial Nerves: No cranial nerve deficit.      Motor: Weakness present.   Psychiatric:         Behavior: Behavior normal.         Thought Content: Thought content normal.         Judgment: Judgment normal.       Laboratory  Lab Results   Component Value Date    WBC 3.58 (L) 12/27/2022    HGB 10.5 (L) 12/27/2022    HCT 35.8 (L) 12/27/2022     (H) 12/27/2022     12/27/2022     Lab Results   Component Value Date    INR 0.9 08/03/2020    INR 1.0 08/10/2019    INR 0.9 07/06/2018     Lab Results   Component Value Date    HGBA1C 5.2 11/16/2022     No results for input(s): POCTGLUCOSE in the last 72 hours.    Diagnostic Results  .last  Results for orders placed during the hospital encounter of 12/25/22    Echo    Interpretation Summary  · The left ventricle is normal in size with concentric remodeling and normal systolic function.  · The estimated ejection fraction is 65%.  · Normal left ventricular diastolic function.  · Normal right ventricular size with normal right ventricular systolic function.  · Intermediate central venous pressure (8 mmHg).  · The estimated PA systolic pressure is 37 mmHg.  · Small  posterior pericardial effusion. And trivial outside the RA.      TRANSITION OF CARE:     [facility] Contact Note: 1/2/23    Family and/or Caretaker present at visit?  Yes.  Diagnostic tests reviewed/disposition: No diagnosic tests pending after this hospitalization.  Disease/illness education: fall prevention, slow position changes, adequate hydration, BP monitoring  Home health/community services discussion/referrals: Patient does not have home health established from hospital visit.  They do need home health.  If needed, we will set up home health for the patient.   Establishment or re-establishment of referral orders for community resources: No other necessary community resources.   Discussion with other health care providers: No discussion with other health care providers necessary.     Transition of Care Visit:  I have reviewed and updated the history and problem list.  I have reconciled the medication list.  I have discussed the hospitalization and current medical issues, prognosis and plans with the patient/family.  I  spent more than 50% of time discussing the care with the patient/family.  Total Face-to-Face Encounter: 60 minutes.    Medications Reconciliation:   I have reconciled the patient's home medications and discharge medications with the patient/family. I have updated all changes.  Refer to After-Visit Medication List.    ASSESSMENT & PLAN:     Orthostatic Hypotension  -resolved  -discussed slow position changes and adequate hydration    Essential Hypertension  -compliant with amlodipine and losartan  -BP elevated today 160/80  -will place home health referral for closer monitoring  -allow for elevated BP given recent orthostasis    Hyperlipidemia  -continue lipitor    Physical Debility  -referral sent for PT        HIGH RISK CONDITION(S):      Problem List Items Addressed This Visit          Cardiac/Vascular    Essential hypertension    Hyperlipidemia    Orthostatic hypotension - Primary     Relevant Orders    Ambulatory referral/consult to Home Health       Other    Physical deconditioning    Current Assessment & Plan     referral sent for PT             Were controlled substances prescribed?  No    Instructions for the patient:  - Continue all medications, treatments and therapies as ordered.   - Follow all instructions, recommendations as discussed.  - Maintain Safety Precautions at all times.  - Attend all medical appointments as scheduled.  - For worsening symptoms: call Primary Care Physician or Nurse Practitioner.  - For emergencies, call 911 or immediately report to the nearest emergency room.  - Limit Risks of environmental exposure to coronavirus/COVID-19 as discussed including: social distancing, hand hygiene, and limiting departures from the home for necessities only.           Questions elicited and answered.  Contact information provided for any changes in condition or concerns    Scheduled Follow-up :  Future Appointments   Date Time Provider Department Center   4/11/2023  3:00 PM Cecilio Kline MD [facility] CARDIO [facility]       After Visit Medication List :     Medication List            Accurate as of January 12, 2023 11:59 PM. If you have any questions, ask your nurse or doctor.                CONTINUE taking these medications      acetaminophen 325 MG tablet  Commonly known as: TYLENOL  Take 2 tablets (650 mg total) by mouth every 6 (six) hours as needed for Pain.     amLODIPine 10 MG tablet  Commonly known as: NORVASC  TAKE 1 TABLET BY MOUTH EVERY DAY     atorvastatin 20 MG tablet  Commonly known as: LIPITOR     losartan 100 MG tablet  Commonly known as: COZAAR  TAKE 1 TABLET BY MOUTH EVERY DAY     nitroGLYCERIN 0.4 MG SL tablet  Commonly known as: NITROSTAT  Place 1 tablet (0.4 mg total) under the tongue every 5 (five) minutes as needed for Chest pain (and notify MD).              Signature: Rowan Rodriguez NP

## 2023-01-13 VITALS
RESPIRATION RATE: 18 BRPM | DIASTOLIC BLOOD PRESSURE: 80 MMHG | SYSTOLIC BLOOD PRESSURE: 160 MMHG | HEART RATE: 78 BPM | OXYGEN SATURATION: 97 %

## 2023-03-27 PROBLEM — N18.9 ACUTE KIDNEY INJURY SUPERIMPOSED ON CHRONIC KIDNEY DISEASE: Status: RESOLVED | Noted: 2017-11-19 | Resolved: 2023-03-27

## 2023-03-27 PROBLEM — N17.9 ACUTE KIDNEY INJURY SUPERIMPOSED ON CHRONIC KIDNEY DISEASE: Status: RESOLVED | Noted: 2017-11-19 | Resolved: 2023-03-27

## 2023-04-11 ENCOUNTER — OFFICE VISIT (OUTPATIENT)
Dept: CARDIOLOGY | Facility: CLINIC | Age: 83
End: 2023-04-11
Payer: MEDICARE

## 2023-04-11 VITALS
BODY MASS INDEX: 29.87 KG/M2 | WEIGHT: 179.25 LBS | HEART RATE: 68 BPM | OXYGEN SATURATION: 99 % | HEIGHT: 65 IN | SYSTOLIC BLOOD PRESSURE: 132 MMHG | DIASTOLIC BLOOD PRESSURE: 74 MMHG

## 2023-04-11 DIAGNOSIS — I95.1 ORTHOSTATIC HYPOTENSION: ICD-10-CM

## 2023-04-11 DIAGNOSIS — I49.5 SSS (SICK SINUS SYNDROME): ICD-10-CM

## 2023-04-11 DIAGNOSIS — I25.10 CORONARY ARTERY DISEASE INVOLVING NATIVE CORONARY ARTERY OF NATIVE HEART WITHOUT ANGINA PECTORIS: ICD-10-CM

## 2023-04-11 DIAGNOSIS — Z98.890 HISTORY OF RADIOFREQUENCY ABLATION (RFA) PROCEDURE FOR CARDIAC ARRHYTHMIA: ICD-10-CM

## 2023-04-11 DIAGNOSIS — I50.30 HEART FAILURE WITH PRESERVED LEFT VENTRICULAR FUNCTION (HFPEF): ICD-10-CM

## 2023-04-11 DIAGNOSIS — Z95.818 STATUS POST PLACEMENT OF IMPLANTABLE LOOP RECORDER: ICD-10-CM

## 2023-04-11 DIAGNOSIS — I35.0 AORTIC VALVE STENOSIS, ETIOLOGY OF CARDIAC VALVE DISEASE UNSPECIFIED: Primary | ICD-10-CM

## 2023-04-11 DIAGNOSIS — R42 POSTURAL DIZZINESS WITH PRESYNCOPE: ICD-10-CM

## 2023-04-11 DIAGNOSIS — I10 ESSENTIAL HYPERTENSION: ICD-10-CM

## 2023-04-11 DIAGNOSIS — D62 ACUTE BLOOD LOSS ANEMIA: ICD-10-CM

## 2023-04-11 DIAGNOSIS — D50.0 IRON DEFICIENCY ANEMIA DUE TO CHRONIC BLOOD LOSS: ICD-10-CM

## 2023-04-11 DIAGNOSIS — R55 POSTURAL DIZZINESS WITH PRESYNCOPE: ICD-10-CM

## 2023-04-11 DIAGNOSIS — G47.33 OSA (OBSTRUCTIVE SLEEP APNEA): ICD-10-CM

## 2023-04-11 DIAGNOSIS — E78.00 PURE HYPERCHOLESTEROLEMIA: ICD-10-CM

## 2023-04-11 PROCEDURE — 1101F PT FALLS ASSESS-DOCD LE1/YR: CPT | Mod: CPTII,S$GLB,, | Performed by: INTERNAL MEDICINE

## 2023-04-11 PROCEDURE — 3288F PR FALLS RISK ASSESSMENT DOCUMENTED: ICD-10-PCS | Mod: CPTII,S$GLB,, | Performed by: INTERNAL MEDICINE

## 2023-04-11 PROCEDURE — 1160F RVW MEDS BY RX/DR IN RCRD: CPT | Mod: CPTII,S$GLB,, | Performed by: INTERNAL MEDICINE

## 2023-04-11 PROCEDURE — 99999 PR PBB SHADOW E&M-EST. PATIENT-LVL III: ICD-10-PCS | Mod: PBBFAC,,, | Performed by: INTERNAL MEDICINE

## 2023-04-11 PROCEDURE — 99214 OFFICE O/P EST MOD 30 MIN: CPT | Mod: 25,S$GLB,, | Performed by: INTERNAL MEDICINE

## 2023-04-11 PROCEDURE — 1101F PR PT FALLS ASSESS DOC 0-1 FALLS W/OUT INJ PAST YR: ICD-10-PCS | Mod: CPTII,S$GLB,, | Performed by: INTERNAL MEDICINE

## 2023-04-11 PROCEDURE — 99214 PR OFFICE/OUTPT VISIT, EST, LEVL IV, 30-39 MIN: ICD-10-PCS | Mod: 25,S$GLB,, | Performed by: INTERNAL MEDICINE

## 2023-04-11 PROCEDURE — 3078F DIAST BP <80 MM HG: CPT | Mod: CPTII,S$GLB,, | Performed by: INTERNAL MEDICINE

## 2023-04-11 PROCEDURE — 1159F MED LIST DOCD IN RCRD: CPT | Mod: CPTII,S$GLB,, | Performed by: INTERNAL MEDICINE

## 2023-04-11 PROCEDURE — 3075F PR MOST RECENT SYSTOLIC BLOOD PRESS GE 130-139MM HG: ICD-10-PCS | Mod: CPTII,S$GLB,, | Performed by: INTERNAL MEDICINE

## 2023-04-11 PROCEDURE — 1125F AMNT PAIN NOTED PAIN PRSNT: CPT | Mod: CPTII,S$GLB,, | Performed by: INTERNAL MEDICINE

## 2023-04-11 PROCEDURE — 93000 ELECTROCARDIOGRAM COMPLETE: CPT | Mod: S$GLB,,, | Performed by: INTERNAL MEDICINE

## 2023-04-11 PROCEDURE — 1160F PR REVIEW ALL MEDS BY PRESCRIBER/CLIN PHARMACIST DOCUMENTED: ICD-10-PCS | Mod: CPTII,S$GLB,, | Performed by: INTERNAL MEDICINE

## 2023-04-11 PROCEDURE — 3078F PR MOST RECENT DIASTOLIC BLOOD PRESSURE < 80 MM HG: ICD-10-PCS | Mod: CPTII,S$GLB,, | Performed by: INTERNAL MEDICINE

## 2023-04-11 PROCEDURE — 3075F SYST BP GE 130 - 139MM HG: CPT | Mod: CPTII,S$GLB,, | Performed by: INTERNAL MEDICINE

## 2023-04-11 PROCEDURE — 1125F PR PAIN SEVERITY QUANTIFIED, PAIN PRESENT: ICD-10-PCS | Mod: CPTII,S$GLB,, | Performed by: INTERNAL MEDICINE

## 2023-04-11 PROCEDURE — 3288F FALL RISK ASSESSMENT DOCD: CPT | Mod: CPTII,S$GLB,, | Performed by: INTERNAL MEDICINE

## 2023-04-11 PROCEDURE — 99999 PR PBB SHADOW E&M-EST. PATIENT-LVL III: CPT | Mod: PBBFAC,,, | Performed by: INTERNAL MEDICINE

## 2023-04-11 PROCEDURE — 1159F PR MEDICATION LIST DOCUMENTED IN MEDICAL RECORD: ICD-10-PCS | Mod: CPTII,S$GLB,, | Performed by: INTERNAL MEDICINE

## 2023-04-11 PROCEDURE — 93000 EKG 12-LEAD: ICD-10-PCS | Mod: S$GLB,,, | Performed by: INTERNAL MEDICINE

## 2023-04-11 NOTE — PROGRESS NOTES
Subjective:      Patient ID: Crystal Alvarado is a 82 y.o. female.    Chief Complaint: Follow-up, Hypertension, and SVT    HPI:  Walks in the house with walker.    Pt was recently hospitalized with blood in stool and a fall associated with low blood pressure.  Pt received pRBC infusions and an iron infusion.    Pt had upper and lower endoscopy and a pill camera.    Pt has right neck pain in certain positions.    No falls since hospitalized with GI blood loss 12/22    Review of Systems   Cardiovascular:  Negative for chest pain, claudication, dyspnea on exertion, irregular heartbeat, leg swelling, near-syncope, orthopnea, palpitations and syncope.        Pt last saw Dr Montez a year ago and pt was advised to get a sleep study.  Sleep study was positive but pt never received her machine, possibly due to phone interruption from [place].      Past Medical History:   Diagnosis Date    Arthritis     CHF (congestive heart failure)     Coronary artery disease     Hyperlipidemia     Hypertension         Past Surgical History:   Procedure Laterality Date    A-V CARDIAC PACEMAKER INSERTION N/A 7/9/2018    Procedure: INSERTION, CARDIAC PACEMAKER, DUAL CHAMBER;  Surgeon: Archie Montez MD;  Location: [facility] CATH LAB;  Service: Cardiology;  Laterality: N/A;  SVT, RFA, +/- Dual PPM or ILR, IGLESIA, MDT, MAC, IN, 3 Prep    CATARACT EXTRACTION W/  INTRAOCULAR LENS IMPLANT Left 11/23/2020    Procedure: EXTRACTION, CATARACT, WITH IOL INSERTION;  Surgeon: Roslyn Napier MD;  Location: [facility], [place], operated by [facility] OR;  Service: Ophthalmology;  Laterality: Left;    cataract removal Right 2018    [facility]    COLONOSCOPY      COLONOSCOPY N/A 8/5/2020    Procedure: COLONOSCOPY;  Surgeon: Sebas Dickens MD;  [facility]  Service: Endoscopy;  Laterality: N/A;    ESOPHAGOGASTRODUODENOSCOPY N/A 8/4/2020    Procedure: EGD (ESOPHAGOGASTRODUODENOSCOPY);  Surgeon: Fernie Cohen MD;  [facility]  Service: Endoscopy;   "Laterality: N/A;    HYSTERECTOMY N/A     INSERTION OF IMPLANTABLE LOOP RECORDER N/A 2018    Procedure: PLACEMENT-LOOP RECORDER;  Surgeon: Archie Montez MD;  Location: [facility] CATH LAB;  Service: Cardiology;  Laterality: N/A;  SVT, RFA, +/- Dual PPM or ILR, IGLESIA, MDT, MAC, RI, 3 Prep       No family history on file.    Social History     Socioeconomic History    Marital status: Single   Tobacco Use    Smoking status: Former     Packs/day: 0.50     Types: Cigarettes     Quit date: 2013     Years since quittin.9    Smokeless tobacco: Never   Substance and Sexual Activity    Alcohol use: No    Drug use: No    Sexual activity: Never       Current Outpatient Medications on File Prior to Visit   Medication Sig Dispense Refill    acetaminophen (TYLENOL) 325 MG tablet Take 2 tablets (650 mg total) by mouth every 6 (six) hours as needed for Pain.  0    amLODIPine (NORVASC) 10 MG tablet TAKE 1 TABLET (10 MG TOTAL) BY MOUTH ONCE DAILY. PATIENT NEEDS AN APPOINTMENT FOR FURTHER REFILLS. 30 tablet 0    atorvastatin (LIPITOR) 20 MG tablet Take 20 mg by mouth every evening.  4    losartan (COZAAR) 100 MG tablet TAKE 1 TABLET BY MOUTH EVERY DAY 30 tablet 0    nitroGLYCERIN (NITROSTAT) 0.4 MG SL tablet Place 1 tablet (0.4 mg total) under the tongue every 5 (five) minutes as needed for Chest pain (and notify MD). 25 tablet 5     No current facility-administered medications on file prior to visit.       Review of patient's allergies indicates:  No Known Allergies  Objective:     Vitals:    23 1534   BP: 132/74   BP Location: Left arm   Patient Position: Sitting   BP Method: Large (Automatic)   Pulse: 68   SpO2: 99%   Weight: 81.3 kg (179 lb 3.7 oz)   Height: 5' 5" (1.651 m)        Physical Exam  Vitals reviewed.   Constitutional:       Appearance: She is well-developed.   Eyes:      General: No scleral icterus.  Neck:      Vascular: No carotid bruit or JVD.   Cardiovascular:      Rate and Rhythm: Normal rate and regular " rhythm.      Heart sounds: No murmur heard.    No gallop.   Pulmonary:      Breath sounds: Normal breath sounds.   Musculoskeletal:      Right lower leg: No edema.      Left lower leg: No edema.   Skin:     General: Skin is warm and dry.   Neurological:      Mental Status: She is alert and oriented to person, place, and time.   Psychiatric:         Behavior: Behavior normal.         Thought Content: Thought content normal.         Judgment: Judgment normal.      ECG today reviewed by me: NSR, WNL      No results displayed because visit has over 200 results.      Admission on 12/22/2022, Discharged on 12/22/2022   Component Date Value Ref Range Status    WBC 12/22/2022 3.74 (L)  3.90 - 12.70 K/uL Final    RBC 12/22/2022 3.80 (L)  4.00 - 5.40 M/uL Final    Hemoglobin 12/22/2022 11.5 (L)  12.0 - 16.0 g/dL Final    Hematocrit 12/22/2022 36.1 (L)  37.0 - 48.5 % Final    MCV 12/22/2022 95  82 - 98 fL Final    MCH 12/22/2022 30.3  27.0 - 31.0 pg Final    MCHC 12/22/2022 31.9 (L)  32.0 - 36.0 g/dL Final    RDW 12/22/2022 14.1  11.5 - 14.5 % Final    Platelets 12/22/2022 214  150 - 450 K/uL Final    MPV 12/22/2022 10.5  9.2 - 12.9 fL Final    Immature Granulocytes 12/22/2022 0.3  0.0 - 0.5 % Final    Gran # (ANC) 12/22/2022 2.4  1.8 - 7.7 K/uL Final    Immature Grans (Abs) 12/22/2022 0.01  0.00 - 0.04 K/uL Final    Lymph # 12/22/2022 0.9 (L)  1.0 - 4.8 K/uL Final    Mono # 12/22/2022 0.3  0.3 - 1.0 K/uL Final    Eos # 12/22/2022 0.1  0.0 - 0.5 K/uL Final    Baso # 12/22/2022 0.03  0.00 - 0.20 K/uL Final    nRBC 12/22/2022 0  0 /100 WBC Final    Gran % 12/22/2022 65.2  38.0 - 73.0 % Final    Lymph % 12/22/2022 23.0  18.0 - 48.0 % Final    Mono % 12/22/2022 9.1  4.0 - 15.0 % Final    Eosinophil % 12/22/2022 1.6  0.0 - 8.0 % Final    Basophil % 12/22/2022 0.8  0.0 - 1.9 % Final    Differential Method 12/22/2022 Automated   Final    Sodium 12/22/2022 137  136 - 145 mmol/L Final    Potassium 12/22/2022 4.2  3.5 - 5.1 mmol/L Final     Chloride 12/22/2022 105  95 - 110 mmol/L Final    CO2 12/22/2022 23  23 - 29 mmol/L Final    Glucose 12/22/2022 113 (H)  70 - 110 mg/dL Final    BUN 12/22/2022 23 (H)  7 - 17 mg/dL Final    Creatinine 12/22/2022 1.39  0.50 - 1.40 mg/dL Final    Calcium 12/22/2022 9.1  8.7 - 10.5 mg/dL Final    Total Protein 12/22/2022 7.6  6.0 - 8.4 g/dL Final    Albumin 12/22/2022 4.1  3.5 - 5.2 g/dL Final    Total Bilirubin 12/22/2022 0.5  0.1 - 1.0 mg/dL Final    Alkaline Phosphatase 12/22/2022 86  38 - 126 U/L Final    AST 12/22/2022 19  15 - 46 U/L Final    ALT 12/22/2022 12  10 - 44 U/L Final    Anion Gap 12/22/2022 9  8 - 16 mmol/L Final    eGFR 12/22/2022 37.4 (A)  >60 mL/min/1.73 m^2 Final    Specimen UA 12/22/2022 Urine, Clean Catch   Final    Color, UA 12/22/2022 Yellow  Yellow, Straw, Lana Final    Appearance, UA 12/22/2022 Clear  Clear Final    pH, UA 12/22/2022 6.0  5.0 - 8.0 Final    Specific Gravity, UA 12/22/2022 1.010  1.005 - 1.030 Final    Protein, UA 12/22/2022 Negative  Negative Final    Glucose, UA 12/22/2022 Negative  Negative Final    Ketones, UA 12/22/2022 Negative  Negative Final    Bilirubin (UA) 12/22/2022 Negative  Negative Final    Occult Blood UA 12/22/2022 Negative  Negative Final    Nitrite, UA 12/22/2022 Negative  Negative Final    Urobilinogen, UA 12/22/2022 Negative  <2.0 EU/dL Final    Leukocytes, UA 12/22/2022 Negative  Negative Final    Troponin I 12/22/2022 <0.012  0.012 - 0.034 ng/mL Final    NT-proBNP 12/22/2022 487  5 - 1800 pg/mL Final   Lab Visit on 11/16/2022   Component Date Value Ref Range Status    Sodium 11/16/2022 141  136 - 145 mmol/L Final    Potassium 11/16/2022 4.2  3.5 - 5.1 mmol/L Final    Chloride 11/16/2022 107  95 - 110 mmol/L Final    CO2 11/16/2022 27  23 - 29 mmol/L Final    Glucose 11/16/2022 109  70 - 110 mg/dL Final    BUN 11/16/2022 25 (H)  7 - 17 mg/dL Final    Creatinine 11/16/2022 1.44 (H)  0.50 - 1.40 mg/dL Final    Calcium 11/16/2022 9.6  8.7 - 10.5 mg/dL  Final    Total Protein 11/16/2022 7.5  6.0 - 8.4 g/dL Final    Albumin 11/16/2022 4.0  3.5 - 5.2 g/dL Final    Total Bilirubin 11/16/2022 0.5  0.1 - 1.0 mg/dL Final    Alkaline Phosphatase 11/16/2022 82  38 - 126 U/L Final    AST 11/16/2022 14 (L)  15 - 46 U/L Final    ALT 11/16/2022 10  10 - 44 U/L Final    Anion Gap 11/16/2022 7 (L)  8 - 16 mmol/L Final    eGFR 11/16/2022 35.9 (A)  >60 mL/min/1.73 m^2 Final    Hemoglobin A1C 11/16/2022 5.2  4.0 - 5.6 % Final    Estimated Avg Glucose 11/16/2022 103  68 - 131 mg/dL Final    Cholesterol 11/16/2022 192  120 - 199 mg/dL Final    Triglycerides 11/16/2022 81  30 - 150 mg/dL Final    HDL 11/16/2022 60  40 - 75 mg/dL Final    LDL Cholesterol 11/16/2022 115.8  63.0 - 159.0 mg/dL Final    HDL/Cholesterol Ratio 11/16/2022 31.3  20.0 - 50.0 % Final    Total Cholesterol/HDL Ratio 11/16/2022 3.2  2.0 - 5.0 Final    Non-HDL Cholesterol 11/16/2022 132  mg/dL Final    TSH 11/16/2022 2.630  0.400 - 4.000 uIU/mL Final    WBC 11/16/2022 5.58  3.90 - 12.70 K/uL Final    RBC 11/16/2022 3.81 (L)  4.00 - 5.40 M/uL Final    Hemoglobin 11/16/2022 11.5 (L)  12.0 - 16.0 g/dL Final    Hematocrit 11/16/2022 37.3  37.0 - 48.5 % Final    MCV 11/16/2022 98  82 - 98 fL Final    MCH 11/16/2022 30.2  27.0 - 31.0 pg Final    MCHC 11/16/2022 30.8 (L)  32.0 - 36.0 g/dL Final    RDW 11/16/2022 14.5  11.5 - 14.5 % Final    Platelets 11/16/2022 224  150 - 450 K/uL Final    MPV 11/16/2022 11.3  9.2 - 12.9 fL Final    Immature Granulocytes 11/16/2022 0.4  0.0 - 0.5 % Final    Gran # (ANC) 11/16/2022 4.2  1.8 - 7.7 K/uL Final    Immature Grans (Abs) 11/16/2022 0.02  0.00 - 0.04 K/uL Final    Lymph # 11/16/2022 0.9 (L)  1.0 - 4.8 K/uL Final    Mono # 11/16/2022 0.3  0.3 - 1.0 K/uL Final    Eos # 11/16/2022 0.1  0.0 - 0.5 K/uL Final    Baso # 11/16/2022 0.04  0.00 - 0.20 K/uL Final    nRBC 11/16/2022 0  0 /100 WBC Final    Gran % 11/16/2022 76.0 (H)  38.0 - 73.0 % Final    Lymph % 11/16/2022 15.6 (L)  18.0 -  48.0 % Final    Mono % 11/16/2022 5.9  4.0 - 15.0 % Final    Eosinophil % 11/16/2022 1.4  0.0 - 8.0 % Final    Basophil % 11/16/2022 0.7  0.0 - 1.9 % Final    Differential Method 11/16/2022 Automated   Final   (  Results  Video capsule endoscopy (Order [id number])  Result Information    Status: Final result (Collected: 9/9/2020 13:39) Provider Status: Reviewed    Reviewed By    Jose Narayanan MD on 9/17/2020 11:31      PACS Images     Show images for Video capsule endoscopy  Video capsule endoscopy  Order: [id number]  Status: Final result    Visible to patient: No (not released)     Next appt: None    0 Result Notes     Narrative  Performed by: [facility]  Patient Name: Crystal Alvarado   Procedure Date: 9/9/2020 1:39 PM   MRN: [id number]   Account Number: [id number]   YOB: 1940   Age: 80   Room: out patient   Gender: Female   Attending MD: Reinaldo Jorgensen MD   Procedure:            Video capsule endoscopy   Indications:          Iron deficiency anemia   Providers:            Reinaldo Jorgensen MD, Jose Narayanan MD (Fellow)   Patient Profile:      This is an 80 year old female. Refer to note in                         patient chart for documentation of history and                         physical.   Referring MD:         Jose Narayanan MD   Complications:        No immediate complications.   Medicines:            None   Procedure:            Pre-Anesthesia Assessment:                         - Prior to the procedure, a History and Physical                         was performed, and patient medications, allergies                         and sensitivities were reviewed. The patient's                         tolerance of previous anesthesia was reviewed.                         - The risks and benefits of the procedure and the                         sedation options and risks were discussed with the                         patient. All questions were answered and informed                         consent was obtained.                          - Patient identification and proposed procedure                         were verified prior to the procedure by the nurse.                         The procedure was verified in the procedure room.                         After obtaining informed consent, the patient was                         given the capsule enteroscope, which was                         swallowed.The video capsule endoscopy was                         accomplished without difficulty. The patient                         tolerated the procedure well.   Findings:        Images of the esophagus, stomach and small bowel were obtained from        the swallowed capsule and reviewed.        The first gastric image was captured at 0-hours 8-minutes and        47-seconds.        The first duodenal image was captured at 0-hours 21-minutes and        39-seconds.        The first cecal image was captured at 1-hour 36-minutes and        35-seconds.        The small bowel passage time of the capsule enteroscope was 1-hour        14-minutes.        On review of the recorded study, it was determined that the study        lasted 7-hours 59-minutes 44-seconds (total recording time). The        capsule enteroscope entered the cecum prior to the end of the        recording.        A few small-mouthed diverticula were found in the small bowel        (distal small bowel).        A few small angiodysplastic lesions without bleeding were seen in        the ileum at 1-hour 12-minutes.        Lymphangiectasia was found in the ileum at 1-hour 13-minutes and        48-seconds.   Impression:           - Small bowel diverticula.                         - A few areas of angiodysplasia without bleeding in                         the ileum.                         - Ileal lymphangiectasia.   Recommendation:       - Discharge patient to home.                         - Recommend an iron supplement.                         - If signs of active bleeding consider a  lower                         device-assisted enteroscopy.   Attending Participation:        I have reviewed and interpreted the results of the above documented        diagnostic study.   Reinaldo Jorgensen MD            CONCLUSIONS     1 - Normal left ventricular systolic function (EF 60-65%).     2 - Impaired LV relaxation, increased LVEDP.     3 - Normal right ventricular systolic function .     4 - The estimated PA systolic pressure is 31 mmHg.             This document has been electronically    SIGNED BY: Jose Rachel MD On: 04/26/2018 12:04             Component Ref Range & Units 4 yr ago                 Dr Archie Montez note from 4/22 reviewed:In summary, Ms. Alvarado is a pleasant 81 year-old woman with hypertension, non-obstructive CAD and CKD stage III and chronic bradycardia presenting for evaluation of SVT and bradycardia. 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 No daytime bradyarrhythmias have been identified. She has nocturnal sinus pauses with concomitant AV block, suspect from undiagnosed sleep apnea. She is agreeable with sleep clinic referral. No true AF observed on her monitoring. Discussed that I do not see a medical necessity for ILR removal/reimplant. Discussed abandoning versus removal. She will think about it.     RTC prn  Referral to sleep clinic     Assessment:     1. Aortic valve stenosis, etiology of cardiac valve disease unspecified    2. Coronary artery disease involving native coronary artery of native heart without angina pectoris    3. Essential hypertension    4. Heart failure with preserved left ventricular function (HFpEF)    5. History of radiofrequency ablation (RFA) procedure for cardiac arrhythmia    6. Pure hypercholesterolemia    7. Orthostatic hypotension    8. SSS (sick sinus syndrome)    9. Status post placement of implantable loop recorder    10. Iron deficiency anemia due to chronic blood loss    11. Acute  blood loss anemia    12. Postural dizziness with presyncope    13. ORLIN (obstructive sleep apnea)      Plan:   Crystal was seen today for follow-up, hypertension and svt.    Diagnoses and all orders for this visit:    Aortic valve stenosis, etiology of cardiac valve disease unspecified  -     IN OFFICE EKG 12-LEAD (to [place])    Coronary artery disease involving native coronary artery of native heart without angina pectoris  -     IN OFFICE EKG 12-LEAD (to [place])    Essential hypertension  -     IN OFFICE EKG 12-LEAD (to [facility])    Heart failure with preserved left ventricular function (HFpEF)  -     IN OFFICE EKG 12-LEAD (to [facility])    History of radiofrequency ablation (RFA) procedure for cardiac arrhythmia  -     IN OFFICE EKG 12-LEAD (to [facility])    Pure hypercholesterolemia  -     IN OFFICE EKG 12-LEAD (to [facility])    Orthostatic hypotension  -     IN OFFICE EKG 12-LEAD (to [facility])    SSS (sick sinus syndrome)  -     IN OFFICE EKG 12-LEAD (to [facility])    Status post placement of implantable loop recorder  -     IN OFFICE EKG 12-LEAD (to [facility])    Iron deficiency anemia due to chronic blood loss  -     IN OFFICE EKG 12-LEAD (to [facility])    Acute blood loss anemia  -     IN OFFICE EKG 12-LEAD (to [facility])    Postural dizziness with presyncope  -     IN OFFICE EKG 12-LEAD (to [facility])    ORLIN (obstructive sleep apnea)     Pt states she gets labs frequently with her nephrologist, Dr Coughlin and with her PCP, Dr Goff.    Cardiac status is stable     Pt has had no AVNRT since RFA    HBP is controlled    Will ask Dr Anguiano to help pt obtain her CPAP    Will ask for lab results not in Epic    Same meds    RTC 6 months    Follow up in about 6 months (around 10/11/2023).

## 2023-05-11 RX ORDER — LOSARTAN POTASSIUM 100 MG/1
TABLET ORAL
Qty: 90 TABLET | Refills: 3 | Status: SHIPPED | OUTPATIENT
Start: 2023-05-11

## 2023-05-19 DIAGNOSIS — I10 ESSENTIAL HYPERTENSION: ICD-10-CM

## 2023-05-22 RX ORDER — AMLODIPINE BESYLATE 10 MG/1
10 TABLET ORAL DAILY
Qty: 30 TABLET | Refills: 5 | Status: SHIPPED | OUTPATIENT
Start: 2023-05-22 | End: 2023-11-27

## 2023-10-17 ENCOUNTER — OFFICE VISIT (OUTPATIENT)
Dept: CARDIOLOGY | Facility: CLINIC | Age: 83
End: 2023-10-17
Payer: MEDICARE

## 2023-10-17 VITALS
HEART RATE: 71 BPM | SYSTOLIC BLOOD PRESSURE: 110 MMHG | OXYGEN SATURATION: 100 % | HEIGHT: 65 IN | DIASTOLIC BLOOD PRESSURE: 70 MMHG | BODY MASS INDEX: 29.95 KG/M2

## 2023-10-17 DIAGNOSIS — R07.9 CHEST PAIN, UNSPECIFIED TYPE: ICD-10-CM

## 2023-10-17 DIAGNOSIS — Z98.890 HISTORY OF RADIOFREQUENCY ABLATION (RFA) PROCEDURE FOR CARDIAC ARRHYTHMIA: ICD-10-CM

## 2023-10-17 DIAGNOSIS — I20.0 UNSTABLE ANGINA PECTORIS: ICD-10-CM

## 2023-10-17 DIAGNOSIS — E78.5 HYPERLIPIDEMIA, UNSPECIFIED HYPERLIPIDEMIA TYPE: ICD-10-CM

## 2023-10-17 DIAGNOSIS — I25.110 CORONARY ARTERY DISEASE INVOLVING NATIVE CORONARY ARTERY OF NATIVE HEART WITH UNSTABLE ANGINA PECTORIS: ICD-10-CM

## 2023-10-17 DIAGNOSIS — R07.9 CHEST PAIN OF UNCERTAIN ETIOLOGY: Primary | ICD-10-CM

## 2023-10-17 DIAGNOSIS — R42 DIZZINESS: ICD-10-CM

## 2023-10-17 DIAGNOSIS — I95.1 ORTHOSTATIC HYPOTENSION: ICD-10-CM

## 2023-10-17 DIAGNOSIS — R06.09 DYSPNEA ON EXERTION: ICD-10-CM

## 2023-10-17 DIAGNOSIS — Z95.818 STATUS POST PLACEMENT OF IMPLANTABLE LOOP RECORDER: ICD-10-CM

## 2023-10-17 DIAGNOSIS — I10 ESSENTIAL HYPERTENSION: ICD-10-CM

## 2023-10-17 PROCEDURE — 99214 OFFICE O/P EST MOD 30 MIN: CPT | Mod: 25,S$GLB,, | Performed by: INTERNAL MEDICINE

## 2023-10-17 PROCEDURE — 1160F RVW MEDS BY RX/DR IN RCRD: CPT | Mod: CPTII,S$GLB,, | Performed by: INTERNAL MEDICINE

## 2023-10-17 PROCEDURE — 1101F PT FALLS ASSESS-DOCD LE1/YR: CPT | Mod: CPTII,S$GLB,, | Performed by: INTERNAL MEDICINE

## 2023-10-17 PROCEDURE — 1101F PR PT FALLS ASSESS DOC 0-1 FALLS W/OUT INJ PAST YR: ICD-10-PCS | Mod: CPTII,S$GLB,, | Performed by: INTERNAL MEDICINE

## 2023-10-17 PROCEDURE — 3288F PR FALLS RISK ASSESSMENT DOCUMENTED: ICD-10-PCS | Mod: CPTII,S$GLB,, | Performed by: INTERNAL MEDICINE

## 2023-10-17 PROCEDURE — 3078F PR MOST RECENT DIASTOLIC BLOOD PRESSURE < 80 MM HG: ICD-10-PCS | Mod: CPTII,S$GLB,, | Performed by: INTERNAL MEDICINE

## 2023-10-17 PROCEDURE — 1159F PR MEDICATION LIST DOCUMENTED IN MEDICAL RECORD: ICD-10-PCS | Mod: CPTII,S$GLB,, | Performed by: INTERNAL MEDICINE

## 2023-10-17 PROCEDURE — 99214 PR OFFICE/OUTPT VISIT, EST, LEVL IV, 30-39 MIN: ICD-10-PCS | Mod: 25,S$GLB,, | Performed by: INTERNAL MEDICINE

## 2023-10-17 PROCEDURE — 99999 PR PBB SHADOW E&M-EST. PATIENT-LVL III: CPT | Mod: PBBFAC,,, | Performed by: INTERNAL MEDICINE

## 2023-10-17 PROCEDURE — 3074F SYST BP LT 130 MM HG: CPT | Mod: CPTII,S$GLB,, | Performed by: INTERNAL MEDICINE

## 2023-10-17 PROCEDURE — 99999 PR PBB SHADOW E&M-EST. PATIENT-LVL III: ICD-10-PCS | Mod: PBBFAC,,, | Performed by: INTERNAL MEDICINE

## 2023-10-17 PROCEDURE — 93000 EKG 12-LEAD: ICD-10-PCS | Mod: S$GLB,,, | Performed by: INTERNAL MEDICINE

## 2023-10-17 PROCEDURE — 1159F MED LIST DOCD IN RCRD: CPT | Mod: CPTII,S$GLB,, | Performed by: INTERNAL MEDICINE

## 2023-10-17 PROCEDURE — 3288F FALL RISK ASSESSMENT DOCD: CPT | Mod: CPTII,S$GLB,, | Performed by: INTERNAL MEDICINE

## 2023-10-17 PROCEDURE — 1126F PR PAIN SEVERITY QUANTIFIED, NO PAIN PRESENT: ICD-10-PCS | Mod: CPTII,S$GLB,, | Performed by: INTERNAL MEDICINE

## 2023-10-17 PROCEDURE — 3074F PR MOST RECENT SYSTOLIC BLOOD PRESSURE < 130 MM HG: ICD-10-PCS | Mod: CPTII,S$GLB,, | Performed by: INTERNAL MEDICINE

## 2023-10-17 PROCEDURE — 93000 ELECTROCARDIOGRAM COMPLETE: CPT | Mod: S$GLB,,, | Performed by: INTERNAL MEDICINE

## 2023-10-17 PROCEDURE — 1160F PR REVIEW ALL MEDS BY PRESCRIBER/CLIN PHARMACIST DOCUMENTED: ICD-10-PCS | Mod: CPTII,S$GLB,, | Performed by: INTERNAL MEDICINE

## 2023-10-17 PROCEDURE — 1126F AMNT PAIN NOTED NONE PRSNT: CPT | Mod: CPTII,S$GLB,, | Performed by: INTERNAL MEDICINE

## 2023-10-17 PROCEDURE — 3078F DIAST BP <80 MM HG: CPT | Mod: CPTII,S$GLB,, | Performed by: INTERNAL MEDICINE

## 2023-10-17 RX ORDER — NITROGLYCERIN 0.4 MG/1
0.4 TABLET SUBLINGUAL EVERY 5 MIN PRN
Qty: 25 TABLET | Refills: 5 | Status: SHIPPED | OUTPATIENT
Start: 2023-10-17 | End: 2024-10-16

## 2023-10-17 RX ORDER — LEVOCETIRIZINE DIHYDROCHLORIDE 5 MG/1
TABLET, FILM COATED ORAL
COMMUNITY

## 2023-10-17 RX ORDER — HYDROCODONE BITARTRATE AND ACETAMINOPHEN 5; 325 MG/1; MG/1
1 TABLET ORAL 2 TIMES DAILY PRN
COMMUNITY

## 2023-10-17 RX ORDER — MONTELUKAST SODIUM 10 MG/1
10 TABLET ORAL
COMMUNITY
Start: 2023-09-14

## 2023-10-17 NOTE — PROGRESS NOTES
"  Subjective:      Patient ID: Crystal Alvarado is a 83 y.o. female.    Chief Complaint: Shortness of Breath and Neck Pain    HPI:  For the past couple of weeks pt c/o bilateral posterior shoulder pain "when I move about" which abates with quiet resting after a few minutes.    Pt c/o dizziness and feeling off balance for the past week.     There is no hx of vertigo.    Pt c/o a breathing problem when up and moving about.    Pt c/o anterior chest pain when making bed    "When I am making my bed I have to stop and rest"    No fall or faint.    Review of Systems   Cardiovascular:  Positive for chest pain and dyspnea on exertion. Negative for claudication, irregular heartbeat, leg swelling, near-syncope, orthopnea, palpitations and syncope.      Pt has never tried NTG SL for her chest pain.    Note ASA was discontinued in the past due to blood loss anemia    Past Medical History:   Diagnosis Date    Arthritis     CHF (congestive heart failure)     Coronary artery disease     Hyperlipidemia     Hypertension         Past Surgical History:   Procedure Laterality Date    A-V CARDIAC PACEMAKER INSERTION N/A 7/9/2018    Procedure: INSERTION, CARDIAC PACEMAKER, DUAL CHAMBER;  Surgeon: Archie Montez MD;  Location: [facility] CATH LAB;  Service: Cardiology;  Laterality: N/A;  SVT, RFA, +/- Dual PPM or ILR, IGLESIA, MDT, MAC, MO, 3 Prep    CATARACT EXTRACTION W/  INTRAOCULAR LENS IMPLANT Left 11/23/2020    Procedure: EXTRACTION, CATARACT, WITH IOL INSERTION;  Surgeon: Roslyn Napier MD;  Location: [facility] OR;  Service: Ophthalmology;  Laterality: Left;    cataract removal Right 2018    [facility]    COLONOSCOPY      COLONOSCOPY N/A 8/5/2020    Procedure: COLONOSCOPY;  Surgeon: Sebas Dickens MD;  Location: [facility] (2ND FLR);  Service: Endoscopy;  Laterality: N/A;    ESOPHAGOGASTRODUODENOSCOPY N/A 8/4/2020    Procedure: EGD (ESOPHAGOGASTRODUODENOSCOPY);  Surgeon: Fernie Cohen MD;  Location: [facility] (2ND FLR);  Service: " Endoscopy;  Laterality: N/A;    HYSTERECTOMY N/A     INSERTION OF IMPLANTABLE LOOP RECORDER N/A 7/9/2018    Procedure: PLACEMENT-LOOP RECORDER;  Surgeon: Archie Montez MD;  Location: [facility] CATH LAB;  Service: Cardiology;  Laterality: N/A;  SVT, RFA, +/- Dual PPM or ILR, IGLESIA, MDT, MAC, TN, 3 Prep       No family history on file.    Social History     Socioeconomic History    Marital status: Single   Tobacco Use    Smoking status: Former     Current packs/day: 0.00     Types: Cigarettes     Quit date: 4/26/2013     Years since quitting: 10.4    Smokeless tobacco: Never   Substance and Sexual Activity    Alcohol use: No    Drug use: No    Sexual activity: Never       Current Outpatient Medications on File Prior to Visit   Medication Sig Dispense Refill    acetaminophen (TYLENOL) 325 MG tablet Take 2 tablets (650 mg total) by mouth every 6 (six) hours as needed for Pain.  0    amLODIPine (NORVASC) 10 MG tablet TAKE 1 TABLET (10 MG TOTAL) BY MOUTH ONCE DAILY. PATIENT NEEDS AN APPOINTMENT FOR FURTHER REFILLS. 30 tablet 5    atorvastatin (LIPITOR) 20 MG tablet Take 20 mg by mouth every evening.  4    HYDROcodone-acetaminophen (NORCO) 5-325 mg per tablet Take 1 tablet by mouth 2 (two) times daily as needed.      levocetirizine (XYZAL) 5 MG tablet TAKE 1 TABLET BY MOUTH DAILY IN THE EVENING FOR ALLERGIES      losartan (COZAAR) 100 MG tablet TAKE 1 TABLET BY MOUTH EVERY DAY 90 tablet 3    montelukast (SINGULAIR) 10 mg tablet Take 10 mg by mouth.      [DISCONTINUED] nitroGLYCERIN (NITROSTAT) 0.4 MG SL tablet Place 1 tablet (0.4 mg total) under the tongue every 5 (five) minutes as needed for Chest pain (and notify MD). 25 tablet 5    [DISCONTINUED] valACYclovir (VALTREX) 1000 MG tablet Take 1 tablet (1,000 mg total) by mouth 3 (three) times daily. for 7 days (Patient not taking: Reported on 10/17/2023) 21 tablet 0     No current facility-administered medications on file prior to visit.       Review of patient's allergies  "indicates:  No Known Allergies  Objective:     Vitals:    10/17/23 1415   BP: 110/70   BP Location: Left arm   Patient Position: Sitting   BP Method: Large (Automatic)   Pulse: 71   SpO2: 100%   Weight: Comment: Wheelchair   Height: 5' 5" (1.651 m)        Physical Exam  Constitutional:       Appearance: She is well-developed.   Eyes:      General: No scleral icterus.  Neck:      Vascular: No carotid bruit or JVD.   Cardiovascular:      Rate and Rhythm: Normal rate and regular rhythm.      Heart sounds: Murmur (I/VI systolic murmur) heard.      No gallop.   Pulmonary:      Breath sounds: Normal breath sounds.   Musculoskeletal:      Right lower leg: No edema.      Left lower leg: No edema.   Skin:     General: Skin is warm and dry.   Neurological:      Mental Status: She is alert and oriented to person, place, and time.   Psychiatric:         Behavior: Behavior normal.         Thought Content: Thought content normal.         Judgment: Judgment normal.              ECG today: YAAKOV SMITH, reviewed by me      Admission on 09/23/2023, Discharged on 09/23/2023   Component Date Value Ref Range Status    WBC 09/23/2023 4.69  3.90 - 12.70 K/uL Final    RBC 09/23/2023 4.04  4.00 - 5.40 M/uL Final    Hemoglobin 09/23/2023 12.4  12.0 - 16.0 g/dL Final    Hematocrit 09/23/2023 38.0  37.0 - 48.5 % Final    MCV 09/23/2023 94  82 - 98 fL Final    MCH 09/23/2023 30.7  27.0 - 31.0 pg Final    MCHC 09/23/2023 32.6  32.0 - 36.0 g/dL Final    RDW 09/23/2023 14.0  11.5 - 14.5 % Final    Platelets 09/23/2023 216  150 - 450 K/uL Final    MPV 09/23/2023 11.6  9.2 - 12.9 fL Final    Immature Granulocytes 09/23/2023 0.2  0.0 - 0.5 % Final    Gran # (ANC) 09/23/2023 3.7  1.8 - 7.7 K/uL Final    Immature Grans (Abs) 09/23/2023 0.01  0.00 - 0.04 K/uL Final    Lymph # 09/23/2023 0.6 (L)  1.0 - 4.8 K/uL Final    Mono # 09/23/2023 0.3  0.3 - 1.0 K/uL Final    Eos # 09/23/2023 0.1  0.0 - 0.5 K/uL Final    Baso # 09/23/2023 0.03  0.00 - 0.20 K/uL Final " "   nRBC 09/23/2023 0  0 /100 WBC Final    Gran % 09/23/2023 78.3 (H)  38.0 - 73.0 % Final    Lymph % 09/23/2023 13.0 (L)  18.0 - 48.0 % Final    Mono % 09/23/2023 6.4  4.0 - 15.0 % Final    Eosinophil % 09/23/2023 1.5  0.0 - 8.0 % Final    Basophil % 09/23/2023 0.6  0.0 - 1.9 % Final    Differential Method 09/23/2023 Automated   Final    Sodium 09/23/2023 137  136 - 145 mmol/L Final    Potassium 09/23/2023 4.1  3.5 - 5.1 mmol/L Final    Chloride 09/23/2023 104  95 - 110 mmol/L Final    CO2 09/23/2023 24  23 - 29 mmol/L Final    Glucose 09/23/2023 101  70 - 110 mg/dL Final    BUN 09/23/2023 34 (H)  7 - 17 mg/dL Final    Creatinine 09/23/2023 1.53 (H)  0.50 - 1.40 mg/dL Final    Calcium 09/23/2023 9.1  8.7 - 10.5 mg/dL Final    Total Protein 09/23/2023 7.5  6.0 - 8.4 g/dL Final    Albumin 09/23/2023 4.0  3.5 - 5.2 g/dL Final    Total Bilirubin 09/23/2023 0.7  0.1 - 1.0 mg/dL Final    Alkaline Phosphatase 09/23/2023 59  38 - 126 U/L Final    AST 09/23/2023 20  15 - 46 U/L Final    ALT 09/23/2023 13  10 - 44 U/L Final    Anion Gap 09/23/2023 9  8 - 16 mmol/L Final    eGFR 09/23/2023 33.1 (A)  >60 mL/min/1.73 m^2 Final    Troponin I 09/23/2023 0.013  0.012 - 0.034 ng/mL Final   (  Transthoracic echo (TTE) complete    Height:  5' 5" (1.651 m)   Weight:  79 kg (174 lb 2.6 oz)   Blood Pressure:  150/68    Date of Study:  12/27/22   Ordering Provider:  Gladys Green PA-C   Clinical Indications:  Dizziness [R42 (ICD-10-CM)]       Interpreting Physicians  Performing Staff   Vishal Munguia MD Tech:  Randi Cohen        Reason for Exam  Priority: Routine  Dx: Dizziness [R42 (ICD-10-CM)]     View Images Vital Vitrea     Show images for Echo  Summary    The left ventricle is normal in size with concentric remodeling and normal systolic function.  The estimated ejection fraction is 65%.  Normal left ventricular diastolic function.  Normal right ventricular size with normal right ventricular systolic " function.  Intermediate central venous pressure (8 mmHg).  The estimated PA systolic pressure is 37 mmHg.  Small posterior pericardial effusion. And trivial outside the RA.   EP lab procedure    Height:  Not recorded   Weight:  Not recorded   Blood Pressure:  Not recorded    Date of Study:  07/09/2018   Ordering Provider:  Cristina Peres NP   Clinical Indications:  Supraventricular tachycardia [I47.1 (ICD-10-CM)], Tobacco use [Z72.0 (ICD-10-CM)]       Interpreting Physicians  Performing Staff   Result is not signed. No performing staff assigned to study.     Indications    Supraventricular tachycardia [I47.1 (ICD-10-CM)]   Tobacco use [Z72.0 (ICD-10-CM)]     EP lab procedure  Order: [id number]  Status: Final result     Visible to patient: Yes (not seen)     Next appt: None     Dx: Supraventricular tachycardia; Tobacco...     0 Result Notes  Details    Narrative   PRE-TEST DATA        TEST DESCRIPTION    Date of Procedure:  07/09/2018     A. Indication/Pre-Operative Diagnosis      The patient is a 77 year old female that was referred to the EP lab by Dr. Cecilio Kline for SVT.     B. Summary/Post-Operative Diagnosis       1.   Successful slow  pathway modification.    2.   No bradycardia indication for PPM implantation in setting of syncope     C. HPI      I have reviewed the history and physical completed on 07/09/2018. The patient has been examined and I concur with the findings from 07/09/2018.     Patient history was obtained from the patient.     Height: 65 in.      Weight: 172 lbs.      BMI: 28.60 kg/m2     OUTPATIENT MEDICATIONS: Medications were reviewed.   ALLERGIES: Allergies were reviewed.     Laboratory data revealed:     07/06/2018 BUN  26, CREAT  1.5, GLU  100, HCT  34.9, HGB  10.8, INR  0.9, K  3.7, NA  140, PLT  246, PTI  10.1, WBCIR  6.19, APTT  23.5           ACS Enzymes:     07/04/2018 TROP  <0.00   07/04/2018 TROP  0.013   07/05/2018 TROP  <0.00         Accession #: [id number]  Cath lab  procedure    Height:  Not recorded   Weight:  Not recorded   Blood Pressure:  Not recorded    Date of Study:  08/12/2015   Ordering Provider:  Yohannes Santos MD   Clinical Indications:  None Listed       Interpreting Physicians  Performing Staff   Result is not signed. No performing staff assigned to study.     Patient Information    Name MRN Description   Crystal Alvarado [id number] 74 y.o. female     Cath lab procedure  Order: [id number]  Status: Final result     Visible to patient: Yes (not seen)     Next appt: None     0 Result Notes        Narrative  Performed by: [facility]  DATE OF PROCEDURE: 08/12/2015     INDICATION/PRE-OPERATIVE DIAGNOSIS:  The patient is a 74 year old female that was referred for catheterization by Self Referral for ACS (unstable angina) and Positive lexiscan cardiolite stress test.     HPI:   I have reviewed the history and physical completed on 08/10/2015. The patient has been examined and I concur with the findings from 08/10/2015.     Patient history was obtained from the patient.     Counseling: The patient was counseled regarding the potential risks and benefits of this procedure, as well as possible alternative approaches to the problem and gave informed consent.     The ASA Score was Class II.     [facility] Risk Score for MACE is 2.3%. [facility] Risk Score for Death is 0.2%.     Height: 65 in.      Weight: 180 lbs.      BMI: 29.90 kg/m2     Laboratory data revealed:   08/09/2015  TROP  0.014   08/09/2015  TROP  <0.006   08/09/2015 INR  1.0, PTI  10.3   08/09/2015  TROP  <0.006   08/12/2015 K  3.3, GLU  80, HGB  11.7, PLT  203, NA  143, HCT  36.7, CREAT  0.8, WBCIR  4.16, BUN  13     PROCEDURES PERFORMED: Left Ventriculogram, LHC and Coronary Angio     ANESTHESIA: Conscious sedation was achieved with 50 mcg of FENTANYL 100MCG/2ML and 2 mg of Versed. Local anesthesia was achieved with 20 ml of Lidocaine 1%.     PRIMARY PHYSICIAN: Shon LONGO DETAILS OF PROCEDURE:      Medications given on sterile field: Heparin 1000u/500cc Bag (3 bags) and Lidocaine 1% (20 ml).     Medications given during procedure: Versed (2 mg), Fentanyl 100mcg/2ml (50 mcg) and Labetalol (20 mg).     The patient was brought to the catheterization laboratory after premedication with oral Benadryl 50 mg. Bilateral groin prepped and draped. The Kit, Manifold was flushed and connected to contrast. After local anesthesia, a Sheath 6fr X 11cm was inserted   into the right femoral artery. A Guidewire J .035 X 150 was inserted into the Aorta. A Catheter 6fr Pigtail 145 was inserted into the left ventricle. The Guidewire J .035 X 150 was removed. Hemodynamics recorded in the left ventricle. Angiography   performed in the left ventricle. Hemodynamics recorded in the left ventricle. The Catheter 6fr Pigtail 145 was removed. A Catheter 6fr Fl 4.0 was inserted into the ostial LM. Angiography performed in multiple views in the left coronary arteries. The   Catheter 6fr Fl 4.0 was removed. A Catheter 6fr Fr 4.0 was inserted into the ostial RCA. Angiography performed in multiple views in the right coronary arteries. The Catheter 6fr Fr 4.0 was removed. The Sheath 6fr X 11cm was removed. A Closure Device 6fr   Angioseal Vip was inserted into the right femoral artery. The Closure Device 6fr Angioseal Vip was removed. Total Contrast Omnipaque 350 150ml injected was 60.0 ml. Total Contrast Omnipaque 350 150ml used was 150.0 ml.     Fluoroscopy Time: 1.5 minutes              Radiation Dose: 440 mGy   Contrast Injected: 60 ml Contrast Omnipaque 350 150ml              Contrast Used: 150 ml Contrast Omnipaque 350 150ml     Procedure log documented by RT Racheal and verified by Shon Omer MD     ESTIMATED BLOOD LOSS is < 50 cc.     SPECIMEN: No specimen.     COMPLICATIONS: There were no complications.     B. HEMODYNAMIC RESULTS:     LVEDP (Pre): 8 mmHg   LVEDP (Post): 10 mmHg   Ejection Fraction: 65%     C.  ANGIOGRAPHIC RESULTS:     DIAGNOSTIC:       Patient has a right dominant coronary artery.        The coronary vessels have luminal irregularities.        The peripheral vessels are all normal.        - Left Main Coronary Artery:              The LM is normal. There is JAMES 3 flow.        - Left Anterior Descending Artery:              The LAD has luminal irregularities. There is JAMES 3 flow.        - Left Circumflex Artery:              The LCX has luminal irregularities. There is JAMES 3 flow.        - Right Coronary Artery:              The RCA has luminal irregularities. There is JAMES 3 flow.        - Common Femoral Artery:              The right CFA has luminal irregularities.       D. SUMMARY/POST-OPERATIVE DIAGNOSIS:     1. Non-obstructive CAD.     E. RECOMMENDATIONS:     1. Routine post-cath care.   2. Maximize medical management.   3. Risk factor reductions.   4. ASA 81mg.     I certify that I was present for catheter insertion, catheter manipulation, angiography, and angiographic interpretation of this patient.     This document has been electronically    SIGNED BY: Sohn Omer MD On: 08/12/2015 13:40   This result has not been signed. Information might be incomplete.           Assessment:     1. Chest pain of uncertain etiology    2. Dyspnea on exertion    3. Dizziness    4. Coronary artery disease involving native coronary artery of native heart with unstable angina pectoris    5. Essential hypertension    6. History of radiofrequency ablation (RFA) procedure for cardiac arrhythmia    7. Hyperlipidemia, unspecified hyperlipidemia type    8. Status post placement of implantable loop recorder    9. Orthostatic hypotension    10. Chest pain, unspecified type      Plan:   Crystal was seen today for shortness of breath and neck pain.    Diagnoses and all orders for this visit:    Chest pain of uncertain etiology  -     IN OFFICE EKG 12-LEAD (to [facility])    Dyspnea on exertion  -     IN OFFICE EKG 12-LEAD  (to Muse)    Dizziness  -     IN OFFICE EKG 12-LEAD (to [place])    Coronary artery disease involving native coronary artery of native heart with unstable angina pectoris  -     IN OFFICE EKG 12-LEAD (to Muse)    Essential hypertension  -     IN OFFICE EKG 12-LEAD (to Muse)    History of radiofrequency ablation (RFA) procedure for cardiac arrhythmia  -     IN OFFICE EKG 12-LEAD (to Muse)    Hyperlipidemia, unspecified hyperlipidemia type  -     IN OFFICE EKG 12-LEAD (to Muse)    Status post placement of implantable loop recorder  -     IN OFFICE EKG 12-LEAD (to Muse)    Orthostatic hypotension  -     IN OFFICE EKG 12-LEAD (to Muse)    Chest pain, unspecified type  -     IN OFFICE EKG 12-LEAD (to Muse)  -     nitroGLYCERIN (NITROSTAT) 0.4 MG SL tablet; Place 1 tablet (0.4 mg total) under the tongue every 5 (five) minutes as needed for Chest pain (and notify MD).    Due to CKD pt counseled to avoid all NSAID's    Pt may have arthritis pain in neck and shoulders ( for which she can take Tylenol) or may be experiencing unstable angina pectoris     Pt instructed in use of NTG SL prn    Same meds plus ECASA 81 mg daily    Will repeat the lab, Lexiscan Cardiolite stress test , and echocardiogram with doppler, and CXR    Go to ER for any chest pain lasting more than 20 minutes    Follow up in about 6 months (around 4/17/2024).

## 2023-10-19 ENCOUNTER — TELEPHONE (OUTPATIENT)
Dept: CARDIOLOGY | Facility: CLINIC | Age: 83
End: 2023-10-19
Payer: MEDICARE

## 2023-10-19 NOTE — TELEPHONE ENCOUNTER
I spoke with daughter.  CXR shows clear lungs  Lab OK except for CKD.  Counseled to avoid all NSAID's  Cardiolite scheduled for next week.

## 2023-10-25 ENCOUNTER — TELEPHONE (OUTPATIENT)
Dept: CARDIOLOGY | Facility: CLINIC | Age: 83
End: 2023-10-25

## 2023-10-25 NOTE — TELEPHONE ENCOUNTER
I spoke with pt  Cardiolite stress test and echocardiogram are normal.  Heart is strong and there is no blockage.  Pt reassured.  Recent shoulder pain and neck pain most likely due to osteoarthritis.

## 2024-04-23 ENCOUNTER — OFFICE VISIT (OUTPATIENT)
Dept: CARDIOLOGY | Facility: CLINIC | Age: 84
End: 2024-04-23
Payer: MEDICARE

## 2024-04-23 VITALS
DIASTOLIC BLOOD PRESSURE: 71 MMHG | BODY MASS INDEX: 29.22 KG/M2 | OXYGEN SATURATION: 98 % | SYSTOLIC BLOOD PRESSURE: 113 MMHG | HEART RATE: 80 BPM | HEIGHT: 65 IN | WEIGHT: 175.38 LBS

## 2024-04-23 DIAGNOSIS — Z98.890 HISTORY OF RADIOFREQUENCY ABLATION (RFA) PROCEDURE FOR CARDIAC ARRHYTHMIA: ICD-10-CM

## 2024-04-23 DIAGNOSIS — E78.5 HYPERLIPIDEMIA, UNSPECIFIED HYPERLIPIDEMIA TYPE: ICD-10-CM

## 2024-04-23 DIAGNOSIS — I10 ESSENTIAL HYPERTENSION: ICD-10-CM

## 2024-04-23 DIAGNOSIS — I95.1 ORTHOSTATIC HYPOTENSION: ICD-10-CM

## 2024-04-23 DIAGNOSIS — I70.0 THORACIC AORTIC ATHEROSCLEROSIS: ICD-10-CM

## 2024-04-23 DIAGNOSIS — R06.09 DYSPNEA ON EXERTION: Primary | ICD-10-CM

## 2024-04-23 PROCEDURE — 1126F AMNT PAIN NOTED NONE PRSNT: CPT | Mod: CPTII,S$GLB,, | Performed by: INTERNAL MEDICINE

## 2024-04-23 PROCEDURE — 3074F SYST BP LT 130 MM HG: CPT | Mod: CPTII,S$GLB,, | Performed by: INTERNAL MEDICINE

## 2024-04-23 PROCEDURE — 3078F DIAST BP <80 MM HG: CPT | Mod: CPTII,S$GLB,, | Performed by: INTERNAL MEDICINE

## 2024-04-23 PROCEDURE — 99214 OFFICE O/P EST MOD 30 MIN: CPT | Mod: S$GLB,,, | Performed by: INTERNAL MEDICINE

## 2024-04-23 PROCEDURE — 1159F MED LIST DOCD IN RCRD: CPT | Mod: CPTII,S$GLB,, | Performed by: INTERNAL MEDICINE

## 2024-04-23 PROCEDURE — 99999 PR PBB SHADOW E&M-EST. PATIENT-LVL III: CPT | Mod: PBBFAC,,, | Performed by: INTERNAL MEDICINE

## 2024-04-23 PROCEDURE — 93000 ELECTROCARDIOGRAM COMPLETE: CPT | Mod: S$GLB,,, | Performed by: INTERNAL MEDICINE

## 2024-04-23 PROCEDURE — 1160F RVW MEDS BY RX/DR IN RCRD: CPT | Mod: CPTII,S$GLB,, | Performed by: INTERNAL MEDICINE

## 2024-04-23 RX ORDER — AMLODIPINE BESYLATE 5 MG/1
5 TABLET ORAL DAILY
Qty: 90 TABLET | Refills: 3 | Status: SHIPPED | OUTPATIENT
Start: 2024-04-23 | End: 2025-04-23

## 2024-04-23 NOTE — PROGRESS NOTES
"  Subjective:      Patient ID: Crystal Alvarado is a 83 y.o. female.    Chief Complaint: No chief complaint on file.    HPI:  Pt c/o shortness of breath walking short distances.    "I get weak if I stand too long"-- pt then has to sit down.    No longer having chest pains.    Pt takes Norco for low back pain    Review of Systems   Cardiovascular:  Positive for dyspnea on exertion and near-syncope. Negative for chest pain, claudication, irregular heartbeat, leg swelling, orthopnea, palpitations and syncope.        Past Medical History:   Diagnosis Date    Arthritis     CHF (congestive heart failure)     Coronary artery disease     Hyperlipidemia     Hypertension         Past Surgical History:   Procedure Laterality Date    A-V CARDIAC PACEMAKER INSERTION N/A 7/9/2018    Procedure: INSERTION, CARDIAC PACEMAKER, DUAL CHAMBER;  Surgeon: Archie Montez MD;  Location: [facility] CATH LAB;  Service: Cardiology;  Laterality: N/A;  SVT, RFA, +/- Dual PPM or ILR, IGLESIA, MDT, MAC, MD, 3 Prep    CATARACT EXTRACTION W/  INTRAOCULAR LENS IMPLANT Left 11/23/2020    Procedure: EXTRACTION, CATARACT, WITH IOL INSERTION;  Surgeon: Roslyn Napier MD;  Location: [facility];  Service: Ophthalmology;  Laterality: Left;    cataract removal Right 2018    [facility]    COLONOSCOPY      COLONOSCOPY N/A 8/5/2020    Procedure: COLONOSCOPY;  Surgeon: Sebas Dickens MD;  Location: [facility] ([address] [place] [address]);  Service: Endoscopy;  Laterality: N/A;    ESOPHAGOGASTRODUODENOSCOPY N/A 8/4/2020    Procedure: EGD (ESOPHAGOGASTRODUODENOSCOPY);  Surgeon: Fernie Cohen MD;  Location: [facility] ([address] [place] [address]);  Service: Endoscopy;  Laterality: N/A;    HYSTERECTOMY N/A     INSERTION OF IMPLANTABLE LOOP RECORDER N/A 7/9/2018    Procedure: PLACEMENT-LOOP RECORDER;  Surgeon: Archie Montez MD;  Location: [facility] CATH LAB;  Service: Cardiology;  Laterality: N/A;  SVT, RFA, +/- Dual PPM or ILR, IGLESIA, MDT, MAC, MD, 3 Prep       No family history on file.    Social History " "    Socioeconomic History    Marital status: Single   Tobacco Use    Smoking status: Former     Current packs/day: 0.00     Types: Cigarettes     Quit date: 2013     Years since quittin.0    Smokeless tobacco: Never   Substance and Sexual Activity    Alcohol use: No    Drug use: No    Sexual activity: Never       Current Outpatient Medications on File Prior to Visit   Medication Sig Dispense Refill    acetaminophen (TYLENOL) 325 MG tablet Take 2 tablets (650 mg total) by mouth every 6 (six) hours as needed for Pain.  0    atorvastatin (LIPITOR) 20 MG tablet Take 20 mg by mouth every evening.  4    HYDROcodone-acetaminophen (NORCO) 5-325 mg per tablet Take 1 tablet by mouth 2 (two) times daily as needed.      levocetirizine (XYZAL) 5 MG tablet TAKE 1 TABLET BY MOUTH DAILY IN THE EVENING FOR ALLERGIES      losartan (COZAAR) 100 MG tablet TAKE 1 TABLET BY MOUTH EVERY DAY 90 tablet 3    montelukast (SINGULAIR) 10 mg tablet Take 10 mg by mouth.      nitroGLYCERIN (NITROSTAT) 0.4 MG SL tablet Place 1 tablet (0.4 mg total) under the tongue every 5 (five) minutes as needed for Chest pain (and notify MD). 25 tablet 5    [DISCONTINUED] amLODIPine (NORVASC) 10 MG tablet TAKE 1 TABLET (10 MG TOTAL) BY MOUTH ONCE DAILY. PATIENT NEEDS AN APPOINTMENT FOR FURTHER REFILLS. 90 tablet 3     No current facility-administered medications on file prior to visit.       Review of patient's allergies indicates:  No Known Allergies  Objective:     Vitals:    24 1419   BP: 113/71   BP Location: Left arm   Patient Position: Sitting   BP Method: Large (Automatic)   Pulse: 80   SpO2: 98%   Weight: 79.5 kg (175 lb 6 oz)   Height: 5' 5" (1.651 m)        Physical Exam  Constitutional:       Appearance: She is well-developed.   Eyes:      General: No scleral icterus.  Neck:      Vascular: No carotid bruit or JVD.   Cardiovascular:      Rate and Rhythm: Normal rate and regular rhythm.      Heart sounds: Murmur (I/VI systolic) heard.    "   No gallop.   Pulmonary:      Breath sounds: Normal breath sounds.   Musculoskeletal:      Right lower leg: No edema.      Left lower leg: No edema.   Skin:     General: Skin is warm and dry.   Neurological:      Mental Status: She is alert and oriented to person, place, and time.   Psychiatric:         Behavior: Behavior normal.         Thought Content: Thought content normal.         Judgment: Judgment normal.              ECG today: NSR with PAC, possible LVH, reviewed by me, no significant change      Hospital Outpatient Visit on 10/25/2023   Component Date Value Ref Range Status    85% Max Predicted HR 10/25/2023 112   Final    Max Predicted HR 10/25/2023 131   Final    [facility] CV CPX PATIENT IS MALE 10/25/2023 0.0   Final    [facility] CV CPX PATIENT IS FEMALE 10/25/2023 1.0   Final    Systolic blood pressure 10/25/2023 159  mmHg Final    Diastolic blood pressure 10/25/2023 74  mmHg Final    HR at rest 10/25/2023 81  bpm Final    dose 10/25/2023 0.0  mg Final    Peak HR 10/25/2023 100  bpm Final    % Max HR Achieved 10/25/2023 76   Final    RPP 10/25/2023 12,879   Final   Hospital Outpatient Visit on 10/25/2023   Component Date Value Ref Range Status    BSA 10/25/2023 1.93  m2 Final    Carrillo's Biplane MOD Ejection Fra* 10/25/2023 73  % Final    LVOT stroke volume 10/25/2023 97.21  cm3 Final    LVIDd 10/25/2023 3.79  3.5 - 6.0 cm Final    LV Systolic Volume 10/25/2023 30.32  mL Final    LV Systolic Volume Index 10/25/2023 16.0  mL/m2 Final    LVIDs 10/25/2023 2.83  2.1 - 4.0 cm Final    LV Diastolic Volume 10/25/2023 61.59  mL Final    LV Diastolic Volume Index 10/25/2023 32.59  mL/m2 Final    IVS 10/25/2023 1.41 (A)  0.6 - 1.1 cm Final    LVOT diameter 10/25/2023 2.07  cm Final    LVOT area 10/25/2023 3.4  cm2 Final    FS 10/25/2023 25 (A)  28 - 44 % Final    Left Ventricle Relative Wall Thick* 10/25/2023 0.57  cm Final    Posterior Wall 10/25/2023 1.08  0.6 - 1.1 cm Final    LV mass 10/25/2023 161.37  g Final    LV  Mass Index 10/25/2023 85  g/m2 Final    MV Peak E Addy 10/25/2023 0.56  m/s Final    TDI LATERAL 10/25/2023 0.05  m/s Final    MV Peak A Addy 10/25/2023 0.85  m/s Final    TR Max Addy 10/25/2023 2.97  m/s Final    E/A ratio 10/25/2023 0.66   Final    IVRT 10/25/2023 148.43  msec Final    E wave deceleration time 10/25/2023 274.41  msec Final    LV LATERAL E/E' RATIO 10/25/2023 11.20  m/s Final    PV Peak S Addy 10/25/2023 0.36  m/s Final    PV Peak D Addy 10/25/2023 0.21  m/s Final    Pulm vein S/D ratio 10/25/2023 1.71   Final    LVOT peak addy 10/25/2023 1.21  m/s Final    Left Ventricular Outflow Tract Liana* 10/25/2023 0.79  cm/s Final    Left Ventricular Outflow Tract Liana* 10/25/2023 2.84  mmHg Final    LA size 10/25/2023 2.56  cm Final    Left Atrium Minor Axis 10/25/2023 3.50  cm Final    Left Atrium Major Axis 10/25/2023 4.11  cm Final    LA volume (mod) 10/25/2023 35.15  cm3 Final    LA Volume Index (Mod) 10/25/2023 18.6  mL/m2 Final    RVDD 10/25/2023 2.27  cm Final    Right ventricular length in diasto* 10/25/2023 5.48  cm Final    RV mid diameter 10/25/2023 1.34  cm Final    RV S' 10/25/2023 15.55  cm/s Final    RA area 10/25/2023 9.9  cm2 Final    RA Major Axis 10/25/2023 3.77  cm Final    RA Width 10/25/2023 2.68  cm Final    Right Atrium Volume Systolic 10/25/2023 17.58  mL Final    AV mean gradient 10/25/2023 9  mmHg Final    AV peak gradient 10/25/2023 18  mmHg Final    Ao peak addy 10/25/2023 2.13  m/s Final    Ao VTI 10/25/2023 44.60  cm Final    LVOT peak VTI 10/25/2023 28.90  cm Final    AV valve area 10/25/2023 2.18  cm² Final    AV Velocity Ratio 10/25/2023 0.57   Final    AV index (prosthetic) 10/25/2023 0.65   Final    MIGUEL by Velocity Ratio 10/25/2023 1.91  cm² Final    MV mean gradient 10/25/2023 1  mmHg Final    MV peak gradient 10/25/2023 3  mmHg Final    MV valve area by continuity eq 10/25/2023 4.84  cm2 Final    MV VTI 10/25/2023 20.1  cm Final    Triscuspid Valve Regurgitation Pea* 10/25/2023  35  mmHg Final    PV PEAK VELOCITY 10/25/2023 0.97  m/s Final    PV peak gradient 10/25/2023 4  mmHg Final    Pulmonary Valve Mean Velocity 10/25/2023 0.76  m/s Final    Ao root annulus 10/25/2023 2.88  cm Final    Sinus 10/25/2023 2.55  cm Final    STJ 10/25/2023 2.36  cm Final    Ascending aorta 10/25/2023 2.44  cm Final    IVC diameter 10/25/2023 0.86  cm Final    ZLVIDS 10/25/2023 -1.12   Final    ZLVIDD 10/25/2023 -3.34   Final    AORTIC VALVE CUSP SEPERATION 10/25/2023 1.19  cm Final    LA Volume Index 10/25/2023 11.8  mL/m2 Final    LA volume 10/25/2023 22.21  cm3 Final    LA WIDTH 10/25/2023 2.7  cm Final    TV resting pulmonary artery pressu* 10/25/2023 38  mmHg Final    RV TB RVSP 10/25/2023 6  mmHg Final    Est. RA pres 10/25/2023 3  mmHg Final   (  MRI Brain Without Contrast  Status: Final result     MyChart Results Release    [employer] Status: Active  Results Release     PACS Images for Smart Holograms Viewer     Show images for MRI Brain Without Contrast  MRI Brain Without Contrast  Order: [id number]  Status: Final result     Visible to patient: Yes (not seen)     Next appt: None     0 Result Notes  Details    Reading Physician Reading Date Result Priority   Anton Bean MD  [phone]  [phone] 12/25/2022 STAT     Narrative & Impression  EXAMINATION:  MRI BRAIN WITHOUT CONTRAST     CLINICAL HISTORY:  Dizziness, persistent/recurrent, cardiac or vascular cause suspected;     TECHNIQUE:  Multiplanar multisequence MR imaging of the brain was performed without contrast.     COMPARISON:  CT scan of the head dated 12/22/2022.     FINDINGS:  The craniocervical junction is intact.  The sellar and parasellar structures are within normal limits.  The intracranial flow voids are within normal limits.     No diffusion-weighted signal abnormality is present.  The ventricles and sulci are prominent, consistent cerebral volume loss.  There are T2/FLAIR signal hyperintense within the periventricular and  subcortical white matter.  There are no extra-axial fluid collections.  There is no evidence of intracranial hemorrhage.  There is no evidence of mass effect.     There are postoperative changes in the globes. The paranasal sinuses are unremarkable.  The mastoid air cells are clear.  The calvarium is intact.     Impression:     No acute intracranial process.  Specifically, no evidence of acute infarction.     Age-related changes.        Electronically signed by: Anton Bean MD  Date:                                            12/25/2022  Time:                                           23:48           Exam Ended: 12/25/22 22:52 CST Last Resulted: 12/25/22 23:48 CST            X-Ray Chest PA And Lateral  Status: Final result     MyChart Results Release    [employer] Status: Active  Results Release     PACS Images for Anevia Viewer     Show images for X-Ray Chest PA And Lateral  All Reviewers List    Cecilio Kline MD on 10/19/2023 14:07     X-Ray Chest PA And Lateral  Order: [id number]  Status: Final result     Visible to patient: Yes (not seen)     Next appt: None     Dx: Orthostatic hypotension; Dizziness; U...     0 Result Notes  Details    Reading Physician Reading Date Result Priority   Rony Leo MD  [phone] 10/19/2023 Routine     Narrative & Impression  EXAMINATION:  XR CHEST PA AND LATERAL     CLINICAL HISTORY:  Chest pain, unspecified     TECHNIQUE:  PA and lateral views of the chest were performed.     COMPARISON:  09/23/2023.     FINDINGS:  There is an implanted cardiac loop recorder.  Heart is not enlarged.  Atherosclerotic calcification is present within the aortic arch.  Superior mediastinal structures are unremarkable.  Pulmonary vasculature is within normal limits.  The lungs are free of focal consolidations.  There is no evidence for pneumothorax or pleural effusions.  There are degenerative changes of both shoulders.     Impression:     Implanted cardiac loop recorder.      No acute chest disease identified.        Electronically signed by: Rony Leo MD  Date:                                            10/19/2023  Time:                                           10:19           Exam Ended: 10/19/23 10:07 CDT Last Resulted: 10/19/23 10:19 CDT            Encounter Form Printed    Encounter form printed on 10/25/23 07:34 AM         NM Myocardial Perfusion Spect Multi Pharmacologic  Status: Final result     MyChart Results Release    My[employer] Status: Active  Results Release     PACS Images for [employer] Viewer     Show images for NM Myocardial Perfusion Spect Multi Pharmacologic  All Reviewers List    Cecilio Kline MD on 10/25/2023 15:06     NM Myocardial Perfusion Spect Multi Pharmacologic  Order: [id number]  Status: Final result     Visible to patient: Yes (not seen)     Next appt: None     Dx: Unstable angina pectoris     0 Result Notes  Details    Reading Physician Reading Date Result Priority   Rony Caraballo MD  [phone] 10/25/2023 Routine     Narrative & Impression  EXAMINATION:  NM MYOCARDIAL PERFUSION SPECT MULTI PHARM     CLINICAL HISTORY:  Chest pain/anginal equiv, intermediate CAD risk, not treadmill candidate;  Unstable angina     TECHNIQUE:  10.2 and 30.4 mCi of technetium labeled tetrofosmin was administered for the rest and stress portion of today's examination.  A standard Lexiscan stress test was performed.     FINDINGS:  There is no fixed or reversible perfusion defect.  The stress and rest end-diastolic volumes measure 43 and 55 mL actively.  The stress and rest end systolic volumes measure 9 and 14 mL respectively.  The stress and rest ejection fraction measures 78 and 74% respectively.     Impression:     No acute findings.        Electronically signed by: Rony Caraballo MD  Date:                                            10/25/2023  Time:                                           11:10           Exam Ended: 10/25/23 10:55 CDT Last Resulted:  10/25/23 11:10 [facility]            Accession #: [id number]  Stress test only, Regadenoson    Height:  Not recorded   Weight:  Not recorded   Blood Pressure:  Not recorded    Date of Study:  10/25/23   Ordering Provider:  Cecilio Kline MD   Clinical Indications:  Chest pain of uncertain etiology [R07.9 (ICD-10-CM)], Dyspnea on exertion [R06.09 (ICD-10-CM)], Dizziness [R42 (ICD-10-CM)], Coronary artery disease involving native coronary artery of native heart with unstable angina pectoris [I25.110 (ICD-10-CM)], Essential hypertension [I10 (ICD-10-CM)], History of radiofrequency ablation (RFA) procedure for cardiac arrhythmia [Z98.890 (ICD-10-CM)], Hyperlipidemia, unspecified hyperlipidemia type [E78.5 (ICD-10-CM)], Status post placement of implantable loop recorder [Z95.818 (ICD-10-CM)], Orthostatic hypotension [I95.1 (ICD-10-CM)], Chest pain, unspecified type [R07.9 (ICD-10-CM)], Unstable angina pectoris [I20.0 (ICD-10-CM)]       Reading Physicians  Performing Staff   Cardiology:  Cecilio Kline MD    Tech:  Marly Rasheed        Reason for Exam  Priority: Routine  Dx: Chest pain of uncertain etiology [R07.9 (ICD-10-CM)]; Dyspnea on exertion [R06.09 (ICD-10-CM)]; Dizziness [R42 (ICD-10-CM)]; Coronary artery disease involving native coronary artery of native heart with unstable angina pectoris [I25.110 (ICD-10-CM)]; Essential hypertension [I10 (ICD-10-CM)]; History of radiofrequency ablation (RFA) procedure for cardiac arrhythmia [Z98.890 (ICD-10-CM)]; Hyperlipidemia, unspecified hyperlipidemia type [E78.5 (ICD-10-CM)]; Status post placement of implantable loop recorder [Z95.818 (ICD-10-CM)]; Orthostatic hypotension [I95.1 (ICD-10-CM)]; Chest pain, unspecified type [R07.9 (ICD-10-CM)]; Unstable angina pectoris [I20.0 (ICD-10-CM)]     Interpretation Summary  Show Result Comparison       The ECG portion of the study is negative for ischemia.    The patient reported no chest pain during the stress test.    There  "were no arrhythmias during stress.    The nuclear portion of this study will be reported separately.     Performing Clinician    Marly Rasheed   Accession #: [id number]  Transthoracic echo (TTE) complete    Height:  5' 5" (1.651 m)   Weight:  81.6 kg (180 lb)   Blood Pressure:  Not recorded    Date of Study:  10/25/23   Ordering Provider:  Cecilio Kline MD   Clinical Indications:  Chest pain of uncertain etiology [R07.9 (ICD-10-CM)], Dyspnea on exertion [R06.09 (ICD-10-CM)], Dizziness [R42 (ICD-10-CM)], Coronary artery disease involving native coronary artery of native heart with unstable angina pectoris [I25.110 (ICD-10-CM)], Essential hypertension [I10 (ICD-10-CM)], History of radiofrequency ablation (RFA) procedure for cardiac arrhythmia [Z98.890 (ICD-10-CM)], Hyperlipidemia, unspecified hyperlipidemia type [E78.5 (ICD-10-CM)], Status post placement of implantable loop recorder [Z95.818 (ICD-10-CM)], Orthostatic hypotension [I95.1 (ICD-10-CM)], Chest pain, unspecified type [R07.9 (ICD-10-CM)], Unstable angina pectoris [I20.0 (ICD-10-CM)]       Reading Physicians  Performing Staff   Cardiology:  Ryan Monson MD    Tech:  Marly Rasheed        Reason for Exam  Priority: Routine  Dx: Chest pain of uncertain etiology [R07.9 (ICD-10-CM)]; Dyspnea on exertion [R06.09 (ICD-10-CM)]; Dizziness [R42 (ICD-10-CM)]; Coronary artery disease involving native coronary artery of native heart with unstable angina pectoris [I25.110 (ICD-10-CM)]; Essential hypertension [I10 (ICD-10-CM)]; History of radiofrequency ablation (RFA) procedure for cardiac arrhythmia [Z98.890 (ICD-10-CM)]; Hyperlipidemia, unspecified hyperlipidemia type [E78.5 (ICD-10-CM)]; Status post placement of implantable loop recorder [Z95.818 (ICD-10-CM)]; Orthostatic hypotension [I95.1 (ICD-10-CM)]; Chest pain, unspecified type [R07.9 (ICD-10-CM)]; Unstable angina pectoris [I20.0 (ICD-10-CM)]     View Images Vital Vitrea     Show images for " "Echo  Summary  Show Result Comparison       Left Ventricle: The left ventricle is normal in size. Mildly increased wall thickness. There is mild concentric hypertrophy. Normal wall motion. There is normal systolic function with a visually estimated ejection fraction of 60 - 65%. Grade I diastolic dysfunction.    Right Ventricle: Normal right ventricular cavity size. Wall thickness is normal. Right ventricle wall motion  is normal. Systolic function is normal.    Aortic Valve: The aortic valve is a trileaflet valve. There is mild aortic valve sclerosis. There is moderate annular calcification present. Mildly restricted motion.    Tricuspid Valve: There is mild regurgitation.    IVC/SVC: Normal venous pressure at 3 mmHg.     Vitals    Height Weight BMI (Calculated) BSA (Calculated - sq m) BP Pulse   5' 5" (1.651 m) 81.6 kg (180 lb) 30 1.93 sq meters       Study Details A complete echo was performed using complete 2D, color flow Doppler and spectral Doppler. During the study, the apical, parasternal and subcostal views were captured.  Overall the study quality was good.     Echocardiography Findings    Left Ventricle The left ventricle is normal in size. Mildly increased wall thickness. There is mild concentric hypertrophy. Normal wall motion. There is normal systolic function with a visually estimated ejection fraction of 60 - 65%. Grade I diastolic dysfunction.   Right Ventricle Normal right ventricular cavity size. Wall thickness is normal. Right ventricle wall motion  is normal. Systolic function is normal.   Left Atrium Normal left atrial size.   Right Atrium Normal right atrial size.   Aortic Valve The aortic valve is a trileaflet valve. There is mild aortic valve sclerosis. There is moderate annular calcification present. Mildly restricted motion. Aortic valve peak velocity is 2.13 m/s. Mean gradient is 9 mmHg. There is no significant regurgitation.   Mitral Valve The mitral valve is structurally normal. There " is normal leaflet mobility. The mean pressure gradient across the mitral valve is 1 mmHg at a heart rate of  bpm. There is no significant regurgitation.   Tricuspid Valve The tricuspid valve is structurally normal. There is normal leaflet mobility. There is mild regurgitation.   Pulmonic Valve The pulmonic valve is structurally normal. There is normal leaflet mobility. There is no significant regurgitation.   IVC/SVC Normal venous pressure at 3 mmHg.   Ascending Aorta Aortic root is normal in size measuring 2.55 cm. Ascending aorta is normal measuring 2.44 cm.   Pericardium and Other Findings There is no pericardial effusion.   Pulmonary Artery The estimated pulmonary artery systolic pressure is 38 mmHg.     Exam Details    Performed Procedure Technologist     Transthoracic echo (TTE) Marly Hernadez           Begin Exam End Exam     10/25/2023  9:15 AM CDT 10/25/2023  9:40 AM CDT              2D Measurements    Dimensions   LVIDd 3.79 cm  (Range: 3.5 - 6.0)         LVIDs 2.83 cm  (Range: 2.1 - 4.0)         IVS 1.41 cm  (Range: 0.6 - 1.1) Abnormal           Posterior Wall 1.08 cm  (Range: 0.6 - 1.1)         FS 25 %  (Range: 28 - 44) Abnormal           LA size 2.56 cm          LA volume 22.21 cm3          LA Volume Index 11.8 mL/m2          LA volume (mod) 35.15 cm3          LA Volume Index (Mod) 18.6 mL/m2          LV mass 161.37 g           Dimensions   RVDD 2.27 cm          Right ventricular length in diastole (apical 4-chamber view) 5.48 cm          RV S' 15.55 cm/s          RV mid diameter 1.34 cm          RA Width 2.68 cm          RA Major Axis 3.77 cm          RA area 9.9 cm2          Right Atrium Volume Systolic 17.58 mL           Aortic Root - End Diastolic   Ao root annulus 2.88 cm          Sinus 2.55 cm          STJ 2.36 cm          Ascending aorta 2.44 cm           Inferior Vena Cava   IVC diameter 0.86 cm                Doppler Measurements - Diastolic Filling    Diastolic Filling   E/A ratio  0.66          IVRT 148.43 msec         Pulm vein S/D ratio 1.71           E wave deceleration time 274.41 msec              Doppler Measurements - Aortic Valve    Stenosis   LVOT diameter 2.07 cm         LVOT area 3.4 cm2         LVOT peak addy 1.21 m/s         LVOT peak VTI 28.9 cm         Left Ventricular Outflow Tract Mean Velocity 0.79 cm/s         Left Ventricular Outflow Tract Mean Gradient 2.84 mmHg         Ao peak addy 2.13 m/s         Ao VTI 44.6 cm         AV valve area 2.18 cm²         AV mean gradient 9 mmHg         AV peak gradient 18 mmHg         AV Velocity Ratio 0.57          AV index (prosthetic) 0.65                  Doppler Measurements - Mitral Valve    Stenosis   MV mean gradient 1 mmHg         MV peak gradient 3 mmHg         MV valve area by continuity eq 4.84 cm2          PISA-MS   MV Peak E Addy 0.56 m/s                 Doppler Measurements - Pulmonic Valve    Stenosis   Pulmonary Valve Mean Velocity 0.76 m/s               Doppler Measurements - Shunt Ratio    Shunt Ratio   LVOT stroke volume 97.21 cm3              Doppler Measurements - Tricuspid Valve    Stress Evaluation   TV resting pulmonary artery pressure 38 mmHg                 Reviewed By    Cecilio Kline MD on 10/25/2023 15:06     Medications  (Filter: Administrations occurring from 0000 to 1012 on 10/25/23)  None     All Reviewers List  EP lab procedure    Height:  Not recorded   Weight:  Not recorded   Blood Pressure:  Not recorded    Date of Study:  07/09/2018   Ordering Provider:  Cristina Peres NP   Clinical Indications:  Supraventricular tachycardia [I47.1 (ICD-10-CM)], Tobacco use [Z72.0 (ICD-10-CM)]       Interpreting Physicians  Performing Staff   Result is not signed. No performing staff assigned to study.     Indications    Supraventricular tachycardia [I47.1 (ICD-10-CM)]   Tobacco use [Z72.0 (ICD-10-CM)]     EP lab procedure  Order: [id number]  Status: Final result     Visible to patient: Yes (not seen)      Next appt: None     Dx: Supraventricular tachycardia; Tobacco...     0 Result Notes  Details    Narrative   PRE-TEST DATA        TEST DESCRIPTION    Date of Procedure:  07/09/2018     A. Indication/Pre-Operative Diagnosis      The patient is a 77 year old female that was referred to the EP lab by Dr. Cecilio Kline for SVT.     B. Summary/Post-Operative Diagnosis       1.   Successful slow  pathway modification.    2.   No bradycardia indication for PPM implantation in setting of syncope     C. HPI      I have reviewed the history and physical completed on 07/09/2018. The patient has been examined and I concur with the findings from 07/09/2018.     Patient history was obtained from the patient.     Height: 65 in.      Weight: 172 lbs.      BMI: 28.60 kg/m2     OUTPATIENT MEDICATIONS: Medications were reviewed.   ALLERGIES: Allergies were reviewed.     Laboratory data revealed:     07/06/2018 BUN  26, CREAT  1.5, GLU  100, HCT  34.9, HGB  10.8, INR  0.9, K  3.7, NA  140, PLT  246, PTI  10.1, WBCIR  6.19, APTT  23.5           ACS Enzymes:     07/04/2018 TROP  <0.00   07/04/2018 TROP  0.013   07/05/2018 TROP  <0.00         Accession #: [id number]  Cath lab procedure    Height:  Not recorded   Weight:  Not recorded   Blood Pressure:  Not recorded    Date of Study:  08/12/2015   Ordering Provider:  Yohannes Santos MD   Clinical Indications:  None Listed       Interpreting Physicians  Performing Staff   Result is not signed. No performing staff assigned to study.     Patient Information    Name MRN Description   Crystal Alvarado [id number] 74 y.o. female     Cath lab procedure  Order: [id number]  Status: Final result     Visible to patient: Yes (not seen)     Next appt: None     0 Result Notes        Narrative  Performed by: CVIS  DATE OF PROCEDURE: 08/12/2015     INDICATION/PRE-OPERATIVE DIAGNOSIS:  The patient is a 74 year old female that was referred for catheterization by Self Referral for ACS (unstable angina) and  Positive lexiscan cardiolite stress test.     HPI:   I have reviewed the history and physical completed on 08/10/2015. The patient has been examined and I concur with the findings from 08/10/2015.     Patient history was obtained from the patient.     Counseling: The patient was counseled regarding the potential risks and benefits of this procedure, as well as possible alternative approaches to the problem and gave informed consent.     The ASA Score was Class II.     [facility] Risk Score for MACE is 2.3%. [facility] Risk Score for Death is 0.2%.     Height: 65 in.      Weight: 180 lbs.      BMI: 29.90 kg/m2     Laboratory data revealed:   08/09/2015  TROP  0.014   08/09/2015  TROP  <0.006   08/09/2015 INR  1.0, PTI  10.3   08/09/2015  TROP  <0.006   08/12/2015 K  3.3, GLU  80, HGB  11.7, PLT  203, NA  143, HCT  36.7, CREAT  0.8, WBCIR  4.16, BUN  13     PROCEDURES PERFORMED: Left Ventriculogram, LHC and Coronary Angio     ANESTHESIA: Conscious sedation was achieved with 50 mcg of FENTANYL 100MCG/2ML and 2 mg of Versed. Local anesthesia was achieved with 20 ml of Lidocaine 1%.     PRIMARY PHYSICIAN: Shon LONGO DETAILS OF PROCEDURE:     Medications given on sterile field: Heparin 1000u/500cc Bag (3 bags) and Lidocaine 1% (20 ml).     Medications given during procedure: Versed (2 mg), Fentanyl 100mcg/2ml (50 mcg) and Labetalol (20 mg).     The patient was brought to the catheterization laboratory after premedication with oral Benadryl 50 mg. Bilateral groin prepped and draped. The Kit, Manifold was flushed and connected to contrast. After local anesthesia, a Sheath 6fr X 11cm was inserted   into the right femoral artery. A Guidewire J .035 X 150 was inserted into the Aorta. A Catheter 6fr Pigtail 145 was inserted into the left ventricle. The Guidewire J .035 X 150 was removed. Hemodynamics recorded in the left ventricle. Angiography   performed in the left ventricle. Hemodynamics recorded in the  left ventricle. The Catheter 6fr Pigtail 145 was removed. A Catheter 6fr Fl 4.0 was inserted into the ostial LM. Angiography performed in multiple views in the left coronary arteries. The   Catheter 6fr Fl 4.0 was removed. A Catheter 6fr Fr 4.0 was inserted into the ostial RCA. Angiography performed in multiple views in the right coronary arteries. The Catheter 6fr Fr 4.0 was removed. The Sheath 6fr X 11cm was removed. A Closure Device 6fr   Angioseal Vip was inserted into the right femoral artery. The Closure Device 6fr Angioseal Vip was removed. Total Contrast Omnipaque 350 150ml injected was 60.0 ml. Total Contrast Omnipaque 350 150ml used was 150.0 ml.     Fluoroscopy Time: 1.5 minutes              Radiation Dose: 440 mGy   Contrast Injected: 60 ml Contrast Omnipaque 350 150ml              Contrast Used: 150 ml Contrast Omnipaque 350 150ml     Procedure log documented by RT Racheal and verified by Shon Omer MD     ESTIMATED BLOOD LOSS is < 50 cc.     SPECIMEN: No specimen.     COMPLICATIONS: There were no complications.     B. HEMODYNAMIC RESULTS:     LVEDP (Pre): 8 mmHg   LVEDP (Post): 10 mmHg   Ejection Fraction: 65%     C. ANGIOGRAPHIC RESULTS:     DIAGNOSTIC:       Patient has a right dominant coronary artery.        The coronary vessels have luminal irregularities.        The peripheral vessels are all normal.        - Left Main Coronary Artery:              The LM is normal. There is JAMES 3 flow.        - Left Anterior Descending Artery:              The LAD has luminal irregularities. There is JAMES 3 flow.        - Left Circumflex Artery:              The LCX has luminal irregularities. There is JAMES 3 flow.        - Right Coronary Artery:              The RCA has luminal irregularities. There is JAMES 3 flow.        - Common Femoral Artery:              The right CFA has luminal irregularities.       D. SUMMARY/POST-OPERATIVE DIAGNOSIS:     1. Non-obstructive CAD.     E. RECOMMENDATIONS:      1. Routine post-cath care.   2. Maximize medical management.   3. Risk factor reductions.   4. ASA 81mg.     I certify that I was present for catheter insertion, catheter manipulation, angiography, and angiographic interpretation of this patient.     This document has been electronically    SIGNED BY: Shon Omer MD On: 08/12/2015 13:40   This result has not been signed. Information might be incomplete.         In summary, Ms. Alvarado is a pleasant 81 year-old woman with hypertension, non-obstructive CAD and CKD stage III and chronic bradycardia presenting for evaluation of SVT and bradycardia. 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 No daytime bradyarrhythmias have been identified. She has nocturnal sinus pauses with concomitant AV block, suspect from undiagnosed sleep apnea. She is agreeable with sleep clinic referral. No true AF observed on her monitoring. Discussed that I do not see a medical necessity for ILR removal/reimplant. Discussed abandoning versus removal. She will think about it.     RTC prn  Referral to sleep clinic     Assessment:     1. Dyspnea on exertion    2. History of radiofrequency ablation (RFA) procedure for cardiac arrhythmia    3. Hyperlipidemia, unspecified hyperlipidemia type    4. Orthostatic hypotension    5. Essential hypertension    6. Thoracic aortic atherosclerosis      Plan:   Diagnoses and all orders for this visit:    Dyspnea on exertion    History of radiofrequency ablation (RFA) procedure for cardiac arrhythmia    Hyperlipidemia, unspecified hyperlipidemia type    Orthostatic hypotension    Essential hypertension    Thoracic aortic atherosclerosis    Other orders  -     amLODIPine (NORVASC) 5 MG tablet; Take 1 tablet (5 mg total) by mouth once daily.     The shortness of breath with exertion is likely due to deconditioning.  Pt encouraged to walk more    The weak spells while standing are likely  due to orthostatic hypotension.    Will therefore decrease the amlodipine top 5 mg daily    F/u with Dr Goff    Follow up in about 2 months (around 6/23/2024).

## 2024-04-25 LAB
OHS QRS DURATION: 84 MS
OHS QTC CALCULATION: 447 MS

## 2024-06-21 NOTE — PROGRESS NOTES
"   Cardiology Clinic note    Subjective:   Patient ID:  Crystal Alvarado is a 83 y.o. female who presents for follow-up of CAD, CHF, HTN, HLD    HPI:  Pt c/o shortness of breath walking short distances.     "I get weak if I stand too long"-- pt then has to sit down.     No longer having chest pains.     Pt takes Norco for low back pain    6/24/2024: 82 yo F with hx of CAD, CHF, SVT, bradycardia, syncope, HTN, HLD. Previously seen by Dr. Kline with RITTER, pre-syncope. Echo, nuc stress reviewed with patient as below. Followed by EP, Had ILR implanted but seemingly unremarkable. Patient seen in clinic with daughter reports overall not doing very well. Ongoing fatigue, RITTER, and CP. CP reported as discomfort, midsternal, reportedly occurring with exertion. CP associated with RITTER. Reports ongoing, chronic fatigue. Patient denies orthopnea, PND, syncope, palpitations, LE edema. Reports compliance and tolerance with all medications.    Cardiac hx  -----------------------------------------    Echo 10/17/2023    Left Ventricle: The left ventricle is normal in size. Mildly increased wall thickness. There is mild concentric hypertrophy. Normal wall motion. There is normal systolic function with a visually estimated ejection fraction of 60 - 65%. Grade I diastolic dysfunction.    Right Ventricle: Normal right ventricular cavity size. Wall thickness is normal. Right ventricle wall motion  is normal. Systolic function is normal.    Aortic Valve: The aortic valve is a trileaflet valve. There is mild aortic valve sclerosis. There is moderate annular calcification present. Mildly restricted motion.    Tricuspid Valve: There is mild regurgitation.    IVC/SVC: Normal venous pressure at 3 mmHg.      Nuc Stress/ MPI 10/17/2023    The ECG portion of the study is negative for ischemia.    The patient reported no chest pain during the stress test.    There were no arrhythmias during stress.    The nuclear portion of this study " will be reported separately.  -There is no fixed or reversible perfusion defect. The stress and rest end-diastolic volumes measure 43 and 55 mL actively. The stress and rest end systolic volumes measure 9 and 14 mL respectively. The stress and rest ejection fraction measures 78 and 74% respectively.     [facility] 08/2015  C. ANGIOGRAPHIC RESULTS:     DIAGNOSTIC:       Patient has a right dominant coronary artery.        The coronary vessels have luminal irregularities.        The peripheral vessels are all normal.        - Left Main Coronary Artery:              The LM is normal. There is JAMES 3 flow.        - Left Anterior Descending Artery:              The LAD has luminal irregularities. There is JAMES 3 flow.        - Left Circumflex Artery:              The LCX has luminal irregularities. There is JAMES 3 flow.        - Right Coronary Artery:              The RCA has luminal irregularities. There is JAMES 3 flow.        - Common Femoral Artery:              The right CFA has luminal irregularities.       D. SUMMARY/POST-OPERATIVE DIAGNOSIS:   1. Non-obstructive CAD.     E. RECOMMENDATIONS:   1. Routine post-cath care.   2. Maximize medical management.   3. Risk factor reductions.   4. ASA 81mg.     Past Medical History:   Diagnosis Date    Arthritis     CHF (congestive heart failure)     Coronary artery disease     Hyperlipidemia     Hypertension           Patient Active Problem List    Diagnosis Date Noted    Thoracic aortic atherosclerosis 04/23/2024    ORLIN (obstructive sleep apnea)     Total, mature age-related cataract 11/23/2020    Iron deficiency anemia due to chronic blood loss 10/15/2020    Vitamin D deficiency 09/02/2020    Acute blood loss anemia 08/04/2020    Left anterior knee pain 08/03/2020    Aortic stenosis 11/04/2019    Hypomagnesemia 08/12/2019    Appendicitis with perforation 08/09/2019    Wrist fracture 07/31/2019    Closed fracture of right wrist 05/27/2019    History of  radiofrequency ablation (RFA) procedure for cardiac arrhythmia 08/14/2018    Status post placement of implantable loop recorder 08/14/2018    Closed fracture of acromial end of clavicle 07/06/2018    Pre-syncope 07/05/2018    Concussion with no loss of consciousness 07/05/2018    Orthostatic hypotension 04/27/2018    Postural dizziness with presyncope     Hypokalemia     Physical deconditioning     Dyspnea     Normocytic anemia 11/19/2017    Coronary artery disease involving native coronary artery of native heart without angina pectoris 02/16/2016    Hyperlipidemia     Essential hypertension 08/11/2015    Dizziness 08/10/2015    Light headedness 08/10/2015    Syncope 08/09/2015    Tobacco abuse 08/09/2015    Chest pain of uncertain etiology 08/09/2015       Patient's Medications   New Prescriptions    ATORVASTATIN (LIPITOR) 40 MG TABLET    Take 1 tablet (40 mg total) by mouth once daily.   Previous Medications    ACETAMINOPHEN (TYLENOL) 325 MG TABLET    Take 2 tablets (650 mg total) by mouth every 6 (six) hours as needed for Pain.    HYDROCODONE-ACETAMINOPHEN (NORCO) 5-325 MG PER TABLET    Take 1 tablet by mouth 2 (two) times daily as needed.    LEVOCETIRIZINE (XYZAL) 5 MG TABLET    TAKE 1 TABLET BY MOUTH DAILY IN THE EVENING FOR ALLERGIES    LOSARTAN (COZAAR) 100 MG TABLET    TAKE 1 TABLET BY MOUTH EVERY DAY    MONTELUKAST (SINGULAIR) 10 MG TABLET    Take 10 mg by mouth.    NITROGLYCERIN (NITROSTAT) 0.4 MG SL TABLET    Place 1 tablet (0.4 mg total) under the tongue every 5 (five) minutes as needed for Chest pain (and notify MD).   Modified Medications    Modified Medication Previous Medication    AMLODIPINE (NORVASC) 2.5 MG TABLET amLODIPine (NORVASC) 5 MG tablet       Take 1 tablet (2.5 mg total) by mouth once daily.    Take 1 tablet (5 mg total) by mouth once daily.   Discontinued Medications    ASPIRIN 81 MG CHEW    Take 1 tablet (81 mg total) by mouth once daily.    ATORVASTATIN (LIPITOR) 20  MG TABLET    Take 1 tablet (20 mg total) by mouth once daily.    ATORVASTATIN (LIPITOR) 20 MG TABLET    Take 20 mg by mouth every evening.        Review of Systems   Constitutional: Positive for malaise/fatigue. Negative for chills, decreased appetite, diaphoresis, weight gain and weight loss.   Cardiovascular:  Positive for chest pain and dyspnea on exertion. Negative for claudication, irregular heartbeat, leg swelling, near-syncope, orthopnea, palpitations, paroxysmal nocturnal dyspnea and syncope.   Respiratory:  Negative for cough, hemoptysis, shortness of breath and snoring.    Gastrointestinal:  Negative for bloating, abdominal pain, nausea and vomiting.   Neurological:  Negative for light-headedness and weakness.     No family history on file.    Social History     Socioeconomic History    Marital status: Single   Tobacco Use    Smoking status: Former     Current packs/day: 0.00     Types: Cigarettes     Quit date: 2013     Years since quittin.1    Smokeless tobacco: Never   Substance and Sexual Activity    Alcohol use: No    Drug use: No    Sexual activity: Never     Social Determinants of Health     Financial Resource Strain: Low Risk  (2024)    Overall Financial Resource Strain (CARDIA)     Difficulty of Paying Living Expenses: Not hard at all   Food Insecurity: No Food Insecurity (2024)    Hunger Vital Sign     Worried About Running Out of Food in the Last Year: Never true     Ran Out of Food in the Last Year: Never true   Physical Activity: Unknown (2024)    Exercise Vital Sign     Days of Exercise per Week: Patient declined   Stress: No Stress Concern Present (2024)    [employer] - Occupational Stress Questionnaire     Feeling of Stress : Not at all   Housing Stability: Unknown (2024)    Housing Stability Vital Sign     Unable to Pay for Housing in the Last Year: No            Objective:   Vitals  Vitals:    24 1306  "06/24/24 1318   BP: 94/61 99/63   Pulse: 67    SpO2: 98%    Weight: 81 kg (178 lb 9.2 oz)    Height: 5' 5" (1.651 m)           Physical Exam  Vitals and nursing note reviewed.   Constitutional:       Appearance: Normal appearance.   Cardiovascular:      Rate and Rhythm: Normal rate and regular rhythm.      Heart sounds: No murmur heard.     No gallop.   Pulmonary:      Effort: Pulmonary effort is normal.      Breath sounds: Normal breath sounds.   Abdominal:      General: Bowel sounds are normal. There is no distension.      Palpations: Abdomen is soft.      Tenderness: There is no abdominal tenderness.   Skin:     General: Skin is warm and dry.   Neurological:      Mental Status: She is alert and oriented to person, place, and time.       Lab Results    Lab Results   Component Value Date    WBC 4.27 10/19/2023    HGB 11.5 (L) 10/19/2023    HCT 36.0 (L) 10/19/2023    MCV 96 10/19/2023       Lab Results   Component Value Date     10/19/2023    INR 0.9 08/03/2020       Lab Results   Component Value Date    K 3.9 10/19/2023    MG 1.9 12/27/2022    BUN 31 (H) 10/19/2023    CREATININE 1.65 (H) 10/19/2023       Lab Results   Component Value Date    GLU 94 10/19/2023    HGBA1C 5.2 11/16/2022       Lab Results   Component Value Date    AST 21 10/19/2023    ALT 11 10/19/2023    ALBUMIN 3.5 10/19/2023    PROT 6.7 10/19/2023       Lab Results   Component Value Date    CHOL 169 10/19/2023    HDL 57 10/19/2023    LDLCALC 100.2 10/19/2023    TRIG 59 10/19/2023       Lab Results   Component Value Date    CRP >300.0 (H) 06/11/2010    BNP 51 10/19/2023         Assessment:     1. Essential hypertension    2. Pure hypercholesterolemia    3. Coronary artery disease involving native coronary artery of native heart without angina pectoris    4. Orthostatic hypotension    5. History of radiofrequency ablation (RFA) procedure for cardiac arrhythmia    6. Status post placement of implantable loop recorder    7. Aortic valve stenosis, " etiology of cardiac valve disease unspecified    8. Dyspnea, unspecified type    9. Tobacco abuse    10. Syncope, unspecified syncope type    11. Dizziness    12. Postural dizziness with presyncope    13. ORLIN (obstructive sleep apnea)    14. Other chest pain        Plan:     CAD (non obs per [facility] 2015)  -not on asa given hx of GIB/blood loss anemia - would like to restart given known CAD, will wait for repeat CBC  -not on BB given hx of baseline bradycardia/ pauses  -given concerning, exertional CP with associated RITTER and known CAD, will order Nuc Stress to r/o ischemic etiology    2. HTN  -ongoing fatigue and low BP - will further deescalate norvasc dose  -continue losartan - if kidney function worsens can consider deescalation of ARB    3. HLD  -goal LDLc < 70, not at goal  -will escalate statin    4. 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 She underwent slow pathway modification and then underwent ILR implantation for rhythm monitoring for her syncope/falls.   -ILR seemingly unremarkable, abandoned   -continue F/U with EP    5. ORLIN  -compliant with CPAP, continue     6. CKD  -F/U with nephro as scheduled  -if kidney function worsens can consider de-escalation of ARB and dose increase of CCB  -avoid NSAIDs      -2 week virtual visit  -F/U 3m  -labs 3-6m    The ASCVD Risk score (Alex DK, et al., 2019) failed to calculate for the following reasons:    The 2019 ASCVD risk score is only valid for ages 40 to 79    Orders Placed This Encounter   Procedures    NM Myocardial Perfusion Spect Multi Pharmacologic     Standing Status:   Future     Standing Expiration Date:   6/24/2025     Order Specific Question:   May the Radiologist modify the order per protocol to meet the clinical needs of the patient?     Answer:   Yes     Order Specific Question:   Stress Medication to use:     Answer:   Regadenoson     Order Specific Question:   Diabetes?     Answer:   No    Comprehensive  Metabolic Panel     Standing Status:   Future     Standing Expiration Date:   9/22/2025    Lipid Panel     Standing Status:   Future     Standing Expiration Date:   9/22/2025    Nuclear Stress Test     Standing Status:   Future     Standing Expiration Date:   6/24/2025     Order Specific Question:   Which stress agent will be used?     Answer:   Pharm     Order Specific Question:   Which medicaton for the stress procedure?     Answer:   Regadenoson     Order Specific Question:   Release to patient     Answer:   Immediate       She expressed verbal understanding and agreed with the plan    Follow up in     Pertinent cardiac images and EKG reviewed independently.    Continue with current medical plan and lifestyle changes.  Return sooner for concerns or questions. If symptoms persist go to the ED.  I have reviewed all pertinent data including patient's medical history in detail and updated the computerized patient record.     Counseling included discussion regarding imaging findings, diagnosis, possibilities, treatment options, risks and benefits.    Thank you for the opportunity to care for this patient. Will be available for questions if needed.        Signed:  Rashaad Fuentes DNP  06/24/2024

## 2024-06-24 ENCOUNTER — OFFICE VISIT (OUTPATIENT)
Dept: CARDIOLOGY | Facility: CLINIC | Age: 84
End: 2024-06-24
Payer: MEDICARE

## 2024-06-24 VITALS
BODY MASS INDEX: 29.75 KG/M2 | HEIGHT: 65 IN | HEART RATE: 67 BPM | SYSTOLIC BLOOD PRESSURE: 99 MMHG | WEIGHT: 178.56 LBS | DIASTOLIC BLOOD PRESSURE: 63 MMHG | OXYGEN SATURATION: 98 %

## 2024-06-24 DIAGNOSIS — R55 POSTURAL DIZZINESS WITH PRESYNCOPE: ICD-10-CM

## 2024-06-24 DIAGNOSIS — R06.00 DYSPNEA, UNSPECIFIED TYPE: ICD-10-CM

## 2024-06-24 DIAGNOSIS — R07.89 OTHER CHEST PAIN: ICD-10-CM

## 2024-06-24 DIAGNOSIS — R42 DIZZINESS: ICD-10-CM

## 2024-06-24 DIAGNOSIS — R55 SYNCOPE, UNSPECIFIED SYNCOPE TYPE: ICD-10-CM

## 2024-06-24 DIAGNOSIS — G47.33 OSA (OBSTRUCTIVE SLEEP APNEA): ICD-10-CM

## 2024-06-24 DIAGNOSIS — Z95.818 STATUS POST PLACEMENT OF IMPLANTABLE LOOP RECORDER: ICD-10-CM

## 2024-06-24 DIAGNOSIS — Z72.0 TOBACCO ABUSE: Chronic | ICD-10-CM

## 2024-06-24 DIAGNOSIS — I25.10 CORONARY ARTERY DISEASE INVOLVING NATIVE CORONARY ARTERY OF NATIVE HEART WITHOUT ANGINA PECTORIS: ICD-10-CM

## 2024-06-24 DIAGNOSIS — R42 POSTURAL DIZZINESS WITH PRESYNCOPE: ICD-10-CM

## 2024-06-24 DIAGNOSIS — I35.0 AORTIC VALVE STENOSIS, ETIOLOGY OF CARDIAC VALVE DISEASE UNSPECIFIED: ICD-10-CM

## 2024-06-24 DIAGNOSIS — I10 ESSENTIAL HYPERTENSION: Primary | ICD-10-CM

## 2024-06-24 DIAGNOSIS — E78.00 PURE HYPERCHOLESTEROLEMIA: ICD-10-CM

## 2024-06-24 DIAGNOSIS — I95.1 ORTHOSTATIC HYPOTENSION: ICD-10-CM

## 2024-06-24 DIAGNOSIS — Z98.890 HISTORY OF RADIOFREQUENCY ABLATION (RFA) PROCEDURE FOR CARDIAC ARRHYTHMIA: ICD-10-CM

## 2024-06-24 PROCEDURE — 99999 PR PBB SHADOW E&M-EST. PATIENT-LVL III: CPT | Mod: PBBFAC,,,

## 2024-06-24 PROCEDURE — 93000 ELECTROCARDIOGRAM COMPLETE: CPT | Mod: S$GLB,,, | Performed by: INTERNAL MEDICINE

## 2024-06-24 RX ORDER — ATORVASTATIN CALCIUM 40 MG/1
40 TABLET, FILM COATED ORAL DAILY
Qty: 90 TABLET | Refills: 3 | Status: SHIPPED | OUTPATIENT
Start: 2024-06-24 | End: 2025-06-24

## 2024-06-24 RX ORDER — AMLODIPINE BESYLATE 2.5 MG/1
2.5 TABLET ORAL DAILY
Qty: 90 TABLET | Refills: 3 | Status: SHIPPED | OUTPATIENT
Start: 2024-06-24 | End: 2025-06-24

## 2024-06-24 RX ORDER — ASPIRIN 81 MG/1
81 TABLET ORAL DAILY
Qty: 90 TABLET | Refills: 3 | Status: SHIPPED | OUTPATIENT
Start: 2024-06-24 | End: 2024-06-24

## 2024-06-27 LAB
OHS QRS DURATION: 70 MS
OHS QTC CALCULATION: 454 MS

## 2024-07-08 ENCOUNTER — OFFICE VISIT (OUTPATIENT)
Dept: CARDIOLOGY | Facility: CLINIC | Age: 84
End: 2024-07-08
Payer: MEDICARE

## 2024-07-08 DIAGNOSIS — I10 ESSENTIAL HYPERTENSION: ICD-10-CM

## 2024-07-08 DIAGNOSIS — R07.9 CHEST PAIN OF UNCERTAIN ETIOLOGY: ICD-10-CM

## 2024-07-08 DIAGNOSIS — E78.00 PURE HYPERCHOLESTEROLEMIA: ICD-10-CM

## 2024-07-08 DIAGNOSIS — I25.10 CORONARY ARTERY DISEASE INVOLVING NATIVE CORONARY ARTERY OF NATIVE HEART WITHOUT ANGINA PECTORIS: Primary | ICD-10-CM

## 2024-07-08 PROCEDURE — 99213 OFFICE O/P EST LOW 20 MIN: CPT | Mod: 95,,,

## 2024-07-08 RX ORDER — AMLODIPINE BESYLATE 2.5 MG/1
5 TABLET ORAL DAILY
Qty: 180 TABLET | Refills: 3 | Status: SHIPPED | OUTPATIENT
Start: 2024-07-08 | End: 2025-07-08

## 2024-07-08 RX ORDER — ASPIRIN 81 MG/1
81 TABLET ORAL DAILY
Qty: 90 TABLET | Refills: 3 | Status: SHIPPED | OUTPATIENT
Start: 2024-07-08 | End: 2025-07-08

## 2024-07-08 NOTE — PROGRESS NOTES
Subjective:    Patient ID:  Crystal Alvarado is a 83 y.o. female who presents for evaluation of No chief complaint on file.      TELEMEDICINE VISIT    Problem List Items Addressed This Visit          Cardiac/Vascular    Chest pain of uncertain etiology    Essential hypertension    Relevant Medications    amLODIPine (NORVASC) 2.5 MG tablet    Hyperlipidemia    Coronary artery disease involving native coronary artery of native heart without angina pectoris - Primary       7/8/2024  Patient presents for telemedicine visit for F/U HTN after de-escalation of regimen due to fatigue and low BPs. Spoke with both patient and daughter. Feeling better but now with elevated BPs, 160s/90s.Patient denies SOB, orthopnea, PND, syncope, palpitations, LE edema. CP, RITTER as below, stress testing pending. Reports compliance and tolerance with all medications.      6/24/2024: 82 yo F with hx of CAD, CHF, SVT, bradycardia, syncope, HTN, HLD. Previously seen by Dr. Kline with RITTER, pre-syncope. Echo, nuc stress reviewed with patient as below. Followed by EP, Had ILR implanted but seemingly unremarkable. Patient seen in clinic with daughter reports overall not doing very well. Ongoing fatigue, RITTER, and CP. CP reported as discomfort, midsternal, reportedly occurring with exertion. CP associated with RITTER. Reports ongoing, chronic fatigue. Patient denies orthopnea, PND, syncope, palpitations, LE edema. Reports compliance and tolerance with all medications.     Cardiac hx  -----------------------------------------     Echo 10/17/2023  ·  Left Ventricle: The left ventricle is normal in size. Mildly increased wall thickness. There is mild concentric hypertrophy. Normal wall motion. There is normal systolic function with a visually estimated ejection fraction of 60 - 65%. Grade I diastolic dysfunction.  ·  Right Ventricle: Normal right ventricular cavity size. Wall thickness is normal. Right ventricle wall motion  is normal. Systolic function  is normal.  ·  Aortic Valve: The aortic valve is a trileaflet valve. There is mild aortic valve sclerosis. There is moderate annular calcification present. Mildly restricted motion.  ·  Tricuspid Valve: There is mild regurgitation.  ·  IVC/SVC: Normal venous pressure at 3 mmHg.        Nuc Stress/ MPI 10/17/2023  ·  The ECG portion of the study is negative for ischemia.  ·  The patient reported no chest pain during the stress test.  ·  There were no arrhythmias during stress.  ·  The nuclear portion of this study will be reported separately.  -There is no fixed or reversible perfusion defect. The stress and rest end-diastolic volumes measure 43 and 55 mL actively. The stress and rest end systolic volumes measure 9 and 14 mL respectively. The stress and rest ejection fraction measures 78 and 74% respectively.      [facility] 08/2015  C. ANGIOGRAPHIC RESULTS:     DIAGNOSTIC:       Patient has a right dominant coronary artery.        The coronary vessels have luminal irregularities.        The peripheral vessels are all normal.        - Left Main Coronary Artery:              The LM is normal. There is JAMES 3 flow.        - Left Anterior Descending Artery:              The LAD has luminal irregularities. There is JAMES 3 flow.        - Left Circumflex Artery:              The LCX has luminal irregularities. There is JAMES 3 flow.        - Right Coronary Artery:              The RCA has luminal irregularities. There is JAMES 3 flow.        - Common Femoral Artery:              The right CFA has luminal irregularities.       D. SUMMARY/POST-OPERATIVE DIAGNOSIS:   1. Non-obstructive CAD.     E. RECOMMENDATIONS:   1. Routine post-cath care.   2. Maximize medical management.   3. Risk factor reductions.   4. ASA 81mg.               Past Medical History:   Diagnosis Date    Arthritis     CHF (congestive heart failure)     Coronary artery disease     Hyperlipidemia     Hypertension        Past Surgical History:   Procedure Laterality  Date    A-V CARDIAC PACEMAKER INSERTION N/A 2018    Procedure: INSERTION, CARDIAC PACEMAKER, DUAL CHAMBER;  Surgeon: Archie Montez MD;  Location: [facility] CATH LAB;  Service: Cardiology;  Laterality: N/A;  SVT, RFA, +/- Dual PPM or ILR, IGLESIA, MDT, MAC, IN, 3 Prep    CATARACT EXTRACTION W/  INTRAOCULAR LENS IMPLANT Left 2020    Procedure: EXTRACTION, CATARACT, WITH IOL INSERTION;  Surgeon: Roslyn Napier MD;  Location: [facility] OR;  Service: Ophthalmology;  Laterality: Left;    cataract removal Right     [facility]    COLONOSCOPY      COLONOSCOPY N/A 2020    Procedure: COLONOSCOPY;  Surgeon: Sebas Dickens MD;  Location: [facility] ENDO (2ND FLR);  Service: Endoscopy;  Laterality: N/A;    ESOPHAGOGASTRODUODENOSCOPY N/A 2020    Procedure: EGD (ESOPHAGOGASTRODUODENOSCOPY);  Surgeon: Fernie Cohen MD;  Location: [facility] ENDO (2ND FLR);  Service: Endoscopy;  Laterality: N/A;    HYSTERECTOMY N/A     INSERTION OF IMPLANTABLE LOOP RECORDER N/A 2018    Procedure: PLACEMENT-LOOP RECORDER;  Surgeon: Archie Montez MD;  Location: [facility] CATH LAB;  Service: Cardiology;  Laterality: N/A;  SVT, RFA, +/- Dual PPM or ILR, IGLESIA, ALONZO, MAC, IN, 3 Prep       No family history on file.    Social History     Socioeconomic History    Marital status: Single   Tobacco Use    Smoking status: Former     Current packs/day: 0.00     Types: Cigarettes     Quit date: 2013     Years since quittin.2    Smokeless tobacco: Never   Substance and Sexual Activity    Alcohol use: No    Drug use: No    Sexual activity: Never     Social Determinants of Health     Financial Resource Strain: Low Risk  (2024)    Overall Financial Resource Strain (CARDIA)     Difficulty of Paying Living Expenses: Not hard at all   Food Insecurity: No Food Insecurity (2024)    Hunger Vital Sign     Worried About Running Out of Food in the Last Year: Never true     Ran Out of Food in the Last Year: Never true   Physical Activity: Unknown  (6/24/2024)    Exercise Vital Sign     Days of Exercise per Week: Patient declined   Stress: No Stress Concern Present (6/24/2024)    [employer] - Occupational Stress Questionnaire     Feeling of Stress : Not at all   Housing Stability: Unknown (6/24/2024)    Housing Stability Vital Sign     Unable to Pay for Housing in the Last Year: No       Review of patient's allergies indicates:  No Known Allergies    Review of Systems   Constitutional: Negative for chills, decreased appetite, diaphoresis, malaise/fatigue, weight gain and weight loss.   Cardiovascular:  Positive for chest pain and dyspnea on exertion. Negative for claudication, irregular heartbeat, leg swelling, near-syncope, orthopnea, palpitations, paroxysmal nocturnal dyspnea and syncope.   Respiratory:  Negative for cough, hemoptysis, shortness of breath and snoring.    Gastrointestinal:  Negative for bloating, abdominal pain, nausea and vomiting.   Neurological:  Negative for light-headedness and weakness.        Objective:   There were no vitals filed for this visit.    BP Readings from Last 5 Encounters:   06/24/24 99/63   04/23/24 113/71   10/17/23 110/70   09/23/23 (!) 170/74   04/11/23 132/74        Physical Exam  Constitutional:       Appearance: Normal appearance.   Neurological:      Mental Status: She is alert and oriented to person, place, and time. Mental status is at baseline.           Current Outpatient Medications   Medication Instructions    acetaminophen (TYLENOL) 650 mg, Oral, Every 6 hours PRN    amLODIPine (NORVASC) 5 mg, Oral, Daily    aspirin (ECOTRIN) 81 mg, Oral, Daily    atorvastatin (LIPITOR) 40 mg, Oral, Daily    HYDROcodone-acetaminophen (NORCO) 5-325 mg per tablet 1 tablet, Oral, 2 times daily PRN    levocetirizine (XYZAL) 5 MG tablet TAKE 1 TABLET BY MOUTH DAILY IN THE EVENING FOR ALLERGIES    losartan (COZAAR) 100 MG tablet TAKE 1 TABLET BY MOUTH EVERY DAY    montelukast (SINGULAIR) 10 mg, Oral     nitroGLYCERIN (NITROSTAT) 0.4 mg, Sublingual, Every 5 min PRN       Lipid Panel:   Lab Results   Component Value Date    CHOL 169 10/19/2023    HDL 57 10/19/2023    LDLCALC 100.2 10/19/2023    TRIG 59 10/19/2023    CHOLHDL 33.7 10/19/2023         The ASCVD Risk score (Alex DK, et al., 2019) failed to calculate for the following reasons:    The 2019 ASCVD risk score is only valid for ages 40 to 79    Most Recent EKG Results  Results for orders placed or performed in visit on 06/24/24   IN OFFICE EKG 12-LEAD (to [place])    Collection Time: 06/24/24  1:06 PM   Result Value Ref Range    QRS Duration 70 ms    [facility] QTC Calculation 454 ms    Narrative    Test Reason : I10,E78.00,I25.10,I95.1,Z98.890,Z95.818,I35.0,R06.00,Z72.0,R55,~    Vent. Rate : 069 BPM     Atrial Rate : 069 BPM     P-R Int : 148 ms          QRS Dur : 070 ms      QT Int : 424 ms       P-R-T Axes : 053 -02 064 degrees     QTc Int : 454 ms    Sinus rhythm with Premature atrial complexes  Minimal voltage criteria for LVH, may be normal variant  Borderline Abnormal ECG  When compared with ECG of 23-APR-2024 14:18,  No significant change was found  Confirmed by Jamel Dumont MD ([id number]) on 6/27/2024 5:16:15 PM    Referred By:             Confirmed By:Jamel Dumont MD       Most Recent Echocardiogram Results  Results for orders placed during the hospital encounter of 10/25/23    Echo    Interpretation Summary    Left Ventricle: The left ventricle is normal in size. Mildly increased wall thickness. There is mild concentric hypertrophy. Normal wall motion. There is normal systolic function with a visually estimated ejection fraction of 60 - 65%. Grade I diastolic dysfunction.    Right Ventricle: Normal right ventricular cavity size. Wall thickness is normal. Right ventricle wall motion  is normal. Systolic function is normal.    Aortic Valve: The aortic valve is a trileaflet valve. There is mild aortic valve sclerosis. There is moderate annular  "calcification present. Mildly restricted motion.    Tricuspid Valve: There is mild regurgitation.    IVC/SVC: Normal venous pressure at 3 mmHg.      Most Recent Nuclear Stress Test Results  Results for orders placed during the hospital encounter of 10/25/23    Nuclear Stress Test    Interpretation Summary    The ECG portion of the study is negative for ischemia.    The patient reported no chest pain during the stress test.    There were no arrhythmias during stress.    The nuclear portion of this study will be reported separately.      Most Recent Cardiac PET Stress Test Results  No results found for this or any previous visit.      Most Recent Cardiovascular Angiogram results  No results found for this or any previous visit.      Other Most Recent Cardiology Results  Results for orders placed in visit on 05/11/22    Cardiac device check - Remote    Narrative  Battery and Leads (BL)  Battery at Elective Replacement Interval    Presenting Rhythm (NH)  Premature Atrial Contraction(s) on presenting rhythm  Sinus bradycardia    Arrhythmic events (AE)  Premature Atrial Contraction(s)  False Pause event(s) recorded due to undersensing    Heart Failure (HF)  No significant change in activity level is noted on trending    Miscellaneous Observations (MISC)  Manual transmission required for missing electrograms  Communicator disconnected, reconnection required    Transmission Information (TI)  Implant indication: Syncope  Device Summary Report    Follow Up (FU)  Continue remote monitoring monthly    Additional Comments  Prepared by RETA Coyle  Provider Comments  Patient seen in clinic. In-clinic interrogation and review of "AF" event is consistent with periods of T-wave oversensing. No AF observed.        All pertinent data including labs, imaging, EKGs, and studies listed above were reviewed.  Patient's most recent EKG tracing was personally interpreted by this provider.    Assessment:       1. Coronary artery disease " involving native coronary artery of native heart without angina pectoris    2. Pure hypercholesterolemia    3. Essential hypertension    4. Chest pain of uncertain etiology         Plan for treatment of the above diagnoses:     CAD  -resume asa 81 daily  -statin dose escalated at last visit  -Nuc Stress Pending    2. HTN  -feeling better but now with HTN, 160s/90s  -will increase norvasc to 5mg daily  -if unable to tolerate can trial alternative antihypertensive    3. HLD  -goal LDLc < 70   -update lipid panel, CMP prior to F/U as ordered      BP/Pulse unable to be assessed on telemedicine visit, based on home logs  Most recent echocardiogram reviewed as above    Continue other cardiac medications  Mediterranean Diet/Cardiovascular Exercise Program      F/u in 2-3m as scheduled    The patient location is: [place]   The chief complaint leading to consultation is:  Please see problem list above  Visit type: Virtual visit with synchronous audio and video  Total time spent with patient: 15 minutes   Each patient to whom he or she provides medical services by telemedicine is:  (1) informed of the relationship between the physician and patient and the respective role of any other health care provider with respect to management of the patient; and (2) notified that he or she may decline to receive medical services by telemedicine and may withdraw from such care at any time.          Signed:  Rashaad Fuentes DNP  7/8/2024 8:00 AM

## 2024-08-09 ENCOUNTER — HOSPITAL ENCOUNTER (OUTPATIENT)
Dept: CARDIOLOGY | Facility: HOSPITAL | Age: 84
Discharge: HOME | End: 2024-08-09
Payer: MEDICARE

## 2024-08-09 ENCOUNTER — HOSPITAL ENCOUNTER (OUTPATIENT)
Dept: RADIOLOGY | Facility: HOSPITAL | Age: 84
Discharge: HOME | End: 2024-08-09
Payer: MEDICARE

## 2024-08-09 DIAGNOSIS — R07.89 OTHER CHEST PAIN: ICD-10-CM

## 2024-08-09 DIAGNOSIS — I25.10 CORONARY ARTERY DISEASE INVOLVING NATIVE CORONARY ARTERY OF NATIVE HEART WITHOUT ANGINA PECTORIS: ICD-10-CM

## 2024-08-09 DIAGNOSIS — R06.00 DYSPNEA, UNSPECIFIED TYPE: ICD-10-CM

## 2024-08-09 LAB
CV STRESS BASE HR: 64 BPM
DIASTOLIC BLOOD PRESSURE: 83 MMHG
OHS CV CPX 85 PERCENT MAX PREDICTED HEART RATE MALE: 115
OHS CV CPX MAX PREDICTED HEART RATE: 135
OHS CV CPX PATIENT IS FEMALE: 1
OHS CV CPX PATIENT IS MALE: 0
OHS CV CPX PEAK DIASTOLIC BLOOD PRESSURE: 54 MMHG
OHS CV CPX PEAK HEAR RATE: 90 BPM
OHS CV CPX PEAK RATE PRESSURE PRODUCT: NORMAL
OHS CV CPX PEAK SYSTOLIC BLOOD PRESSURE: 203 MMHG
OHS CV CPX PERCENT MAX PREDICTED HEART RATE ACHIEVED: 69
OHS CV CPX RATE PRESSURE PRODUCT PRESENTING: NORMAL
SYSTOLIC BLOOD PRESSURE: 180 MMHG

## 2024-08-09 PROCEDURE — 63600175 PHARM REV CODE 636 W HCPCS

## 2024-08-09 PROCEDURE — 93018 CV STRESS TEST I&R ONLY: CPT | Mod: ,,, | Performed by: INTERNAL MEDICINE

## 2024-08-09 PROCEDURE — 93016 CV STRESS TEST SUPVJ ONLY: CPT | Mod: ,,, | Performed by: INTERNAL MEDICINE

## 2024-08-09 PROCEDURE — 78452 HT MUSCLE IMAGE SPECT MULT: CPT | Mod: 26,,, | Performed by: STUDENT IN AN ORGANIZED HEALTH CARE EDUCATION/TRAINING PROGRAM

## 2024-08-09 PROCEDURE — 93017 CV STRESS TEST TRACING ONLY: CPT

## 2024-08-09 PROCEDURE — 78452 HT MUSCLE IMAGE SPECT MULT: CPT | Mod: TC

## 2024-08-09 PROCEDURE — A9502 TC99M TETROFOSMIN: HCPCS

## 2024-08-09 RX ORDER — REGADENOSON 0.08 MG/ML
0.4 INJECTION, SOLUTION INTRAVENOUS ONCE
Status: COMPLETED | OUTPATIENT
Start: 2024-08-09 | End: 2024-08-09

## 2024-08-09 RX ADMIN — REGADENOSON 0.4 MG: 0.08 INJECTION, SOLUTION INTRAVENOUS at 10:08

## 2024-08-09 RX ADMIN — TETROFOSMIN 30 MILLICURIE: 1.38 INJECTION, POWDER, LYOPHILIZED, FOR SOLUTION INTRAVENOUS at 10:08

## 2024-08-09 RX ADMIN — TETROFOSMIN 10 MILLICURIE: 1.38 INJECTION, POWDER, LYOPHILIZED, FOR SOLUTION INTRAVENOUS at 09:08

## 2024-10-02 NOTE — PROGRESS NOTES
"   Cardiology Clinic note    Subjective:   Patient ID:  Crystal Alvarado is a 84 y.o. female who presents for follow-up of CAD, CHF, HTN, HLD    HPI:     10/2024: Here for F/U. Seen in clinic with daughter reports overall doing okay. Nuc stress obtained at last visit and unremarkable. Reports since about Tuesday of this week has developed recurring exertional CP associated with SOB, 6/10 in intensity, relieved by rest. Sometimes associated with SOB. Has not required SL NTG, usually very short lived. No other CV s/s, denies resting CP, SOB, orthopnea, PND, syncope, palpitations, LE edema. Reports compliance and tolerance with all medications.    7/8/2024: Virtual visit. Norvasc dose escalated.    6/24/2024: 84 yo F with hx of CAD, CHF, SVT, bradycardia, syncope, HTN, HLD. Previously seen by Dr. Kline with RITTER, pre-syncope. Echo, nuc stress reviewed with patient as below. Followed by EP, Had ILR implanted but seemingly unremarkable. Patient seen in clinic with daughter reports overall not doing very well. Ongoing fatigue, RITTER, and CP. CP reported as discomfort, midsternal, reportedly occurring with exertion. CP associated with RITTER. Reports ongoing, chronic fatigue. Patient denies orthopnea, PND, syncope, palpitations, LE edema. Reports compliance and tolerance with all medications.     MD Kline 04/2024  Pt c/o shortness of breath walking short distances.     "I get weak if I stand too long"-- pt then has to sit down.     No longer having chest pains.     Pt takes Norco for low back pain        Cardiac hx  -----------------------------------------    Nuc Stress 08/2024    The ECG portion of the study is negative for ischemia.    The nuclear portion of this study will be reported separately. The patient was infused intravenously with regadenoson at 0.08 mg/ml for a total duration of 10 seconds. A total regadenoson dose of 0.4 mg was injected.    Baseline ECG: The Baseline ECG reveals sinus rhythm.    The ECG " portion of the study is negative for ischemia.  1. Scintigraphically negative for ischemia or infarct.  2. The global left ventricular systolic function is normal with an LV ejection fraction of 70 % and no evidence of LV dilatation. Wall motion is normal.    Echo 10/17/2023    Left Ventricle: The left ventricle is normal in size. Mildly increased wall thickness. There is mild concentric hypertrophy. Normal wall motion. There is normal systolic function with a visually estimated ejection fraction of 60 - 65%. Grade I diastolic dysfunction.    Right Ventricle: Normal right ventricular cavity size. Wall thickness is normal. Right ventricle wall motion  is normal. Systolic function is normal.    Aortic Valve: The aortic valve is a trileaflet valve. There is mild aortic valve sclerosis. There is moderate annular calcification present. Mildly restricted motion.    Tricuspid Valve: There is mild regurgitation.    IVC/SVC: Normal venous pressure at 3 mmHg.      Nuc Stress/ MPI 10/17/2023    The ECG portion of the study is negative for ischemia.    The patient reported no chest pain during the stress test.    There were no arrhythmias during stress.    The nuclear portion of this study will be reported separately.  -There is no fixed or reversible perfusion defect. The stress and rest end-diastolic volumes measure 43 and 55 mL actively. The stress and rest end systolic volumes measure 9 and 14 mL respectively. The stress and rest ejection fraction measures 78 and 74% respectively.     [facility] 08/2015  C. ANGIOGRAPHIC RESULTS:     DIAGNOSTIC:       Patient has a right dominant coronary artery.        The coronary vessels have luminal irregularities.        The peripheral vessels are all normal.        - Left Main Coronary Artery:              The LM is normal. There is JAMES 3 flow.        - Left Anterior Descending Artery:              The LAD has luminal irregularities. There is JAMES 3 flow.        - Left Circumflex Artery:               The LCX has luminal irregularities. There is JAMES 3 flow.        - Right Coronary Artery:              The RCA has luminal irregularities. There is JAMES 3 flow.        - Common Femoral Artery:              The right CFA has luminal irregularities.       D. SUMMARY/POST-OPERATIVE DIAGNOSIS:   1. Non-obstructive CAD.     E. RECOMMENDATIONS:   1. Routine post-cath care.   2. Maximize medical management.   3. Risk factor reductions.   4. ASA 81mg.     Past Medical History:   Diagnosis Date    Arthritis     CHF (congestive heart failure)     Coronary artery disease     Hyperlipidemia     Hypertension           Patient Active Problem List    Diagnosis Date Noted    Thoracic aortic atherosclerosis 04/23/2024    ORLIN (obstructive sleep apnea)     Total, mature age-related cataract 11/23/2020    Iron deficiency anemia due to chronic blood loss 10/15/2020    Vitamin D deficiency 09/02/2020    Acute blood loss anemia 08/04/2020    Left anterior knee pain 08/03/2020    Aortic stenosis 11/04/2019    Hypomagnesemia 08/12/2019    Appendicitis with perforation 08/09/2019    Wrist fracture 07/31/2019    Closed fracture of right wrist 05/27/2019    History of radiofrequency ablation (RFA) procedure for cardiac arrhythmia 08/14/2018    Status post placement of implantable loop recorder 08/14/2018    Closed fracture of acromial end of clavicle 07/06/2018    Pre-syncope 07/05/2018    Concussion with no loss of consciousness 07/05/2018    Orthostatic hypotension 04/27/2018    Postural dizziness with presyncope     Hypokalemia     Physical deconditioning     Dyspnea     Normocytic anemia 11/19/2017    Coronary artery disease of native artery of native heart with stable angina pectoris 02/16/2016    Hyperlipidemia     Essential hypertension 08/11/2015    Dizziness 08/10/2015    Light headedness 08/10/2015    Syncope 08/09/2015    Tobacco abuse 08/09/2015    Chest pain of uncertain etiology 08/09/2015       Patient's Medications    New Prescriptions    ISOSORBIDE MONONITRATE (IMDUR) 30 MG 24 HR TABLET    Take 1 tablet (30 mg total) by mouth once daily.   Previous Medications    ACETAMINOPHEN (TYLENOL) 325 MG TABLET    Take 2 tablets (650 mg total) by mouth every 6 (six) hours as needed for Pain.    AMLODIPINE (NORVASC) 2.5 MG TABLET    Take 2 tablets (5 mg total) by mouth once daily.    ASPIRIN (ECOTRIN) 81 MG EC TABLET    Take 1 tablet (81 mg total) by mouth once daily.    ATORVASTATIN (LIPITOR) 40 MG TABLET    Take 1 tablet (40 mg total) by mouth once daily.    HYDROCODONE-ACETAMINOPHEN (NORCO) 5-325 MG PER TABLET    Take 1 tablet by mouth 2 (two) times daily as needed.    LEVOCETIRIZINE (XYZAL) 5 MG TABLET        LOSARTAN (COZAAR) 100 MG TABLET    TAKE 1 TABLET BY MOUTH EVERY DAY    MONTELUKAST (SINGULAIR) 10 MG TABLET    Take 10 mg by mouth.    NITROGLYCERIN (NITROSTAT) 0.4 MG SL TABLET    Place 1 tablet (0.4 mg total) under the tongue every 5 (five) minutes as needed for Chest pain (and notify MD).   Modified Medications    No medications on file   Discontinued Medications    No medications on file        Review of Systems   Constitutional: Positive for malaise/fatigue. Negative for chills, decreased appetite, diaphoresis, weight gain and weight loss.   Cardiovascular:  Positive for chest pain and dyspnea on exertion. Negative for claudication, irregular heartbeat, leg swelling, near-syncope, orthopnea, palpitations, paroxysmal nocturnal dyspnea and syncope.   Respiratory:  Negative for cough, hemoptysis, shortness of breath and snoring.    Gastrointestinal:  Negative for bloating, abdominal pain, nausea and vomiting.   Neurological:  Negative for light-headedness and weakness.       No family history on file.    Social History     Socioeconomic History    Marital status: Single   Tobacco Use    Smoking status: Former     Current packs/day: 0.00     Types: Cigarettes     Quit date: 2013     Years since quittin.4    Smokeless  "tobacco: Never   Substance and Sexual Activity    Alcohol use: No    Drug use: No    Sexual activity: Never     Social Drivers of Health     Financial Resource Strain: Low Risk  (6/24/2024)    Overall Financial Resource Strain (CARDIA)     Difficulty of Paying Living Expenses: Not hard at all   Food Insecurity: No Food Insecurity (6/24/2024)    Hunger Vital Sign     Worried About Running Out of Food in the Last Year: Never true     Ran Out of Food in the Last Year: Never true   Physical Activity: Unknown (6/24/2024)    Exercise Vital Sign     Days of Exercise per Week: Patient declined   Stress: No Stress Concern Present (6/24/2024)    [employer] - Occupational Stress Questionnaire     Feeling of Stress : Not at all   Housing Stability: Unknown (6/24/2024)    Housing Stability Vital Sign     Unable to Pay for Housing in the Last Year: No            Objective:   Vitals  Vitals:    10/04/24 1409   BP: 137/79   Pulse: 87   SpO2: 97%   Weight: 77.6 kg (170 lb 15.5 oz)   Height: 5' 5" (1.651 m)            Physical Exam  Vitals and nursing note reviewed.   Constitutional:       Appearance: Normal appearance.   Cardiovascular:      Rate and Rhythm: Normal rate and regular rhythm.      Heart sounds: No murmur heard.     No gallop.   Pulmonary:      Effort: Pulmonary effort is normal.      Breath sounds: Normal breath sounds.   Abdominal:      General: Bowel sounds are normal. There is no distension.      Palpations: Abdomen is soft.      Tenderness: There is no abdominal tenderness.   Skin:     General: Skin is warm and dry.   Neurological:      Mental Status: She is alert and oriented to person, place, and time.         Lab Results    Lab Results   Component Value Date    WBC 4.27 10/19/2023    HGB 11.5 (L) 10/19/2023    HCT 36.0 (L) 10/19/2023    MCV 96 10/19/2023       Lab Results   Component Value Date     10/19/2023    INR 0.9 08/03/2020       Lab Results   Component Value Date    K " 3.9 10/19/2023    MG 1.9 12/27/2022    BUN 31 (H) 10/19/2023    CREATININE 1.65 (H) 10/19/2023       Lab Results   Component Value Date    GLU 94 10/19/2023    HGBA1C 5.2 11/16/2022       Lab Results   Component Value Date    AST 21 10/19/2023    ALT 11 10/19/2023    ALBUMIN 3.5 10/19/2023    PROT 6.7 10/19/2023       Lab Results   Component Value Date    CHOL 169 10/19/2023    HDL 57 10/19/2023    LDLCALC 100.2 10/19/2023    TRIG 59 10/19/2023       Lab Results   Component Value Date    CRP >300.0 (H) 06/11/2010    BNP 51 10/19/2023         Assessment:     1. Coronary artery disease of native artery of native heart with stable angina pectoris    2. Aortic valve stenosis, etiology of cardiac valve disease unspecified    3. Essential hypertension    4. History of radiofrequency ablation (RFA) procedure for cardiac arrhythmia    5. Pure hypercholesterolemia    6. Status post placement of implantable loop recorder    7. Thoracic aortic atherosclerosis          Plan:     CAD (non obs per [facility] 2015)  -on good GDMT, continue asa, statin  -Nuc stress -ve 08/2024  -concerned for ongoing stable angina - will add Imdur and F/U close - if unrelieved will rec [facility] for eval  -not on BB given hx of baseline bradycardia/ pauses    2. HTN  -well controlled, continue regimen as above    3. HLD  -goal LDLc < 70, not at goal  -statin escalated at last visit    4. 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 She underwent slow pathway modification and then underwent ILR implantation for rhythm monitoring for her syncope/falls.   -ILR seemingly unremarkable, abandoned   -continue F/U with EP    5. ORLIN  -compliant with CPAP, continue     6. CKD  -F/U with nephro as scheduled  -if kidney function worsens can consider de-escalation of ARB and dose increase of CCB  -avoid NSAIDs      -ECG without acute ischemic changes  -update Echo  -will escalate anti-anginals - if unsuccessfull will rec [facility]  -F/U 2 weeks,  labs prior    The ASCVD Risk score (Alex ORTEGA, et al., 2019) failed to calculate for the following reasons:    The 2019 ASCVD risk score is only valid for ages 40 to 79    Orders Placed This Encounter   Procedures    Comprehensive Metabolic Panel     Standing Status:   Future     Standing Expiration Date:   1/2/2026     Order Specific Question:   Send normal result to authorizing provider's In Basket if patient is active on MyChart:     Answer:   Yes    Lipid Panel     Standing Status:   Future     Standing Expiration Date:   1/2/2026     Order Specific Question:   Send normal result to authorizing provider's In Basket if patient is active on MyChart:     Answer:   Yes    IN OFFICE EKG 12-LEAD (to [place])    Echo     Standing Status:   Future     Standing Expiration Date:   10/4/2025     Order Specific Question:   Release to patient     Answer:   Immediate       She expressed verbal understanding and agreed with the plan    Follow up in 3m    Pertinent cardiac images and EKG reviewed independently.    Continue with current medical plan and lifestyle changes.  Return sooner for concerns or questions. If symptoms persist go to the ED.  I have reviewed all pertinent data including patient's medical history in detail and updated the computerized patient record.     Counseling included discussion regarding imaging findings, diagnosis, possibilities, treatment options, risks and benefits.    Thank you for the opportunity to care for this patient. Will be available for questions if needed.        Signed:  Rashaad Fuentes DNP  10/04/2024

## 2024-10-04 ENCOUNTER — OFFICE VISIT (OUTPATIENT)
Dept: CARDIOLOGY | Facility: CLINIC | Age: 84
End: 2024-10-04
Payer: MEDICARE

## 2024-10-04 VITALS
OXYGEN SATURATION: 97 % | DIASTOLIC BLOOD PRESSURE: 79 MMHG | SYSTOLIC BLOOD PRESSURE: 137 MMHG | WEIGHT: 170.94 LBS | BODY MASS INDEX: 28.48 KG/M2 | HEIGHT: 65 IN | HEART RATE: 87 BPM

## 2024-10-04 DIAGNOSIS — Z95.818 STATUS POST PLACEMENT OF IMPLANTABLE LOOP RECORDER: ICD-10-CM

## 2024-10-04 DIAGNOSIS — I25.118 CORONARY ARTERY DISEASE OF NATIVE ARTERY OF NATIVE HEART WITH STABLE ANGINA PECTORIS: Primary | ICD-10-CM

## 2024-10-04 DIAGNOSIS — I10 ESSENTIAL HYPERTENSION: ICD-10-CM

## 2024-10-04 DIAGNOSIS — I70.0 THORACIC AORTIC ATHEROSCLEROSIS: ICD-10-CM

## 2024-10-04 DIAGNOSIS — Z98.890 HISTORY OF RADIOFREQUENCY ABLATION (RFA) PROCEDURE FOR CARDIAC ARRHYTHMIA: ICD-10-CM

## 2024-10-04 DIAGNOSIS — E78.00 PURE HYPERCHOLESTEROLEMIA: ICD-10-CM

## 2024-10-04 DIAGNOSIS — I35.0 AORTIC VALVE STENOSIS, ETIOLOGY OF CARDIAC VALVE DISEASE UNSPECIFIED: ICD-10-CM

## 2024-10-04 LAB
OHS QRS DURATION: 68 MS
OHS QTC CALCULATION: 448 MS

## 2024-10-04 PROCEDURE — 99999 PR PBB SHADOW E&M-EST. PATIENT-LVL III: CPT | Mod: PBBFAC,,,

## 2024-10-04 RX ORDER — ISOSORBIDE MONONITRATE 30 MG/1
30 TABLET, EXTENDED RELEASE ORAL DAILY
Qty: 30 TABLET | Refills: 11 | Status: SHIPPED | OUTPATIENT
Start: 2024-10-04 | End: 2025-10-04

## 2024-10-16 NOTE — PROGRESS NOTES
"   Cardiology Clinic note    Subjective:   Patient ID:  Crystal Alvarado is a 84 y.o. female who presents for follow-up of CAD, CHF, HTN, HLD    HPI:     10/17/2024: Here for F/U after medication escalation for ongoing stable angina. Seen in clinic with daughter reports overall doing very well. No recurrent CP/SOB since initiation of Imdur, has no required any SL NTG. Patient denies CP, SOB, orthopnea, PND, syncope, palpitations, LE edema. Reports compliance and tolerance with all medications.    10/4/2024: Here for F/U. Seen in clinic with daughter reports overall doing okay. Nuc stress obtained at last visit and unremarkable. Reports since about Tuesday of this week has developed recurring exertional CP associated with SOB, 6/10 in intensity, relieved by rest. Sometimes associated with SOB. Has not required SL NTG, usually very short lived. No other CV s/s, denies resting CP, SOB, orthopnea, PND, syncope, palpitations, LE edema. Reports compliance and tolerance with all medications.    7/8/2024: Virtual visit. Norvasc dose escalated.    6/24/2024: 84 yo F with hx of CAD, CHF, SVT, bradycardia, syncope, HTN, HLD. Previously seen by Dr. Kline with RITTER, pre-syncope. Echo, nuc stress reviewed with patient as below. Followed by EP, Had ILR implanted but seemingly unremarkable. Patient seen in clinic with daughter reports overall not doing very well. Ongoing fatigue, RITTER, and CP. CP reported as discomfort, midsternal, reportedly occurring with exertion. CP associated with RITTER. Reports ongoing, chronic fatigue. Patient denies orthopnea, PND, syncope, palpitations, LE edema. Reports compliance and tolerance with all medications.     MD Kline 04/2024  Pt c/o shortness of breath walking short distances.     "I get weak if I stand too long"-- pt then has to sit down.     No longer having chest pains.     Pt takes Norco for low back pain        Cardiac hx  -----------------------------------------    Echo 10/10/2024   "  Left Ventricle: The left ventricle is normal in size. Mildly increased wall thickness. There is concentric remodeling. There is normal systolic function. Biplane (2D) method of discs ejection fraction is 58%. Grade I diastolic dysfunction.    Right Ventricle: Normal right ventricular cavity size. Systolic function is normal.    Aortic Valve: The aortic valve is a trileaflet valve. There is aortic valve sclerosis. Mildly calcified cusps. There is mild stenosis. Aortic valve area by VTI is 1.8 cm². Aortic valve peak velocity is 2.2 m/s. Mean gradient is 9.7 mmHg. The dimensionless index is 0.59.    Pulmonary Artery: The estimated pulmonary artery systolic pressure is 33 mmHg.    IVC/SVC: Normal venous pressure at 3 mmHg.    Pericardium: There is a small effusion.    Nuc Stress 08/2024    The ECG portion of the study is negative for ischemia.    The nuclear portion of this study will be reported separately. The patient was infused intravenously with regadenoson at 0.08 mg/ml for a total duration of 10 seconds. A total regadenoson dose of 0.4 mg was injected.    Baseline ECG: The Baseline ECG reveals sinus rhythm.    The ECG portion of the study is negative for ischemia.  1. Scintigraphically negative for ischemia or infarct.  2. The global left ventricular systolic function is normal with an LV ejection fraction of 70 % and no evidence of LV dilatation. Wall motion is normal.    Echo 10/17/2023    Left Ventricle: The left ventricle is normal in size. Mildly increased wall thickness. There is mild concentric hypertrophy. Normal wall motion. There is normal systolic function with a visually estimated ejection fraction of 60 - 65%. Grade I diastolic dysfunction.    Right Ventricle: Normal right ventricular cavity size. Wall thickness is normal. Right ventricle wall motion  is normal. Systolic function is normal.    Aortic Valve: The aortic valve is a trileaflet valve. There is mild aortic valve sclerosis. There is  moderate annular calcification present. Mildly restricted motion.    Tricuspid Valve: There is mild regurgitation.    IVC/SVC: Normal venous pressure at 3 mmHg.      Nuc Stress/ MPI 10/17/2023    The ECG portion of the study is negative for ischemia.    The patient reported no chest pain during the stress test.    There were no arrhythmias during stress.    The nuclear portion of this study will be reported separately.  -There is no fixed or reversible perfusion defect. The stress and rest end-diastolic volumes measure 43 and 55 mL actively. The stress and rest end systolic volumes measure 9 and 14 mL respectively. The stress and rest ejection fraction measures 78 and 74% respectively.     [facility] 08/2015  C. ANGIOGRAPHIC RESULTS:     DIAGNOSTIC:       Patient has a right dominant coronary artery.        The coronary vessels have luminal irregularities.        The peripheral vessels are all normal.        - Left Main Coronary Artery:              The LM is normal. There is JAMES 3 flow.        - Left Anterior Descending Artery:              The LAD has luminal irregularities. There is JAMES 3 flow.        - Left Circumflex Artery:              The LCX has luminal irregularities. There is JAMES 3 flow.        - Right Coronary Artery:              The RCA has luminal irregularities. There is JAMES 3 flow.        - Common Femoral Artery:              The right CFA has luminal irregularities.       D. SUMMARY/POST-OPERATIVE DIAGNOSIS:   1. Non-obstructive CAD.     E. RECOMMENDATIONS:   1. Routine post-cath care.   2. Maximize medical management.   3. Risk factor reductions.   4. ASA 81mg.     Past Medical History:   Diagnosis Date    Arthritis     CHF (congestive heart failure)     Coronary artery disease     Hyperlipidemia     Hypertension           Patient Active Problem List    Diagnosis Date Noted    Thoracic aortic atherosclerosis 04/23/2024    ORLIN (obstructive sleep apnea)     Total, mature age-related cataract 11/23/2020     Iron deficiency anemia due to chronic blood loss 10/15/2020    Vitamin D deficiency 09/02/2020    Acute blood loss anemia 08/04/2020    Left anterior knee pain 08/03/2020    Aortic stenosis 11/04/2019    Hypomagnesemia 08/12/2019    Appendicitis with perforation 08/09/2019    Wrist fracture 07/31/2019    Closed fracture of right wrist 05/27/2019    History of radiofrequency ablation (RFA) procedure for cardiac arrhythmia 08/14/2018    Status post placement of implantable loop recorder 08/14/2018    Closed fracture of acromial end of clavicle 07/06/2018    Pre-syncope 07/05/2018    Concussion with no loss of consciousness 07/05/2018    Orthostatic hypotension 04/27/2018    Postural dizziness with presyncope     Hypokalemia     Physical deconditioning     Dyspnea     Normocytic anemia 11/19/2017    Coronary artery disease of native artery of native heart with stable angina pectoris 02/16/2016    Hyperlipidemia     Essential hypertension 08/11/2015    Dizziness 08/10/2015    Light headedness 08/10/2015    Syncope 08/09/2015    Tobacco abuse 08/09/2015    Chest pain of uncertain etiology 08/09/2015       Patient's Medications   New Prescriptions    No medications on file   Previous Medications    ACETAMINOPHEN (TYLENOL) 325 MG TABLET    Take 2 tablets (650 mg total) by mouth every 6 (six) hours as needed for Pain.    AMLODIPINE (NORVASC) 2.5 MG TABLET    Take 2 tablets (5 mg total) by mouth once daily.    ASPIRIN (ECOTRIN) 81 MG EC TABLET    Take 1 tablet (81 mg total) by mouth once daily.    ATORVASTATIN (LIPITOR) 40 MG TABLET    Take 1 tablet (40 mg total) by mouth once daily.    HYDROCODONE-ACETAMINOPHEN (NORCO) 5-325 MG PER TABLET    Take 1 tablet by mouth 2 (two) times daily as needed.    ISOSORBIDE MONONITRATE (IMDUR) 30 MG 24 HR TABLET    Take 1 tablet (30 mg total) by mouth once daily.    LEVOCETIRIZINE (XYZAL) 5 MG TABLET        LOSARTAN (COZAAR) 100 MG TABLET    TAKE 1 TABLET BY MOUTH EVERY DAY     MONTELUKAST (SINGULAIR) 10 MG TABLET    Take 10 mg by mouth.    NITROGLYCERIN (NITROSTAT) 0.4 MG SL TABLET    Place 1 tablet (0.4 mg total) under the tongue every 5 (five) minutes as needed for Chest pain (and notify MD).   Modified Medications    No medications on file   Discontinued Medications    No medications on file        Review of Systems   Constitutional: Positive for malaise/fatigue. Negative for chills, decreased appetite, diaphoresis, weight gain and weight loss.   Cardiovascular:  Negative for chest pain, claudication, dyspnea on exertion, irregular heartbeat, leg swelling, near-syncope, orthopnea, palpitations, paroxysmal nocturnal dyspnea and syncope.   Respiratory:  Negative for cough, hemoptysis, shortness of breath and snoring.    Gastrointestinal:  Negative for bloating, abdominal pain, nausea and vomiting.   Neurological:  Negative for light-headedness and weakness.       No family history on file.    Social History     Socioeconomic History    Marital status: Single   Tobacco Use    Smoking status: Former     Current packs/day: 0.00     Types: Cigarettes     Quit date: 2013     Years since quittin.4    Smokeless tobacco: Never   Substance and Sexual Activity    Alcohol use: No    Drug use: No    Sexual activity: Never     Social Drivers of Health     Financial Resource Strain: Low Risk  (2024)    Overall Financial Resource Strain (CARDIA)     Difficulty of Paying Living Expenses: Not hard at all   Food Insecurity: No Food Insecurity (2024)    Hunger Vital Sign     Worried About Running Out of Food in the Last Year: Never true     Ran Out of Food in the Last Year: Never true   Physical Activity: Unknown (2024)    Exercise Vital Sign     Days of Exercise per Week: Patient declined   Stress: No Stress Concern Present (2024)    [employer] [place] [employer] - Occupational Stress Questionnaire     Feeling of Stress : Not at all   Housing Stability: Unknown  "(6/24/2024)    Housing Stability Vital Sign     Unable to Pay for Housing in the Last Year: No            Objective:   Vitals  Vitals:    10/17/24 1313   BP: 116/65   Pulse: 66   SpO2: 99%   Weight: 79.9 kg (176 lb 2.4 oz)   Height: 5' 5" (1.651 m)              Physical Exam  Vitals and nursing note reviewed.   Constitutional:       Appearance: Normal appearance.   Cardiovascular:      Rate and Rhythm: Normal rate and regular rhythm.      Heart sounds: No murmur heard.     No gallop.   Pulmonary:      Effort: Pulmonary effort is normal.      Breath sounds: Normal breath sounds. No wheezing or rales.   Abdominal:      General: Bowel sounds are normal. There is no distension.      Palpations: Abdomen is soft.      Tenderness: There is no abdominal tenderness.   Musculoskeletal:      Right lower leg: No edema.      Left lower leg: No edema.   Skin:     General: Skin is warm and dry.   Neurological:      Mental Status: She is alert and oriented to person, place, and time.         Lab Results    Lab Results   Component Value Date    WBC 4.27 10/19/2023    HGB 11.5 (L) 10/19/2023    HCT 36.0 (L) 10/19/2023    MCV 96 10/19/2023       Lab Results   Component Value Date     10/19/2023    INR 0.9 08/03/2020       Lab Results   Component Value Date    K 3.9 10/19/2023    MG 1.9 12/27/2022    BUN 31 (H) 10/19/2023    CREATININE 1.65 (H) 10/19/2023       Lab Results   Component Value Date    GLU 94 10/19/2023    HGBA1C 5.2 11/16/2022       Lab Results   Component Value Date    AST 21 10/19/2023    ALT 11 10/19/2023    ALBUMIN 3.5 10/19/2023    PROT 6.7 10/19/2023       Lab Results   Component Value Date    CHOL 169 10/19/2023    HDL 57 10/19/2023    LDLCALC 100.2 10/19/2023    TRIG 59 10/19/2023       Lab Results   Component Value Date    CRP >300.0 (H) 06/11/2010    BNP 51 10/19/2023         Assessment:     1. Status post placement of implantable loop recorder    2. Thoracic aortic atherosclerosis    3. Pure " hypercholesterolemia    4. History of radiofrequency ablation (RFA) procedure for cardiac arrhythmia    5. Essential hypertension    6. Coronary artery disease of native artery of native heart with stable angina pectoris    7. Aortic valve stenosis, etiology of cardiac valve disease unspecified    8. ORLIN (obstructive sleep apnea)            Plan:     CAD (non obs per [facility] 2015)  -stable, no recurrent angina since initiation of Imdur  -on good GDMT, continue asa, statin, Imdur  -Nuc stress -ve 08/2024  -not on BB given hx of baseline bradycardia/ pauses    2. HTN  -well controlled, continue regimen as above  -goal BP < 130/80    3. HLD  -goal LDLc < 70, not at goal  -statin escalated at last visit    4. 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 She underwent slow pathway modification and then underwent ILR implantation for rhythm monitoring for her syncope/falls.   -ILR seemingly unremarkable, abandoned   -continue F/U with EP    5. ORLIN  -compliant with CPAP, continue     6. CKD  -F/U with nephro as scheduled  -if kidney function worsens can consider de-escalation of ARB and dose increase of CCB  -avoid NSAIDs, nephrotoxic agents      -F/U 3m    The ASCVD Risk score ([place] DK, et al., 2019) failed to calculate for the following reasons:    The 2019 ASCVD risk score is only valid for ages 40 to 79    No orders of the defined types were placed in this encounter.      She expressed verbal understanding and agreed with the plan    Follow up in 3m    Pertinent cardiac images and EKG reviewed independently.    Continue with current medical plan and lifestyle changes.  Return sooner for concerns or questions. If symptoms persist go to the ED.  I have reviewed all pertinent data including patient's medical history in detail and updated the computerized patient record.     Counseling included discussion regarding imaging findings, diagnosis, possibilities, treatment options, risks and  benefits.    Thank you for the opportunity to care for this patient. Will be available for questions if needed.        Signed:  Rashaad Fuentes DNP  10/17/2024

## 2024-10-17 ENCOUNTER — OFFICE VISIT (OUTPATIENT)
Dept: CARDIOLOGY | Facility: CLINIC | Age: 84
End: 2024-10-17
Payer: MEDICARE

## 2024-10-17 VITALS
SYSTOLIC BLOOD PRESSURE: 116 MMHG | DIASTOLIC BLOOD PRESSURE: 65 MMHG | BODY MASS INDEX: 29.34 KG/M2 | WEIGHT: 176.13 LBS | HEIGHT: 65 IN | HEART RATE: 66 BPM | OXYGEN SATURATION: 99 %

## 2024-10-17 DIAGNOSIS — I25.118 CORONARY ARTERY DISEASE OF NATIVE ARTERY OF NATIVE HEART WITH STABLE ANGINA PECTORIS: ICD-10-CM

## 2024-10-17 DIAGNOSIS — G47.33 OSA (OBSTRUCTIVE SLEEP APNEA): ICD-10-CM

## 2024-10-17 DIAGNOSIS — Z98.890 HISTORY OF RADIOFREQUENCY ABLATION (RFA) PROCEDURE FOR CARDIAC ARRHYTHMIA: ICD-10-CM

## 2024-10-17 DIAGNOSIS — I35.0 AORTIC VALVE STENOSIS, ETIOLOGY OF CARDIAC VALVE DISEASE UNSPECIFIED: ICD-10-CM

## 2024-10-17 DIAGNOSIS — E78.00 PURE HYPERCHOLESTEROLEMIA: ICD-10-CM

## 2024-10-17 DIAGNOSIS — I70.0 THORACIC AORTIC ATHEROSCLEROSIS: ICD-10-CM

## 2024-10-17 DIAGNOSIS — Z95.818 STATUS POST PLACEMENT OF IMPLANTABLE LOOP RECORDER: Primary | ICD-10-CM

## 2024-10-17 DIAGNOSIS — I10 ESSENTIAL HYPERTENSION: ICD-10-CM

## 2024-10-17 PROCEDURE — 99999 PR PBB SHADOW E&M-EST. PATIENT-LVL III: CPT | Mod: PBBFAC,,,

## 2024-10-18 ENCOUNTER — TELEPHONE (OUTPATIENT)
Dept: CARDIOLOGY | Facility: CLINIC | Age: 84
End: 2024-10-18
Payer: MEDICARE

## 2024-10-18 NOTE — TELEPHONE ENCOUNTER
Jamel Dumont MD Wilson, Nereida, MA  Caller: Unspecified (Today, 11:18 AM)    Ask her to stop it.  Thanks          Previous Messages       ----- Message -----  From: Padma Lantigua MA  Sent: 10/18/2024   1:58 PM CDT  To: MD Raphael Carranza,    This Patient saw Rashaad Fuentes/  .  Patient saw Alfredo 10-4-2024 and he gave pt new Rx of Isosorbide 30 mg Daily.  Patient Daughter stated pt got up this morning feeling very dizzy until about 10 am the Daughter gave her Dramamine 1 tablet to calm the dizziness.  Daughter wants to know if patient should continue Isosorbide.    Rashaad fuentes not here today.  Please advise.    Nw          ----- Message -----  From: Kelsy Walker  Sent: 10/18/2024  11:20 AM CDT  To: Alfredo Neil Staff    Type:  Patient Returning Call    Who Called:Shimon  Would the patient rather a call back or a response via [facility]? Call back  Best Call Back Number:[phone]  Additional Information: pt was real dizzy this morning and want to know if it could be from her new medication

## 2024-10-18 NOTE — TELEPHONE ENCOUNTER
Expressed to the Daughter that I will forward her message to  for his advised on the  Isosorbide, if I here from him today ,I will call her back.  Daughter expressed understanding.    Nw                          ----- Message from Kelsy sent at 10/18/2024 11:18 AM CDT -----  Type:  Patient Returning Call    Who Called:Shimon   Would the patient rather a call back or a response via [facility]? Call back   Best Call Back Number:[phone]  Additional Information: pt was real dizzy this morning and want to know if it could be from her new medication

## 2024-11-07 DIAGNOSIS — I10 ESSENTIAL HYPERTENSION: ICD-10-CM

## 2024-11-07 NOTE — TELEPHONE ENCOUNTER
----- Message from Lea sent at 11/7/2024  4:56 PM CST -----  Type:  RX Refill Request     Who Called: DIANA COOPER [[id number]]  Refill or New Rx:refill  RX Name and Strength:amLODIPine (NORVASC) 2.5 MG tablet  How is the patient currently taking it? (ex. 1XDay):2xday  Is this a 30 day or 90 day RX:90  Preferred Pharmacy with phone number:[facility]/pharmacy #[id number] - [place] - [address] AT [facility]   Phone: [phone]  Fax: [phone]    Local or Mail Order:local  Ordering Provider:shaggy  Would the patient rather a call back or a response via [facility]? call  Best Call Back Number:[phone]   Additional Information: The patient would like the prescription for this medicine for 5MG . The patient also would like to speak with the provider about another medication.

## 2024-11-08 RX ORDER — AMLODIPINE BESYLATE 2.5 MG/1
5 TABLET ORAL DAILY
Qty: 180 TABLET | Refills: 3 | Status: SHIPPED | OUTPATIENT
Start: 2024-11-08 | End: 2025-11-08

## 2025-01-30 NOTE — PT/OT/SLP PROGRESS
"Occupational Therapy  Visit Attempt    Patient Name:  Crystal Alvarado   MRN:  [id number]    Patient not seen today secondary to declining OT activity participation -- "I'm tired today. I don't want to. I've gotten up to the bathroom already this morning." Patient educated on therapy importance; verbalized understanding. Will follow-up later, as time permits.    FRANCE Bhardwaj  4/27/2018  " Polo Khan MD   to Behnke, Abbie J, LPN  [facility] Rn Triage Pool       1/30/25  1:22 PM  Thank you, I reviewed biopsy results with pathology. I ordered tempus next gen sequencing with tissue origin. Please ensure kits are available for blood draw on Tuesday. Thank you,

## 2025-03-03 ENCOUNTER — PATIENT OUTREACH (OUTPATIENT)
Facility: OTHER | Age: 85
End: 2025-03-03
Payer: MEDICARE

## 2025-03-03 ENCOUNTER — E-VISIT (OUTPATIENT)
Facility: CLINIC | Age: 85
End: 2025-03-03
Payer: MEDICARE

## 2025-03-03 ENCOUNTER — NURSE TRIAGE (OUTPATIENT)
Dept: ADMINISTRATIVE | Facility: CLINIC | Age: 85
End: 2025-03-03
Payer: MEDICARE

## 2025-03-03 DIAGNOSIS — M54.2 NECK PAIN: ICD-10-CM

## 2025-03-03 DIAGNOSIS — M54.12 CERVICAL RADICULOPATHY: Primary | ICD-10-CM

## 2025-03-03 RX ORDER — METHYLPREDNISOLONE 4 MG/1
TABLET ORAL
Qty: 1 EACH | Refills: 0 | Status: SHIPPED | OUTPATIENT
Start: 2025-03-03

## 2025-03-03 NOTE — PROGRESS NOTES
Patient ID: Crystal Alvarado is a 84 y.o. female.    Chief Complaint: Neck Pain    The patient initiated a request through [employer] on 3/3/2025 for evaluation and management with a chief complaint of Neck Pain     I evaluated the questionnaire submission on 03/03/2025.    [facility]    3/3/2025  9:48 AM CST - Filed by Patient   Do you agree to participate in an E-Visit? Yes   If you have any of the following symptoms, please go to the nearest emergency room or call 911: I acknowledge   Choose the state of your primary residence [place]   What is the main issue you would like addressed today? Nothing   Please describe your symptoms. Pain   Where is your problem located? Neck   On a scale of 1-10, where 10 is the worst you can imagine, how severe are your symptoms? (range: 1 - 10) 8   Have you had these symptoms before? Yes   How long have you had these symptoms? About a week   What helps with your symptoms? Neck Pain   What makes your symptoms feel worse? Pain   Are your symptoms related to a condition you currently have? No   Please describe any probable cause for your symptoms. Na   Provide any additional information you feel is important. Yes   Please attach any relevant images or files    Are you able to take your vital signs? No         Encounter Diagnoses   Name Primary?    Cervical radiculopathy Yes    Neck pain         Orders Placed This Encounter   Procedures    Ambulatory referral/consult to Pain Clinic     Referral Priority:   Routine     Referral Type:   Consultation     Referral Reason:   Specialty Services Required     Requested Specialty:   Pain Medicine     Number of Visits Requested:   1      Medications Ordered This Encounter   Medications    methylPREDNISolone (MEDROL DOSEPACK) 4 mg tablet     Sig: Take as directed on the package     Dispense:  1 each     Refill:  0        No follow-ups on file.      E-Visit Time Tracking:    Day 1 Time (in minutes): 15    Total Time (in minutes):  15

## 2025-03-03 NOTE — PROGRESS NOTES
Patient spoke with OOC RN on 3/3/2025 with complaint of right side neck pain and right hand weakness.  OOC RN consulted with [facility] provider, Dr. Villalobos and e-visit completed and specialty/pain management referral placed.  Patient's pain management appointment scheduled for 3/18/2025 at 2:20 pm.

## 2025-03-03 NOTE — TELEPHONE ENCOUNTER
Patient's daughter calling in. Patient was trying to see ortho for right sided neck pain. She has been having pain for about 2 weeks. Denies any injury. She was seen in urgent care and given muscle relaxer with no relief. She spoke with pcp and they suggested ortho. She has weakness to the right hand. Dispo is go to ER/UC now or office with pcp triage. Sent secure chat to [facility] and chart reviewed by Dr. Villalobos. He would like patient to be seen by pain management.  Luiza scheduled for 3/18 with Dr Gomez and Dr Villalobos requested evisit to send a steroid pack. Order placed and assisted patient's daughter  with completing pre check. Updated MD and prescription was sent. Notified patient's daughter.  Instructed to call back with additional questions or worsening of symptoms. Patient's daughter verbalized understanding.     Reason for Disposition   Weakness of an arm or hand    Additional Information   Negative: Shock suspected (e.g., cold/pale/clammy skin, too weak to stand, low BP, rapid pulse)   Negative: Similar pain previously and it was from 'heart attack'   Negative: Similar pain previously from 'angina' and not relieved by nitroglycerin   Negative: Difficult to awaken or acting confused (e.g., disoriented, slurred speech)   Negative: Sounds like a life-threatening emergency to the triager   Negative: Difficulty breathing or unusual sweating (e.g., sweating without exertion)   Negative: Chest pain lasting longer than 5 minutes   Negative: Stiff neck (can't touch chin to chest) and has headache   Negative: Stiff neck (can't touch chin to chest) and fever    Protocols used: Neck Pain or [id number]

## 2025-03-05 ENCOUNTER — PATIENT OUTREACH (OUTPATIENT)
Facility: OTHER | Age: 85
End: 2025-03-05
Payer: MEDICARE

## 2025-03-05 NOTE — PROGRESS NOTES
Spoke with patient's daughter to see if patient had any additional concerns.  Patient's daughter stated she followed up with patient's PCP to obtain additional medication for patient due to increased pain and in route to pick medication up at the pharmacy.  Patient's pain management appointment is scheduled for 3/18/2025 at 2:20 pm.

## 2025-03-13 ENCOUNTER — OFFICE VISIT (OUTPATIENT)
Dept: PAIN MEDICINE | Facility: CLINIC | Age: 85
End: 2025-03-13
Payer: MEDICARE

## 2025-03-13 ENCOUNTER — RESULTS FOLLOW-UP (OUTPATIENT)
Dept: PAIN MEDICINE | Facility: CLINIC | Age: 85
End: 2025-03-13

## 2025-03-13 VITALS
SYSTOLIC BLOOD PRESSURE: 110 MMHG | HEART RATE: 72 BPM | HEIGHT: 66 IN | BODY MASS INDEX: 28.56 KG/M2 | WEIGHT: 177.69 LBS | DIASTOLIC BLOOD PRESSURE: 68 MMHG

## 2025-03-13 DIAGNOSIS — M62.838 NECK MUSCLE SPASM: ICD-10-CM

## 2025-03-13 DIAGNOSIS — M79.18 MYOFASCIAL PAIN: ICD-10-CM

## 2025-03-13 DIAGNOSIS — M54.2 NECK PAIN ON RIGHT SIDE: Primary | ICD-10-CM

## 2025-03-13 PROCEDURE — 99999 PR PBB SHADOW E&M-EST. PATIENT-LVL III: CPT | Mod: PBBFAC,,, | Performed by: STUDENT IN AN ORGANIZED HEALTH CARE EDUCATION/TRAINING PROGRAM

## 2025-03-13 RX ORDER — METHOCARBAMOL 500 MG/1
500 TABLET, FILM COATED ORAL 4 TIMES DAILY
Qty: 40 TABLET | Refills: 0 | Status: SHIPPED | OUTPATIENT
Start: 2025-03-13 | End: 2025-03-23

## 2025-03-13 RX ORDER — DICLOFENAC SODIUM 10 MG/G
2 GEL TOPICAL 4 TIMES DAILY PRN
Qty: 2 EACH | Refills: 3 | Status: SHIPPED | OUTPATIENT
Start: 2025-03-13

## 2025-03-13 NOTE — PROGRESS NOTES
[facility] - New Patient Evaluation    Referred by: Dr. Mohan Villalobos   Reason for referral: Cervical radiculopathy  Neck pain     CC:   Chief Complaint   Patient presents with    Neck Pain         3/13/2025    12:08 PM   Last 3 PDI Scores   Pain Disability Index (PDI) 70       Subjective 03/13/2025:   Crystal Alvarado is a 84 y.o. female who presents complaining of right-sided neck pain.  Patient states the pain started approximately 2 weeks ago.  No inciting incident or trauma.  Pain is worse with flexion of the neck.  Denies any radiation into the upper extremity or fingers.    Denies any weakness numbness or tingling.  No shoulder pain.  No history of cervical spine surgeries.  No history of shoulder surgeries. She was prescribed a Medrol Dosepak by the ED, she reports minimal relief.    Initial Pain Assessment:  Location: NECK PAIN, right-sided  Onset:  2 weeks  Current Pain Score: 9/10  Daily Pain of Range: 8-8/10  Quality: Burning and Sharp  Radiation:  Right-sided neck pain that radiates into the cervical paraspinal muscles and upper trapezius  Worsened by: extension and flexion  Improved by: medications     Patient denies night fever/night sweats, urinary incontinence, bowel incontinence, significant weight loss, significant motor weakness, and loss of sensations.      Previous Interventions:  - none    Previous Therapies:  PT/OT: no   Chiropractor:   HEP:   Relevant Surgery: no     Previous Medications:   - Tylenol or NSAIDS:  Tylenol, Avoid NSAIDs due to renal function  - Muscle Relaxants:    - TCAs:   - SNRIs:   - Topicals:   - Anticonvulsants:    - Opioids:  Norco per PCP  - Adjuvants:     Current Pain Medications:  Norco 5/325 p.r.n.  Tylenol    Anticoagulation: NO    Review of Systems:  ROS    GENERAL:  No weight loss, malaise or fevers.  HEENT:   No recent changes in vision or hearing  NECK:  No difficulty with swallowing. No stridor.   RESPIRATORY:  Negative for cough,  "wheezing or shortness of breath, patient denies any recent URI.  CARDIOVASCULAR:  Negative for chest pain, leg swelling or palpitations.  GI:  Negative for abdominal discomfort, blood in stools or black stools or change in bowel habits.  MUSCULOSKELETAL:  See HPI.  SKIN:  Negative for lesions, rash, and itching.  PSYCH:  No mood disorder or recent psychosocial stressors.    HEMATOLOGY/LYMPHOLOGY:  Negative for prolonged bleeding, bruising easily or swollen nodes.  Patient is not currently taking any anti-coagulants  NEURO:   No history of headaches, syncope, paralysis, seizures or tremors.  All other reviewed and negative other than HPI.    History:  Current medications, allergies, medical history, surgical history,   family history, and social history were reviewed in the chart as marked.    Full Medication List:  Current Medications[1]     Allergies:  Patient has no known allergies.     Medical History:   has a past medical history of Arthritis, CHF (congestive heart failure), Coronary artery disease, Hyperlipidemia, and Hypertension.    Surgical History:   has a past surgical history that includes Hysterectomy (N/A); A-V cardiac pacemaker insertion (N/A, 7/9/2018); Insertion of implantable loop recorder (N/A, 7/9/2018); Esophagogastroduodenoscopy (N/A, 8/4/2020); Colonoscopy; Colonoscopy (N/A, 8/5/2020); Cataract extraction w/  intraocular lens implant (Left, 11/23/2020); and cataract removal (Right, 2018).    Family History:  family history is not on file.    Social History:   reports that she quit smoking about 11 years ago. Her smoking use included cigarettes. She has never used smokeless tobacco. She reports that she does not drink alcohol and does not use drugs.    Physical Exam:  Vitals:    03/13/25 1209   BP: 110/68   Pulse: 72   Weight: 80.6 kg (177 lb 11.1 oz)   Height: 5' 6" (1.676 m)   PainSc: 10-Worst pain ever   PainLoc: Neck       GENERAL: Well appearing, in no acute distress, alert and oriented " x3.  PSYCH:  Mood and affect appropriate.  SKIN: Skin color, texture, turgor normal, no rashes or lesions.  HEAD/FACE:  Normocephalic, atraumatic. Cranial nerves grossly intact.  NECK: decreased ROM of the neck to the right 2/2 pain. +pain to palpation over the right cervical paraspinous muscles, trapezius and SCM. Spurling Negative.   CV: RRR with palpation of the radial artery.  PULM: No evidence of respiratory difficulty, symmetric chest rise.  GI:  Soft and non-distended.  MSK: No obvious deformities, edema, or skin discoloration.  No atrophy or tone abnormalities are noted.   NEURO: Bilateral upper and lower extremity coordination and strength is symmetric.  No loss of sensation is noted. Pike negative.  MENTAL STATUS: A x O x 3, good concentration, speech is fluent and goal directed  MOTOR: 5/5 in all muscle groups  GAIT: Normal. Ambulates unassisted.    Imaging:  No pertinent imaging available.    Labs:  BMP  Lab Results   Component Value Date     10/24/2024    K 3.9 10/24/2024     10/24/2024    CO2 22 (L) 10/24/2024    BUN 34 (H) 10/24/2024    CREATININE 1.6 (H) 10/24/2024    CALCIUM 9.5 10/24/2024    ANIONGAP 10 10/24/2024    EGFRNORACEVR 31.2 (A) 10/24/2024     Lab Results   Component Value Date    ALT 8 (L) 10/24/2024    AST 11 10/24/2024    ALKPHOS 61 10/24/2024    BILITOT 0.5 10/24/2024     Lab Results   Component Value Date    WBC 4.48 10/24/2024    HGB 11.6 (L) 10/24/2024    HCT 37.4 10/24/2024    MCV 94 10/24/2024     10/24/2024       Assessment:  Problem List Items Addressed This Visit    None  Visit Diagnoses         Neck pain on right side    -  Primary    Relevant Orders    X-Ray Cervical Spine Complete 5 view      Neck muscle spasm          Myofascial pain                03/13/2025 - Crystal Alvarado is a 84 y.o. female who  has a past medical history of Arthritis, CHF (congestive heart failure), Coronary artery disease, Hyperlipidemia, and Hypertension.  By history and  examination this patient has acute neck pain.  Based on exam, it appears myofascial in nature, likely a muscle strain.  I will get an x-ray to assess further however. We discussed the underlying diagnoses and multiple treatment options including conservative therapy such as ice, heat, muscle relaxant medications, Voltaren gel.  If her pain persists, we can consider trigger point injections.  Recommending holding off on trigger point injections at today's visit as she just completed a Medrol Dosepak.  The risks and benefits of each treatment option were discussed and all questions were answered.      Treatment Plan:   Procedures:  Consider trigger point injection if pain continues.  PT/OT/HEP: I have stressed the importance of physical activity and a home exercise plan to help with pain and improve health.  Medications:    - Tylenol p.r.n.   - methocarbamol q.i.d. 500 mg p.r.n.   - Voltaren gel   - she is prescribed Norco by her PCP   -  Reviewed and consistent with medication use as prescribed.  Imaging:  Cervical x-ray ordered.  Follow Up: RTC 4 weeks or sooner if needed    Kassidy Gomez DO   Interventional Pain Medicine / Anesthesiology    Disclaimer: This note was partly generated using dictation software which may occasionally result in transcription errors.         [1]   Current Outpatient Medications:     acetaminophen (TYLENOL) 325 MG tablet, Take 2 tablets (650 mg total) by mouth every 6 (six) hours as needed for Pain., Disp: , Rfl: 0    amLODIPine (NORVASC) 2.5 MG tablet, Take 2 tablets (5 mg total) by mouth once daily., Disp: 180 tablet, Rfl: 3    aspirin (ECOTRIN) 81 MG EC tablet, Take 1 tablet (81 mg total) by mouth once daily., Disp: 90 tablet, Rfl: 3    atorvastatin (LIPITOR) 40 MG tablet, Take 1 tablet (40 mg total) by mouth once daily., Disp: 90 tablet, Rfl: 3    HYDROcodone-acetaminophen (NORCO) 5-325 mg per tablet, Take 1 tablet by mouth 2 (two) times daily as needed., Disp: , Rfl:      isosorbide mononitrate (IMDUR) 30 MG 24 hr tablet, Take 1 tablet (30 mg total) by mouth once daily., Disp: 30 tablet, Rfl: 11    levocetirizine (XYZAL) 5 MG tablet, , Disp: , Rfl:     losartan (COZAAR) 100 MG tablet, TAKE 1 TABLET BY MOUTH EVERY DAY, Disp: 90 tablet, Rfl: 3    methylPREDNISolone (MEDROL DOSEPACK) 4 mg tablet, Take as directed on the package, Disp: 1 each, Rfl: 0    montelukast (SINGULAIR) 10 mg tablet, Take 10 mg by mouth., Disp: , Rfl:     diclofenac sodium (VOLTAREN) 1 % Gel, Apply 2 g topically 4 (four) times daily as needed (pain)., Disp: 2 each, Rfl: 3    methocarbamoL (ROBAXIN) 500 MG Tab, Take 1 tablet (500 mg total) by mouth 4 (four) times daily. for 10 days, Disp: 40 tablet, Rfl: 0    nitroGLYCERIN (NITROSTAT) 0.4 MG SL tablet, Place 1 tablet (0.4 mg total) under the tongue every 5 (five) minutes as needed for Chest pain (and notify MD)., Disp: 25 tablet, Rfl: 5

## 2025-03-16 ENCOUNTER — PATIENT OUTREACH (OUTPATIENT)
Facility: OTHER | Age: 85
End: 2025-03-16
Payer: MEDICARE

## 2025-03-16 NOTE — PROGRESS NOTES
Per chart review, patient's appointment for 3/18/25 was rescheduled via the wait list and patient was seen on 3/13/25. Called patient to follow up and assess for additional needs, no answer, LVM asking for return call.

## 2025-03-26 ENCOUNTER — PATIENT MESSAGE (OUTPATIENT)
Dept: PAIN MEDICINE | Facility: CLINIC | Age: 85
End: 2025-03-26
Payer: MEDICARE

## 2025-03-26 RX ORDER — METHOCARBAMOL 500 MG/1
500 TABLET, FILM COATED ORAL 4 TIMES DAILY PRN
Qty: 120 TABLET | Refills: 0 | Status: SHIPPED | OUTPATIENT
Start: 2025-03-26 | End: 2025-04-25

## 2025-04-10 ENCOUNTER — OFFICE VISIT (OUTPATIENT)
Dept: PAIN MEDICINE | Facility: CLINIC | Age: 85
End: 2025-04-10
Payer: MEDICARE

## 2025-04-10 VITALS
HEART RATE: 66 BPM | HEIGHT: 66 IN | WEIGHT: 177.69 LBS | SYSTOLIC BLOOD PRESSURE: 110 MMHG | DIASTOLIC BLOOD PRESSURE: 68 MMHG | BODY MASS INDEX: 28.56 KG/M2

## 2025-04-10 DIAGNOSIS — M47.812 CERVICAL SPONDYLOSIS: ICD-10-CM

## 2025-04-10 DIAGNOSIS — M62.838 NECK MUSCLE SPASM: ICD-10-CM

## 2025-04-10 DIAGNOSIS — M54.2 NECK PAIN ON RIGHT SIDE: Primary | ICD-10-CM

## 2025-04-10 DIAGNOSIS — M79.18 MYOFASCIAL PAIN: ICD-10-CM

## 2025-04-10 PROCEDURE — 99999 PR PBB SHADOW E&M-EST. PATIENT-LVL III: CPT | Mod: PBBFAC,,, | Performed by: STUDENT IN AN ORGANIZED HEALTH CARE EDUCATION/TRAINING PROGRAM

## 2025-04-10 RX ORDER — METHOCARBAMOL 500 MG/1
500 TABLET, FILM COATED ORAL 3 TIMES DAILY PRN
Qty: 90 TABLET | Refills: 0 | Status: SHIPPED | OUTPATIENT
Start: 2025-04-10 | End: 2025-05-10

## 2025-04-10 NOTE — PROGRESS NOTES
[facility] - Established Patient Evaluation    Referred by: No ref. provider found   Reason for referral: Neck pain on right side     CC:   Chief Complaint   Patient presents with    Neck Pain         3/13/2025    12:08 PM   Last 3 PDI Scores   Pain Disability Index (PDI) 70     Interval History 4/10/2025:     Crystal Alvarado is a 84 y.o. female presents to the clinic for follow up of her right sided neck pain.  Since last visit the pain has has slightly improved with the muscle relaxers.  She continues to have pain localized to the right cervical paraspinal muscles, trapezius muscle and SCM on the right. Denies radiation into the RUE. The pain is located in the right side neck area and does not radiate.  The pain is described as burning and sharp.  She would like to move forward with the trigger point injections today.    At BEST  7/10   At WORST  8/10 on the WORST day.    On average pain is rated as 7/10.   Today the pain is rated as 7/10  Symptoms interfere with daily activity.   Exacerbating factors: Laying.    Mitigating factors Muscle relaxers.       Subjective 03/13/2025:   Crystal Alvarado is a 84 y.o. female who presents complaining of right-sided neck pain.  Patient states the pain started approximately 2 weeks ago.  No inciting incident or trauma.  Pain is worse with flexion of the neck.  Denies any radiation into the upper extremity or fingers.    Denies any weakness numbness or tingling.  No shoulder pain.  No history of cervical spine surgeries.  No history of shoulder surgeries. She was prescribed a Medrol Dosepak by the ED, she reports minimal relief.    Initial Pain Assessment:  Location: NECK PAIN, right-sided  Onset:  2 weeks  Current Pain Score: 9/10  Daily Pain of Range: 8-8/10  Quality: Burning and Sharp  Radiation:  Right-sided neck pain that radiates into the cervical paraspinal muscles and upper trapezius  Worsened by: extension and flexion  Improved by:  medications     Patient denies night fever/night sweats, urinary incontinence, bowel incontinence, significant weight loss, significant motor weakness, and loss of sensations.      Previous Interventions:  - none    Previous Therapies:  PT/OT: no   Chiropractor:   HEP:   Relevant Surgery: no     Previous Medications:   - Tylenol or NSAIDS:  Tylenol, Avoid NSAIDs due to renal function  - Muscle Relaxants:  Robaxin 500 mg  - TCAs: NO  - SNRIs: NO  - Topicals: creams Voltaren gel  - Anticonvulsants:  NO  - Opioids:  Norco per PCP  - Adjuvants:     Current Pain Medications:  Tylenol  Robaxin p.r.n.  Voltaren gel    Anticoagulation: NO    Review of Systems:  ROS    GENERAL:  No weight loss, malaise or fevers.  HEENT:   No recent changes in vision or hearing  NECK:  No difficulty with swallowing. No stridor.   RESPIRATORY:  Negative for cough, wheezing or shortness of breath, patient denies any recent URI.  CARDIOVASCULAR:  Negative for chest pain, leg swelling or palpitations.  GI:  Negative for abdominal discomfort, blood in stools or black stools or change in bowel habits.  MUSCULOSKELETAL:  See HPI.  SKIN:  Negative for lesions, rash, and itching.  PSYCH:  No mood disorder or recent psychosocial stressors.    HEMATOLOGY/LYMPHOLOGY:  Negative for prolonged bleeding, bruising easily or swollen nodes.  Patient is not currently taking any anti-coagulants  NEURO:   No history of headaches, syncope, paralysis, seizures or tremors.  All other reviewed and negative other than HPI.    History:  Current medications, allergies, medical history, surgical history,   family history, and social history were reviewed in the chart as marked.    Full Medication List:  Current Medications[1]     Allergies:  Patient has no known allergies.     Medical History:   has a past medical history of Arthritis, CHF (congestive heart failure), Coronary artery disease, Hyperlipidemia, and Hypertension.    Surgical History:   has a past surgical  "history that includes Hysterectomy (N/A); A-V cardiac pacemaker insertion (N/A, 7/9/2018); Insertion of implantable loop recorder (N/A, 7/9/2018); Esophagogastroduodenoscopy (N/A, 8/4/2020); Colonoscopy; Colonoscopy (N/A, 8/5/2020); Cataract extraction w/  intraocular lens implant (Left, 11/23/2020); and cataract removal (Right, 2018).    Family History:  family history is not on file.    Social History:   reports that she quit smoking about 11 years ago. Her smoking use included cigarettes. She has never used smokeless tobacco. She reports that she does not drink alcohol and does not use drugs.    Physical Exam:  Vitals:    04/10/25 1111   BP: 110/68   Pulse: 66   Weight: 80.6 kg (177 lb 11.1 oz)   Height: 5' 6" (1.676 m)   PainSc:   7   PainLoc: Neck         GENERAL: Well appearing, in no acute distress, alert and oriented x3.  PSYCH:  Mood and affect appropriate.  SKIN: Skin color, texture, turgor normal, no rashes or lesions.  HEAD/FACE:  Normocephalic, atraumatic. Cranial nerves grossly intact.  NECK: She has decreased ROM of the neck to the right 2/2 pain. +pain to palpation over the right cervical paraspinous muscles, trapezius and SCM. Spurling Negative.   CV: RRR with palpation of the radial artery.  PULM: No evidence of respiratory difficulty, symmetric chest rise.  GI:  Soft and non-distended.  MSK: No obvious deformities, edema, or skin discoloration.  No atrophy or tone abnormalities are noted.   NEURO: Bilateral upper and lower extremity coordination and strength is symmetric.  No loss of sensation is noted. Pike negative.  MENTAL STATUS: A x O x 3, good concentration, speech is fluent and goal directed  MOTOR: 5/5 in all muscle groups  GAIT: Normal.     Imaging:    CERVICAL X-RAY  Results for orders placed during the hospital encounter of 03/13/25    X-Ray Cervical Spine Complete 5 view    Narrative  EXAMINATION:  XR CERVICAL SPINE COMPLETE 5 VIEW    CLINICAL HISTORY:  . " Cervicalgia    TECHNIQUE:  AP, Lateral, bilateral oblique and open mouth views of the cervical spine were performed.    COMPARISON:  None    FINDINGS:  There is straightening and mild reversal the normal cervical lordosis.  The bones are osteoporotic.  There is no evidence for acute fracture or bone destruction.  There are advanced degenerative changes throughout the cervical spine with prominent anterior osteophytes as well as facet arthropathy.  There is neural foraminal narrowing due to uncinate joint hypertrophy and facet arthropathy particularly at the C3-4 and C4-5 levels on the left and the C2-3 and C3-4 levels on the right.  Prevertebral soft tissues are unremarkable.  Visualized lung apices are clear.    Impression  Osteoporosis.    Advanced cervical spondylosis resulting in neural foraminal narrowing at multiple levels.      Electronically signed by: Rony Leo MD  Date:    03/13/2025  Time:    16:06      Labs:  BMP  Lab Results   Component Value Date     10/24/2024    K 3.9 10/24/2024     10/24/2024    CO2 22 (L) 10/24/2024    BUN 34 (H) 10/24/2024    CREATININE 1.6 (H) 10/24/2024    CALCIUM 9.5 10/24/2024    ANIONGAP 10 10/24/2024    EGFRNORACEVR 31.2 (A) 10/24/2024     Lab Results   Component Value Date    ALT 8 (L) 10/24/2024    AST 11 10/24/2024    ALKPHOS 61 10/24/2024    BILITOT 0.5 10/24/2024     Lab Results   Component Value Date    WBC 4.48 10/24/2024    HGB 11.6 (L) 10/24/2024    HCT 37.4 10/24/2024    MCV 94 10/24/2024     10/24/2024       Assessment:  Problem List Items Addressed This Visit    None  Visit Diagnoses         Neck pain on right side    -  Primary    Relevant Orders    Ambulatory Referral/Consult to Physical Therapy      Myofascial pain          Neck muscle spasm        Relevant Orders    Ambulatory Referral/Consult to Physical Therapy      Cervical spondylosis        Relevant Orders    Ambulatory Referral/Consult to Physical Therapy              03/13/2025 -  Crystal Alvarado is a 84 y.o. female who  has a past medical history of Arthritis, CHF (congestive heart failure), Coronary artery disease, Hyperlipidemia, and Hypertension.  By history and examination this patient has acute neck pain.  Based on exam, it appears myofascial in nature, likely a muscle strain.  I will get an x-ray to assess further however. We discussed the underlying diagnoses and multiple treatment options including conservative therapy such as ice, heat, muscle relaxant medications, Voltaren gel.  If her pain persists, we can consider trigger point injections.  Recommending holding off on trigger point injections at today's visit as she just completed a Medrol Dosepak.  The risks and benefits of each treatment option were discussed and all questions were answered.      04/10/2025-patient presents today with continued myofascial pain on the right.  Trigger point injections performed today in clinic.  Her most recent cervical x-ray was significant for severe degenerative changes in the cervical spine.  In the future consider cervical MBB/RFA on the right at C2, C3, C4.  Referral placed to physical therapy.  Refill of Robaxin provided.    Treatment Plan:   Procedures:    Trigger point injections performed today.  Consider cervical MBB/RFA in the future, Right C2, C3,C4  PT/OT/HEP: I have stressed the importance of physical activity and a home exercise plan to help with pain and improve health.  Referral placed to physical therapy.  Medications:    - Tylenol p.r.n.   - refill methocarbamol t.i.d. 500 mg p.r.n.   - Voltaren gel   - she is prescribed Norco by her PCP   -  Reviewed and consistent with medication use as prescribed.  Imaging:  Cervical x-ray reviewed with the patient today.  Follow Up:  6-8 weeks or sooner to discuss response to PT and trigger point injections    Kassidy Gomez, DO   Interventional Pain Medicine / Anesthesiology    Disclaimer: This note was partly generated using  dictation software which may occasionally result in transcription errors.         [1]   Current Outpatient Medications:     acetaminophen (TYLENOL) 325 MG tablet, Take 2 tablets (650 mg total) by mouth every 6 (six) hours as needed for Pain., Disp: , Rfl: 0    amLODIPine (NORVASC) 2.5 MG tablet, Take 2 tablets (5 mg total) by mouth once daily., Disp: 180 tablet, Rfl: 3    aspirin (ECOTRIN) 81 MG EC tablet, Take 1 tablet (81 mg total) by mouth once daily., Disp: 90 tablet, Rfl: 3    atorvastatin (LIPITOR) 40 MG tablet, Take 1 tablet (40 mg total) by mouth once daily., Disp: 90 tablet, Rfl: 3    diclofenac sodium (VOLTAREN) 1 % Gel, Apply 2 g topically 4 (four) times daily as needed (pain)., Disp: 2 each, Rfl: 3    HYDROcodone-acetaminophen (NORCO) 5-325 mg per tablet, Take 1 tablet by mouth 2 (two) times daily as needed., Disp: , Rfl:     isosorbide mononitrate (IMDUR) 30 MG 24 hr tablet, Take 1 tablet (30 mg total) by mouth once daily., Disp: 30 tablet, Rfl: 11    levocetirizine (XYZAL) 5 MG tablet, , Disp: , Rfl:     losartan (COZAAR) 100 MG tablet, TAKE 1 TABLET BY MOUTH EVERY DAY, Disp: 90 tablet, Rfl: 3    methylPREDNISolone (MEDROL DOSEPACK) 4 mg tablet, Take as directed on the package, Disp: 1 each, Rfl: 0    montelukast (SINGULAIR) 10 mg tablet, Take 10 mg by mouth., Disp: , Rfl:     methocarbamoL (ROBAXIN) 500 MG Tab, Take 1 tablet (500 mg total) by mouth 3 (three) times daily as needed (muscle pain)., Disp: 90 tablet, Rfl: 0    nitroGLYCERIN (NITROSTAT) 0.4 MG SL tablet, Place 1 tablet (0.4 mg total) under the tongue every 5 (five) minutes as needed for Chest pain (and notify MD)., Disp: 25 tablet, Rfl: 5

## 2025-05-27 PROBLEM — R68.89 IMPAIRED TOLERANCE OF ACTIVITY: Status: ACTIVE | Noted: 2025-05-27

## 2025-05-27 PROBLEM — M53.82 IMPAIRED RANGE OF MOTION OF CERVICAL SPINE: Status: ACTIVE | Noted: 2025-05-27

## 2025-06-12 ENCOUNTER — PATIENT MESSAGE (OUTPATIENT)
Dept: PAIN MEDICINE | Facility: CLINIC | Age: 85
End: 2025-06-12
Payer: MEDICARE

## 2025-06-13 DIAGNOSIS — M79.18 MYOFASCIAL PAIN: Primary | ICD-10-CM

## 2025-06-13 RX ORDER — METHOCARBAMOL 500 MG/1
500 TABLET, FILM COATED ORAL 3 TIMES DAILY
COMMUNITY
Start: 2025-04-25 | End: 2025-06-13 | Stop reason: SDUPTHER

## 2025-06-13 RX ORDER — METHOCARBAMOL 500 MG/1
500 TABLET, FILM COATED ORAL 3 TIMES DAILY
Qty: 90 TABLET | Refills: 0 | Status: SHIPPED | OUTPATIENT
Start: 2025-06-13 | End: 2025-07-13

## 2025-06-18 DIAGNOSIS — M79.18 MYOFASCIAL PAIN: ICD-10-CM

## 2025-06-18 RX ORDER — METHOCARBAMOL 500 MG/1
TABLET, FILM COATED ORAL
Qty: 90 TABLET | Refills: 0 | Status: SHIPPED | OUTPATIENT
Start: 2025-06-18

## 2025-07-31 ENCOUNTER — OFFICE VISIT (OUTPATIENT)
Dept: CARDIOLOGY | Facility: CLINIC | Age: 85
End: 2025-07-31
Payer: MEDICARE

## 2025-07-31 ENCOUNTER — OFFICE VISIT (OUTPATIENT)
Dept: PAIN MEDICINE | Facility: CLINIC | Age: 85
End: 2025-07-31
Payer: MEDICARE

## 2025-07-31 VITALS
DIASTOLIC BLOOD PRESSURE: 72 MMHG | SYSTOLIC BLOOD PRESSURE: 115 MMHG | HEIGHT: 66 IN | BODY MASS INDEX: 28.56 KG/M2 | HEART RATE: 69 BPM | WEIGHT: 177.69 LBS

## 2025-07-31 VITALS
HEART RATE: 69 BPM | WEIGHT: 170.88 LBS | SYSTOLIC BLOOD PRESSURE: 115 MMHG | DIASTOLIC BLOOD PRESSURE: 72 MMHG | BODY MASS INDEX: 27.58 KG/M2

## 2025-07-31 DIAGNOSIS — M79.18 MYOFASCIAL PAIN: ICD-10-CM

## 2025-07-31 DIAGNOSIS — I35.0 AORTIC VALVE STENOSIS, ETIOLOGY OF CARDIAC VALVE DISEASE UNSPECIFIED: ICD-10-CM

## 2025-07-31 DIAGNOSIS — I10 ESSENTIAL HYPERTENSION: ICD-10-CM

## 2025-07-31 DIAGNOSIS — I47.10 SVT (SUPRAVENTRICULAR TACHYCARDIA): ICD-10-CM

## 2025-07-31 DIAGNOSIS — M62.838 NECK MUSCLE SPASM: ICD-10-CM

## 2025-07-31 DIAGNOSIS — I25.10 CORONARY ARTERY DISEASE INVOLVING NATIVE CORONARY ARTERY OF NATIVE HEART WITHOUT ANGINA PECTORIS: ICD-10-CM

## 2025-07-31 DIAGNOSIS — E78.2 MIXED HYPERLIPIDEMIA: ICD-10-CM

## 2025-07-31 DIAGNOSIS — M47.812 CERVICAL SPONDYLOSIS: Primary | ICD-10-CM

## 2025-07-31 DIAGNOSIS — R06.02 SOB (SHORTNESS OF BREATH): Primary | ICD-10-CM

## 2025-07-31 DIAGNOSIS — M54.2 CERVICALGIA: ICD-10-CM

## 2025-07-31 DIAGNOSIS — Z98.890 HISTORY OF RADIOFREQUENCY ABLATION (RFA) PROCEDURE FOR CARDIAC ARRHYTHMIA: ICD-10-CM

## 2025-07-31 PROBLEM — Z95.818 STATUS POST PLACEMENT OF IMPLANTABLE LOOP RECORDER: Status: RESOLVED | Noted: 2018-08-14 | Resolved: 2025-07-31

## 2025-07-31 PROCEDURE — G2211 COMPLEX E/M VISIT ADD ON: HCPCS | Mod: S$GLB,,, | Performed by: INTERNAL MEDICINE

## 2025-07-31 PROCEDURE — 1125F AMNT PAIN NOTED PAIN PRSNT: CPT | Mod: CPTII,S$GLB,, | Performed by: STUDENT IN AN ORGANIZED HEALTH CARE EDUCATION/TRAINING PROGRAM

## 2025-07-31 PROCEDURE — 3288F FALL RISK ASSESSMENT DOCD: CPT | Mod: CPTII,S$GLB,, | Performed by: STUDENT IN AN ORGANIZED HEALTH CARE EDUCATION/TRAINING PROGRAM

## 2025-07-31 PROCEDURE — 99999 PR PBB SHADOW E&M-EST. PATIENT-LVL III: CPT | Mod: PBBFAC,,, | Performed by: STUDENT IN AN ORGANIZED HEALTH CARE EDUCATION/TRAINING PROGRAM

## 2025-07-31 PROCEDURE — 3288F FALL RISK ASSESSMENT DOCD: CPT | Mod: CPTII,S$GLB,, | Performed by: INTERNAL MEDICINE

## 2025-07-31 PROCEDURE — 99999 PR PBB SHADOW E&M-EST. PATIENT-LVL IV: CPT | Mod: PBBFAC,,, | Performed by: INTERNAL MEDICINE

## 2025-07-31 PROCEDURE — 1160F RVW MEDS BY RX/DR IN RCRD: CPT | Mod: CPTII,S$GLB,, | Performed by: INTERNAL MEDICINE

## 2025-07-31 PROCEDURE — 1159F MED LIST DOCD IN RCRD: CPT | Mod: CPTII,S$GLB,, | Performed by: INTERNAL MEDICINE

## 2025-07-31 PROCEDURE — 1126F AMNT PAIN NOTED NONE PRSNT: CPT | Mod: CPTII,S$GLB,, | Performed by: INTERNAL MEDICINE

## 2025-07-31 PROCEDURE — 99214 OFFICE O/P EST MOD 30 MIN: CPT | Mod: S$GLB,,, | Performed by: STUDENT IN AN ORGANIZED HEALTH CARE EDUCATION/TRAINING PROGRAM

## 2025-07-31 PROCEDURE — 1101F PT FALLS ASSESS-DOCD LE1/YR: CPT | Mod: CPTII,S$GLB,, | Performed by: STUDENT IN AN ORGANIZED HEALTH CARE EDUCATION/TRAINING PROGRAM

## 2025-07-31 PROCEDURE — 99204 OFFICE O/P NEW MOD 45 MIN: CPT | Mod: S$GLB,,, | Performed by: INTERNAL MEDICINE

## 2025-07-31 PROCEDURE — 1101F PT FALLS ASSESS-DOCD LE1/YR: CPT | Mod: CPTII,S$GLB,, | Performed by: INTERNAL MEDICINE

## 2025-07-31 RX ORDER — METHOCARBAMOL 500 MG/1
500 TABLET, FILM COATED ORAL 3 TIMES DAILY PRN
Qty: 90 TABLET | Refills: 0 | Status: SHIPPED | OUTPATIENT
Start: 2025-07-31 | End: 2025-08-30

## 2025-07-31 NOTE — PROGRESS NOTES
Patient ID: Crystal Alvarado is a 84 y.o. female.    History of Present Illness    CHIEF COMPLAINT:  - Patient presents for an initial evaluation by a new cardiologist after discontinuing care with a previous provider.    HPI:  Patient, an 84-year-old female, presents for an initial cardiology evaluation. She reports a long-standing history of dyspnea, present for over a decade and stable, occurring primarily with exertion. She also reports persistent fatigue, despite minimal activity.    She has a history of balance issues and requires a walker for ambulation. She reports dizziness upon standing, affecting her mobility.  She has previously had issues with hypotension that has resulted in a decrease in her antihypertensive therapy.  No issues with hypotension at this time.    She attended a pain management appointment on [id number] for neck-related issues. An MRI has been planned to further investigate her neck pain.    She denies chest pain, swelling of lower extremities, orthopnea, recent syncope, and a history of MI, CVA, or CHF.  She has had an extensive cardiovascular workup over the years without remarkable findings with the exception of the history of SVT status post ablation in 2018.    MEDICAL HISTORY:  - HTN  - hyperlipidemia  - SVT status post ablation 2018    MEDICATIONS:  - Atorvastatin 40 mg daily  - Losartan 100 mg daily  - Amlodipine 2.5 mg daily          Physical Exam    Vitals: Pulse: 65. Blood pressure: 115/70.  Cardiovascular: 2/6 aortic stenosis murmur.  Extremities: All normal.  Lungs: All normal.  LABS:  - Hemoglobin: July 2025, 12.4  - MCV: July 2025, 101  - Platelets: July 2025, 198  - Creatinine: July 2025, 1.5  - BUN: July 2025, 30  - GFR: July 2025, 34  - Albumin: July 2025, 3.7  - Total cholesterol: July 2025, 241  - HDL: July 2025, 59  - LDL: July 2025, 158  - Triglycerides: July 2025, 120  - BNP: 2024, 80  - A1C: 2025, normal  - TSH: 2025, normal  TEST RESULTS   (IMAGING REVIEWED DURING  CLINIC VISIT):  - Echo 10/2024: normal LV size and function, LVEF 55-60%, normal RV size and function, mild AS, CVP 3  - CTA chest 10/2024: normal size aorta and heart, aortic atherosclerosis  - Mild AS  - Nuclear stress test 2024: normal LVF, no ischemia or infarct  - Cath 2015: non-obstructive CAD  - CAD on CT chest  - ECG 10/2024: SR, no Q or ST changes, PACs  - SVT ablation 2018         Assessment & Plan    R06.02 SOB (shortness of breath)  I35.0 Aortic valve stenosis, etiology of cardiac valve disease unspecified  I25.10 Coronary artery disease involving native coronary artery of native heart without angina pectoris  I10 Essential hypertension  E78.2 Mixed hyperlipidemia  Z98.890 History of radiofrequency ablation (RFA) procedure for cardiac arrhythmia  I47.10 SVT (supraventricular tachycardia)    SHORTNESS OF BREATH:   - patient with chronic dyspnea that appears noncardiac in origin.  Multiple normal stress tests, normal cardiac catheterization 2015, normal cardiovascular labs including troponins and BNPs over the years.  - expectant management from cardiovascular standpoint.    AORTIC VALVE STENOSIS, ETIOLOGY OF CARDIAC VALVE DISEASE UNSPECIFIED:  - Mild aortic valve stenosis present; no intervention needed at this time.  - Echocardiogram every few years.    CORONARY ARTERY DISEASE INVOLVING NATIVE CORONARY ARTERY OF NATIVE HEART WITHOUT ANGINA PECTORIS:  - Discontinued isosorbide mononitrate.  - okay to defer aspirin therapy from my standpoint.    ESSENTIAL HYPERTENSION:  - Blood pressure well-controlled on current medication regimen.  - Continued losartan 100 mg daily.  - Continued amlodipine 2.5 mg daily.  - okay to trim blood pressure regimen if blood pressures trend low.    MIXED HYPERLIPIDEMIA:  - Cholesterol levels slightly elevated, but acceptable given current atorvastatin dose.  - Continued atorvastatin 40 mg daily.     Long discussion with the patient and daughter  regarding overall approach to therapy.  They favor minimal intervention if possible, including a trim medicinal regimen.          This note was generated with the assistance of ambient listening technology. Verbal consent was obtained by the patient and accompanying visitor(s) for the recording of patient appointment to facilitate this note. I attest to having reviewed and edited the generated note for accuracy, though some syntax or spelling errors may persist. Please contact the author of this note for any clarification.      Follow up in about 1 year (around 7/31/2026).

## 2025-07-31 NOTE — PROGRESS NOTES
[facility] - Established Patient Evaluation    Referred by: No ref. provider found   Reason for referral: Cervical spondylosis     CC:   Chief Complaint   Patient presents with    Neck Pain         7/31/2025    11:19 AM 3/13/2025    12:08 PM   Last 3 PDI Scores   Pain Disability Index (PDI) 49 70     Interval History 7/31/2025:     Crystal Alvarado is a 84 y.o. female presents to the clinic for follow up of neck pain..  Since last visit the pain has only slightly improved.  She underwent trigger point injections at last visit and did not note much relief.  She has tried physical therapy, when continues to have pain.    The pain is located in the neck pain and radiates to the trapezius bilaterally.  The pain is described as burning.   She takes Robaxin, which helps a little.  Voltaren gel did not provide much relief.  In the past, she has been prescribed Lyrica and gabapentin but found them too sedating and ineffective.    At BEST  7/10   At WORST  9/10 on the WORST day.    On average pain is rated as 7/10.   Today the pain is rated as 7/10  Symptoms interfere with daily activity.   Exacerbating factors: Laying.    Mitigating factors medications.         Interval History 4/10/2025:     Crystal Alvarado is a 84 y.o. female presents to the clinic for follow up of her right sided neck pain.  Since last visit the pain has has slightly improved with the muscle relaxers.  She continues to have pain localized to the right cervical paraspinal muscles, trapezius muscle and SCM on the right. Denies radiation into the RUE. The pain is located in the right side neck area and does not radiate.  The pain is described as burning and sharp.  She would like to move forward with the trigger point injections today.    At BEST  7/10   At WORST  8/10 on the WORST day.    On average pain is rated as 7/10.   Today the pain is rated as 7/10  Symptoms interfere with daily activity.   Exacerbating factors:  Laying.    Mitigating factors Muscle relaxers.       Subjective 03/13/2025:   Crystal Alvarado is a 84 y.o. female who presents complaining of right-sided neck pain.  Patient states the pain started approximately 2 weeks ago.  No inciting incident or trauma.  Pain is worse with flexion of the neck.  Denies any radiation into the upper extremity or fingers.    Denies any weakness numbness or tingling.  No shoulder pain.  No history of cervical spine surgeries.  No history of shoulder surgeries. She was prescribed a Medrol Dosepak by the ED, she reports minimal relief.    Initial Pain Assessment:  Location: NECK PAIN, right-sided  Onset:  2 weeks  Current Pain Score: 9/10  Daily Pain of Range: 8-8/10  Quality: Burning and Sharp  Radiation:  Right-sided neck pain that radiates into the cervical paraspinal muscles and upper trapezius  Worsened by: extension and flexion  Improved by: medications     Patient denies night fever/night sweats, urinary incontinence, bowel incontinence, significant weight loss, significant motor weakness, and loss of sensations.      Previous Interventions:  - 04/10/2025 - trigger point injections (cervical, bilateral trapezius) - minimal relief    Previous Therapies:  PT/OT: no   Chiropractor:   HEP:  Yes  Relevant Surgery: no     Previous Medications:   - Tylenol or NSAIDS:  Tylenol, Avoid NSAIDs due to renal function  - Muscle Relaxants:  Robaxin 500 mg  - TCAs: NO  - SNRIs: NO  - Topicals: creams Voltaren gel  - Anticonvulsants:  NO  - Opioids:  Norco per PCP  - Adjuvants:     Current Pain Medications:  Tylenol  Robaxin p.r.n.  Voltaren gel    Anticoagulation: NO    Review of Systems:  ROS    GENERAL:  No weight loss, malaise or fevers.  HEENT:   No recent changes in vision or hearing  NECK:  No difficulty with swallowing. No stridor.   RESPIRATORY:  Negative for cough, wheezing or shortness of breath, patient denies any recent URI.  CARDIOVASCULAR:  Negative for chest pain, leg  "swelling or palpitations.  GI:  Negative for abdominal discomfort, blood in stools or black stools or change in bowel habits.  MUSCULOSKELETAL:  See HPI.  SKIN:  Negative for lesions, rash, and itching.  PSYCH:  No mood disorder or recent psychosocial stressors.    HEMATOLOGY/LYMPHOLOGY:  Negative for prolonged bleeding, bruising easily or swollen nodes.  Patient is not currently taking any anti-coagulants  NEURO:   No history of headaches, syncope, paralysis, seizures or tremors.  All other reviewed and negative other than HPI.    History:  Current medications, allergies, medical history, surgical history,   family history, and social history were reviewed in the chart as marked.    Full Medication List:  Current Medications[1]     Allergies:  Patient has no known allergies.     Medical History:   has a past medical history of Arthritis, CHF (congestive heart failure), Coronary artery disease, Hyperlipidemia, and Hypertension.    Surgical History:   has a past surgical history that includes Hysterectomy (N/A); A-V cardiac pacemaker insertion (N/A, 7/9/2018); Insertion of implantable loop recorder (N/A, 7/9/2018); Esophagogastroduodenoscopy (N/A, 8/4/2020); Colonoscopy; Colonoscopy (N/A, 8/5/2020); Cataract extraction w/  intraocular lens implant (Left, 11/23/2020); and cataract removal (Right, 2018).    Family History:  family history is not on file.    Social History:   reports that she quit smoking about 12 years ago. Her smoking use included cigarettes. She has never used smokeless tobacco. She reports that she does not drink alcohol and does not use drugs.    Physical Exam:  Vitals:    07/31/25 1120   BP: 115/72   Pulse: 69   Weight: 80.6 kg (177 lb 11.1 oz)   Height: 5' 6" (1.676 m)   PainSc:   7   PainLoc: Neck           GENERAL: Well appearing, in no acute distress, alert and oriented x3.  PSYCH:  Mood and affect appropriate.  SKIN: Skin color, texture, turgor normal, no rashes or lesions.  HEAD/FACE:  " Normocephalic, atraumatic. Cranial nerves grossly intact.  NECK: She has decreased ROM of the neck to the right 2/2 pain. +pain to palpation over the right cervical paraspinous muscles, trapezius and SCM. Spurling Negative.   CV: RRR with palpation of the radial artery.  PULM: No evidence of respiratory difficulty, symmetric chest rise.  GI:  Soft and non-distended.  MSK: No obvious deformities, edema, or skin discoloration.  No atrophy or tone abnormalities are noted.   NEURO: Bilateral upper and lower extremity coordination and strength is symmetric.  No loss of sensation is noted. Pike negative.  MENTAL STATUS: A x O x 3, good concentration, speech is fluent and goal directed  MOTOR: 5/5 in all muscle groups  GAIT:  Using a wheelchair     Imaging:    CERVICAL X-RAY  Results for orders placed during the hospital encounter of 03/13/25    X-Ray Cervical Spine Complete 5 view    Narrative  EXAMINATION:  XR CERVICAL SPINE COMPLETE 5 VIEW    CLINICAL HISTORY:  . Cervicalgia    TECHNIQUE:  AP, Lateral, bilateral oblique and open mouth views of the cervical spine were performed.    COMPARISON:  None    FINDINGS:  There is straightening and mild reversal the normal cervical lordosis.  The bones are osteoporotic.  There is no evidence for acute fracture or bone destruction.  There are advanced degenerative changes throughout the cervical spine with prominent anterior osteophytes as well as facet arthropathy.  There is neural foraminal narrowing due to uncinate joint hypertrophy and facet arthropathy particularly at the C3-4 and C4-5 levels on the left and the C2-3 and C3-4 levels on the right.  Prevertebral soft tissues are unremarkable.  Visualized lung apices are clear.    Impression  Osteoporosis.    Advanced cervical spondylosis resulting in neural foraminal narrowing at multiple levels.      Electronically signed by: Rony Leo MD  Date:    03/13/2025  Time:    16:06      Labs:  BMP  Lab Results   Component  Value Date     07/01/2025    K 4.5 07/01/2025     07/01/2025    CO2 23 07/01/2025    BUN 30 (H) 07/01/2025    CREATININE 1.5 (H) 07/01/2025    CALCIUM 9.4 07/01/2025    ANIONGAP 9 07/01/2025    EGFRNORACEVR 34 (L) 07/01/2025     Lab Results   Component Value Date    ALT <5 (L) 07/01/2025    AST 8 (L) 07/01/2025    ALKPHOS 54 07/01/2025    BILITOT 0.4 07/01/2025     Lab Results   Component Value Date    WBC 3.85 (L) 07/01/2025    HGB 12.4 07/01/2025    HCT 40.7 07/01/2025     (H) 07/01/2025     07/01/2025       Assessment:  Problem List Items Addressed This Visit    None  Visit Diagnoses         Cervical spondylosis    -  Primary      Cervicalgia        Relevant Orders    MRI Cervical Spine Without Contrast      Myofascial pain        Relevant Medications    methocarbamoL (ROBAXIN) 500 MG Tab      Neck muscle spasm                03/13/2025 - Crystal Alvarado is a 84 y.o. female who  has a past medical history of Arthritis, CHF (congestive heart failure), Coronary artery disease, Hyperlipidemia, and Hypertension.  By history and examination this patient has acute neck pain.  Based on exam, it appears myofascial in nature, likely a muscle strain.  I will get an x-ray to assess further however. We discussed the underlying diagnoses and multiple treatment options including conservative therapy such as ice, heat, muscle relaxant medications, Voltaren gel.  If her pain persists, we can consider trigger point injections.  Recommending holding off on trigger point injections at today's visit as she just completed a Medrol Dosepak.  The risks and benefits of each treatment option were discussed and all questions were answered.      04/10/2025-patient presents today with continued myofascial pain on the right.  Trigger point injections performed today in clinic.  Her most recent cervical x-ray was significant for severe degenerative changes in the cervical spine.  In the future consider cervical  MBB/RFA on the right at C2, C3, C4.  Referral placed to physical therapy.  Refill of Robaxin provided.    07/31/2025-patient presents today with continued neck pain.  Minimal relief with PT and trigger point injections.  I will obtain a cervical MRI.  Pending review of MRI, may consider cervical epidural steroid injection and cervical MBB/RFA.    Treatment Plan:   Procedures:    Consider IGLESIA pending MRI review  Consider cervical MBB/RFA   PT/OT/HEP: I have stressed the importance of physical activity and a home exercise plan to help with pain and improve health.  Continue with PT and home exercise as tolerated.  Medications:    - Tylenol p.r.n.   - refill methocarbamol t.i.d. 500 mg p.r.n.   - Voltaren gel PRN   - she is prescribed Norco by her PCP   -  Reviewed and consistent with medication use as prescribed.  Imaging:  Cervical x-ray reviewed with the patient today.  Cervical MRI ordered.  Follow Up:  After cervical MRI to discuss results and next steps regarding ongoing management of her neck pain    Kassidy Gomez, DO   Interventional Pain Medicine / Anesthesiology    Disclaimer: This note was partly generated using dictation software which may occasionally result in transcription errors.           [1]   Current Outpatient Medications:     acetaminophen (TYLENOL) 325 MG tablet, Take 2 tablets (650 mg total) by mouth every 6 (six) hours as needed for Pain., Disp: , Rfl: 0    amLODIPine (NORVASC) 2.5 MG tablet, Take 2 tablets (5 mg total) by mouth once daily., Disp: 180 tablet, Rfl: 3    aspirin (ECOTRIN) 81 MG EC tablet, Take 1 tablet (81 mg total) by mouth once daily., Disp: 90 tablet, Rfl: 3    atorvastatin (LIPITOR) 40 MG tablet, Take 1 tablet (40 mg total) by mouth once daily., Disp: 90 tablet, Rfl: 3    diclofenac sodium (VOLTAREN) 1 % Gel, Apply 2 g topically 4 (four) times daily as needed (pain)., Disp: 2 each, Rfl: 3    HYDROcodone-acetaminophen (NORCO) 5-325 mg per tablet, Take 1 tablet by mouth 2  (two) times daily as needed., Disp: , Rfl:     isosorbide mononitrate (IMDUR) 30 MG 24 hr tablet, Take 1 tablet (30 mg total) by mouth once daily., Disp: 30 tablet, Rfl: 11    levocetirizine (XYZAL) 5 MG tablet, , Disp: , Rfl:     losartan (COZAAR) 100 MG tablet, TAKE 1 TABLET BY MOUTH EVERY DAY, Disp: 90 tablet, Rfl: 3    methocarbamoL (ROBAXIN) 500 MG Tab, Take 1 tablet (500 mg total) by mouth 3 (three) times daily as needed (muscle pain)., Disp: 90 tablet, Rfl: 0    methylPREDNISolone (MEDROL DOSEPACK) 4 mg tablet, Take as directed on the package, Disp: 1 each, Rfl: 0    montelukast (SINGULAIR) 10 mg tablet, Take 10 mg by mouth., Disp: , Rfl:     nitroGLYCERIN (NITROSTAT) 0.4 MG SL tablet, Place 1 tablet (0.4 mg total) under the tongue every 5 (five) minutes as needed for Chest pain (and notify MD)., Disp: 25 tablet, Rfl: 5

## 2025-08-02 ENCOUNTER — HOSPITAL ENCOUNTER (EMERGENCY)
Facility: HOSPITAL | Age: 85
Discharge: HOME | End: 2025-08-03
Attending: EMERGENCY MEDICINE
Payer: MEDICARE

## 2025-08-02 DIAGNOSIS — M25.561 RIGHT KNEE PAIN: ICD-10-CM

## 2025-08-02 DIAGNOSIS — M25.519 SHOULDER PAIN: ICD-10-CM

## 2025-08-02 DIAGNOSIS — M25.551 BILATERAL HIP PAIN: ICD-10-CM
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
## 2025-08-02 DIAGNOSIS — M25.552 BILATERAL HIP PAIN: ICD-10-CM

## 2025-08-02 PROCEDURE — 99285 EMERGENCY DEPT VISIT HI MDM: CPT | Mod: 25

## 2025-08-02 RX ORDER — OXYCODONE AND ACETAMINOPHEN 7.5; 325 MG/1; MG/1
1 TABLET ORAL EVERY 4 HOURS PRN
Refills: 0 | Status: DISCONTINUED | OUTPATIENT
Start: 2025-08-02 | End: 2025-08-03 | Stop reason: HOSPADM

## 2025-08-02 RX ORDER — MORPHINE SULFATE 4 MG/ML
4 INJECTION, SOLUTION INTRAMUSCULAR; INTRAVENOUS
Refills: 0 | Status: DISCONTINUED | OUTPATIENT
Start: 2025-08-02 | End: 2025-08-02

## 2025-08-03 VITALS
WEIGHT: 170 LBS | SYSTOLIC BLOOD PRESSURE: 192 MMHG | HEIGHT: 66 IN | BODY MASS INDEX: 27.32 KG/M2 | HEART RATE: 58 BPM | TEMPERATURE: 98 F | DIASTOLIC BLOOD PRESSURE: 79 MMHG | RESPIRATION RATE: 22 BRPM | OXYGEN SATURATION: 98 %

## 2025-08-03 PROCEDURE — 25000003 PHARM REV CODE 250: Performed by: EMERGENCY MEDICINE

## 2025-08-03 RX ORDER — OXYCODONE AND ACETAMINOPHEN 7.5; 325 MG/1; MG/1
1 TABLET ORAL EVERY 6 HOURS PRN
Qty: 10 TABLET | Refills: 0 | Status: SHIPPED | OUTPATIENT
Start: 2025-08-03

## 2025-08-03 RX ADMIN — OXYCODONE HYDROCHLORIDE AND ACETAMINOPHEN 1 TABLET: 7.5; 325 TABLET ORAL at 12:08

## 2025-08-03 NOTE — PLAN OF CARE
Sw was unable to complete assessment. Pt's nurse was at bedside.       Linnea Medina LMSW  Case Management  Emergency Department  [phone]

## 2025-08-03 NOTE — PROVIDER PROGRESS NOTES - EMERGENCY DEPT.
Imaging Results              CT Head Without Contrast (Final result)  Result time 08/02/25 23:49:36      Final result by Anton Bean MD (08/02/25 23:49:36)                   Impression:      No evidence of acute intracranial hemorrhage.    No fracture or traumatic malalignment in the cervical spine.    Multiple anterior bridging osteophytes, suggestive of DISH.    Electronically signed by resident: Mark Godfrey  Date:    08/02/2025  Time:    23:17    Electronically signed by: Anton Bean MD  Date:    08/02/2025  Time:    23:49               Narrative:    EXAMINATION:  CT HEAD WITHOUT CONTRAST; CT CERVICAL SPINE WITHOUT CONTRAST    CLINICAL HISTORY:  Head trauma, moderate-severe;; Neck trauma (Age >= 65y);    TECHNIQUE:  Low dose axial CT images obtained throughout the head and cervical spine without intravenous contrast.  Axial, sagittal and coronal reconstructions were performed.    COMPARISON:  MRI brain 12/25/2022 CT head 12/22/2025, CT C-spine 07/04/2018.    FINDINGS:  Head:    Generalized cerebral volume loss with compensatory enlargement of the ventricles and sulci.  No hydrocephalus.    Mild patchy hypoattenuation in the supratentorial white matter, nonspecific but most likely reflecting chronic microvascular ischemic changes. No recent or remote major vascular distribution infarct. No acute hemorrhage.  No mass effect or midline shift.    No extra-axial blood or fluid collections.    No displaced calvarial fracture.    Mastoid air cells and paranasal sinuses are essentially clear.    Spine:    The craniocervical junction is intact.  The predental space is maintained.  No prevertebral soft tissue swelling is identified.    There is reversal of cervical lordosis.  The vertebral body heights are maintained.  There is demineralization of the osseous structures.    The vertebral body heights are maintained.  The C1 ring is intact.  The occipital condyles are within normal limits.  There is no evidence of  acute fracture or listhesis of the cervical spine.    Multilevel posterior disc osteophyte complex, uncovertebral spurring, and facet arthropathy.  Findings are most significant at C2-C3 and C3-C4 with mild-to-moderate spinal canal stenosis and severe bilateral neural foraminal narrowing.    There are multiple anterior bridging osteophytes with chronic fracture deformities.  There is a calcified central disc protrusion at the T1-T2 level.  Some component of OPLL may present within this region.    Calcific atherosclerosis of the right brachiocephalic artery.  Pleuroparenchymal scarring at the left lung apex.  No apical pneumothorax.                                       CT Cervical Spine Without Contrast (Final result)  Result time 08/02/25 23:49:36      Final result by Anton Bean MD (08/02/25 23:49:36)                   Impression:      No evidence of acute intracranial hemorrhage.    No fracture or traumatic malalignment in the cervical spine.    Multiple anterior bridging osteophytes, suggestive of DISH.    Electronically signed by resident: Mark Godfrey  Date:    08/02/2025  Time:    23:17    Electronically signed by: Anton Bean MD  Date:    08/02/2025  Time:    23:49               Narrative:    EXAMINATION:  CT HEAD WITHOUT CONTRAST; CT CERVICAL SPINE WITHOUT CONTRAST    CLINICAL HISTORY:  Head trauma, moderate-severe;; Neck trauma (Age >= 65y);    TECHNIQUE:  Low dose axial CT images obtained throughout the head and cervical spine without intravenous contrast.  Axial, sagittal and coronal reconstructions were performed.    COMPARISON:  MRI brain 12/25/2022 CT head 12/22/2025, CT C-spine 07/04/2018.    FINDINGS:  Head:    Generalized cerebral volume loss with compensatory enlargement of the ventricles and sulci.  No hydrocephalus.    Mild patchy hypoattenuation in the supratentorial white matter, nonspecific but most likely reflecting chronic microvascular ischemic changes. No recent or remote major  vascular distribution infarct. No acute hemorrhage.  No mass effect or midline shift.    No extra-axial blood or fluid collections.    No displaced calvarial fracture.    Mastoid air cells and paranasal sinuses are essentially clear.    Spine:    The craniocervical junction is intact.  The predental space is maintained.  No prevertebral soft tissue swelling is identified.    There is reversal of cervical lordosis.  The vertebral body heights are maintained.  There is demineralization of the osseous structures.    The vertebral body heights are maintained.  The C1 ring is intact.  The occipital condyles are within normal limits.  There is no evidence of acute fracture or listhesis of the cervical spine.    Multilevel posterior disc osteophyte complex, uncovertebral spurring, and facet arthropathy.  Findings are most significant at C2-C3 and C3-C4 with mild-to-moderate spinal canal stenosis and severe bilateral neural foraminal narrowing.    There are multiple anterior bridging osteophytes with chronic fracture deformities.  There is a calcified central disc protrusion at the T1-T2 level.  Some component of OPLL may present within this region.    Calcific atherosclerosis of the right brachiocephalic artery.  Pleuroparenchymal scarring at the left lung apex.  No apical pneumothorax.                                       X-Ray Pelvis Routine AP (Final result)  Result time 08/03/25 00:05:25      Final result by Jesse Barcenas MD (08/03/25 00:05:25)                   Impression:      No acute findings evident pelvis and since 02/10 with progression osteoarthritic changes.      Electronically signed by: Jesse Barcenas  Date:    08/03/2025  Time:    00:05               Narrative:    EXAMINATION:  XR PELVIS ROUTINE AP    CLINICAL HISTORY:  Pain in right hip    TECHNIQUE:  AP view of the pelvis was performed.    COMPARISON:  06/11/2010    FINDINGS:  Degenerative changes pubic symphysis SI joints and spine as well as  the hips.  No fracture or dislocation.  No destructive process.                                       X-Ray Knee 3 View Right (Final result)  Result time 08/03/25 00:07:32      Final result by Jesse Barcenas MD (08/03/25 00:07:32)                   Impression:      Severe tricompartment osteoarthritis with no acute findings evident the right knee.      Electronically signed by: Jesse Barcenas  Date:    08/03/2025  Time:    00:07               Narrative:    EXAMINATION:  XR KNEE 3 VIEW RIGHT    CLINICAL HISTORY:  Pain in right knee    TECHNIQUE:  AP, lateral, and Merchant views of the right knee were performed.    COMPARISON:  12/25/2022    FINDINGS:  Tricompartment osteoarthritis is again noted with soft tissue calcifications likely in the suprapatellar bursa suggesting synovial osteochondromatosis.  No acute fracture.  No significant effusion.                                       X-Ray Shoulder Trauma 3 View Bilateral (Final result)  Result time 08/03/25 00:11:26   Procedure changed from X-Ray Shoulder Trauma Right     Final result by Jesse Barcenas MD (08/03/25 00:11:26)                   Impression:      Severe bilateral osteoarthritis with no acute findings by plain film.      Electronically signed by: Jesse Barcenas  Date:    08/03/2025  Time:    00:11               Narrative:    EXAMINATION:  XR SHOULDER TRAUMA 3 VIEW BILATERAL    CLINICAL HISTORY:  pain;Pain in unspecified shoulder    TECHNIQUE:  Three views of each shoulder were performed.    COMPARISON:  07/04/2018    FINDINGS:  Healed left clavicular fracture.    Severe osteoarthritic changes are noted throughout the glenohumeral and AC joints bilaterally.  No acute fracture or dislocation.  Adjacent chest wall and soft tissues unremarkable as imaged.                                     Patient is signed out pending CT head and C-spine.  X-rays reviewed and negative on my interpretation.  She has not taken any of her narcotic pain  medications since earlier in the morning, and we are having difficulty placing an IV to give the IV morphine that has been ordered.  Plan on giving Percocet 7.5-325mg for pain control while pending CT imaging.    CT imaging is negative for ICH or other C-spine fracture.  She does have signs of dish likely the cause of her chronic neck pain.  She sees pain management, though her hydrocodone is prescribed through her primary physician.  No concerning prescribing habits on her .  Norco 5 was not helping her pain at home, she was given Percocet 7.5 in the ER.  Upon reassessment, she reports her pain has significantly improved.  She tolerated this dose without side effect.  Plan on discharging with short course for pain control throughout the weekend with plan on follow up with PCP for further pain management and prescriptions.

## 2025-08-03 NOTE — ED PROVIDER NOTES
Encounter Date: 8/2/2025       History     Chief Complaint   Patient presents with    Fall     Knee gave out. Complaining of right knee and left shoulder pain     84-year-old female with past medical history of CAD, HLD, HTN, CHF who presents to the emergency department for evaluation status post mechanical fall that occurred 2 days ago.  Patient states she went to go sit down and the chair broke from under her, causing her to fall.  She is unsure if she hit her head.  She is complaining of neck pain, bilateral shoulder pain, bilateral hip pain, right knee pain.  She denies any prodromal symptoms.  She denies any fever, chills, chest pain, shortness of breath, abdominal pain, back pain, loss of consciousness, headache, nausea, vomiting, focal weakness, and any other symptoms.  She denies any anticoagulant use        Review of patient's allergies indicates:  No Known Allergies  Past Medical History:   Diagnosis Date    Arthritis     CHF (congestive heart failure)     Coronary artery disease     Hyperlipidemia     Hypertension      Past Surgical History:   Procedure Laterality Date    A-V CARDIAC PACEMAKER INSERTION N/A 7/9/2018    Procedure: INSERTION, CARDIAC PACEMAKER, DUAL CHAMBER;  Surgeon: Archie Montez MD;  Location: [facility] CATH LAB;  Service: Cardiology;  Laterality: N/A;  SVT, RFA, +/- Dual PPM or ILR, IGLESIA, MDT, MAC, [place], 3 Prep    CATARACT EXTRACTION W/  INTRAOCULAR LENS IMPLANT Left 11/23/2020    Procedure: EXTRACTION, CATARACT, WITH IOL INSERTION;  Surgeon: Roslyn Napier MD;  Location: [facility] OR;  Service: Ophthalmology;  Laterality: Left;    cataract removal Right 2018    [facility]    COLONOSCOPY      COLONOSCOPY N/A 8/5/2020    Procedure: COLONOSCOPY;  Surgeon: Sebas Dickens MD;  Location: [facility] (2ND FLR);  Service: Endoscopy;  Laterality: N/A;    ESOPHAGOGASTRODUODENOSCOPY N/A 8/4/2020    Procedure: EGD (ESOPHAGOGASTRODUODENOSCOPY);  Surgeon: Fernie Cohen MD;  Location: [facility] (2ND FLR);   Service: Endoscopy;  Laterality: N/A;    HYSTERECTOMY N/A     INSERTION OF IMPLANTABLE LOOP RECORDER N/A 7/9/2018    Procedure: PLACEMENT-LOOP RECORDER;  Surgeon: Archie Montez MD;  Location: [facility] CATH LAB;  Service: Cardiology;  Laterality: N/A;  SVT, RFA, +/- Dual PPM or ILR, IGLESIA, MDT, MAC, [place], 3 Prep     No family history on file.  Social History[1]  Review of Systems    Physical Exam     Initial Vitals [08/02/25 1949]   BP Pulse Resp Temp SpO2   138/63 71 16 98.2 °F (36.8 °C) 99 %      MAP       --         Physical Exam    Nursing note and vitals reviewed.  Constitutional: She appears well-developed and well-nourished. She is not diaphoretic. No distress.   HENT:   Head: Normocephalic and atraumatic. Mouth/Throat: Oropharynx is clear and moist.   Eyes: EOM are normal.   Neck: Neck supple.   Normal range of motion.  Cardiovascular:  Normal rate, regular rhythm, normal heart sounds and intact distal pulses.           Pulmonary/Chest: Breath sounds normal. No respiratory distress. She has no wheezes. She has no rhonchi. She has no rales. She exhibits no tenderness.   Abdominal: Abdomen is soft. She exhibits no distension. There is no abdominal tenderness.   Musculoskeletal:         General: Normal range of motion.      Cervical back: Normal range of motion and neck supple.      Comments: There is some tenderness to her right trapezius region.  Bilateral shoulders with full range of motion.  No obvious deformity.     Neck: Supple. No cervical midline bony tenderness, deformities, or step-offs.   Back: No midline bony tenderness, deformities, or step-offs of the T-spine or L-spine. Skin appears normal without abrasions or bruising. No erythema, induration, or fluctuance.   Neurological: Awake and alert. Appropriate for age. No strength deficit; equal and 5/5 in bilateral upper and lower extremities. No acute focal neurological deficits noted.    Right Knee:  No obvious deformity. There is edema and tenderness. No  increased warmth, erythema, induration or fluctuance.  No ligament laxity. DP and PT pulses are 2+.  Normal capillary refill.  Distal sensation is intact.       Neurological: She is alert and oriented to person, place, and time. She has normal strength.   Skin: Skin is warm and dry. Capillary refill takes less than 2 seconds.   Psychiatric: She has a normal mood and affect.         ED Course   Procedures  Labs Reviewed - No data to display       Imaging Results              CT Head Without Contrast (Final result)  Result time 08/02/25 23:49:36      Final result by Anton Bean MD (08/02/25 23:49:36)                   Impression:      No evidence of acute intracranial hemorrhage.    No fracture or traumatic malalignment in the cervical spine.    Multiple anterior bridging osteophytes, suggestive of DISH.    Electronically signed by resident: Mark Godfrey  Date:    08/02/2025  Time:    23:17    Electronically signed by: Anton Bean MD  Date:    08/02/2025  Time:    23:49               Narrative:    EXAMINATION:  CT HEAD WITHOUT CONTRAST; CT CERVICAL SPINE WITHOUT CONTRAST    CLINICAL HISTORY:  Head trauma, moderate-severe;; Neck trauma (Age >= 65y);    TECHNIQUE:  Low dose axial CT images obtained throughout the head and cervical spine without intravenous contrast.  Axial, sagittal and coronal reconstructions were performed.    COMPARISON:  MRI brain 12/25/2022 CT head 12/22/2025, CT C-spine 07/04/2018.    FINDINGS:  Head:    Generalized cerebral volume loss with compensatory enlargement of the ventricles and sulci.  No hydrocephalus.    Mild patchy hypoattenuation in the supratentorial white matter, nonspecific but most likely reflecting chronic microvascular ischemic changes. No recent or remote major vascular distribution infarct. No acute hemorrhage.  No mass effect or midline shift.    No extra-axial blood or fluid collections.    No displaced calvarial fracture.    Mastoid air cells and paranasal sinuses are  essentially clear.    Spine:    The craniocervical junction is intact.  The predental space is maintained.  No prevertebral soft tissue swelling is identified.    There is reversal of cervical lordosis.  The vertebral body heights are maintained.  There is demineralization of the osseous structures.    The vertebral body heights are maintained.  The C1 ring is intact.  The occipital condyles are within normal limits.  There is no evidence of acute fracture or listhesis of the cervical spine.    Multilevel posterior disc osteophyte complex, uncovertebral spurring, and facet arthropathy.  Findings are most significant at C2-C3 and C3-C4 with mild-to-moderate spinal canal stenosis and severe bilateral neural foraminal narrowing.    There are multiple anterior bridging osteophytes with chronic fracture deformities.  There is a calcified central disc protrusion at the T1-T2 level.  Some component of OPLL may present within this region.    Calcific atherosclerosis of the right brachiocephalic artery.  Pleuroparenchymal scarring at the left lung apex.  No apical pneumothorax.                                       CT Cervical Spine Without Contrast (Final result)  Result time 08/02/25 23:49:36      Final result by Anton Bean MD (08/02/25 23:49:36)                   Impression:      No evidence of acute intracranial hemorrhage.    No fracture or traumatic malalignment in the cervical spine.    Multiple anterior bridging osteophytes, suggestive of DISH.    Electronically signed by resident: Mark Godfrey  Date:    08/02/2025  Time:    23:17    Electronically signed by: Anton Bean MD  Date:    08/02/2025  Time:    23:49               Narrative:    EXAMINATION:  CT HEAD WITHOUT CONTRAST; CT CERVICAL SPINE WITHOUT CONTRAST    CLINICAL HISTORY:  Head trauma, moderate-severe;; Neck trauma (Age >= 65y);    TECHNIQUE:  Low dose axial CT images obtained throughout the head and cervical spine without intravenous contrast.  Axial,  sagittal and coronal reconstructions were performed.    COMPARISON:  MRI brain 12/25/2022 CT head 12/22/2025, CT C-spine 07/04/2018.    FINDINGS:  Head:    Generalized cerebral volume loss with compensatory enlargement of the ventricles and sulci.  No hydrocephalus.    Mild patchy hypoattenuation in the supratentorial white matter, nonspecific but most likely reflecting chronic microvascular ischemic changes. No recent or remote major vascular distribution infarct. No acute hemorrhage.  No mass effect or midline shift.    No extra-axial blood or fluid collections.    No displaced calvarial fracture.    Mastoid air cells and paranasal sinuses are essentially clear.    Spine:    The craniocervical junction is intact.  The predental space is maintained.  No prevertebral soft tissue swelling is identified.    There is reversal of cervical lordosis.  The vertebral body heights are maintained.  There is demineralization of the osseous structures.    The vertebral body heights are maintained.  The C1 ring is intact.  The occipital condyles are within normal limits.  There is no evidence of acute fracture or listhesis of the cervical spine.    Multilevel posterior disc osteophyte complex, uncovertebral spurring, and facet arthropathy.  Findings are most significant at C2-C3 and C3-C4 with mild-to-moderate spinal canal stenosis and severe bilateral neural foraminal narrowing.    There are multiple anterior bridging osteophytes with chronic fracture deformities.  There is a calcified central disc protrusion at the T1-T2 level.  Some component of OPLL may present within this region.    Calcific atherosclerosis of the right brachiocephalic artery.  Pleuroparenchymal scarring at the left lung apex.  No apical pneumothorax.                                       X-Ray Pelvis Routine AP (Final result)  Result time 08/03/25 00:05:25      Final result by Jesse Barcenas MD (08/03/25 00:05:25)                   Impression:       No acute findings evident pelvis and since 02/10 with progression osteoarthritic changes.      Electronically signed by: Jesse Barcenas  Date:    08/03/2025  Time:    00:05               Narrative:    EXAMINATION:  XR PELVIS ROUTINE AP    CLINICAL HISTORY:  Pain in right hip    TECHNIQUE:  AP view of the pelvis was performed.    COMPARISON:  06/11/2010    FINDINGS:  Degenerative changes pubic symphysis SI joints and spine as well as the hips.  No fracture or dislocation.  No destructive process.                                       X-Ray Knee 3 View Right (Final result)  Result time 08/03/25 00:07:32      Final result by Jesse Barcenas MD (08/03/25 00:07:32)                   Impression:      Severe tricompartment osteoarthritis with no acute findings evident the right knee.      Electronically signed by: Jesse Barcenas  Date:    08/03/2025  Time:    00:07               Narrative:    EXAMINATION:  XR KNEE 3 VIEW RIGHT    CLINICAL HISTORY:  Pain in right knee    TECHNIQUE:  AP, lateral, and Merchant views of the right knee were performed.    COMPARISON:  12/25/2022    FINDINGS:  Tricompartment osteoarthritis is again noted with soft tissue calcifications likely in the suprapatellar bursa suggesting synovial osteochondromatosis.  No acute fracture.  No significant effusion.                                       X-Ray Shoulder Trauma 3 View Bilateral (Final result)  Result time 08/03/25 00:11:26   Procedure changed from X-Ray Shoulder Trauma Right     Final result by Jesse Barcenas MD (08/03/25 00:11:26)                   Impression:      Severe bilateral osteoarthritis with no acute findings by plain film.      Electronically signed by: Jesse Barcenas  Date:    08/03/2025  Time:    00:11               Narrative:    EXAMINATION:  XR SHOULDER TRAUMA 3 VIEW BILATERAL    CLINICAL HISTORY:  pain;Pain in unspecified shoulder    TECHNIQUE:  Three views of each shoulder were  performed.    COMPARISON:  07/04/2018    FINDINGS:  Healed left clavicular fracture.    Severe osteoarthritic changes are noted throughout the glenohumeral and AC joints bilaterally.  No acute fracture or dislocation.  Adjacent chest wall and soft tissues unremarkable as imaged.                                       Medications - No data to display    Medical Decision Making  84-year-old female with past medical history of CAD, HLD, HTN, CHF who presents to the emergency department for evaluation status post mechanical fall that occurred 2 days ago. Physical exam as above    Based on available information and the initial assessment, the working differential diagnoses considered during this evaluation include but are not limited to ICH, fracture, dislocation, sprain    Obtaining imaging, medicate, and will reassess    Patient signed out to my colleague at the end of my shift with instructions to follow-up pending work-up. Patient signed out with imaging pending.         Amount and/or Complexity of Data Reviewed  Radiology: ordered.    Risk  Prescription drug management.              Attending Attestation:     Physician Attestation Statement for NP/PA:   I personally made/approved the management plan and take responsibility for the patient management.    Other NP/PA Attestation Additions:    History of Present Illness: Fall                                      Clinical Impression:  Final diagnoses:  [M25.551, M25.552] Bilateral hip pain  [M25.561] Right knee pain  [M25.519] Shoulder pain  [W19.XXXA] Fall, initial encounter (Primary)      ED Disposition Condition    Discharge Stable          ED Prescriptions       Medication Sig Dispense Start Date End Date Auth. Provider    oxyCODONE-acetaminophen (PERCOCET) 7.5-325 mg per tablet Take 1 tablet by mouth every 6 (six) hours as needed for Pain. 10 tablet 8/3/2025 -- Brandie Freed MD          Follow-up Information       Follow up With Specialties Details Why  Contact Info    Shantell Goff MD Family Medicine   [address]  [address]  [place] [facility] [address]  [phone]                   Jenna Odonnell PA-C  [phone]         [1]   Social History  Tobacco Use    Smoking status: Former     Current packs/day: 0.00     Types: Cigarettes     Quit date: 2013     Years since quittin.2    Smokeless tobacco: Never   Substance Use Topics    Alcohol use: No    Drug use: No        Dong Wang III, MD  [phone]

## 2025-08-04 ENCOUNTER — PATIENT MESSAGE (OUTPATIENT)
Dept: PAIN MEDICINE | Facility: CLINIC | Age: 85
End: 2025-08-04
Payer: MEDICARE

## (undated) DEVICE — CASSETTE INFINITI

## (undated) DEVICE — SOL BETADINE 5%

## (undated) DEVICE — Device

## (undated) DEVICE — SYR SLIP TIP 1CC

## (undated) DEVICE — GLOVE BIOGEL SKINSENSE PI 7.5

## (undated) DEVICE — GLOVE BIOGEL SKINSENSE PI 6.5